# Patient Record
Sex: FEMALE | Race: WHITE | Employment: OTHER | URBAN - NONMETROPOLITAN AREA
[De-identification: names, ages, dates, MRNs, and addresses within clinical notes are randomized per-mention and may not be internally consistent; named-entity substitution may affect disease eponyms.]

---

## 2017-03-22 ENCOUNTER — OFFICE VISIT (OUTPATIENT)
Dept: FAMILY MEDICINE CLINIC | Age: 67
End: 2017-03-22

## 2017-03-22 VITALS
HEIGHT: 61 IN | OXYGEN SATURATION: 97 % | DIASTOLIC BLOOD PRESSURE: 84 MMHG | SYSTOLIC BLOOD PRESSURE: 120 MMHG | HEART RATE: 73 BPM | BODY MASS INDEX: 47.01 KG/M2 | WEIGHT: 249 LBS

## 2017-03-22 DIAGNOSIS — R29.898 RIGIDITY: ICD-10-CM

## 2017-03-22 DIAGNOSIS — Z11.59 NEED FOR HEPATITIS C SCREENING TEST: ICD-10-CM

## 2017-03-22 DIAGNOSIS — I10 ESSENTIAL HYPERTENSION: ICD-10-CM

## 2017-03-22 DIAGNOSIS — R25.1 TREMOR: Primary | ICD-10-CM

## 2017-03-22 DIAGNOSIS — M16.11 PRIMARY OSTEOARTHRITIS OF RIGHT HIP: ICD-10-CM

## 2017-03-22 DIAGNOSIS — Z12.11 SCREEN FOR COLON CANCER: ICD-10-CM

## 2017-03-22 DIAGNOSIS — M25.551 RIGHT HIP PAIN: ICD-10-CM

## 2017-03-22 PROCEDURE — 99203 OFFICE O/P NEW LOW 30 MIN: CPT | Performed by: FAMILY MEDICINE

## 2017-03-22 RX ORDER — DICLOFENAC SODIUM AND MISOPROSTOL 75; 200 MG/1; UG/1
TABLET, DELAYED RELEASE ORAL
COMMUNITY
Start: 2017-03-06 | End: 2017-08-17

## 2017-03-24 DIAGNOSIS — I10 ESSENTIAL HYPERTENSION: ICD-10-CM

## 2017-03-24 DIAGNOSIS — Z11.59 NEED FOR HEPATITIS C SCREENING TEST: ICD-10-CM

## 2017-03-24 DIAGNOSIS — R25.1 TREMOR: ICD-10-CM

## 2017-03-24 LAB
ALBUMIN SERPL-MCNC: 4.2 G/DL (ref 3.9–4.9)
ALP BLD-CCNC: 61 U/L (ref 40–130)
ALT SERPL-CCNC: 24 U/L (ref 0–33)
ANION GAP SERPL CALCULATED.3IONS-SCNC: 16 MEQ/L (ref 7–13)
AST SERPL-CCNC: 20 U/L (ref 0–35)
BILIRUB SERPL-MCNC: 0.4 MG/DL (ref 0–1.2)
BUN BLDV-MCNC: 20 MG/DL (ref 8–23)
CALCIUM SERPL-MCNC: 10 MG/DL (ref 8.6–10.2)
CHLORIDE BLD-SCNC: 100 MEQ/L (ref 98–107)
CHOLESTEROL, TOTAL: 128 MG/DL (ref 0–199)
CO2: 25 MEQ/L (ref 22–29)
CREAT SERPL-MCNC: 0.78 MG/DL (ref 0.5–0.9)
GFR AFRICAN AMERICAN: >60
GFR NON-AFRICAN AMERICAN: >60
GLOBULIN: 2.7 G/DL (ref 2.3–3.5)
GLUCOSE BLD-MCNC: 94 MG/DL (ref 74–109)
HCT VFR BLD CALC: 44 % (ref 37–47)
HDLC SERPL-MCNC: 44 MG/DL (ref 40–59)
HEMOGLOBIN: 14.5 G/DL (ref 12–16)
HEPATITIS C ANTIBODY INTERPRETATION: NORMAL
LDL CHOLESTEROL CALCULATED: 69 MG/DL (ref 0–129)
MCH RBC QN AUTO: 29.2 PG (ref 27–31.3)
MCHC RBC AUTO-ENTMCNC: 33 % (ref 33–37)
MCV RBC AUTO: 88.5 FL (ref 82–100)
PDW BLD-RTO: 13.8 % (ref 11.5–14.5)
PLATELET # BLD: 307 K/UL (ref 130–400)
POTASSIUM SERPL-SCNC: 4.5 MEQ/L (ref 3.5–5.1)
RBC # BLD: 4.97 M/UL (ref 4.2–5.4)
SODIUM BLD-SCNC: 141 MEQ/L (ref 132–144)
TOTAL PROTEIN: 6.9 G/DL (ref 6.4–8.1)
TRIGL SERPL-MCNC: 74 MG/DL (ref 0–200)
TSH SERPL DL<=0.05 MIU/L-ACNC: 4.86 UIU/ML (ref 0.27–4.2)
WBC # BLD: 8.2 K/UL (ref 4.8–10.8)

## 2017-04-20 ENCOUNTER — HOSPITAL ENCOUNTER (OUTPATIENT)
Dept: GENERAL RADIOLOGY | Age: 67
Discharge: HOME | End: 2017-04-20
Payer: COMMERCIAL

## 2017-04-20 DIAGNOSIS — M16.10 DEGENERATIVE JOINT DISEASE OF PELVIC REGION: ICD-10-CM

## 2017-04-20 PROCEDURE — 2500000003 HC RX 250 WO HCPCS: Performed by: RADIOLOGY

## 2017-04-20 PROCEDURE — 20610 DRAIN/INJ JOINT/BURSA W/O US: CPT

## 2017-04-20 PROCEDURE — 6360000002 HC RX W HCPCS: Performed by: RADIOLOGY

## 2017-04-20 RX ORDER — TRIAMCINOLONE ACETONIDE 40 MG/ML
80 INJECTION, SUSPENSION INTRA-ARTICULAR; INTRAMUSCULAR ONCE
Status: COMPLETED | OUTPATIENT
Start: 2017-04-20 | End: 2017-04-20

## 2017-04-20 RX ORDER — BUPIVACAINE HYDROCHLORIDE 5 MG/ML
30 INJECTION, SOLUTION EPIDURAL; INTRACAUDAL ONCE
Status: COMPLETED | OUTPATIENT
Start: 2017-04-20 | End: 2017-04-20

## 2017-04-20 RX ORDER — LIDOCAINE HYDROCHLORIDE 20 MG/ML
20 INJECTION, SOLUTION INFILTRATION; PERINEURAL ONCE
Status: COMPLETED | OUTPATIENT
Start: 2017-04-20 | End: 2017-04-20

## 2017-04-20 RX ADMIN — TRIAMCINOLONE ACETONIDE 80 MG: 40 INJECTION, SUSPENSION INTRA-ARTICULAR; INTRAMUSCULAR at 09:05

## 2017-04-20 RX ADMIN — BUPIVACAINE HYDROCHLORIDE 3 MG: 5 INJECTION, SOLUTION EPIDURAL; INTRACAUDAL; PERINEURAL at 09:00

## 2017-04-20 RX ADMIN — LIDOCAINE HYDROCHLORIDE 12 ML: 20 INJECTION, SOLUTION INFILTRATION; PERINEURAL at 09:10

## 2017-07-31 ENCOUNTER — OFFICE VISIT (OUTPATIENT)
Dept: FAMILY MEDICINE CLINIC | Age: 67
End: 2017-07-31

## 2017-07-31 VITALS
SYSTOLIC BLOOD PRESSURE: 128 MMHG | HEART RATE: 87 BPM | WEIGHT: 207 LBS | DIASTOLIC BLOOD PRESSURE: 82 MMHG | HEIGHT: 61 IN | BODY MASS INDEX: 39.08 KG/M2 | OXYGEN SATURATION: 97 %

## 2017-07-31 DIAGNOSIS — G20 PARKINSONISM, UNSPECIFIED PARKINSONISM TYPE (HCC): ICD-10-CM

## 2017-07-31 DIAGNOSIS — Z23 NEED FOR PNEUMOCOCCAL VACCINATION: ICD-10-CM

## 2017-07-31 DIAGNOSIS — I10 ESSENTIAL HYPERTENSION: ICD-10-CM

## 2017-07-31 DIAGNOSIS — Z12.11 COLON CANCER SCREENING: ICD-10-CM

## 2017-07-31 DIAGNOSIS — R19.5 POSITIVE FIT (FECAL IMMUNOCHEMICAL TEST): ICD-10-CM

## 2017-07-31 DIAGNOSIS — M16.11 PRIMARY OSTEOARTHRITIS OF RIGHT HIP: Primary | ICD-10-CM

## 2017-07-31 LAB
CONTROL: YES
HEMOCCULT STL QL: ABNORMAL

## 2017-07-31 PROCEDURE — 90471 IMMUNIZATION ADMIN: CPT | Performed by: FAMILY MEDICINE

## 2017-07-31 PROCEDURE — 99213 OFFICE O/P EST LOW 20 MIN: CPT | Performed by: FAMILY MEDICINE

## 2017-07-31 PROCEDURE — 82274 ASSAY TEST FOR BLOOD FECAL: CPT | Performed by: FAMILY MEDICINE

## 2017-07-31 PROCEDURE — 90670 PCV13 VACCINE IM: CPT | Performed by: FAMILY MEDICINE

## 2017-07-31 ASSESSMENT — PATIENT HEALTH QUESTIONNAIRE - PHQ9
2. FEELING DOWN, DEPRESSED OR HOPELESS: 0
1. LITTLE INTEREST OR PLEASURE IN DOING THINGS: 0
SUM OF ALL RESPONSES TO PHQ9 QUESTIONS 1 & 2: 0
SUM OF ALL RESPONSES TO PHQ QUESTIONS 1-9: 0

## 2017-08-10 ENCOUNTER — HOSPITAL ENCOUNTER (OUTPATIENT)
Dept: PREADMISSION TESTING | Age: 67
Discharge: HOME | End: 2017-08-10
Payer: COMMERCIAL

## 2017-08-10 VITALS
RESPIRATION RATE: 18 BRPM | HEIGHT: 60 IN | TEMPERATURE: 97 F | WEIGHT: 205 LBS | DIASTOLIC BLOOD PRESSURE: 77 MMHG | HEART RATE: 84 BPM | OXYGEN SATURATION: 96 % | BODY MASS INDEX: 40.25 KG/M2 | SYSTOLIC BLOOD PRESSURE: 152 MMHG

## 2017-08-10 PROBLEM — M16.11 OSTEOARTHRITIS OF RIGHT HIP: Status: ACTIVE | Noted: 2017-08-10

## 2017-08-10 LAB
ABO/RH: NORMAL
ANION GAP SERPL CALCULATED.3IONS-SCNC: 15 MEQ/L (ref 7–13)
ANTIBODY IDENTIFICATION: NORMAL
ANTIBODY SCREEN: NORMAL
BILIRUBIN URINE: NEGATIVE
BLOOD, URINE: NEGATIVE
BUN BLDV-MCNC: 22 MG/DL (ref 8–23)
CALCIUM SERPL-MCNC: 9.4 MG/DL (ref 8.6–10.2)
CHLORIDE BLD-SCNC: 102 MEQ/L (ref 98–107)
CLARITY: CLEAR
CO2: 23 MEQ/L (ref 22–29)
COLOR: YELLOW
CREAT SERPL-MCNC: 0.66 MG/DL (ref 0.5–0.9)
DAT POLYSPECIFIC: NORMAL
EKG ATRIAL RATE: 75 BPM
EKG P AXIS: -9 DEGREES
EKG P-R INTERVAL: 138 MS
EKG Q-T INTERVAL: 382 MS
EKG QRS DURATION: 98 MS
EKG QTC CALCULATION (BAZETT): 426 MS
EKG R AXIS: -20 DEGREES
EKG T AXIS: 2 DEGREES
EKG VENTRICULAR RATE: 75 BPM
GFR AFRICAN AMERICAN: >60
GFR NON-AFRICAN AMERICAN: >60
GLUCOSE BLD-MCNC: 99 MG/DL (ref 74–109)
GLUCOSE URINE: NEGATIVE MG/DL
HCT VFR BLD CALC: 42.9 % (ref 37–47)
HEMOGLOBIN: 14.2 G/DL (ref 12–16)
KETONES, URINE: NEGATIVE MG/DL
LEUKOCYTE ESTERASE, URINE: NEGATIVE
MCH RBC QN AUTO: 29.8 PG (ref 27–31.3)
MCHC RBC AUTO-ENTMCNC: 33.1 % (ref 33–37)
MCV RBC AUTO: 90 FL (ref 82–100)
NITRITE, URINE: NEGATIVE
PDW BLD-RTO: 13.6 % (ref 11.5–14.5)
PH UA: 7.5 (ref 5–9)
PLATELET # BLD: 335 K/UL (ref 130–400)
POTASSIUM SERPL-SCNC: 4.3 MEQ/L (ref 3.5–5.1)
PROTEIN UA: NEGATIVE MG/DL
RBC # BLD: 4.77 M/UL (ref 4.2–5.4)
SODIUM BLD-SCNC: 140 MEQ/L (ref 132–144)
SPECIFIC GRAVITY UA: 1.02 (ref 1–1.03)
URINE REFLEX TO CULTURE: NORMAL
UROBILINOGEN, URINE: 0.2 E.U./DL
WBC # BLD: 7.5 K/UL (ref 4.8–10.8)

## 2017-08-10 PROCEDURE — 86870 RBC ANTIBODY IDENTIFICATION: CPT

## 2017-08-10 PROCEDURE — 93005 ELECTROCARDIOGRAM TRACING: CPT

## 2017-08-10 PROCEDURE — 86901 BLOOD TYPING SEROLOGIC RH(D): CPT

## 2017-08-10 PROCEDURE — 86900 BLOOD TYPING SEROLOGIC ABO: CPT

## 2017-08-10 PROCEDURE — 86850 RBC ANTIBODY SCREEN: CPT

## 2017-08-10 PROCEDURE — 85027 COMPLETE CBC AUTOMATED: CPT

## 2017-08-10 PROCEDURE — 81003 URINALYSIS AUTO W/O SCOPE: CPT

## 2017-08-10 PROCEDURE — 80048 BASIC METABOLIC PNL TOTAL CA: CPT

## 2017-08-10 PROCEDURE — 93010 ELECTROCARDIOGRAM REPORT: CPT | Performed by: INTERNAL MEDICINE

## 2017-08-10 PROCEDURE — 86880 COOMBS TEST DIRECT: CPT

## 2017-08-10 RX ORDER — SODIUM CHLORIDE 0.9 % (FLUSH) 0.9 %
10 SYRINGE (ML) INJECTION EVERY 12 HOURS SCHEDULED
Status: CANCELLED | OUTPATIENT
Start: 2017-08-10

## 2017-08-10 RX ORDER — TRAMADOL HYDROCHLORIDE 50 MG/1
50 TABLET ORAL EVERY 6 HOURS PRN
Status: ON HOLD | COMMUNITY
End: 2017-08-17

## 2017-08-10 RX ORDER — SODIUM CHLORIDE, SODIUM LACTATE, POTASSIUM CHLORIDE, CALCIUM CHLORIDE 600; 310; 30; 20 MG/100ML; MG/100ML; MG/100ML; MG/100ML
INJECTION, SOLUTION INTRAVENOUS CONTINUOUS
Status: CANCELLED | OUTPATIENT
Start: 2017-08-10

## 2017-08-10 RX ORDER — LIDOCAINE HYDROCHLORIDE 10 MG/ML
1 INJECTION, SOLUTION EPIDURAL; INFILTRATION; INTRACAUDAL; PERINEURAL
Status: CANCELLED | OUTPATIENT
Start: 2017-08-10 | End: 2017-08-10

## 2017-08-10 RX ORDER — SODIUM CHLORIDE 0.9 % (FLUSH) 0.9 %
10 SYRINGE (ML) INJECTION PRN
Status: CANCELLED | OUTPATIENT
Start: 2017-08-10

## 2017-08-16 ENCOUNTER — ANESTHESIA EVENT (OUTPATIENT)
Dept: OPERATING ROOM | Age: 67
DRG: 470 | End: 2017-08-16
Payer: COMMERCIAL

## 2017-08-17 ENCOUNTER — PREP FOR PROCEDURE (OUTPATIENT)
Dept: ORTHOPEDIC SURGERY | Age: 67
End: 2017-08-17

## 2017-08-17 ENCOUNTER — ANESTHESIA (OUTPATIENT)
Dept: OPERATING ROOM | Age: 67
DRG: 470 | End: 2017-08-17
Payer: COMMERCIAL

## 2017-08-17 ENCOUNTER — APPOINTMENT (OUTPATIENT)
Dept: GENERAL RADIOLOGY | Age: 67
DRG: 470 | End: 2017-08-17
Attending: ORTHOPAEDIC SURGERY
Payer: COMMERCIAL

## 2017-08-17 ENCOUNTER — HOSPITAL ENCOUNTER (INPATIENT)
Age: 67
LOS: 2 days | Discharge: HOME HEALTH | DRG: 470 | End: 2017-08-19
Attending: ORTHOPAEDIC SURGERY | Admitting: ORTHOPAEDIC SURGERY
Payer: COMMERCIAL

## 2017-08-17 VITALS — SYSTOLIC BLOOD PRESSURE: 113 MMHG | TEMPERATURE: 97.7 F | DIASTOLIC BLOOD PRESSURE: 62 MMHG | OXYGEN SATURATION: 100 %

## 2017-08-17 PROCEDURE — 2720000010 HC SURG SUPPLY STERILE: Performed by: ORTHOPAEDIC SURGERY

## 2017-08-17 PROCEDURE — 6360000002 HC RX W HCPCS: Performed by: NURSE ANESTHETIST, CERTIFIED REGISTERED

## 2017-08-17 PROCEDURE — 6360000002 HC RX W HCPCS: Performed by: NURSE PRACTITIONER

## 2017-08-17 PROCEDURE — 87086 URINE CULTURE/COLONY COUNT: CPT

## 2017-08-17 PROCEDURE — G8987 SELF CARE CURRENT STATUS: HCPCS

## 2017-08-17 PROCEDURE — C1773 RET DEV, INSERTABLE: HCPCS | Performed by: ORTHOPAEDIC SURGERY

## 2017-08-17 PROCEDURE — G8988 SELF CARE GOAL STATUS: HCPCS

## 2017-08-17 PROCEDURE — 2500000003 HC RX 250 WO HCPCS: Performed by: STUDENT IN AN ORGANIZED HEALTH CARE EDUCATION/TRAINING PROGRAM

## 2017-08-17 PROCEDURE — 2580000003 HC RX 258: Performed by: STUDENT IN AN ORGANIZED HEALTH CARE EDUCATION/TRAINING PROGRAM

## 2017-08-17 PROCEDURE — 3600000014 HC SURGERY LEVEL 4 ADDTL 15MIN: Performed by: ORTHOPAEDIC SURGERY

## 2017-08-17 PROCEDURE — 1210000000 HC MED SURG R&B

## 2017-08-17 PROCEDURE — 3700000001 HC ADD 15 MINUTES (ANESTHESIA): Performed by: ORTHOPAEDIC SURGERY

## 2017-08-17 PROCEDURE — 2580000003 HC RX 258: Performed by: ORTHOPAEDIC SURGERY

## 2017-08-17 PROCEDURE — 97167 OT EVAL HIGH COMPLEX 60 MIN: CPT

## 2017-08-17 PROCEDURE — 97161 PT EVAL LOW COMPLEX 20 MIN: CPT

## 2017-08-17 PROCEDURE — 0SR904A REPLACEMENT OF RIGHT HIP JOINT WITH CERAMIC ON POLYETHYLENE SYNTHETIC SUBSTITUTE, UNCEMENTED, OPEN APPROACH: ICD-10-PCS | Performed by: ORTHOPAEDIC SURGERY

## 2017-08-17 PROCEDURE — 73501 X-RAY EXAM HIP UNI 1 VIEW: CPT

## 2017-08-17 PROCEDURE — 6360000002 HC RX W HCPCS: Performed by: ORTHOPAEDIC SURGERY

## 2017-08-17 PROCEDURE — 7100000001 HC PACU RECOVERY - ADDTL 15 MIN: Performed by: ORTHOPAEDIC SURGERY

## 2017-08-17 PROCEDURE — 3700000000 HC ANESTHESIA ATTENDED CARE: Performed by: ORTHOPAEDIC SURGERY

## 2017-08-17 PROCEDURE — G8978 MOBILITY CURRENT STATUS: HCPCS

## 2017-08-17 PROCEDURE — G8979 MOBILITY GOAL STATUS: HCPCS

## 2017-08-17 PROCEDURE — 3600000004 HC SURGERY LEVEL 4 BASE: Performed by: ORTHOPAEDIC SURGERY

## 2017-08-17 PROCEDURE — 6370000000 HC RX 637 (ALT 250 FOR IP): Performed by: PHYSICIAN ASSISTANT

## 2017-08-17 PROCEDURE — 6370000000 HC RX 637 (ALT 250 FOR IP): Performed by: ORTHOPAEDIC SURGERY

## 2017-08-17 PROCEDURE — C1776 JOINT DEVICE (IMPLANTABLE): HCPCS | Performed by: ORTHOPAEDIC SURGERY

## 2017-08-17 PROCEDURE — 7100000000 HC PACU RECOVERY - FIRST 15 MIN: Performed by: ORTHOPAEDIC SURGERY

## 2017-08-17 PROCEDURE — 2500000003 HC RX 250 WO HCPCS: Performed by: NURSE PRACTITIONER

## 2017-08-17 PROCEDURE — 2580000003 HC RX 258: Performed by: NURSE PRACTITIONER

## 2017-08-17 PROCEDURE — 2500000003 HC RX 250 WO HCPCS: Performed by: NURSE ANESTHETIST, CERTIFIED REGISTERED

## 2017-08-17 DEVICE — LINER ACET SZ E ID42MM HIP X3 CO CHROM CEMENTLESS MOD 2: Type: IMPLANTABLE DEVICE | Status: FUNCTIONAL

## 2017-08-17 DEVICE — HEAD FEM DIA28MM +0MM OFFSET HIP BIOLOX DELT CERAMIC TAPR: Type: IMPLANTABLE DEVICE | Status: FUNCTIONAL

## 2017-08-17 DEVICE — STEM FEM SZ 3 L102MM NK L30MM 38MM OFFSET 127DEG HIP TI: Type: IMPLANTABLE DEVICE | Status: FUNCTIONAL

## 2017-08-17 DEVICE — SHELL ACET SZ E DIA52MM HIP TRIDENT HA CLUS H HMSPHR MOD 2: Type: IMPLANTABLE DEVICE | Status: FUNCTIONAL

## 2017-08-17 RX ORDER — PROPOFOL 10 MG/ML
INJECTION, EMULSION INTRAVENOUS CONTINUOUS PRN
Status: DISCONTINUED | OUTPATIENT
Start: 2017-08-17 | End: 2017-08-17 | Stop reason: SDUPTHER

## 2017-08-17 RX ORDER — PROPOFOL 10 MG/ML
INJECTION, EMULSION INTRAVENOUS PRN
Status: DISCONTINUED | OUTPATIENT
Start: 2017-08-17 | End: 2017-08-17 | Stop reason: SDUPTHER

## 2017-08-17 RX ORDER — SENNA AND DOCUSATE SODIUM 50; 8.6 MG/1; MG/1
1 TABLET, FILM COATED ORAL DAILY
Status: DISCONTINUED | OUTPATIENT
Start: 2017-08-17 | End: 2017-08-19 | Stop reason: HOSPADM

## 2017-08-17 RX ORDER — METOCLOPRAMIDE HYDROCHLORIDE 5 MG/ML
10 INJECTION INTRAMUSCULAR; INTRAVENOUS
Status: DISCONTINUED | OUTPATIENT
Start: 2017-08-17 | End: 2017-08-17 | Stop reason: HOSPADM

## 2017-08-17 RX ORDER — FENTANYL CITRATE 50 UG/ML
50 INJECTION, SOLUTION INTRAMUSCULAR; INTRAVENOUS EVERY 10 MIN PRN
Status: DISCONTINUED | OUTPATIENT
Start: 2017-08-17 | End: 2017-08-17 | Stop reason: HOSPADM

## 2017-08-17 RX ORDER — OXYCODONE HYDROCHLORIDE AND ACETAMINOPHEN 5; 325 MG/1; MG/1
1 TABLET ORAL EVERY 4 HOURS PRN
Status: DISCONTINUED | OUTPATIENT
Start: 2017-08-17 | End: 2017-08-19 | Stop reason: HOSPADM

## 2017-08-17 RX ORDER — SENNA AND DOCUSATE SODIUM 50; 8.6 MG/1; MG/1
1 TABLET, FILM COATED ORAL DAILY
Status: CANCELLED | OUTPATIENT
Start: 2017-08-17

## 2017-08-17 RX ORDER — OXYCODONE HYDROCHLORIDE AND ACETAMINOPHEN 5; 325 MG/1; MG/1
1 TABLET ORAL EVERY 4 HOURS PRN
Status: CANCELLED | OUTPATIENT
Start: 2017-08-17

## 2017-08-17 RX ORDER — ACETAMINOPHEN 500 MG
500 TABLET ORAL EVERY 6 HOURS PRN
Status: DISCONTINUED | OUTPATIENT
Start: 2017-08-17 | End: 2017-08-19 | Stop reason: HOSPADM

## 2017-08-17 RX ORDER — ONDANSETRON 2 MG/ML
INJECTION INTRAMUSCULAR; INTRAVENOUS PRN
Status: DISCONTINUED | OUTPATIENT
Start: 2017-08-17 | End: 2017-08-17 | Stop reason: SDUPTHER

## 2017-08-17 RX ORDER — SODIUM CHLORIDE 0.9 % (FLUSH) 0.9 %
10 SYRINGE (ML) INJECTION EVERY 12 HOURS SCHEDULED
Status: DISCONTINUED | OUTPATIENT
Start: 2017-08-17 | End: 2017-08-17 | Stop reason: HOSPADM

## 2017-08-17 RX ORDER — TRANEXAMIC ACID 100 MG/ML
INJECTION, SOLUTION INTRAVENOUS PRN
Status: DISCONTINUED | OUTPATIENT
Start: 2017-08-17 | End: 2017-08-17 | Stop reason: SDUPTHER

## 2017-08-17 RX ORDER — OXYCODONE HYDROCHLORIDE AND ACETAMINOPHEN 5; 325 MG/1; MG/1
2 TABLET ORAL EVERY 4 HOURS PRN
Status: CANCELLED | OUTPATIENT
Start: 2017-08-17

## 2017-08-17 RX ORDER — SODIUM CHLORIDE 0.9 % (FLUSH) 0.9 %
10 SYRINGE (ML) INJECTION PRN
Status: DISCONTINUED | OUTPATIENT
Start: 2017-08-17 | End: 2017-08-19 | Stop reason: HOSPADM

## 2017-08-17 RX ORDER — EPHEDRINE SULFATE 50 MG/ML
INJECTION, SOLUTION INTRAVENOUS PRN
Status: DISCONTINUED | OUTPATIENT
Start: 2017-08-17 | End: 2017-08-17 | Stop reason: SDUPTHER

## 2017-08-17 RX ORDER — DOCUSATE SODIUM 100 MG/1
100 CAPSULE, LIQUID FILLED ORAL 2 TIMES DAILY
Status: DISCONTINUED | OUTPATIENT
Start: 2017-08-17 | End: 2017-08-19 | Stop reason: HOSPADM

## 2017-08-17 RX ORDER — MORPHINE SULFATE 2 MG/ML
2 INJECTION, SOLUTION INTRAMUSCULAR; INTRAVENOUS
Status: CANCELLED | OUTPATIENT
Start: 2017-08-17

## 2017-08-17 RX ORDER — OXYCODONE HYDROCHLORIDE AND ACETAMINOPHEN 5; 325 MG/1; MG/1
2 TABLET ORAL EVERY 4 HOURS PRN
Status: DISCONTINUED | OUTPATIENT
Start: 2017-08-17 | End: 2017-08-19 | Stop reason: HOSPADM

## 2017-08-17 RX ORDER — KETOROLAC TROMETHAMINE 30 MG/ML
30 INJECTION, SOLUTION INTRAMUSCULAR; INTRAVENOUS EVERY 6 HOURS
Status: CANCELLED | OUTPATIENT
Start: 2017-08-17 | End: 2017-08-17

## 2017-08-17 RX ORDER — MORPHINE SULFATE 4 MG/ML
4 INJECTION, SOLUTION INTRAMUSCULAR; INTRAVENOUS
Status: DISCONTINUED | OUTPATIENT
Start: 2017-08-17 | End: 2017-08-19 | Stop reason: HOSPADM

## 2017-08-17 RX ORDER — SPIRONOLACTONE AND HYDROCHLOROTHIAZIDE 25; 25 MG/1; MG/1
2 TABLET ORAL DAILY
Status: DISCONTINUED | OUTPATIENT
Start: 2017-08-17 | End: 2017-08-19 | Stop reason: HOSPADM

## 2017-08-17 RX ORDER — SODIUM CHLORIDE 9 MG/ML
INJECTION, SOLUTION INTRAVENOUS CONTINUOUS
Status: CANCELLED | OUTPATIENT
Start: 2017-08-17

## 2017-08-17 RX ORDER — HYDROCODONE BITARTRATE AND ACETAMINOPHEN 5; 325 MG/1; MG/1
2 TABLET ORAL PRN
Status: DISCONTINUED | OUTPATIENT
Start: 2017-08-17 | End: 2017-08-17 | Stop reason: HOSPADM

## 2017-08-17 RX ORDER — MEPERIDINE HYDROCHLORIDE 25 MG/ML
12.5 INJECTION INTRAMUSCULAR; INTRAVENOUS; SUBCUTANEOUS EVERY 5 MIN PRN
Status: DISCONTINUED | OUTPATIENT
Start: 2017-08-17 | End: 2017-08-17 | Stop reason: HOSPADM

## 2017-08-17 RX ORDER — SODIUM CHLORIDE 9 MG/ML
INJECTION, SOLUTION INTRAVENOUS CONTINUOUS
Status: DISCONTINUED | OUTPATIENT
Start: 2017-08-17 | End: 2017-08-19 | Stop reason: HOSPADM

## 2017-08-17 RX ORDER — SODIUM CHLORIDE 0.9 % (FLUSH) 0.9 %
10 SYRINGE (ML) INJECTION PRN
Status: CANCELLED | OUTPATIENT
Start: 2017-08-17

## 2017-08-17 RX ORDER — ONDANSETRON 2 MG/ML
4 INJECTION INTRAMUSCULAR; INTRAVENOUS EVERY 6 HOURS PRN
Status: DISCONTINUED | OUTPATIENT
Start: 2017-08-17 | End: 2017-08-19 | Stop reason: HOSPADM

## 2017-08-17 RX ORDER — HYDROCODONE BITARTRATE AND ACETAMINOPHEN 5; 325 MG/1; MG/1
1 TABLET ORAL PRN
Status: DISCONTINUED | OUTPATIENT
Start: 2017-08-17 | End: 2017-08-17 | Stop reason: HOSPADM

## 2017-08-17 RX ORDER — LIDOCAINE HYDROCHLORIDE 10 MG/ML
1 INJECTION, SOLUTION EPIDURAL; INFILTRATION; INTRACAUDAL; PERINEURAL
Status: COMPLETED | OUTPATIENT
Start: 2017-08-17 | End: 2017-08-17

## 2017-08-17 RX ORDER — MORPHINE SULFATE 2 MG/ML
4 INJECTION, SOLUTION INTRAMUSCULAR; INTRAVENOUS
Status: CANCELLED | OUTPATIENT
Start: 2017-08-17

## 2017-08-17 RX ORDER — SODIUM CHLORIDE 0.9 % (FLUSH) 0.9 %
10 SYRINGE (ML) INJECTION EVERY 12 HOURS SCHEDULED
Status: CANCELLED | OUTPATIENT
Start: 2017-08-17

## 2017-08-17 RX ORDER — SODIUM CHLORIDE 0.9 % (FLUSH) 0.9 %
10 SYRINGE (ML) INJECTION PRN
Status: DISCONTINUED | OUTPATIENT
Start: 2017-08-17 | End: 2017-08-17 | Stop reason: HOSPADM

## 2017-08-17 RX ORDER — DEXAMETHASONE SODIUM PHOSPHATE 4 MG/ML
INJECTION, SOLUTION INTRA-ARTICULAR; INTRALESIONAL; INTRAMUSCULAR; INTRAVENOUS; SOFT TISSUE PRN
Status: DISCONTINUED | OUTPATIENT
Start: 2017-08-17 | End: 2017-08-17 | Stop reason: SDUPTHER

## 2017-08-17 RX ORDER — ACETAMINOPHEN 325 MG/1
500 TABLET ORAL EVERY 6 HOURS PRN
Status: CANCELLED | OUTPATIENT
Start: 2017-08-17

## 2017-08-17 RX ORDER — DIPHENHYDRAMINE HYDROCHLORIDE 50 MG/ML
12.5 INJECTION INTRAMUSCULAR; INTRAVENOUS
Status: DISCONTINUED | OUTPATIENT
Start: 2017-08-17 | End: 2017-08-17 | Stop reason: HOSPADM

## 2017-08-17 RX ORDER — MIDAZOLAM HYDROCHLORIDE 1 MG/ML
INJECTION INTRAMUSCULAR; INTRAVENOUS PRN
Status: DISCONTINUED | OUTPATIENT
Start: 2017-08-17 | End: 2017-08-17 | Stop reason: SDUPTHER

## 2017-08-17 RX ORDER — ONDANSETRON 2 MG/ML
4 INJECTION INTRAMUSCULAR; INTRAVENOUS EVERY 6 HOURS PRN
Status: CANCELLED | OUTPATIENT
Start: 2017-08-17

## 2017-08-17 RX ORDER — SODIUM CHLORIDE 0.9 % (FLUSH) 0.9 %
10 SYRINGE (ML) INJECTION EVERY 12 HOURS SCHEDULED
Status: DISCONTINUED | OUTPATIENT
Start: 2017-08-17 | End: 2017-08-19 | Stop reason: HOSPADM

## 2017-08-17 RX ORDER — MORPHINE SULFATE 1 MG/ML
INJECTION, SOLUTION EPIDURAL; INTRATHECAL; INTRAVENOUS PRN
Status: DISCONTINUED | OUTPATIENT
Start: 2017-08-17 | End: 2017-08-17 | Stop reason: SDUPTHER

## 2017-08-17 RX ORDER — ASPIRIN 81 MG/1
81 TABLET ORAL 2 TIMES DAILY
Status: DISCONTINUED | OUTPATIENT
Start: 2017-08-17 | End: 2017-08-19 | Stop reason: HOSPADM

## 2017-08-17 RX ORDER — KETOROLAC TROMETHAMINE 30 MG/ML
30 INJECTION, SOLUTION INTRAMUSCULAR; INTRAVENOUS EVERY 6 HOURS
Status: COMPLETED | OUTPATIENT
Start: 2017-08-17 | End: 2017-08-17

## 2017-08-17 RX ORDER — MORPHINE SULFATE 2 MG/ML
2 INJECTION, SOLUTION INTRAMUSCULAR; INTRAVENOUS
Status: DISCONTINUED | OUTPATIENT
Start: 2017-08-17 | End: 2017-08-19 | Stop reason: HOSPADM

## 2017-08-17 RX ORDER — SODIUM CHLORIDE, SODIUM LACTATE, POTASSIUM CHLORIDE, CALCIUM CHLORIDE 600; 310; 30; 20 MG/100ML; MG/100ML; MG/100ML; MG/100ML
INJECTION, SOLUTION INTRAVENOUS CONTINUOUS
Status: DISCONTINUED | OUTPATIENT
Start: 2017-08-17 | End: 2017-08-17

## 2017-08-17 RX ORDER — DOCUSATE SODIUM 100 MG/1
100 CAPSULE, LIQUID FILLED ORAL 2 TIMES DAILY
Status: CANCELLED | OUTPATIENT
Start: 2017-08-17

## 2017-08-17 RX ORDER — ONDANSETRON 2 MG/ML
4 INJECTION INTRAMUSCULAR; INTRAVENOUS
Status: DISCONTINUED | OUTPATIENT
Start: 2017-08-17 | End: 2017-08-17 | Stop reason: HOSPADM

## 2017-08-17 RX ADMIN — CEFAZOLIN SODIUM 2 G: 2 SOLUTION INTRAVENOUS at 16:32

## 2017-08-17 RX ADMIN — PHENYLEPHRINE HYDROCHLORIDE 100 MCG: 10 INJECTION INTRAVENOUS at 09:29

## 2017-08-17 RX ADMIN — ASPIRIN 81 MG: 81 TABLET, COATED ORAL at 20:29

## 2017-08-17 RX ADMIN — CEFAZOLIN SODIUM 2 G: 2 SOLUTION INTRAVENOUS at 08:40

## 2017-08-17 RX ADMIN — EPHEDRINE SULFATE 5 MG: 50 INJECTION, SOLUTION INTRAMUSCULAR; INTRAVENOUS; SUBCUTANEOUS at 09:57

## 2017-08-17 RX ADMIN — MORPHINE SULFATE 0.2 MG: 1 INJECTION EPIDURAL; INTRATHECAL; INTRAVENOUS at 08:50

## 2017-08-17 RX ADMIN — PROPOFOL 30 MG: 10 INJECTION, EMULSION INTRAVENOUS at 09:00

## 2017-08-17 RX ADMIN — PHENYLEPHRINE HYDROCHLORIDE 100 MCG: 10 INJECTION INTRAVENOUS at 09:22

## 2017-08-17 RX ADMIN — TRANEXAMIC ACID 1000 MG: 100 INJECTION, SOLUTION INTRAVENOUS at 11:08

## 2017-08-17 RX ADMIN — DOCUSATE SODIUM 100 MG: 100 CAPSULE, LIQUID FILLED ORAL at 20:30

## 2017-08-17 RX ADMIN — DEXAMETHASONE SODIUM PHOSPHATE 4 MG: 4 INJECTION, SOLUTION INTRAMUSCULAR; INTRAVENOUS at 09:11

## 2017-08-17 RX ADMIN — SENNOSIDES AND DOCUSATE SODIUM 1 TABLET: 8.6; 5 TABLET ORAL at 20:30

## 2017-08-17 RX ADMIN — EPHEDRINE SULFATE 5 MG: 50 INJECTION, SOLUTION INTRAMUSCULAR; INTRAVENOUS; SUBCUTANEOUS at 09:33

## 2017-08-17 RX ADMIN — PROPOFOL 120 MCG/KG/MIN: 10 INJECTION, EMULSION INTRAVENOUS at 09:00

## 2017-08-17 RX ADMIN — SODIUM CHLORIDE: 9 INJECTION, SOLUTION INTRAVENOUS at 12:17

## 2017-08-17 RX ADMIN — CEFAZOLIN SODIUM 2 G: 2 SOLUTION INTRAVENOUS at 23:40

## 2017-08-17 RX ADMIN — EPHEDRINE SULFATE 5 MG: 50 INJECTION, SOLUTION INTRAMUSCULAR; INTRAVENOUS; SUBCUTANEOUS at 09:29

## 2017-08-17 RX ADMIN — MIDAZOLAM HYDROCHLORIDE 2 MG: 1 INJECTION, SOLUTION INTRAMUSCULAR; INTRAVENOUS at 08:40

## 2017-08-17 RX ADMIN — EPHEDRINE SULFATE 5 MG: 50 INJECTION, SOLUTION INTRAMUSCULAR; INTRAVENOUS; SUBCUTANEOUS at 09:49

## 2017-08-17 RX ADMIN — LIDOCAINE HYDROCHLORIDE 0.1 ML: 10 INJECTION, SOLUTION EPIDURAL; INFILTRATION; INTRACAUDAL; PERINEURAL at 07:36

## 2017-08-17 RX ADMIN — TRANEXAMIC ACID 1000 MG: 100 INJECTION, SOLUTION INTRAVENOUS at 09:08

## 2017-08-17 RX ADMIN — EPHEDRINE SULFATE 5 MG: 50 INJECTION, SOLUTION INTRAMUSCULAR; INTRAVENOUS; SUBCUTANEOUS at 09:19

## 2017-08-17 RX ADMIN — SPIRONOLACTONE AND HYDROCHLOROTHIAZIDE 2 TABLET: 25; 25 TABLET, FILM COATED ORAL at 21:57

## 2017-08-17 RX ADMIN — KETOROLAC TROMETHAMINE 30 MG: 30 INJECTION, SOLUTION INTRAMUSCULAR at 18:04

## 2017-08-17 RX ADMIN — ONDANSETRON 4 MG: 2 INJECTION, SOLUTION INTRAMUSCULAR; INTRAVENOUS at 09:28

## 2017-08-17 RX ADMIN — SODIUM CHLORIDE, POTASSIUM CHLORIDE, SODIUM LACTATE AND CALCIUM CHLORIDE: 600; 310; 30; 20 INJECTION, SOLUTION INTRAVENOUS at 09:20

## 2017-08-17 RX ADMIN — KETOROLAC TROMETHAMINE 30 MG: 30 INJECTION, SOLUTION INTRAMUSCULAR at 12:17

## 2017-08-17 RX ADMIN — EPHEDRINE SULFATE 5 MG: 50 INJECTION, SOLUTION INTRAMUSCULAR; INTRAVENOUS; SUBCUTANEOUS at 09:43

## 2017-08-17 RX ADMIN — SODIUM CHLORIDE, POTASSIUM CHLORIDE, SODIUM LACTATE AND CALCIUM CHLORIDE: 600; 310; 30; 20 INJECTION, SOLUTION INTRAVENOUS at 08:40

## 2017-08-17 RX ADMIN — EPHEDRINE SULFATE 5 MG: 50 INJECTION, SOLUTION INTRAMUSCULAR; INTRAVENOUS; SUBCUTANEOUS at 09:13

## 2017-08-17 RX ADMIN — SODIUM CHLORIDE, POTASSIUM CHLORIDE, SODIUM LACTATE AND CALCIUM CHLORIDE: 600; 310; 30; 20 INJECTION, SOLUTION INTRAVENOUS at 07:37

## 2017-08-17 RX ADMIN — PHENYLEPHRINE HYDROCHLORIDE 100 MCG: 10 INJECTION INTRAVENOUS at 09:37

## 2017-08-17 ASSESSMENT — PAIN SCALES - GENERAL
PAINLEVEL_OUTOF10: 2
PAINLEVEL_OUTOF10: 0

## 2017-08-18 LAB
ANION GAP SERPL CALCULATED.3IONS-SCNC: 14 MEQ/L (ref 7–13)
BASOPHILS ABSOLUTE: 0 K/UL (ref 0–0.2)
BASOPHILS RELATIVE PERCENT: 0.2 %
BUN BLDV-MCNC: 18 MG/DL (ref 8–23)
CALCIUM SERPL-MCNC: 8.9 MG/DL (ref 8.6–10.2)
CHLORIDE BLD-SCNC: 96 MEQ/L (ref 98–107)
CO2: 26 MEQ/L (ref 22–29)
CREAT SERPL-MCNC: 0.58 MG/DL (ref 0.5–0.9)
EOSINOPHILS ABSOLUTE: 0.1 K/UL (ref 0–0.7)
EOSINOPHILS RELATIVE PERCENT: 0.9 %
GFR AFRICAN AMERICAN: >60
GFR NON-AFRICAN AMERICAN: >60
GLUCOSE BLD-MCNC: 101 MG/DL (ref 74–109)
HCT VFR BLD CALC: 34.4 % (ref 37–47)
HCT VFR BLD CALC: 36.3 % (ref 37–47)
HEMOGLOBIN: 11.3 G/DL (ref 12–16)
HEMOGLOBIN: 12 G/DL (ref 12–16)
LYMPHOCYTES ABSOLUTE: 1.6 K/UL (ref 1–4.8)
LYMPHOCYTES RELATIVE PERCENT: 10.5 %
MCH RBC QN AUTO: 29 PG (ref 27–31.3)
MCH RBC QN AUTO: 29.4 PG (ref 27–31.3)
MCHC RBC AUTO-ENTMCNC: 32.8 % (ref 33–37)
MCHC RBC AUTO-ENTMCNC: 33.1 % (ref 33–37)
MCV RBC AUTO: 88.4 FL (ref 82–100)
MCV RBC AUTO: 89 FL (ref 82–100)
MONOCYTES ABSOLUTE: 1 K/UL (ref 0.2–0.8)
MONOCYTES RELATIVE PERCENT: 6.5 %
NEUTROPHILS ABSOLUTE: 12.6 K/UL (ref 1.4–6.5)
NEUTROPHILS RELATIVE PERCENT: 81.9 %
PDW BLD-RTO: 13.4 % (ref 11.5–14.5)
PDW BLD-RTO: 13.4 % (ref 11.5–14.5)
PLATELET # BLD: 276 K/UL (ref 130–400)
PLATELET # BLD: 289 K/UL (ref 130–400)
POTASSIUM SERPL-SCNC: 3.5 MEQ/L (ref 3.5–5.1)
RBC # BLD: 3.9 M/UL (ref 4.2–5.4)
RBC # BLD: 4.08 M/UL (ref 4.2–5.4)
SODIUM BLD-SCNC: 136 MEQ/L (ref 132–144)
WBC # BLD: 15.3 K/UL (ref 4.8–10.8)
WBC # BLD: 15.4 K/UL (ref 4.8–10.8)

## 2017-08-18 PROCEDURE — 6370000000 HC RX 637 (ALT 250 FOR IP): Performed by: ORTHOPAEDIC SURGERY

## 2017-08-18 PROCEDURE — 36415 COLL VENOUS BLD VENIPUNCTURE: CPT

## 2017-08-18 PROCEDURE — 80048 BASIC METABOLIC PNL TOTAL CA: CPT

## 2017-08-18 PROCEDURE — 85027 COMPLETE CBC AUTOMATED: CPT

## 2017-08-18 PROCEDURE — 6360000002 HC RX W HCPCS: Performed by: ORTHOPAEDIC SURGERY

## 2017-08-18 PROCEDURE — 2580000003 HC RX 258: Performed by: NURSE PRACTITIONER

## 2017-08-18 PROCEDURE — 1210000000 HC MED SURG R&B

## 2017-08-18 PROCEDURE — 6370000000 HC RX 637 (ALT 250 FOR IP): Performed by: PHYSICIAN ASSISTANT

## 2017-08-18 PROCEDURE — 2580000003 HC RX 258: Performed by: ORTHOPAEDIC SURGERY

## 2017-08-18 PROCEDURE — 97116 GAIT TRAINING THERAPY: CPT

## 2017-08-18 PROCEDURE — 85025 COMPLETE CBC W/AUTO DIFF WBC: CPT

## 2017-08-18 PROCEDURE — 97110 THERAPEUTIC EXERCISES: CPT

## 2017-08-18 PROCEDURE — 6370000000 HC RX 637 (ALT 250 FOR IP): Performed by: NURSE PRACTITIONER

## 2017-08-18 RX ORDER — ASPIRIN 81 MG/1
81 TABLET ORAL 2 TIMES DAILY
Qty: 60 TABLET | Refills: 0 | Status: SHIPPED | OUTPATIENT
Start: 2017-08-18 | End: 2021-03-24

## 2017-08-18 RX ORDER — FAMOTIDINE 20 MG/1
20 TABLET, FILM COATED ORAL ONCE
Status: COMPLETED | OUTPATIENT
Start: 2017-08-18 | End: 2017-08-18

## 2017-08-18 RX ORDER — PSEUDOEPHEDRINE HCL 30 MG
100 TABLET ORAL 2 TIMES DAILY PRN
Qty: 60 CAPSULE | Refills: 0 | Status: SHIPPED | OUTPATIENT
Start: 2017-08-18 | End: 2018-09-17

## 2017-08-18 RX ORDER — OXYCODONE HYDROCHLORIDE AND ACETAMINOPHEN 5; 325 MG/1; MG/1
1 TABLET ORAL EVERY 4 HOURS PRN
Qty: 60 TABLET | Refills: 0 | Status: SHIPPED | OUTPATIENT
Start: 2017-08-18 | End: 2017-08-25

## 2017-08-18 RX ADMIN — ONDANSETRON 4 MG: 2 INJECTION INTRAMUSCULAR; INTRAVENOUS at 08:49

## 2017-08-18 RX ADMIN — OXYCODONE HYDROCHLORIDE AND ACETAMINOPHEN 2 TABLET: 5; 325 TABLET ORAL at 23:50

## 2017-08-18 RX ADMIN — SPIRONOLACTONE AND HYDROCHLOROTHIAZIDE 2 TABLET: 25; 25 TABLET, FILM COATED ORAL at 09:38

## 2017-08-18 RX ADMIN — FAMOTIDINE 20 MG: 20 TABLET, FILM COATED ORAL at 17:37

## 2017-08-18 RX ADMIN — OXYCODONE HYDROCHLORIDE AND ACETAMINOPHEN 1 TABLET: 5; 325 TABLET ORAL at 07:41

## 2017-08-18 RX ADMIN — DOCUSATE SODIUM 100 MG: 100 CAPSULE, LIQUID FILLED ORAL at 20:56

## 2017-08-18 RX ADMIN — OXYCODONE HYDROCHLORIDE AND ACETAMINOPHEN 2 TABLET: 5; 325 TABLET ORAL at 11:25

## 2017-08-18 RX ADMIN — DOCUSATE SODIUM 100 MG: 100 CAPSULE, LIQUID FILLED ORAL at 09:38

## 2017-08-18 RX ADMIN — SENNOSIDES AND DOCUSATE SODIUM 1 TABLET: 8.6; 5 TABLET ORAL at 20:56

## 2017-08-18 RX ADMIN — ASPIRIN 81 MG: 81 TABLET, COATED ORAL at 09:38

## 2017-08-18 RX ADMIN — OXYCODONE HYDROCHLORIDE AND ACETAMINOPHEN 1 TABLET: 5; 325 TABLET ORAL at 01:54

## 2017-08-18 RX ADMIN — SODIUM CHLORIDE 50 ML/HR: 9 INJECTION, SOLUTION INTRAVENOUS at 08:49

## 2017-08-18 RX ADMIN — SODIUM CHLORIDE 125 ML/HR: 9 INJECTION, SOLUTION INTRAVENOUS at 17:37

## 2017-08-18 RX ADMIN — CARBIDOPA AND LEVODOPA 1 TABLET: 25; 100 TABLET ORAL at 09:38

## 2017-08-18 RX ADMIN — OXYCODONE HYDROCHLORIDE AND ACETAMINOPHEN 2 TABLET: 5; 325 TABLET ORAL at 19:30

## 2017-08-18 RX ADMIN — OXYCODONE HYDROCHLORIDE AND ACETAMINOPHEN 2 TABLET: 5; 325 TABLET ORAL at 15:39

## 2017-08-18 RX ADMIN — ASPIRIN 81 MG: 81 TABLET, COATED ORAL at 20:56

## 2017-08-18 ASSESSMENT — PAIN DESCRIPTION - LOCATION
LOCATION: HIP

## 2017-08-18 ASSESSMENT — PAIN DESCRIPTION - ORIENTATION
ORIENTATION: RIGHT

## 2017-08-18 ASSESSMENT — PAIN SCALES - GENERAL
PAINLEVEL_OUTOF10: 2
PAINLEVEL_OUTOF10: 5
PAINLEVEL_OUTOF10: 2
PAINLEVEL_OUTOF10: 5
PAINLEVEL_OUTOF10: 5
PAINLEVEL_OUTOF10: 7
PAINLEVEL_OUTOF10: 4
PAINLEVEL_OUTOF10: 3
PAINLEVEL_OUTOF10: 3

## 2017-08-18 ASSESSMENT — PAIN DESCRIPTION - PAIN TYPE
TYPE: ACUTE PAIN
TYPE: ACUTE PAIN;SURGICAL PAIN
TYPE: SURGICAL PAIN

## 2017-08-18 ASSESSMENT — PAIN DESCRIPTION - DESCRIPTORS
DESCRIPTORS: ACHING
DESCRIPTORS: ACHING

## 2017-08-19 VITALS
SYSTOLIC BLOOD PRESSURE: 125 MMHG | TEMPERATURE: 99.1 F | WEIGHT: 205 LBS | RESPIRATION RATE: 18 BRPM | HEIGHT: 61 IN | HEART RATE: 88 BPM | OXYGEN SATURATION: 96 % | DIASTOLIC BLOOD PRESSURE: 63 MMHG | BODY MASS INDEX: 38.71 KG/M2

## 2017-08-19 LAB
ANION GAP SERPL CALCULATED.3IONS-SCNC: 15 MEQ/L (ref 7–13)
BUN BLDV-MCNC: 8 MG/DL (ref 8–23)
CALCIUM SERPL-MCNC: 8.7 MG/DL (ref 8.6–10.2)
CHLORIDE BLD-SCNC: 98 MEQ/L (ref 98–107)
CO2: 28 MEQ/L (ref 22–29)
CREAT SERPL-MCNC: 0.54 MG/DL (ref 0.5–0.9)
GFR AFRICAN AMERICAN: >60
GFR NON-AFRICAN AMERICAN: >60
GLUCOSE BLD-MCNC: 96 MG/DL (ref 74–109)
HCT VFR BLD CALC: 34.9 % (ref 37–47)
HEMOGLOBIN: 11.4 G/DL (ref 12–16)
MAGNESIUM: 1.9 MG/DL (ref 1.7–2.3)
MCH RBC QN AUTO: 29.4 PG (ref 27–31.3)
MCHC RBC AUTO-ENTMCNC: 32.7 % (ref 33–37)
MCV RBC AUTO: 89.9 FL (ref 82–100)
PDW BLD-RTO: 13.4 % (ref 11.5–14.5)
PLATELET # BLD: 296 K/UL (ref 130–400)
POTASSIUM SERPL-SCNC: 3.2 MEQ/L (ref 3.5–5.1)
RBC # BLD: 3.88 M/UL (ref 4.2–5.4)
SODIUM BLD-SCNC: 141 MEQ/L (ref 132–144)
URINE CULTURE, ROUTINE: NORMAL
WBC # BLD: 12.3 K/UL (ref 4.8–10.8)

## 2017-08-19 PROCEDURE — 6370000000 HC RX 637 (ALT 250 FOR IP): Performed by: ORTHOPAEDIC SURGERY

## 2017-08-19 PROCEDURE — 83735 ASSAY OF MAGNESIUM: CPT

## 2017-08-19 PROCEDURE — 97116 GAIT TRAINING THERAPY: CPT

## 2017-08-19 PROCEDURE — 85027 COMPLETE CBC AUTOMATED: CPT

## 2017-08-19 PROCEDURE — 97535 SELF CARE MNGMENT TRAINING: CPT

## 2017-08-19 PROCEDURE — 6370000000 HC RX 637 (ALT 250 FOR IP): Performed by: INTERNAL MEDICINE

## 2017-08-19 PROCEDURE — 6370000000 HC RX 637 (ALT 250 FOR IP): Performed by: PHYSICIAN ASSISTANT

## 2017-08-19 PROCEDURE — 36415 COLL VENOUS BLD VENIPUNCTURE: CPT

## 2017-08-19 PROCEDURE — 80048 BASIC METABOLIC PNL TOTAL CA: CPT

## 2017-08-19 RX ORDER — POTASSIUM CHLORIDE 20 MEQ/1
40 TABLET, EXTENDED RELEASE ORAL ONCE
Status: COMPLETED | OUTPATIENT
Start: 2017-08-19 | End: 2017-08-19

## 2017-08-19 RX ADMIN — DOCUSATE SODIUM 100 MG: 100 CAPSULE, LIQUID FILLED ORAL at 09:44

## 2017-08-19 RX ADMIN — POTASSIUM CHLORIDE 40 MEQ: 20 TABLET, EXTENDED RELEASE ORAL at 10:55

## 2017-08-19 RX ADMIN — OXYCODONE HYDROCHLORIDE AND ACETAMINOPHEN 2 TABLET: 5; 325 TABLET ORAL at 14:56

## 2017-08-19 RX ADMIN — SPIRONOLACTONE AND HYDROCHLOROTHIAZIDE 2 TABLET: 25; 25 TABLET, FILM COATED ORAL at 09:44

## 2017-08-19 RX ADMIN — SENNOSIDES AND DOCUSATE SODIUM 1 TABLET: 8.6; 5 TABLET ORAL at 09:44

## 2017-08-19 RX ADMIN — ASPIRIN 81 MG: 81 TABLET, COATED ORAL at 09:44

## 2017-08-19 RX ADMIN — OXYCODONE HYDROCHLORIDE AND ACETAMINOPHEN 2 TABLET: 5; 325 TABLET ORAL at 06:13

## 2017-08-19 RX ADMIN — CARBIDOPA AND LEVODOPA 1 TABLET: 25; 100 TABLET ORAL at 09:44

## 2017-08-19 RX ADMIN — OXYCODONE HYDROCHLORIDE AND ACETAMINOPHEN 2 TABLET: 5; 325 TABLET ORAL at 10:54

## 2017-08-19 ASSESSMENT — PAIN DESCRIPTION - ORIENTATION
ORIENTATION: RIGHT

## 2017-08-19 ASSESSMENT — PAIN SCALES - GENERAL
PAINLEVEL_OUTOF10: 3
PAINLEVEL_OUTOF10: 4
PAINLEVEL_OUTOF10: 2
PAINLEVEL_OUTOF10: 7

## 2017-08-19 ASSESSMENT — PAIN DESCRIPTION - LOCATION
LOCATION: BUTTOCKS;HIP
LOCATION: HIP
LOCATION: HIP

## 2017-08-19 ASSESSMENT — ENCOUNTER SYMPTOMS
VOMITING: 0
NAUSEA: 0
COUGH: 0
SHORTNESS OF BREATH: 0

## 2017-08-19 ASSESSMENT — PAIN DESCRIPTION - PAIN TYPE
TYPE: SURGICAL PAIN;ACUTE PAIN
TYPE: ACUTE PAIN;SURGICAL PAIN
TYPE: SURGICAL PAIN

## 2017-08-19 ASSESSMENT — PAIN DESCRIPTION - DESCRIPTORS
DESCRIPTORS: ACHING
DESCRIPTORS: ACHING

## 2017-08-20 LAB
BLOOD BANK DISPENSE STATUS: NORMAL
BLOOD BANK DISPENSE STATUS: NORMAL
BLOOD BANK PRODUCT CODE: NORMAL
BLOOD BANK PRODUCT CODE: NORMAL
BPU ID: NORMAL
BPU ID: NORMAL
DESCRIPTION BLOOD BANK: NORMAL
DESCRIPTION BLOOD BANK: NORMAL

## 2017-08-29 ENCOUNTER — HOSPITAL ENCOUNTER (OUTPATIENT)
Dept: ORTHOPEDIC SURGERY | Age: 67
Discharge: HOME | End: 2017-08-29
Payer: COMMERCIAL

## 2017-08-29 DIAGNOSIS — M16.10 DEGENERATIVE JOINT DISEASE OF PELVIC REGION: ICD-10-CM

## 2017-08-29 PROCEDURE — 73502 X-RAY EXAM HIP UNI 2-3 VIEWS: CPT

## 2017-09-28 ENCOUNTER — HOSPITAL ENCOUNTER (OUTPATIENT)
Dept: PHYSICAL THERAPY | Age: 67
Setting detail: THERAPIES SERIES
Discharge: HOME | End: 2017-09-28
Payer: COMMERCIAL

## 2017-09-28 PROCEDURE — 97110 THERAPEUTIC EXERCISES: CPT

## 2017-09-28 PROCEDURE — 97161 PT EVAL LOW COMPLEX 20 MIN: CPT

## 2017-09-28 ASSESSMENT — PAIN DESCRIPTION - PAIN TYPE: TYPE: SURGICAL PAIN

## 2017-09-28 ASSESSMENT — PAIN DESCRIPTION - LOCATION: LOCATION: BACK;HIP

## 2017-09-28 ASSESSMENT — PAIN DESCRIPTION - DESCRIPTORS: DESCRIPTORS: SORE

## 2017-09-28 ASSESSMENT — PAIN DESCRIPTION - ORIENTATION: ORIENTATION: RIGHT

## 2017-10-04 ENCOUNTER — HOSPITAL ENCOUNTER (OUTPATIENT)
Dept: PHYSICAL THERAPY | Age: 67
Setting detail: THERAPIES SERIES
Discharge: HOME | End: 2017-10-04
Payer: COMMERCIAL

## 2017-10-04 PROCEDURE — 97110 THERAPEUTIC EXERCISES: CPT

## 2017-10-04 ASSESSMENT — PAIN DESCRIPTION - ORIENTATION: ORIENTATION: RIGHT

## 2017-10-04 ASSESSMENT — PAIN SCALES - GENERAL: PAINLEVEL_OUTOF10: 2

## 2017-10-04 ASSESSMENT — PAIN DESCRIPTION - LOCATION: LOCATION: BUTTOCKS

## 2017-10-04 ASSESSMENT — PAIN DESCRIPTION - DESCRIPTORS: DESCRIPTORS: SORE

## 2017-10-04 NOTE — PROGRESS NOTES
[id number] [address]  Outpatient Physical Therapy    Treatment Note        Date: 10/4/2017  Patient: Molly Bahena  : 1950  ACCT #: [de-identified]  Referring Practitioner: RHYS Pepe; Alley Shultz   Diagnosis: Hip OA; Hip pain R hip     Visit Information:  PT Visit Information  Onset Date: 17  PT Insurance Information: BCBS  Total # of Visits to Date: 2  No Show: 0  Canceled Appointment: 0  Progress Note Counter: -10    Subjective: Pt states thinks she overdid it with exercises yesterday. Reporting soreness in R buttock. Comments: RTD 10/31/17  HEP Compliance:  [x] Good [] Fair [] Poor [] Reports not doing due to:    Vital Signs  Patient Currently in Pain: Yes   Pain Screening  Patient Currently in Pain: Yes  Pain Assessment  Pain Assessment: 0-10  Pain Level: 2 (with WB)  Pain Location: Buttocks  Pain Orientation: Right  Pain Descriptors: Sore    OBJECTIVE:   Exercises  Exercise 1: bridges 5\"x15  Exercise 2: mod florina stretch 30\"x3   Exercise 3: S/L hip abd x15  Exercise 4: Scifit L1.0 x 5 min  Exercise 5: step ups F/L x15 ea  Exercise 6: rockerboard 3 way x15   Exercise 7: hip hikes 2\" x15 b/l  Exercise 8: clams x15  Exercise 10: HS stretch at step 3x30\"  Exercise 11: Sink ex x15    Modalities:  Modalities  Cryotherapy (Minutes\Location): to R hip post tx for pain and inflammation control x10 min     *Indicates exercise, modality, or manual techniques to be initiated when appropriate    Assessment: Body structures, Functions, Activity limitations: Decreased functional mobility , Decreased ADL status, Decreased ROM, Decreased strength  Assessment: VC for eccentric control with sink ex and for retro wt shift with mini squats. Reports mild inc discomfort in R buttock with therex, relieved with cold pack.   Treatment Diagnosis: decreased R hip arom, strength, functional mobility and pain  Prognosis: Good, Excellent     Goals:  Long term goals  Time Frame for Long term goals : 4-5 weeks

## 2017-10-06 ENCOUNTER — HOSPITAL ENCOUNTER (OUTPATIENT)
Dept: PHYSICAL THERAPY | Age: 67
Setting detail: THERAPIES SERIES
Discharge: HOME | End: 2017-10-06
Payer: COMMERCIAL

## 2017-10-06 PROCEDURE — 97110 THERAPEUTIC EXERCISES: CPT

## 2017-10-06 ASSESSMENT — PAIN DESCRIPTION - DESCRIPTORS: DESCRIPTORS: SORE

## 2017-10-06 ASSESSMENT — PAIN DESCRIPTION - ORIENTATION: ORIENTATION: RIGHT

## 2017-10-06 ASSESSMENT — PAIN DESCRIPTION - PAIN TYPE: TYPE: SURGICAL PAIN

## 2017-10-06 ASSESSMENT — PAIN DESCRIPTION - LOCATION: LOCATION: BUTTOCKS;HIP

## 2017-10-06 ASSESSMENT — PAIN SCALES - GENERAL: PAINLEVEL_OUTOF10: 2

## 2017-10-06 NOTE — PROGRESS NOTES
[id number] [address]  Outpatient Physical Therapy    Treatment Note        Date: 10/6/2017  Patient: Mitchell Licona  : 1950  ACCT #: [de-identified]  Referring Practitioner: RHYS Kent; Abdoul Aguero   Diagnosis: Hip OA; Hip pain R hip     Visit Information:  PT Visit Information  Onset Date: 17  PT Insurance Information: BCBS  Total # of Visits to Date: 3  No Show: 0  Canceled Appointment: 0  Progress Note Counter: 3/8-10    Subjective: Pt reports 1-2/10 pain currently in R hip. Pt reprots increased soreness yesterday and \"skipped out on the ex's\" however admits to \"up and moving all day. \"  Comments: RTD 10/31/17  HEP Compliance:  [x] Good [] Fair [] Poor [] Reports not doing due to:    Vital Signs  Patient Currently in Pain: Yes   Pain Screening  Patient Currently in Pain: Yes  Pain Assessment  Pain Assessment: 0-10  Pain Level: 2  Pain Type: Surgical pain  Pain Location: Buttocks; Hip  Pain Orientation: Right  Pain Descriptors: Sore    OBJECTIVE:   Exercises  Exercise 1: bridges 5\"x15  Exercise 2: mod florina stretch 30\"x3   Exercise 3: S/L hip abd x15, circles x10 F/R b/l  Exercise 4: Scifit L1.2 x 5 min  Exercise 5: step ups on 6'' F/L x15 ea  Exercise 6: small rockerboard 3 way x15   Exercise 7: hip hikes 2\" x15 b/l  Exercise 8: clams x15 b/l  Exercise 10: HS stretch at step 3x30\"  Exercise 11: 4 way hip w/ YTB x10 ea RLE only- *progress to LLE for focus SLS when appropriate  Exercise 12: H/L hip adduction w/ ball 5''x15    Strength: [x] NT  [] MMT completed:  ROM: [x] NT  [] ROM measurements:    Modalities:  Modalities  Cryotherapy (Minutes\Location): to R hip post tx for pain and inflammation control x10 min     *Indicates exercise, modality, or manual techniques to be initiated when appropriate    Assessment:    Body structures, Functions, Activity limitations: Decreased functional mobility , Decreased ADL status, Decreased ROM, Decreased strength  Assessment: VC's for decreased use of UE exercise changes, additions or modifications this date.     PT Individual Minutes  Time In: 0800  Time Out: 6355  Minutes: 50  Timed Code Treatment Minutes: 40 Minutes  Procedure Minutes:10    Signature:  Electronically signed by Devante Mustafa PTA on 10/6/17 at 8:41 AM

## 2017-10-11 ENCOUNTER — HOSPITAL ENCOUNTER (OUTPATIENT)
Dept: PHYSICAL THERAPY | Age: 67
Setting detail: THERAPIES SERIES
Discharge: HOME | End: 2017-10-11
Payer: COMMERCIAL

## 2017-10-11 PROCEDURE — 97110 THERAPEUTIC EXERCISES: CPT

## 2017-10-11 NOTE — PROGRESS NOTES
strength, functional mobility and pain  Prognosis: Good, Excellent      Goals:       Long term goals  Time Frame for Long term goals : 4-5 weeks   Long term goal 1: Pt will be Indep/compliant with HEP in order to self manage symptoms upon D/C  Long term goal 2: The pt will have an increase in LEFS score >/=10 points demonstrating an improvement in functional activity tolerace  Long term goal 3: The pt will demo improved R LE strength >/= 4+/5 in order to safely ambulate with decreased pain/limitations   Long term goal 4: The pt will demo improved R hip ext, IR, and ER arom >/=5-10* ea in order to perform dressing with decreased pain/limitations   Progress toward goals:ongoing    POST-PAIN       Pain Rating (0-10 pain scale):  0 /10   Location and pain description same as pre-treatment unless indicated. Action: [] NA   [] Perform HEP  [] Meds as prescribed  [] Modalities as prescribed   [] Call Physician     Frequency/Duration:  Plan  Times per week: 2  Plan weeks: 4-5  Current Treatment Recommendations: Strengthening, ROM, Balance Training, Functional Mobility Training, Gait Training, Stair training, Neuromuscular Re-education, Manual Therapy - Soft Tissue Mobilization, Manual Therapy - Joint Manipulation, Equipment Evaluation, Education, & procurement, Modalities, Home Exercise Program, Safety Education & Training, Patient/Caregiver Education & Training     Pt to continue current HEP. See objective section for any therapeutic exercise changes, additions or modifications this date.          PT Individual Minutes  Time In: 0800  Time Out: 0848  Minutes: 48  Timed Code Treatment Minutes: 38 Minutes  Procedure Minutes:10    Signature:  Electronically signed by Chandan Georges PTA on 10/11/17 at 8:34 AM

## 2017-10-13 ENCOUNTER — HOSPITAL ENCOUNTER (OUTPATIENT)
Dept: PHYSICAL THERAPY | Age: 67
Setting detail: THERAPIES SERIES
Discharge: HOME | End: 2017-10-13
Payer: COMMERCIAL

## 2017-10-13 PROCEDURE — 97110 THERAPEUTIC EXERCISES: CPT

## 2017-10-13 NOTE — PROGRESS NOTES
[id number] [address]  Outpatient Physical Therapy    Treatment Note        Date: 10/13/2017  Patient: Licha Joy  : 1950  ACCT #: [de-identified]  Referring Practitioner: RHYS Henao; Skinny De   Diagnosis: Hip OA; Hip pain R hip     Visit Information:  PT Visit Information  Onset Date: 17  PT Insurance Information: BCBS  Total # of Visits to Date: 5  No Show: 0  Canceled Appointment: 0  Progress Note Counter: -10    Subjective: Pt reports only having minimal discomfort with walking after sitting for a prolonged period of time, no problems with prolonged driving  Comments: RTD 10/31/17  HEP Compliance:  [x] Good [] Fair [] Poor [] Reports not doing due to:    Vital Signs  Patient Currently in Pain: Denies   Pain Screening  Patient Currently in Pain: Denies    OBJECTIVE:   Exercises  Exercise 1: bridges 5\"x20   Exercise 2: mod florina stretch 30\"x3   Exercise 3: hip series x 10 right   Exercise 4: Scifit L-2.7, 5 min x 5 min  Exercise 5: step ups on 6'' F/L x20 ea  Exercise 6: Lg rocker bd x 20, 3-way  Exercise 7: hip hikes 4\" x20 b/l  Exercise 8: clams x15 with YTB, right  Exercise 9: SLS 3 x 20 sec b/l with one finger hold  Exercise 10: HS stretch 3 x 30 sec in longsit  Exercise 11: 4 way hip w/ YTB x15 B   Exercise 12: pball HS curls 5\"x15   Exercise 13: TG L8 x15   Exercise 14: B Hip IR seated with towel between knees 5\"x10  Exercise 15: prone hip ER YTB x15  Exercise 16: prone hip ext x10   Exercise 20: HEP: hip series, clams,     Strength: [x] NT  [] MMT completed:    ROM: [] NT  [x] ROM measurements:     AROM RLE (degrees)  RLE General AROM: Hip ext 5, ER 50, IR 20 (pepe in prone)     Modalities:  Modalities  Cryotherapy (Minutes\Location): to R hip post tx for pain and inflammation control x10 min     *Indicates exercise, modality, or manual techniques to be initiated when appropriate    Assessment:    Body structures, Functions, Activity limitations: Decreased functional mobility , Decreased ADL status, Decreased ROM, Decreased strength  Assessment: Pt with good sofia to multiple progressions this date to improve R Le strength and stability for increased ease with functional adl's and ambulation. No c/o increased pain throughout, min VC's required for pelvic alignment with S/L hip series. Treatment Diagnosis: decreased R hip arom, strength, functional mobility and pain  Prognosis: Good, Excellent  Patient Education: provided written updates for hep     Goals:  Long term goals  Time Frame for Long term goals : 4-5 weeks   Long term goal 1: Pt will be Indep/compliant with HEP in order to self manage symptoms upon D/C  Long term goal 2: The pt will have an increase in LEFS score >/=10 points demonstrating an improvement in functional activity tolerace  Long term goal 3: The pt will demo improved R LE strength >/= 4+/5 in order to safely ambulate with decreased pain/limitations   Long term goal 4: The pt will demo improved R hip ext, IR, and ER arom >/=5-10* ea in order to perform dressing with decreased pain/limitations   Progress toward goals: good towards rom     POST-PAIN       Pain Rating (0-10 pain scale):  0 /10   Location and pain description same as pre-treatment unless indicated. Action: [] NA   [] Perform HEP  [] Meds as prescribed  [] Modalities as prescribed   [] Call Physician     Frequency/Duration:  Plan  Times per week: 2  Plan weeks: 4-5  Current Treatment Recommendations: Strengthening, ROM, Balance Training, Functional Mobility Training, Gait Training, Stair training, Neuromuscular Re-education, Manual Therapy - Soft Tissue Mobilization, Manual Therapy - Joint Manipulation, Equipment Evaluation, Education, & procurement, Modalities, Home Exercise Program, Safety Education & Training, Patient/Caregiver Education & Training     Pt to continue current HEP. See objective section for any therapeutic exercise changes, additions or modifications this date.     PT Individual Minutes  Time In: 0801  Time Out: 1199  Minutes: 49  Timed Code Treatment Minutes: 38 Minutes  Procedure Minutes: 10 minutes    Signature:  Electronically signed by Devaughn Ortiz PT on 10/13/17 at 8:42 AM

## 2017-10-17 ENCOUNTER — HOSPITAL ENCOUNTER (OUTPATIENT)
Dept: ORTHOPEDIC SURGERY | Age: 67
Discharge: HOME | End: 2017-10-17
Payer: COMMERCIAL

## 2017-10-17 DIAGNOSIS — M25.551 PAIN IN JOINT INVOLVING RIGHT PELVIC REGION AND THIGH: ICD-10-CM

## 2017-10-17 PROCEDURE — 73502 X-RAY EXAM HIP UNI 2-3 VIEWS: CPT

## 2017-10-18 ENCOUNTER — HOSPITAL ENCOUNTER (OUTPATIENT)
Dept: PHYSICAL THERAPY | Age: 67
Setting detail: THERAPIES SERIES
Discharge: HOME | End: 2017-10-18
Payer: COMMERCIAL

## 2017-10-18 PROCEDURE — 97110 THERAPEUTIC EXERCISES: CPT

## 2017-10-20 ENCOUNTER — HOSPITAL ENCOUNTER (OUTPATIENT)
Dept: PHYSICAL THERAPY | Age: 67
Setting detail: THERAPIES SERIES
Discharge: HOME | End: 2017-10-20
Payer: COMMERCIAL

## 2017-10-20 PROCEDURE — 97110 THERAPEUTIC EXERCISES: CPT

## 2017-10-20 NOTE — PROGRESS NOTES
[id number] [address]  Outpatient Physical Therapy    Treatment Note        Date: 10/20/2017  Patient: Cheryl Santamaria  : 1950  ACCT #: [de-identified]  Referring Practitioner: RHYS Muñiz; Dhiraj Serrano   Diagnosis: Hip OA; Hip pain R hip     Visit Information:  PT Visit Information  Onset Date: 17  PT Insurance Information: BCBS  Total # of Visits to Date: 7  No Show: 0  Canceled Appointment: 0  Progress Note Counter: 7/8-10    Subjective: Pt states RTW at [address] [place] [address] in [place] in 2 weeks. Pt reports having no difficulties dressing, though reports still having difficulty with stairs and prolonged amb. Comments: RTD 10/31/17  HEP Compliance:  [x] Good [] Fair [] Poor [] Reports not doing due to:    Vital Signs  Patient Currently in Pain: No   Pain Screening  Patient Currently in Pain: No    OBJECTIVE:   Exercises  Exercise 1: bridges on Pball 5\"x20  Exercise 2: mod florina stretch 30\"x3   Exercise 3: hip series x 15 right   Exercise 4: Scifit L3.5 x 5 min for LE strengthening  Exercise 6: Lg rocker bd x 25, 3-way  Exercise 8: clams 2x15 with RTB R; reverse clams 2x15 R  Exercise 10: HS stretch 3 x 30 sec at step  Exercise 11: 4 way hip w/ YTB x15 B   Exercise 12: pball HS curls 5\"x20  Exercise 13: TG squats/HR L8 x15 ea  Exercise 17: *Gait drills NV  Exercise 20: HEP: 4 way hip    Stairs  # Steps : 4 (2x)  Stairs Height: 6\"  Rails: Bilateral  Device: No Device  Assistance: Independent  Comment: non-reciprocal first trial, reciprocal with report of mild inc in pain in R groin and lateral hip    Modalities:  Modalities  Cryotherapy (Minutes\Location): to R hip post tx for pain and inflammation control x10 min     *Indicates exercise, modality, or manual techniques to be initiated when appropriate    Assessment:    Body structures, Functions, Activity limitations: Decreased functional mobility , Decreased ADL status, Decreased ROM, Decreased strength  Assessment: Good tolerance to tx this date without report of discomfort with therex. Initiated 4 Way hip to progress HEP. Treatment Diagnosis: decreased R hip arom, strength, functional mobility and pain  Prognosis: Good, Excellent     Goals:  Long term goals  Time Frame for Long term goals : 4-5 weeks   Long term goal 1: Pt will be Indep/compliant with HEP in order to self manage symptoms upon D/C  Long term goal 2: The pt will have an increase in LEFS score >/=10 points demonstrating an improvement in functional activity tolerace  Long term goal 3: The pt will demo improved R LE strength >/= 4+/5 in order to safely ambulate with decreased pain/limitations   Long term goal 4: The pt will demo improved R hip ext, IR, and ER arom >/=5-10* ea in order to perform dressing with decreased pain/limitations   Progress toward goals: LE strengthening for improved tolerance to functional activities    POST-PAIN       Pain Rating (0-10 pain scale):  0 /10   Location and pain description same as pre-treatment unless indicated. Action: [] NA   [x] Perform HEP  [] Meds as prescribed  [] Modalities as prescribed   [] Call Physician     Frequency/Duration:  Plan  Times per week: 2  Plan weeks: 4-5  Current Treatment Recommendations: Strengthening, ROM, Balance Training, Functional Mobility Training, Gait Training, Stair training, Neuromuscular Re-education, Manual Therapy - Soft Tissue Mobilization, Manual Therapy - Joint Manipulation, Equipment Evaluation, Education, & procurement, Modalities, Home Exercise Program, Safety Education & Training, Patient/Caregiver Education & Training     Pt to continue current HEP. See objective section for any therapeutic exercise changes, additions or modifications this date.          PT Individual Minutes  Time In: 0720  Time Out: 0809  Minutes: 49  Timed Code Treatment Minutes: 39 Minutes  Procedure Minutes: 10    Activity Minutes Units   Ther Ex 39 3   Manual      Neuro Ed     Cold pack  10          Signature:  Electronically signed

## 2017-10-23 ENCOUNTER — HOSPITAL ENCOUNTER (OUTPATIENT)
Dept: PHYSICAL THERAPY | Age: 67
Setting detail: THERAPIES SERIES
Discharge: HOME | End: 2017-10-23
Payer: COMMERCIAL

## 2017-10-23 PROCEDURE — 97110 THERAPEUTIC EXERCISES: CPT

## 2017-10-23 NOTE — PROGRESS NOTES
Physical Therapy  [facility]  Outpatient Physical Therapy    Treatment Note        Date: 10/23/2017  Patient: Molly Bahena  : 1950  ACCT #: [de-identified]  Referring Practitioner: RHYS Pepe; Alley Shultz   Diagnosis: Hip OA; Hip pain R hip     Visit Information:  PT Visit Information  Onset Date: 17  PT Insurance Information: BCBS  Total # of Visits to Date: 8  No Show: 0  Canceled Appointment: 0  Progress Note Counter: -10    Subjective: Pt stated not RTW till10/31/17. Reports no pain, just stiff getting oout of car. Comments: RTD 10/31/17  HEP Compliance:  [x] Good [] Fair [] Poor [] Reports not doing due to:    Vital Signs  Patient Currently in Pain: No   Pain Screening  Patient Currently in Pain: No    OBJECTIVE:   Exercises  Exercise 1: bridges on Pball 5\"x20  Exercise 2: mod florina stretch 30\"x3   Exercise 3: hip series x 18 right   Exercise 4: Scifit L3.7 x 5 min for LE strengthening  Exercise 6: Lg rocker bd x 30, 3-way  Exercise 7: hip hikes 4\" x20 b/l  Exercise 8: clams 2x18 with RTB R; reverse clams 2x18 R  Exercise 10: HS stretch 3 x 30 sec at step  Exercise 11: 4 way hip w/ YTB x 20 B   Exercise 12: pball HS curls 5\"x20  Exercise 13: TG squats/HR L8 x 20 ea  Exercise 15: prone hip ER YTB x18  Exercise 16: prone hip ext x18       Strength: [x] NT  [] MMT completed:        ROM: [x] NT  [] ROM measurements:            Modalities:  Modalities  Cryotherapy (Minutes\Location): to R hip post tx for pain and inflammation control x10 min     *Indicates exercise, modality, or manual techniques to be initiated when appropriate    Assessment: Body structures, Functions, Activity limitations: Decreased functional mobility , Decreased ADL status, Decreased ROM, Decreased strength  Assessment: Increased multiple exercises, no complaint of pain with ex. Good tolerance to progressions and treatemnt.   Treatment Diagnosis: decreased R hip arom, strength, functional mobility

## 2017-10-26 ENCOUNTER — HOSPITAL ENCOUNTER (OUTPATIENT)
Dept: PHYSICAL THERAPY | Age: 67
Setting detail: THERAPIES SERIES
Discharge: HOME | End: 2017-10-26
Payer: COMMERCIAL

## 2017-10-26 PROCEDURE — 97110 THERAPEUTIC EXERCISES: CPT

## 2017-10-26 ASSESSMENT — PAIN SCALES - GENERAL: PAINLEVEL_OUTOF10: 2

## 2017-10-26 ASSESSMENT — PAIN DESCRIPTION - ORIENTATION: ORIENTATION: RIGHT

## 2017-10-26 ASSESSMENT — PAIN DESCRIPTION - LOCATION: LOCATION: KNEE

## 2017-10-26 NOTE — PROGRESS NOTES
[address], [address]     Ph: [phone]  Fax: [phone]    [] Certification  [] Recertification []  Plan of Care  [] Progress Note [x] Discharge      To:  Referring Practitioner: Andrew Pettit Gearing      From:  Devaughn Ortiz, PT,DPT  Patient: Nata Lopez     : 1950  Diagnosis: Hip OA; Hip pain R hip     Date: 10/26/2017  Treatment Diagnosis: decreased R hip arom, strength, functional mobility and pain     Progress Report Period from:  2017  to 10/26/2017    Total # of Visits to Date: 9   No Show: 0    Canceled Appointment: 0     OBJECTIVE:   Long Term Goals - Time Frame for Long term goals : 4-5 weeks   Goals Current/ Discharge status Met   Long term goal 1: Pt will be Indep/compliant with HEP in order to self manage symptoms upon D/C Indep/compliant [x] yes  [] no   Long term goal 2: The pt will have an increase in LEFS score >/=10 points demonstrating an improvement in functional activity tolerace Previous   Current   Reports feeling 80% functional  [x] yes  [x] no   Long term goal 3: The pt will demo improved R LE strength >/= 4+/5 in order to safely ambulate with decreased pain/limitations  5/5 knee, 4+/5 Hip IR/ER, 4/5 hip ext/abd, 4-/5 SLR  [x] yes  [x] no   Long term goal 4: The pt will demo improved R hip ext, IR, and ER arom >/=5-10* ea in order to perform dressing with decreased pain/limitations  IR 26, ER 24, flex 106, ext 10, abd 51  [x] yes  [x] no     Assessment: Pt demos improved R LE strength and functional abilities since initiation of PT. She conts to have minimal deficits in strength and R hip arom however is WFL's and is able to self manage symptoms independently on her own with HEP at this time. PLAN: D/C              ? Patient Status:[] Continue/ Initiate plan of Care    [x] Discharge PT. Recommend pt continue with HEP.      [] Additional visits requested, Please re-certify for additional visits:        Signature: Electronically signed by Isai Nieves PT on 10/26/17 at 11:08 AM    If you have any questions or concerns, please don't hesitate to call. Thank you for your referral.    I have reviewed this plan of care and certify a need for medically necessary rehabilitation services.     Physician Signature:__________________________________________________________  Date:  Please sign and return

## 2018-03-08 ENCOUNTER — OFFICE VISIT (OUTPATIENT)
Dept: FAMILY MEDICINE CLINIC | Age: 68
End: 2018-03-08
Payer: COMMERCIAL

## 2018-03-08 VITALS
TEMPERATURE: 98.4 F | WEIGHT: 201.6 LBS | HEIGHT: 61 IN | HEART RATE: 85 BPM | OXYGEN SATURATION: 97 % | DIASTOLIC BLOOD PRESSURE: 94 MMHG | BODY MASS INDEX: 38.06 KG/M2 | SYSTOLIC BLOOD PRESSURE: 160 MMHG

## 2018-03-08 DIAGNOSIS — J20.9 ACUTE BRONCHITIS, UNSPECIFIED ORGANISM: Primary | ICD-10-CM

## 2018-03-08 PROCEDURE — 99213 OFFICE O/P EST LOW 20 MIN: CPT | Performed by: FAMILY MEDICINE

## 2018-03-08 RX ORDER — AZITHROMYCIN 250 MG/1
TABLET, FILM COATED ORAL
Qty: 1 PACKET | Refills: 0 | Status: SHIPPED | OUTPATIENT
Start: 2018-03-08 | End: 2018-03-18

## 2018-03-08 RX ORDER — PROMETHAZINE HYDROCHLORIDE AND CODEINE PHOSPHATE 6.25; 1 MG/5ML; MG/5ML
5-10 SYRUP ORAL 4 TIMES DAILY PRN
Qty: 200 ML | Refills: 0 | Status: SHIPPED | OUTPATIENT
Start: 2018-03-08 | End: 2018-03-15

## 2018-03-08 NOTE — PROGRESS NOTES
Chief Complaint   Patient presents with    Congestion     cough, x Monday, sinus congestion,clear phelgm, ear pressure         HPI:   Olivier Day is a 69 y. o.female that complains of symptoms as detailed above. Symptoms began earlier this week  Productive cough  Sinus congestion  Cough worse at night     Ear pressure     Has taken some robitussin  Not much help    Grandson/son with flu   with cold    Some wheezing    Denies SOB  Has some hot flashes     reports that she has never smoked. She has never used smokeless tobacco.    Patient's medications, allergies, past medical, surgical, social and family histories were reviewed and updated as appropriate. Physical Exam:  BP (!) 160/94 (Site: Right Arm, Position: Sitting, Cuff Size: Medium Adult)   Pulse 85   Temp 98.4 °F (36.9 °C)   Ht 5' 1\" (1.549 m)   Wt 201 lb 9.6 oz (91.4 kg)   LMP 08/10/2007   SpO2 97%   Breastfeeding? No   BMI 38.09 kg/m²   General appearance - alert, well appearing, and not in acute respiratory distress  Mental Status - alert, oriented to person, place, and time  Eyes - pupils equal and reactive, extraocular eye movements intact   Ears - bilateral TM's and external ear canals normal   Nose - clear  Sinuses - no sinus tenderness  Throat - mucous membranes moist, pharynx normal without lesions   Neck - mild to moderate anterior lymphadenopathy    Chest: rhonchi noted diffusely with mild end exp wheeze. Heart - normal rate, regular rhythm, normal S1, S2, no murmurs, rubs, clicks or gallops   Skin - normal coloration and turgor, no rashes    A/P  1. Acute bronchitis, unspecified organism  azithromycin (ZITHROMAX) 250 MG tablet    promethazine-codeine (PHENERGAN WITH CODEINE) 6.25-10 MG/5ML syrup       Meds as ordered. OTC anti-tussives, steam inhalation, rest/fluids    Take antibiotics as directed.    If any problems occur, an appointment should be made or ER visit if severe    Call or return to clinic prn if these

## 2018-05-08 ENCOUNTER — HOSPITAL ENCOUNTER (OUTPATIENT)
Dept: ORTHOPEDIC SURGERY | Age: 68
Discharge: HOME | End: 2018-05-10
Payer: COMMERCIAL

## 2018-05-08 DIAGNOSIS — M25.551 PAIN IN JOINT INVOLVING RIGHT PELVIC REGION AND THIGH: ICD-10-CM

## 2018-05-08 PROCEDURE — 73502 X-RAY EXAM HIP UNI 2-3 VIEWS: CPT

## 2018-05-25 ENCOUNTER — HOSPITAL ENCOUNTER (OUTPATIENT)
Dept: PHYSICAL THERAPY | Age: 68
Setting detail: THERAPIES SERIES
Discharge: HOME | End: 2018-05-25
Payer: COMMERCIAL

## 2018-05-25 PROCEDURE — 97161 PT EVAL LOW COMPLEX 20 MIN: CPT

## 2018-05-25 ASSESSMENT — PAIN SCALES - GENERAL: PAINLEVEL_OUTOF10: 0

## 2018-05-29 ENCOUNTER — HOSPITAL ENCOUNTER (OUTPATIENT)
Dept: PHYSICAL THERAPY | Age: 68
Setting detail: THERAPIES SERIES
Discharge: HOME | End: 2018-05-29
Payer: COMMERCIAL

## 2018-06-01 ENCOUNTER — HOSPITAL ENCOUNTER (OUTPATIENT)
Dept: PHYSICAL THERAPY | Age: 68
Setting detail: THERAPIES SERIES
Discharge: HOME | End: 2018-06-01
Payer: COMMERCIAL

## 2018-06-06 ENCOUNTER — HOSPITAL ENCOUNTER (OUTPATIENT)
Dept: PHYSICAL THERAPY | Age: 68
Setting detail: THERAPIES SERIES
Discharge: HOME | End: 2018-06-06
Payer: COMMERCIAL

## 2018-06-06 PROCEDURE — 97110 THERAPEUTIC EXERCISES: CPT

## 2018-06-06 ASSESSMENT — PAIN SCALES - GENERAL: PAINLEVEL_OUTOF10: 0

## 2018-06-08 ENCOUNTER — HOSPITAL ENCOUNTER (OUTPATIENT)
Dept: PHYSICAL THERAPY | Age: 68
Setting detail: THERAPIES SERIES
Discharge: HOME | End: 2018-06-08
Payer: COMMERCIAL

## 2018-06-13 ENCOUNTER — HOSPITAL ENCOUNTER (OUTPATIENT)
Dept: PHYSICAL THERAPY | Age: 68
Setting detail: THERAPIES SERIES
Discharge: HOME | End: 2018-06-13
Payer: COMMERCIAL

## 2018-06-13 PROCEDURE — 97110 THERAPEUTIC EXERCISES: CPT

## 2018-06-15 ENCOUNTER — HOSPITAL ENCOUNTER (OUTPATIENT)
Dept: PHYSICAL THERAPY | Age: 68
Setting detail: THERAPIES SERIES
Discharge: HOME | End: 2018-06-15
Payer: COMMERCIAL

## 2018-06-15 PROCEDURE — 97110 THERAPEUTIC EXERCISES: CPT

## 2018-06-15 ASSESSMENT — PAIN SCALES - GENERAL: PAINLEVEL_OUTOF10: 0

## 2018-06-20 ENCOUNTER — HOSPITAL ENCOUNTER (OUTPATIENT)
Dept: PHYSICAL THERAPY | Age: 68
Setting detail: THERAPIES SERIES
Discharge: HOME | End: 2018-06-20
Payer: COMMERCIAL

## 2018-06-20 PROCEDURE — 97110 THERAPEUTIC EXERCISES: CPT

## 2018-06-21 RX ORDER — SPIRONOLACTONE AND HYDROCHLOROTHIAZIDE 25; 25 MG/1; MG/1
2 TABLET ORAL DAILY
Qty: 60 TABLET | Refills: 5 | Status: SHIPPED | OUTPATIENT
Start: 2018-06-21 | End: 2018-12-15 | Stop reason: SDUPTHER

## 2018-06-22 ENCOUNTER — HOSPITAL ENCOUNTER (OUTPATIENT)
Dept: PHYSICAL THERAPY | Age: 68
Setting detail: THERAPIES SERIES
Discharge: HOME | End: 2018-06-22
Payer: COMMERCIAL

## 2018-06-22 PROCEDURE — 97110 THERAPEUTIC EXERCISES: CPT

## 2018-09-17 ENCOUNTER — OFFICE VISIT (OUTPATIENT)
Dept: FAMILY MEDICINE CLINIC | Age: 68
End: 2018-09-17
Payer: COMMERCIAL

## 2018-09-17 VITALS
WEIGHT: 208 LBS | OXYGEN SATURATION: 97 % | TEMPERATURE: 97.1 F | BODY MASS INDEX: 39.27 KG/M2 | HEIGHT: 61 IN | DIASTOLIC BLOOD PRESSURE: 76 MMHG | HEART RATE: 73 BPM | SYSTOLIC BLOOD PRESSURE: 136 MMHG

## 2018-09-17 DIAGNOSIS — Z12.11 COLON CANCER SCREENING: ICD-10-CM

## 2018-09-17 DIAGNOSIS — H61.22 IMPACTED CERUMEN OF LEFT EAR: Primary | ICD-10-CM

## 2018-09-17 DIAGNOSIS — Z78.0 POST-MENOPAUSAL: ICD-10-CM

## 2018-09-17 DIAGNOSIS — Z23 NEED FOR INFLUENZA VACCINATION: ICD-10-CM

## 2018-09-17 DIAGNOSIS — Z12.31 SCREENING MAMMOGRAM, ENCOUNTER FOR: ICD-10-CM

## 2018-09-17 DIAGNOSIS — Z23 NEED FOR PNEUMOCOCCAL VACCINATION: ICD-10-CM

## 2018-09-17 DIAGNOSIS — I10 ESSENTIAL HYPERTENSION: ICD-10-CM

## 2018-09-17 PROCEDURE — 99214 OFFICE O/P EST MOD 30 MIN: CPT | Performed by: NURSE PRACTITIONER

## 2018-09-17 PROCEDURE — 90471 IMMUNIZATION ADMIN: CPT | Performed by: NURSE PRACTITIONER

## 2018-09-17 PROCEDURE — 90662 IIV NO PRSV INCREASED AG IM: CPT | Performed by: NURSE PRACTITIONER

## 2018-09-17 PROCEDURE — 69209 REMOVE IMPACTED EAR WAX UNI: CPT | Performed by: NURSE PRACTITIONER

## 2018-09-17 PROCEDURE — 90732 PPSV23 VACC 2 YRS+ SUBQ/IM: CPT | Performed by: NURSE PRACTITIONER

## 2018-09-17 PROCEDURE — 90472 IMMUNIZATION ADMIN EACH ADD: CPT | Performed by: NURSE PRACTITIONER

## 2018-09-17 ASSESSMENT — PATIENT HEALTH QUESTIONNAIRE - PHQ9
2. FEELING DOWN, DEPRESSED OR HOPELESS: 0
SUM OF ALL RESPONSES TO PHQ9 QUESTIONS 1 & 2: 0
1. LITTLE INTEREST OR PLEASURE IN DOING THINGS: 0
SUM OF ALL RESPONSES TO PHQ QUESTIONS 1-9: 0
SUM OF ALL RESPONSES TO PHQ QUESTIONS 1-9: 0

## 2018-09-17 ASSESSMENT — ENCOUNTER SYMPTOMS
CHEST TIGHTNESS: 0
SINUS PRESSURE: 0
ABDOMINAL DISTENTION: 0
SINUS PAIN: 0
SHORTNESS OF BREATH: 0
ABDOMINAL PAIN: 0

## 2018-09-17 NOTE — PROGRESS NOTES
Subjective  Chief Complaint   Patient presents with    Ear Fullness     LT worse, pt states that she was swimming last week and forgot swimming cap? RT should be looked at as well?  Health Maintenance       Ear Fullness    There is pain in both (left way worse) ears. This is a new problem. The current episode started in the past 7 days. The problem occurs constantly. The problem has been unchanged. There has been no fever. The patient is experiencing no pain. Associated symptoms include hearing loss (left ear). Pertinent negatives include no abdominal pain, ear discharge, headaches or neck pain. She has tried nothing for the symptoms. Pt reports for CC described above and denies having had a similar problem in the past. Cannot hear her phone in the left ear, reports muffled voice. Occasionally feels pressure in the left pre-auricular area.     Post-ear canal lavage pt reports fullness in right ear but hearing in the left ear    Patient Active Problem List    Diagnosis Date Noted    Osteoarthritis of right hip 08/10/2017    Positive FIT (fecal immunochemical test) 2017    Osteoarthritis     Hypertension     Parkinsonism ([address]      Past Medical History:   Diagnosis Date    Hypertension     meds > 10 yrs / denies TIA or stroke    Osteoarthritis     DJD right hip    Parkinsonism ([address]      Past Surgical History:   Procedure Laterality Date     SECTION  1988    DILATION AND CURETTAGE OF UTERUS      AUB    KNEE SURGERY Left     arthrotomy    TOTAL HIP ARTHROPLASTY Right 2017    RIGHT HIP TOTAL HIP ARTHROPLASTY WILLIAM [facility]INAL  performed by Alfredo Gaona MD at [address] History   Problem Relation Age of Onset    High Blood Pressure Mother     Emphysema Father     Other Sister         fibromyalgia    Stroke Brother     Heart Disease Brother     Stroke Sister         brain aneurysm at age 43    Other Brother         drowning accident at age 39

## 2018-12-17 RX ORDER — SPIRONOLACTONE AND HYDROCHLOROTHIAZIDE 25; 25 MG/1; MG/1
2 TABLET ORAL DAILY
Qty: 60 TABLET | Refills: 5 | Status: SHIPPED | OUTPATIENT
Start: 2018-12-17 | End: 2019-04-09 | Stop reason: SDUPTHER

## 2018-12-19 DIAGNOSIS — I10 ESSENTIAL HYPERTENSION: ICD-10-CM

## 2018-12-19 LAB
ALBUMIN SERPL-MCNC: 4.2 G/DL (ref 3.9–4.9)
ALP BLD-CCNC: 69 U/L (ref 40–130)
ALT SERPL-CCNC: 10 U/L (ref 0–33)
ANION GAP SERPL CALCULATED.3IONS-SCNC: 13 MEQ/L (ref 7–13)
AST SERPL-CCNC: 15 U/L (ref 0–35)
BILIRUB SERPL-MCNC: 0.4 MG/DL (ref 0–1.2)
BUN BLDV-MCNC: 21 MG/DL (ref 8–23)
CALCIUM SERPL-MCNC: 9.8 MG/DL (ref 8.6–10.2)
CHLORIDE BLD-SCNC: 101 MEQ/L (ref 98–107)
CO2: 27 MEQ/L (ref 22–29)
CREAT SERPL-MCNC: 0.71 MG/DL (ref 0.5–0.9)
GFR AFRICAN AMERICAN: >60
GFR NON-AFRICAN AMERICAN: >60
GLOBULIN: 3.3 G/DL (ref 2.3–3.5)
GLUCOSE BLD-MCNC: 83 MG/DL (ref 74–109)
POTASSIUM SERPL-SCNC: 3.5 MEQ/L (ref 3.5–5.1)
SODIUM BLD-SCNC: 141 MEQ/L (ref 132–144)
TOTAL PROTEIN: 7.5 G/DL (ref 6.4–8.1)

## 2019-04-09 RX ORDER — SPIRONOLACTONE AND HYDROCHLOROTHIAZIDE 25; 25 MG/1; MG/1
2 TABLET ORAL DAILY
Qty: 60 TABLET | Refills: 4 | Status: SHIPPED | OUTPATIENT
Start: 2019-04-09 | End: 2019-08-20 | Stop reason: SDUPTHER

## 2019-08-20 RX ORDER — SPIRONOLACTONE AND HYDROCHLOROTHIAZIDE 25; 25 MG/1; MG/1
2 TABLET ORAL DAILY
Qty: 180 TABLET | Refills: 1 | Status: SHIPPED | OUTPATIENT
Start: 2019-08-20 | End: 2020-03-04

## 2019-10-18 ENCOUNTER — HOSPITAL ENCOUNTER (OUTPATIENT)
Dept: ORTHOPEDIC SURGERY | Age: 69
Discharge: HOME | End: 2019-10-20
Payer: MEDICARE

## 2019-10-18 DIAGNOSIS — M25.559 ARTHRALGIA OF HIP, UNSPECIFIED LATERALITY: ICD-10-CM

## 2019-10-18 PROCEDURE — 73502 X-RAY EXAM HIP UNI 2-3 VIEWS: CPT

## 2019-11-24 ENCOUNTER — OFFICE VISIT (OUTPATIENT)
Dept: FAMILY MEDICINE CLINIC | Age: 69
End: 2019-11-24
Payer: MEDICARE

## 2019-11-24 VITALS
DIASTOLIC BLOOD PRESSURE: 80 MMHG | TEMPERATURE: 97.8 F | HEART RATE: 81 BPM | BODY MASS INDEX: 39.65 KG/M2 | HEIGHT: 61 IN | SYSTOLIC BLOOD PRESSURE: 134 MMHG | OXYGEN SATURATION: 98 % | WEIGHT: 210 LBS

## 2019-11-24 DIAGNOSIS — R10.9 FLANK PAIN: ICD-10-CM

## 2019-11-24 DIAGNOSIS — R11.0 NAUSEA: ICD-10-CM

## 2019-11-24 DIAGNOSIS — R10.9 ACUTE LEFT FLANK PAIN: Primary | ICD-10-CM

## 2019-11-24 LAB
BILIRUBIN, POC: NORMAL
BLOOD URINE, POC: NORMAL
CLARITY, POC: CLEAR
COLOR, POC: YELLOW
GLUCOSE URINE, POC: NORMAL
KETONES, POC: NORMAL
LEUKOCYTE EST, POC: NORMAL
NITRITE, POC: NORMAL
PH, POC: 8.5
PROTEIN, POC: NORMAL
SPECIFIC GRAVITY, POC: 1.01
UROBILINOGEN, POC: 0.2

## 2019-11-24 PROCEDURE — 1123F ACP DISCUSS/DSCN MKR DOCD: CPT | Performed by: NURSE PRACTITIONER

## 2019-11-24 PROCEDURE — 81002 URINALYSIS NONAUTO W/O SCOPE: CPT | Performed by: NURSE PRACTITIONER

## 2019-11-24 PROCEDURE — G8427 DOCREV CUR MEDS BY ELIG CLIN: HCPCS | Performed by: NURSE PRACTITIONER

## 2019-11-24 PROCEDURE — G8417 CALC BMI ABV UP PARAM F/U: HCPCS | Performed by: NURSE PRACTITIONER

## 2019-11-24 PROCEDURE — G8484 FLU IMMUNIZE NO ADMIN: HCPCS | Performed by: NURSE PRACTITIONER

## 2019-11-24 PROCEDURE — 1090F PRES/ABSN URINE INCON ASSESS: CPT | Performed by: NURSE PRACTITIONER

## 2019-11-24 PROCEDURE — 4040F PNEUMOC VAC/ADMIN/RCVD: CPT | Performed by: NURSE PRACTITIONER

## 2019-11-24 PROCEDURE — 3017F COLORECTAL CA SCREEN DOC REV: CPT | Performed by: NURSE PRACTITIONER

## 2019-11-24 PROCEDURE — G8400 PT W/DXA NO RESULTS DOC: HCPCS | Performed by: NURSE PRACTITIONER

## 2019-11-24 PROCEDURE — 1036F TOBACCO NON-USER: CPT | Performed by: NURSE PRACTITIONER

## 2019-11-24 PROCEDURE — 99213 OFFICE O/P EST LOW 20 MIN: CPT | Performed by: NURSE PRACTITIONER

## 2019-11-24 RX ORDER — OXYCODONE HYDROCHLORIDE AND ACETAMINOPHEN 5; 325 MG/1; MG/1
1 TABLET ORAL EVERY 6 HOURS PRN
Qty: 20 TABLET | Refills: 0 | Status: SHIPPED | OUTPATIENT
Start: 2019-11-24 | End: 2019-11-29

## 2019-11-24 RX ORDER — ONDANSETRON 4 MG/1
4 TABLET, FILM COATED ORAL EVERY 8 HOURS PRN
Qty: 30 TABLET | Refills: 0 | Status: SHIPPED | OUTPATIENT
Start: 2019-11-24 | End: 2019-11-27

## 2019-11-24 ASSESSMENT — PATIENT HEALTH QUESTIONNAIRE - PHQ9
2. FEELING DOWN, DEPRESSED OR HOPELESS: 0
SUM OF ALL RESPONSES TO PHQ QUESTIONS 1-9: 0
SUM OF ALL RESPONSES TO PHQ QUESTIONS 1-9: 0
1. LITTLE INTEREST OR PLEASURE IN DOING THINGS: 0
SUM OF ALL RESPONSES TO PHQ9 QUESTIONS 1 & 2: 0

## 2019-11-24 ASSESSMENT — ENCOUNTER SYMPTOMS
CONSTIPATION: 1
ABDOMINAL PAIN: 1
VOMITING: 0
NAUSEA: 1
DIARRHEA: 0

## 2019-11-25 DIAGNOSIS — R10.9 ACUTE LEFT FLANK PAIN: ICD-10-CM

## 2019-11-25 LAB
ALBUMIN SERPL-MCNC: 4.6 G/DL (ref 3.5–4.6)
ALP BLD-CCNC: 73 U/L (ref 40–130)
ALT SERPL-CCNC: 8 U/L (ref 0–33)
ANION GAP SERPL CALCULATED.3IONS-SCNC: 17 MEQ/L (ref 9–15)
AST SERPL-CCNC: 15 U/L (ref 0–35)
BILIRUB SERPL-MCNC: 0.3 MG/DL (ref 0.2–0.7)
BUN BLDV-MCNC: 18 MG/DL (ref 8–23)
CALCIUM SERPL-MCNC: 10 MG/DL (ref 8.5–9.9)
CHLORIDE BLD-SCNC: 102 MEQ/L (ref 95–107)
CO2: 24 MEQ/L (ref 20–31)
CREAT SERPL-MCNC: 0.85 MG/DL (ref 0.5–0.9)
GFR AFRICAN AMERICAN: >60
GFR NON-AFRICAN AMERICAN: >60
GLOBULIN: 3.4 G/DL (ref 2.3–3.5)
GLUCOSE BLD-MCNC: 94 MG/DL (ref 70–99)
POTASSIUM SERPL-SCNC: 3.7 MEQ/L (ref 3.4–4.9)
SODIUM BLD-SCNC: 143 MEQ/L (ref 135–144)
TOTAL PROTEIN: 8 G/DL (ref 6.3–8)

## 2019-12-27 ENCOUNTER — OFFICE VISIT (OUTPATIENT)
Dept: NEUROLOGY | Age: 69
End: 2019-12-27
Payer: MEDICARE

## 2019-12-27 VITALS — SYSTOLIC BLOOD PRESSURE: 126 MMHG | WEIGHT: 209.2 LBS | BODY MASS INDEX: 39.53 KG/M2 | DIASTOLIC BLOOD PRESSURE: 58 MMHG

## 2019-12-27 DIAGNOSIS — G20 PARKINSON DISEASE (HCC): Primary | ICD-10-CM

## 2019-12-27 PROCEDURE — 99214 OFFICE O/P EST MOD 30 MIN: CPT | Performed by: PSYCHIATRY & NEUROLOGY

## 2019-12-27 ASSESSMENT — ENCOUNTER SYMPTOMS
TROUBLE SWALLOWING: 0
CHOKING: 0
SHORTNESS OF BREATH: 0
PHOTOPHOBIA: 0
NAUSEA: 0
BACK PAIN: 0
VOMITING: 0

## 2020-01-05 ENCOUNTER — OFFICE VISIT (OUTPATIENT)
Dept: FAMILY MEDICINE CLINIC | Age: 70
End: 2020-01-05
Payer: MEDICARE

## 2020-01-05 VITALS
OXYGEN SATURATION: 97 % | SYSTOLIC BLOOD PRESSURE: 136 MMHG | HEART RATE: 75 BPM | HEIGHT: 61 IN | TEMPERATURE: 97.3 F | DIASTOLIC BLOOD PRESSURE: 70 MMHG | BODY MASS INDEX: 39.65 KG/M2 | WEIGHT: 210 LBS

## 2020-01-05 LAB
BILIRUBIN, POC: NORMAL
BLOOD URINE, POC: NORMAL
CLARITY, POC: NORMAL
COLOR, POC: NORMAL
GLUCOSE URINE, POC: NORMAL
KETONES, POC: NORMAL
LEUKOCYTE EST, POC: NORMAL
NITRITE, POC: NORMAL
PH, POC: 6
PROTEIN, POC: NORMAL
SPECIFIC GRAVITY, POC: 1.02
UROBILINOGEN, POC: 0.2

## 2020-01-05 PROCEDURE — G8417 CALC BMI ABV UP PARAM F/U: HCPCS | Performed by: NURSE PRACTITIONER

## 2020-01-05 PROCEDURE — G8427 DOCREV CUR MEDS BY ELIG CLIN: HCPCS | Performed by: NURSE PRACTITIONER

## 2020-01-05 PROCEDURE — 81002 URINALYSIS NONAUTO W/O SCOPE: CPT | Performed by: NURSE PRACTITIONER

## 2020-01-05 PROCEDURE — 4040F PNEUMOC VAC/ADMIN/RCVD: CPT | Performed by: NURSE PRACTITIONER

## 2020-01-05 PROCEDURE — 1036F TOBACCO NON-USER: CPT | Performed by: NURSE PRACTITIONER

## 2020-01-05 PROCEDURE — 1090F PRES/ABSN URINE INCON ASSESS: CPT | Performed by: NURSE PRACTITIONER

## 2020-01-05 PROCEDURE — G8400 PT W/DXA NO RESULTS DOC: HCPCS | Performed by: NURSE PRACTITIONER

## 2020-01-05 PROCEDURE — 3017F COLORECTAL CA SCREEN DOC REV: CPT | Performed by: NURSE PRACTITIONER

## 2020-01-05 PROCEDURE — 99213 OFFICE O/P EST LOW 20 MIN: CPT | Performed by: NURSE PRACTITIONER

## 2020-01-05 PROCEDURE — G8484 FLU IMMUNIZE NO ADMIN: HCPCS | Performed by: NURSE PRACTITIONER

## 2020-01-05 PROCEDURE — 1123F ACP DISCUSS/DSCN MKR DOCD: CPT | Performed by: NURSE PRACTITIONER

## 2020-01-05 RX ORDER — CIPROFLOXACIN 500 MG/1
500 TABLET, FILM COATED ORAL 2 TIMES DAILY
Qty: 20 TABLET | Refills: 0 | Status: SHIPPED | OUTPATIENT
Start: 2020-01-05 | End: 2020-01-15

## 2020-01-05 SDOH — ECONOMIC STABILITY: TRANSPORTATION INSECURITY
IN THE PAST 12 MONTHS, HAS LACK OF TRANSPORTATION KEPT YOU FROM MEETINGS, WORK, OR FROM GETTING THINGS NEEDED FOR DAILY LIVING?: NO

## 2020-01-05 SDOH — ECONOMIC STABILITY: TRANSPORTATION INSECURITY
IN THE PAST 12 MONTHS, HAS THE LACK OF TRANSPORTATION KEPT YOU FROM MEDICAL APPOINTMENTS OR FROM GETTING MEDICATIONS?: NO

## 2020-01-05 SDOH — ECONOMIC STABILITY: INCOME INSECURITY: HOW HARD IS IT FOR YOU TO PAY FOR THE VERY BASICS LIKE FOOD, HOUSING, MEDICAL CARE, AND HEATING?: NOT HARD AT ALL

## 2020-01-05 SDOH — ECONOMIC STABILITY: FOOD INSECURITY: WITHIN THE PAST 12 MONTHS, THE FOOD YOU BOUGHT JUST DIDN'T LAST AND YOU DIDN'T HAVE MONEY TO GET MORE.: NEVER TRUE

## 2020-01-05 SDOH — ECONOMIC STABILITY: FOOD INSECURITY: WITHIN THE PAST 12 MONTHS, YOU WORRIED THAT YOUR FOOD WOULD RUN OUT BEFORE YOU GOT MONEY TO BUY MORE.: NEVER TRUE

## 2020-01-05 ASSESSMENT — PATIENT HEALTH QUESTIONNAIRE - PHQ9
1. LITTLE INTEREST OR PLEASURE IN DOING THINGS: 1
SUM OF ALL RESPONSES TO PHQ QUESTIONS 1-9: 2
2. FEELING DOWN, DEPRESSED OR HOPELESS: 1
SUM OF ALL RESPONSES TO PHQ QUESTIONS 1-9: 2
SUM OF ALL RESPONSES TO PHQ9 QUESTIONS 1 & 2: 2

## 2020-01-05 ASSESSMENT — ENCOUNTER SYMPTOMS
NAUSEA: 0
ABDOMINAL PAIN: 1
VOMITING: 0

## 2020-01-05 NOTE — PROGRESS NOTES
Subjective  [place] Dottie, 68 y.o. female presents today with:  Chief Complaint   Patient presents with    Urinary Tract Infection     RT side back pain X 1 wk. pt states that she was in about a month ago for Kidney pain. pt states still struggling, burning urination        Urinary Tract Infection    This is a new problem. Episode onset: one week. The problem occurs every urination. The quality of the pain is described as burning. There has been no fever. There is no history of pyelonephritis. Associated symptoms include flank pain, frequency and urgency. Pertinent negatives include no chills, hematuria, nausea, sweats or vomiting. Her past medical history is significant for kidney stones. There is no history of urinary stasis or a urological procedure. Review of Systems   Constitutional: Negative for chills, diaphoresis and fever. Gastrointestinal: Positive for abdominal pain. Negative for nausea and vomiting. Genitourinary: Positive for dysuria, flank pain, frequency and urgency. Negative for hematuria, vaginal bleeding, vaginal discharge and vaginal pain. Objective    Vitals:    01/05/20 1115   BP: 136/70   Pulse: 75   Temp: 97.3 °F (36.3 °C)   SpO2: 97%   Weight: 210 lb (95.3 kg)   Height: 5' 1\" (1.549 m)       Physical Exam  Vitals signs reviewed. Constitutional:       General: She is not in acute distress. Appearance: She is well-developed. She is not diaphoretic. HENT:      Head: Normocephalic and atraumatic. Right Ear: External ear normal.      Left Ear: External ear normal.      Nose: Nose normal.   Eyes:      General:         Right eye: No discharge. Conjunctiva/sclera: Conjunctivae normal.   Cardiovascular:      Rate and Rhythm: Normal rate and regular rhythm. Pulmonary:      Effort: Pulmonary effort is normal.      Breath sounds: Normal breath sounds. Abdominal:      General: Bowel sounds are normal. There is no distension. Palpations: Abdomen is soft.  There is no mass. Tenderness: There is tenderness in the suprapubic area. There is right CVA tenderness. There is no left CVA tenderness. Lymphadenopathy:      Cervical: No cervical adenopathy. Skin:     General: Skin is warm and dry. Neurological:      Mental Status: She is alert. POC Testing Today:  Results for POC orders placed in visit on 01/05/20   POCT Urinalysis no Micro   Result Value Ref Range    Color, UA dark     Clarity, UA hazy     Glucose, UA POC n     Bilirubin, UA 1+     Ketones, UA +     Spec Grav, UA 1.025     Blood, UA POC n     pH, UA 6.0     Protein, UA POC +     Urobilinogen, UA 0.2     Leukocytes, UA n     Nitrite, UA n        Assessment & Plan    Diagnosis Orders   1. Urinary tract infection without hematuria, site unspecified  ciprofloxacin (CIPRO) 500 MG tablet   2. Burning with urination  POCT Urinalysis no Micro    Urine Culture       Orders Placed This Encounter   Procedures    Urine Culture     Standing Status:   Future     Standing Expiration Date:   1/5/2021     Order Specific Question:   Specify (ex-cath, midstream, cysto, etc)? Answer:   Midstream    POCT Urinalysis no Micro     UTI Instructions: Complete the full course of antibiotics as ordered. Take each dose with a small snack or meal to lessen potential GI upset. To prevent antibiotic resistance, please take medication as ordered and for the full duration even if you start to feel better. Consider intake of yogurt or probiotic during antibiotic use and for a few days after to help reduce the risk of developing a secondary infection. Separate the yogurt and antibiotic by at least 2 hours. Avoid alcohol while taking antibiotics. Drink plenty of water to keep your urinary system flushed. Urine specimen sent for culture and sensitivity. I will f/u with you when the results are available from the lab and may adjust your antibiotic if needed.   If you do not feel that your symptoms have resolved after

## 2020-01-06 DIAGNOSIS — R30.0 BURNING WITH URINATION: ICD-10-CM

## 2020-01-08 LAB — URINE CULTURE, ROUTINE: NORMAL

## 2020-02-10 ENCOUNTER — OFFICE VISIT (OUTPATIENT)
Dept: FAMILY MEDICINE CLINIC | Age: 70
End: 2020-02-10
Payer: MEDICARE

## 2020-02-10 VITALS
SYSTOLIC BLOOD PRESSURE: 138 MMHG | RESPIRATION RATE: 20 BRPM | HEART RATE: 88 BPM | WEIGHT: 210 LBS | TEMPERATURE: 98.1 F | OXYGEN SATURATION: 95 % | HEIGHT: 61 IN | DIASTOLIC BLOOD PRESSURE: 70 MMHG | BODY MASS INDEX: 39.65 KG/M2

## 2020-02-10 PROBLEM — E66.01 MORBIDLY OBESE (HCC): Status: ACTIVE | Noted: 2020-02-10

## 2020-02-10 PROCEDURE — G8484 FLU IMMUNIZE NO ADMIN: HCPCS | Performed by: FAMILY MEDICINE

## 2020-02-10 PROCEDURE — 1036F TOBACCO NON-USER: CPT | Performed by: FAMILY MEDICINE

## 2020-02-10 PROCEDURE — G8417 CALC BMI ABV UP PARAM F/U: HCPCS | Performed by: FAMILY MEDICINE

## 2020-02-10 PROCEDURE — 1123F ACP DISCUSS/DSCN MKR DOCD: CPT | Performed by: FAMILY MEDICINE

## 2020-02-10 PROCEDURE — G8400 PT W/DXA NO RESULTS DOC: HCPCS | Performed by: FAMILY MEDICINE

## 2020-02-10 PROCEDURE — G8427 DOCREV CUR MEDS BY ELIG CLIN: HCPCS | Performed by: FAMILY MEDICINE

## 2020-02-10 PROCEDURE — 1090F PRES/ABSN URINE INCON ASSESS: CPT | Performed by: FAMILY MEDICINE

## 2020-02-10 PROCEDURE — 3017F COLORECTAL CA SCREEN DOC REV: CPT | Performed by: FAMILY MEDICINE

## 2020-02-10 PROCEDURE — 4040F PNEUMOC VAC/ADMIN/RCVD: CPT | Performed by: FAMILY MEDICINE

## 2020-02-10 PROCEDURE — 99213 OFFICE O/P EST LOW 20 MIN: CPT | Performed by: FAMILY MEDICINE

## 2020-02-10 RX ORDER — HYDROCODONE BITARTRATE AND ACETAMINOPHEN 5; 325 MG/1; MG/1
1 TABLET ORAL EVERY 6 HOURS PRN
Qty: 12 TABLET | Refills: 0 | Status: SHIPPED | OUTPATIENT
Start: 2020-02-10 | End: 2020-02-13

## 2020-02-10 RX ORDER — METHYLPREDNISOLONE 4 MG/1
TABLET ORAL
Qty: 1 KIT | Refills: 0 | Status: SHIPPED | OUTPATIENT
Start: 2020-02-10 | End: 2020-10-19 | Stop reason: ALTCHOICE

## 2020-02-10 NOTE — PROGRESS NOTES
Chief Complaint   Patient presents with    Arm Pain     right x 2 week        HPI:  Licha Guy is a 68 y.o. female     Right arm pain for 2 weeks  Points to lateral deltoid  Radiates down to forearm  Denies numbness/tingling  Ibuprofen is some help  Hurts when abducting the arm  Hurts to lift things    Parkinsonism  Does see dr. Max Selby    htn  Taking medication without issue  Overdue for screens/routine labs, etc    She has not had colonoscopy    Past Medical History:   Diagnosis Date    Hypertension     meds > 10 yrs / denies TIA or stroke    Osteoarthritis     DJD right hip    Parkinsonism ([address]      Past Surgical History:   Procedure Laterality Date     SECTION  1988    DILATION AND CURETTAGE OF UTERUS      AUB    KNEE SURGERY Left     arthrotomy    TOTAL HIP ARTHROPLASTY Right 2017    RIGHT HIP TOTAL HIP ARTHROPLASTY WILLIAM [facility]DM SPINAL  performed by Prem Roberts MD at [address] History   Problem Relation Age of Onset    High Blood Pressure Mother     Emphysema Father     Other Sister         fibromyalgia    Stroke Brother     Heart Disease Brother     Stroke Sister         brain aneurysm at age 43    Other Brother         drowning accident at age 39     Social History     Socioeconomic History    Marital status:      Spouse name: Not on file    Number of children: Not on file    Years of education: Not on file    Highest education level: Not on file   Occupational History    Not on file   Social Needs    Financial resource strain: Not hard at all   [place] insecurity:     Worry: Never true     Inability: Never true   [place] needs:     Medical: No     Non-medical: No   Tobacco Use    Smoking status: Never Smoker    Smokeless tobacco: Never Used   Substance and Sexual Activity    Alcohol use:  Yes     Alcohol/week: 1.0 standard drinks     Types: 1 Glasses of wine per week    Drug use: No    Sexual activity: Not on file   Lifestyle    Physical activity:     Days per week: Not on file     Minutes per session: Not on file    Stress: Not on file   Relationships    Social connections:     Talks on phone: Not on file     Gets together: Not on file     Attends Yazdanism service: Not on file     Active member of club or organization: Not on file     Attends meetings of clubs or organizations: Not on file     Relationship status: Not on file    Intimate partner violence:     Fear of current or ex partner: Not on file     Emotionally abused: Not on file     Physically abused: Not on file     Forced sexual activity: Not on file   Other Topics Concern    Not on file   Social History Narrative    Not on file     Current Outpatient Medications   Medication Sig Dispense Refill    methylPREDNISolone (MEDROL DOSEPACK) 4 MG tablet Take by mouth. 1 kit 0    HYDROcodone-acetaminophen (NORCO) 5-325 MG per tablet Take 1 tablet by mouth every 6 hours as needed for Pain for up to 3 days. Intended supply: 3 days. Take lowest dose possible to manage pain 12 tablet 0    spironolactone-hydrochlorothiazide (ALDACTAZIDE) 25-25 MG per tablet TAKE 2 TABLETS BY MOUTH DAILY 180 tablet 1    aspirin 81 MG EC tablet Take 1 tablet by mouth 2 times daily 60 tablet 0    carbidopa-levodopa (SINEMET)  MG per tablet Take 1 tablet by mouth 2 times daily 180 tablet 3     No current facility-administered medications for this visit.       Allergies   Allergen Reactions    Tramadol Nausea And Vomiting    Sulfa Antibiotics Hives     hives & chills       Review of Systems:   General ROS: negative for - chills, fatigue, fever, malaise, weight gain or weight loss  Respiratory ROS: no cough, shortness of breath, or wheezing  Cardiovascular ROS: no chest pain or dyspnea on exertion  Gastrointestinal ROS: no abdominal pain, change in bowel habits, or black or bloody stools  Genito-Urinary ROS: no dysuria, trouble voiding  Musculoskeletal ROS: right knee pain, right hip

## 2020-02-10 NOTE — PATIENT INSTRUCTIONS
with:  ? Sweating. ? Shortness of breath. ? Nausea or vomiting. ? Pain that spreads from the chest to the neck, jaw, or one or both shoulders or arms. ? Dizziness or lightheadedness. ? A fast or uneven pulse. After calling  911, chew 1 adult-strength aspirin. Wait for an ambulance. Do not try to drive yourself.     · Your arm or hand is cool or pale or changes color.    Call your doctor now or seek immediate medical care if:    · You have signs of infection, such as:  ? Increased pain, swelling, warmth, or redness in your shoulder. ? Red streaks leading from a place on your shoulder. ? Pus draining from an area of your shoulder. ? Swollen lymph nodes in your neck, armpits, or groin. ? A fever.    Watch closely for changes in your health, and be sure to contact your doctor if:    · You cannot use your shoulder.     · Your shoulder does not get better as expected. Where can you learn more? Go to https://[email or web address]. org and sign in to your [employer] account. Enter L931 in the [employer]heart box to learn more about \"Shoulder Pain: Care Instructions. \"     If you do not have an account, please click on the \"Sign Up Now\" link. Current as of: June 26, 2019  Content Version: 12.3  © 5314-5862 [employer]. Care instructions adapted under license by [facility] ([facility]). If you have questions about a medical condition or this instruction, always ask your healthcare professional. [place] [address] any warranty or liability for your use of this information. Patient Education        Shoulder Stretches: Exercises  Introduction  Here are some examples of exercises for you to try. The exercises may be suggested for a condition or for rehabilitation. Start each exercise slowly. Ease off the exercises if you start to have pain. You will be told when to start these exercises and which ones will work best for you.   How to do the exercises  Shoulder stretch   1. Stand in a doorway and place one arm against the door frame. Your elbow should be a little higher than your shoulder. 2. Relax your shoulders as you lean forward, allowing your chest and shoulder muscles to stretch. You can also turn your body slightly away from your arm to stretch the muscles even more. 3. Hold for 15 to 30 seconds. 4. Repeat 2 to 4 times with each arm. Shoulder and chest stretch   1. Shoulder and chest stretch  2. While sitting, relax your upper body so you slump slightly in your chair. 3. As you breathe in, straighten your back and open your arms out to the sides. 4. Gently pull your shoulder blades back and downward. 5. Hold for 15 to 30 seconds as your breathe normally. 6. Repeat 2 to 4 times. Overhead stretch   1. Reach up over your head with both arms. 2. Hold for 15 to 30 seconds. 3. Repeat 2 to 4 times. Follow-up care is a key part of your treatment and safety. Be sure to make and go to all appointments, and call your doctor if you are having problems. It's also a good idea to know your test results and keep a list of the medicines you take. Where can you learn more? Go to [email or web address] org and sign in to your [employer] account. Enter S254 in the [employer] box to learn more about \"Shoulder Stretches: Exercises. \"     If you do not have an account, please click on the \"Sign Up Now\" link. Current as of: June 26, 2019  Content Version: 12.3  © 8746-7094 [employer]. Care instructions adapted under license by [facility] ([facility]). If you have questions about a medical condition or this instruction, always ask your healthcare professional. [address] any warranty or liability for your use of this information.

## 2020-03-04 RX ORDER — SPIRONOLACTONE AND HYDROCHLOROTHIAZIDE 25; 25 MG/1; MG/1
2 TABLET ORAL DAILY
Qty: 180 TABLET | Refills: 1 | Status: SHIPPED | OUTPATIENT
Start: 2020-03-04 | End: 2020-03-05 | Stop reason: SDUPTHER

## 2020-03-05 ENCOUNTER — OFFICE VISIT (OUTPATIENT)
Dept: FAMILY MEDICINE CLINIC | Age: 70
End: 2020-03-05
Payer: MEDICARE

## 2020-03-05 VITALS
BODY MASS INDEX: 39.2 KG/M2 | HEIGHT: 61 IN | WEIGHT: 207.6 LBS | HEART RATE: 79 BPM | OXYGEN SATURATION: 97 % | SYSTOLIC BLOOD PRESSURE: 128 MMHG | DIASTOLIC BLOOD PRESSURE: 80 MMHG

## 2020-03-05 PROCEDURE — G8417 CALC BMI ABV UP PARAM F/U: HCPCS | Performed by: FAMILY MEDICINE

## 2020-03-05 PROCEDURE — 1123F ACP DISCUSS/DSCN MKR DOCD: CPT | Performed by: FAMILY MEDICINE

## 2020-03-05 PROCEDURE — 20610 DRAIN/INJ JOINT/BURSA W/O US: CPT | Performed by: FAMILY MEDICINE

## 2020-03-05 PROCEDURE — 4040F PNEUMOC VAC/ADMIN/RCVD: CPT | Performed by: FAMILY MEDICINE

## 2020-03-05 PROCEDURE — 1090F PRES/ABSN URINE INCON ASSESS: CPT | Performed by: FAMILY MEDICINE

## 2020-03-05 PROCEDURE — 3017F COLORECTAL CA SCREEN DOC REV: CPT | Performed by: FAMILY MEDICINE

## 2020-03-05 PROCEDURE — G8400 PT W/DXA NO RESULTS DOC: HCPCS | Performed by: FAMILY MEDICINE

## 2020-03-05 PROCEDURE — G8484 FLU IMMUNIZE NO ADMIN: HCPCS | Performed by: FAMILY MEDICINE

## 2020-03-05 PROCEDURE — 1036F TOBACCO NON-USER: CPT | Performed by: FAMILY MEDICINE

## 2020-03-05 PROCEDURE — 99212 OFFICE O/P EST SF 10 MIN: CPT | Performed by: FAMILY MEDICINE

## 2020-03-05 PROCEDURE — G8427 DOCREV CUR MEDS BY ELIG CLIN: HCPCS | Performed by: FAMILY MEDICINE

## 2020-03-05 RX ORDER — TRIAMCINOLONE ACETONIDE 40 MG/ML
40 INJECTION, SUSPENSION INTRA-ARTICULAR; INTRAMUSCULAR ONCE
Status: COMPLETED | OUTPATIENT
Start: 2020-03-05 | End: 2020-03-05

## 2020-03-05 RX ORDER — SPIRONOLACTONE AND HYDROCHLOROTHIAZIDE 25; 25 MG/1; MG/1
2 TABLET ORAL DAILY
Qty: 180 TABLET | Refills: 1 | Status: SHIPPED | OUTPATIENT
Start: 2020-03-05 | End: 2020-11-05

## 2020-03-05 RX ORDER — LIDOCAINE HYDROCHLORIDE 10 MG/ML
2 INJECTION, SOLUTION INFILTRATION; PERINEURAL ONCE
Status: COMPLETED | OUTPATIENT
Start: 2020-03-05 | End: 2020-03-05

## 2020-03-05 RX ADMIN — LIDOCAINE HYDROCHLORIDE 2 ML: 10 INJECTION, SOLUTION INFILTRATION; PERINEURAL at 14:23

## 2020-03-05 RX ADMIN — TRIAMCINOLONE ACETONIDE 40 MG: 40 INJECTION, SUSPENSION INTRA-ARTICULAR; INTRAMUSCULAR at 14:24

## 2020-03-05 NOTE — PROGRESS NOTES
Chief Complaint   Patient presents with    Shoulder Pain     was on prednisone 3 weeks ago, still bothering her        HPI:  Alexis Johnson is a 68 y.o. female     Right arm pain for now over a month  Prednisone helped for a few days  Points to lateral deltoid    Denies numbness/tingling  Ibuprofen is some help  Hurts when abducting the arm  Hurts to lift things    Parkinsonism  Does see dr. Rowdy De Jesus    htn  Taking medication without issue  Overdue for screens/routine labs, etc    She has not had colonoscopy    Past Medical History:   Diagnosis Date    Hypertension     meds > 10 yrs / denies TIA or stroke    Osteoarthritis     DJD right hip    Parkinsonism ([facility] [address]      Past Surgical History:   Procedure Laterality Date     SECTION  1988    DILATION AND CURETTAGE OF UTERUS      AUB    KNEE SURGERY Left     arthrotomy    TOTAL HIP ARTHROPLASTY Right 2017    RIGHT HIP TOTAL HIP ARTHROPLASTY WILLIAM MDM SPINAL  performed by Babak Pardo MD at [address] History   Problem Relation Age of Onset    High Blood Pressure Mother     Emphysema Father     Other Sister         fibromyalgia    Stroke Brother     Heart Disease Brother     Stroke Sister         brain aneurysm at age 39    Other Brother         drowning accident at age 39     Social History     Socioeconomic History    Marital status:      Spouse name: None    Number of children: None    Years of education: None    Highest education level: None   Occupational History    None   Social Needs    Financial resource strain: Not hard at all   [place] insecurity:     Worry: Never true     Inability: Never true    Transportation needs:     Medical: No     Non-medical: No   Tobacco Use    Smoking status: Never Smoker    Smokeless tobacco: Never Used   Substance and Sexual Activity    Alcohol use:  Yes     Alcohol/week: 1.0 standard drinks     Types: 1 Glasses of wine per week    Drug use: No    Sexual activity: None Physical Exam:  /80 (Site: Right Upper Arm)   Pulse 79   Ht 5' 1\" (1.549 m)   Wt 207 lb 9.6 oz (94.2 kg)   LMP 08/10/2007   SpO2 97%   Breastfeeding No   BMI 39.23 kg/m²     Gen: Well, NAD, Alert, Oriented x 3   HEENT: EOMI, eyes clear, MMM  Skin: without rash or jaundice  Right lateral arm with tenderness  Neuro: left hand with pill rolling tremor, left arm with some rigidity        A&P   Diagnosis Orders   1. Acute pain of right shoulder  20610 - [place] DRAIN/INJECT LARGE JOINT/BURSA    lidocaine 1 % injection 2 mL    triamcinolone acetonide (KENALOG-40) injection 40 mg    Ambulatory referral to Physical Therapy   2. Essential hypertension  spironolactone-hydrochlorothiazide (ALDACTAZIDE) 25-25 MG per tablet   3. Bursitis of right shoulder  Ambulatory referral to Physical Therapy        Shoulder  Pre-operative Diagnosis: right shoulder pain/impingement  Post-operative Diagnosis: same    Indications: shoulder pain with impingement    Anesthesia: Ethyl chloride spray    Procedure Details     After a discussion of the risks and benefits with the patient (including the possibility that any manipulation of the joint space could introduce infection, worsening the current situation significantly), verbal consent was obtained for the procedure. The joint was prepped with alcohol,  Betadine x2, then again with alcohol. Ethyl chloride spray used for topical anesthesia. Using a lateral approach, the shoulder was injected 40mg kenalog and 2cc 2%lidocaine without epi. The injection site was cleansed with topical isopropyl alcohol and a dressing was applied. Complications:  None; patient tolerated the procedure well.       Allyson Mcginnis MD

## 2020-05-26 ENCOUNTER — TELEPHONE (OUTPATIENT)
Dept: FAMILY MEDICINE CLINIC | Age: 70
End: 2020-05-26

## 2020-07-09 ENCOUNTER — OFFICE VISIT (OUTPATIENT)
Dept: NEUROLOGY | Age: 70
End: 2020-07-09
Payer: MEDICARE

## 2020-07-09 VITALS
WEIGHT: 204 LBS | HEART RATE: 79 BPM | DIASTOLIC BLOOD PRESSURE: 73 MMHG | SYSTOLIC BLOOD PRESSURE: 125 MMHG | BODY MASS INDEX: 38.55 KG/M2

## 2020-07-09 PROBLEM — R25.1 TREMORS OF NERVOUS SYSTEM: Status: ACTIVE | Noted: 2020-07-09

## 2020-07-09 PROCEDURE — 4040F PNEUMOC VAC/ADMIN/RCVD: CPT | Performed by: PSYCHIATRY & NEUROLOGY

## 2020-07-09 PROCEDURE — 1123F ACP DISCUSS/DSCN MKR DOCD: CPT | Performed by: PSYCHIATRY & NEUROLOGY

## 2020-07-09 PROCEDURE — G8417 CALC BMI ABV UP PARAM F/U: HCPCS | Performed by: PSYCHIATRY & NEUROLOGY

## 2020-07-09 PROCEDURE — G8427 DOCREV CUR MEDS BY ELIG CLIN: HCPCS | Performed by: PSYCHIATRY & NEUROLOGY

## 2020-07-09 PROCEDURE — 1036F TOBACCO NON-USER: CPT | Performed by: PSYCHIATRY & NEUROLOGY

## 2020-07-09 PROCEDURE — G8400 PT W/DXA NO RESULTS DOC: HCPCS | Performed by: PSYCHIATRY & NEUROLOGY

## 2020-07-09 PROCEDURE — 1090F PRES/ABSN URINE INCON ASSESS: CPT | Performed by: PSYCHIATRY & NEUROLOGY

## 2020-07-09 PROCEDURE — 3017F COLORECTAL CA SCREEN DOC REV: CPT | Performed by: PSYCHIATRY & NEUROLOGY

## 2020-07-09 PROCEDURE — 99214 OFFICE O/P EST MOD 30 MIN: CPT | Performed by: PSYCHIATRY & NEUROLOGY

## 2020-07-09 ASSESSMENT — ENCOUNTER SYMPTOMS
CHOKING: 0
SHORTNESS OF BREATH: 0
TROUBLE SWALLOWING: 0
BACK PAIN: 0
VOMITING: 0
COLOR CHANGE: 0
NAUSEA: 0
PHOTOPHOBIA: 0

## 2020-07-09 NOTE — PROGRESS NOTES
Lifestyle    Physical activity     Days per week: Not on file     Minutes per session: Not on file    Stress: Not on file   Relationships    Social connections     Talks on phone: Not on file     Gets together: Not on file     Attends Sabianist service: Not on file     Active member of club or organization: Not on file     Attends meetings of clubs or organizations: Not on file     Relationship status: Not on file    Intimate partner violence     Fear of current or ex partner: Not on file     Emotionally abused: Not on file     Physically abused: Not on file     Forced sexual activity: Not on file   Other Topics Concern    Not on file   Social History Narrative    Not on file     Family History   Problem Relation Age of Onset    High Blood Pressure Mother     Emphysema Father     Other Sister         fibromyalgia    Stroke Brother     Heart Disease Brother     Stroke Sister         brain aneurysm at age 39    Other Brother         drowning accident at age 39     Allergies   Allergen Reactions    Tramadol Nausea And Vomiting    Sulfa Antibiotics Hives     hives & chills       Current Outpatient Medications   Medication Sig Dispense Refill    spironolactone-hydrochlorothiazide (ALDACTAZIDE) 25-25 MG per tablet Take 2 tablets by mouth daily 180 tablet 1    carbidopa-levodopa (SINEMET)  MG per tablet Take 1 tablet by mouth 2 times daily 180 tablet 3    aspirin 81 MG EC tablet Take 1 tablet by mouth 2 times daily 60 tablet 0    methylPREDNISolone (MEDROL DOSEPACK) 4 MG tablet Take by mouth. (Patient not taking: Reported on 3/5/2020) 1 kit 0     No current facility-administered medications for this visit. Review of Systems   Constitutional: Negative for fever. HENT: Negative for ear pain, tinnitus and trouble swallowing. Eyes: Negative for photophobia and visual disturbance. Respiratory: Negative for choking and shortness of breath.     Cardiovascular: Negative for chest pain and palpitations. Gastrointestinal: Negative for nausea and vomiting. Musculoskeletal: Negative for back pain, gait problem, joint swelling, myalgias, neck pain and neck stiffness. Skin: Negative for color change. Allergic/Immunologic: Negative for food allergies. Neurological: Positive for tremors. Negative for dizziness, seizures, syncope, facial asymmetry, speech difficulty, weakness, light-headedness, numbness and headaches. Psychiatric/Behavioral: Negative for behavioral problems, confusion, hallucinations and sleep disturbance. Objective:   /73 (Site: Right Upper Arm, Position: Sitting, Cuff Size: Large Adult)   Pulse 79   Wt 204 lb (92.5 kg)   LMP 08/10/2007   BMI 38.55 kg/m²     Physical Exam  Vitals signs reviewed. Eyes:      Pupils: Pupils are equal, round, and reactive to light. Neck:      Musculoskeletal: Normal range of motion. Cardiovascular:      Rate and Rhythm: Normal rate and regular rhythm. Heart sounds: No murmur. Pulmonary:      Effort: Pulmonary effort is normal.      Breath sounds: Normal breath sounds. Abdominal:      General: Bowel sounds are normal.   Musculoskeletal: Normal range of motion. Skin:     General: Skin is warm. Neurological:      Mental Status: She is alert and oriented to person, place, and time. Cranial Nerves: No cranial nerve deficit. Sensory: No sensory deficit. Motor: No abnormal muscle tone. Coordination: Coordination normal.      Deep Tendon Reflexes: Reflexes are normal and symmetric. Babinski sign absent on the right side. Babinski sign absent on the left side. Psychiatric:         Mood and Affect: Mood normal.     Minimal tremor which is an isolated parkinsonian tremor noted on the left. She has normal arm swings normal saccades normal pursuits and a normal walk    Xr Hip Right (2-3 Views)    Result Date: 10/18/2019  Radiology exam is complete. No Radiologist dictation.  Please follow up with ordering months (around 11/9/2020).       Chasity Srivastava MD

## 2020-10-10 ENCOUNTER — TELEPHONE (OUTPATIENT)
Dept: FAMILY MEDICINE CLINIC | Age: 70
End: 2020-10-10

## 2020-10-15 ENCOUNTER — NURSE TRIAGE (OUTPATIENT)
Dept: OTHER | Facility: CLINIC | Age: 70
End: 2020-10-15

## 2020-10-18 NOTE — PROGRESS NOTES
10/19/2020    Nika Guido (:  1950) is a 68 y.o. female, here for evaluation of the following medical concerns:  Chief Complaint   Patient presents with    Rectal Bleeding     x2 months States this has happened before, has had stomach scan and never found anything.  Diarrhea    Health Maintenance     Yes for Flu vaccine. Declined Dexa, Mammogram orderd previously needs to schedule, and will discuss colonscopy. HPI  Rectal bleeding  Symptoms started 2 months ago  Blood when she wipes, she does not notice any mixed in with the stool  Denied constipation  Pt does take 2 aspirin daily  Pt has had similar episodes before, states stress tends to cause her symptoms  Denied history of hemorrhoids  Endorses mucous in the stool  Pt does have urgency and loose BMs  She is having 1 BM daily, sometimes another BM at night  Denied any pain with BMs  Rectal bleeding occurs everyday, bleeding is about the size of a quarter on the toilet paper      Review of Systems   Constitutional: Negative for chills and fever. HENT: Negative for congestion, sinus pressure and sore throat. Respiratory: Negative for cough and shortness of breath. Cardiovascular: Negative for chest pain and palpitations. Gastrointestinal: Positive for anal bleeding. Negative for abdominal pain and vomiting. Musculoskeletal: Negative for arthralgias and myalgias. Skin: Negative for rash and wound. Neurological: Negative for speech difficulty and light-headedness. Psychiatric/Behavioral: Negative for suicidal ideas. The patient is not nervous/anxious. Prior to Visit Medications    Medication Sig Taking?  Authorizing Provider   carbidopa-levodopa (SINEMET)  MG per tablet Take 1 tablet by mouth 2 times daily Yes Javon Lau MD   spironolactone-hydrochlorothiazide (ALDACTAZIDE) 25-25 MG per tablet Take 2 tablets by mouth daily Yes Mitchell Willoughby MD   aspirin 81 MG EC tablet Take 1 tablet by mouth 2 times daily Yes Cordella Fair, APRN - CNP        Allergies   Allergen Reactions    Tramadol Nausea And Vomiting    Sulfa Antibiotics Hives     hives & chills       Past Medical History:   Diagnosis Date    Hypertension     meds > 10 yrs / denies TIA or stroke    Osteoarthritis     DJD right hip    Parkinsonism ([address]        Past Surgical History:   Procedure Laterality Date     SECTION  1988    DILATION AND CURETTAGE OF UTERUS      AUB    KNEE SURGERY Left     arthrotomy    TOTAL HIP ARTHROPLASTY Right 2017    RIGHT HIP TOTAL HIP ARTHROPLASTY WILLIAM MDM SPINAL  performed by Paula Redman MD at [facility] History     Socioeconomic History    Marital status:      Spouse name: Not on file    Number of children: Not on file    Years of education: Not on file    Highest education level: Not on file   Occupational History    Not on file   Social Needs    Financial resource strain: Not hard at all   [place] insecurity     Worry: Never true     Inability: Never true   [employer] needs     Medical: No     Non-medical: No   Tobacco Use    Smoking status: Never Smoker    Smokeless tobacco: Never Used   Substance and Sexual Activity    Alcohol use:  Yes     Alcohol/week: 1.0 standard drinks     Types: 1 Glasses of wine per week    Drug use: No    Sexual activity: Not on file   Lifestyle    Physical activity     Days per week: Not on file     Minutes per session: Not on file    Stress: Not on file   Relationships    Social connections     Talks on phone: Not on file     Gets together: Not on file     Attends Mandaen service: Not on file     Active member of club or organization: Not on file     Attends meetings of clubs or organizations: Not on file     Relationship status: Not on file    Intimate partner violence     Fear of current or ex partner: Not on file     Emotionally abused: Not on file     Physically abused: Not on file     Forced sexual activity: Not on file   Other Topics Concern    Not on file   Social History Narrative    Not on file        Family History   Problem Relation Age of Onset    High Blood Pressure Mother     Emphysema Father     Other Sister         fibromyalgia    Stroke Brother     Heart Disease Brother     Stroke Sister         brain aneurysm at age 43    Other Brother         drowning accident at age 39       Vitals:    10/19/20 1330   BP: 126/80   Pulse: 88   Temp: 96.1 °F (35.6 °C)   SpO2: 99%   Weight: 198 lb (89.8 kg)   Height: 5' 1\" (1.549 m)       Estimated body mass index is 37.41 kg/m² as calculated from the following:    Height as of this encounter: 5' 1\" (1.549 m). Weight as of this encounter: 198 lb (89.8 kg). No results for input(s): WBC, RBC, HGB, HCT, MCV, MCH, MCHC, RDW, PLT, MPV in the last 72 hours. No results for input(s): NA, K, CL, CO2, BUN, CREATININE, GLUCOSE, CALCIUM, PROT, LABALBU, BILITOT, ALKPHOS, AST, ALT in the last 72 hours. No results found for: LABA1C    No results found. Physical Exam  Constitutional:       Appearance: Normal appearance. She is normal weight. HENT:      Head: Normocephalic and atraumatic. Eyes:      Extraocular Movements: Extraocular movements intact. Conjunctiva/sclera: Conjunctivae normal.   Cardiovascular:      Rate and Rhythm: Normal rate and regular rhythm. Pulses: Normal pulses. Heart sounds: Normal heart sounds. Pulmonary:      Effort: Pulmonary effort is normal.      Breath sounds: Normal breath sounds. No wheezing or rales. Abdominal:      General: Abdomen is flat. Bowel sounds are normal. There is no distension. Palpations: Abdomen is soft. Tenderness: There is no abdominal tenderness. There is no guarding or rebound. Skin:     General: Skin is warm. Capillary Refill: Capillary refill takes less than 2 seconds. Findings: No rash. Neurological:      Mental Status: She is alert.    Psychiatric:         Mood and Affect: Mood normal.         Thought Content: Thought content normal.         Judgment: Judgment normal.         ASSESSMENT/PLAN:  1. Rectal bleeding  - Pt can trial benefiber/metamucil to help with loose stools, discussed importance of drinking plenty of fluids while taking fiber supplement  - Pt has never had a colonoscopy, would recommend follow up with GI and colonoscopy at this time  [facility] Kandy Contreras    2. Need for influenza vaccination  - INFLUENZA, QUADV, ADJUVANTED, 65 YRS =, IM, PF, PREFILL SYR, 0.5ML (FLUAD)      ---------------------------------------------------------------------  Side effects, adverse effects of the medication prescribed today, as well as treatment plan/ rationale and result expectations have been discussed with the patient who expresses understanding and desires to proceed. Close follow up to evaluate treatment results and for coordination of care. I have reviewed the patient's medical history in detail and updated the computerized patient record. As always, patient is advised that if symptoms worsen in any way they will proceed to the nearest emergency room. --------------------------------------------------------------------    Return in about 2 months (around 12/19/2020) for Follow up chronic conditions w/PCP. An  electronic signature was used to authenticate this note.     --Shadi Amor,  on 10/19/2020 at 1:49 PM

## 2020-10-19 ENCOUNTER — OFFICE VISIT (OUTPATIENT)
Dept: FAMILY MEDICINE CLINIC | Age: 70
End: 2020-10-19
Payer: MEDICARE

## 2020-10-19 VITALS
OXYGEN SATURATION: 99 % | HEART RATE: 88 BPM | HEIGHT: 61 IN | SYSTOLIC BLOOD PRESSURE: 126 MMHG | TEMPERATURE: 96.1 F | DIASTOLIC BLOOD PRESSURE: 80 MMHG | BODY MASS INDEX: 37.38 KG/M2 | WEIGHT: 198 LBS

## 2020-10-19 PROCEDURE — G8484 FLU IMMUNIZE NO ADMIN: HCPCS | Performed by: STUDENT IN AN ORGANIZED HEALTH CARE EDUCATION/TRAINING PROGRAM

## 2020-10-19 PROCEDURE — 3017F COLORECTAL CA SCREEN DOC REV: CPT | Performed by: STUDENT IN AN ORGANIZED HEALTH CARE EDUCATION/TRAINING PROGRAM

## 2020-10-19 PROCEDURE — 1036F TOBACCO NON-USER: CPT | Performed by: STUDENT IN AN ORGANIZED HEALTH CARE EDUCATION/TRAINING PROGRAM

## 2020-10-19 PROCEDURE — 1123F ACP DISCUSS/DSCN MKR DOCD: CPT | Performed by: STUDENT IN AN ORGANIZED HEALTH CARE EDUCATION/TRAINING PROGRAM

## 2020-10-19 PROCEDURE — G0008 ADMIN INFLUENZA VIRUS VAC: HCPCS | Performed by: STUDENT IN AN ORGANIZED HEALTH CARE EDUCATION/TRAINING PROGRAM

## 2020-10-19 PROCEDURE — G8400 PT W/DXA NO RESULTS DOC: HCPCS | Performed by: STUDENT IN AN ORGANIZED HEALTH CARE EDUCATION/TRAINING PROGRAM

## 2020-10-19 PROCEDURE — 4040F PNEUMOC VAC/ADMIN/RCVD: CPT | Performed by: STUDENT IN AN ORGANIZED HEALTH CARE EDUCATION/TRAINING PROGRAM

## 2020-10-19 PROCEDURE — 90694 VACC AIIV4 NO PRSRV 0.5ML IM: CPT | Performed by: STUDENT IN AN ORGANIZED HEALTH CARE EDUCATION/TRAINING PROGRAM

## 2020-10-19 PROCEDURE — 99213 OFFICE O/P EST LOW 20 MIN: CPT | Performed by: STUDENT IN AN ORGANIZED HEALTH CARE EDUCATION/TRAINING PROGRAM

## 2020-10-19 PROCEDURE — G8427 DOCREV CUR MEDS BY ELIG CLIN: HCPCS | Performed by: STUDENT IN AN ORGANIZED HEALTH CARE EDUCATION/TRAINING PROGRAM

## 2020-10-19 PROCEDURE — G8417 CALC BMI ABV UP PARAM F/U: HCPCS | Performed by: STUDENT IN AN ORGANIZED HEALTH CARE EDUCATION/TRAINING PROGRAM

## 2020-10-19 PROCEDURE — 1090F PRES/ABSN URINE INCON ASSESS: CPT | Performed by: STUDENT IN AN ORGANIZED HEALTH CARE EDUCATION/TRAINING PROGRAM

## 2020-10-19 ASSESSMENT — ENCOUNTER SYMPTOMS
SORE THROAT: 0
ANAL BLEEDING: 1
SHORTNESS OF BREATH: 0
VOMITING: 0
ABDOMINAL PAIN: 0
COUGH: 0
SINUS PRESSURE: 0

## 2020-10-19 NOTE — PROGRESS NOTES
After obtaining consent, and per orders of Dr. Rajan Jameson, injection of Influenza given in Right deltoid by Perla Gottlieb. Patient instructed to remain in clinic for 20 minutes afterwards, and to report any adverse reaction to me immediately. Vaccine Information Sheet, \"Influenza - Inactivated\"  given to Girish Pineda, or parent/legal guardian of  Girish Pineda and verbalized understanding. Patient responses:    Have you ever had a reaction to a flu vaccine? No  Are you able to eat eggs without adverse effects? Yes  Do you have any current illness? No  Have you ever had Guillian [place] Syndrome? No    Flu vaccine given per order. Please see immunization tab.

## 2020-10-26 ENCOUNTER — OFFICE VISIT (OUTPATIENT)
Dept: FAMILY MEDICINE CLINIC | Age: 70
End: 2020-10-26
Payer: MEDICARE

## 2020-10-26 VITALS
DIASTOLIC BLOOD PRESSURE: 82 MMHG | OXYGEN SATURATION: 97 % | TEMPERATURE: 97.5 F | HEIGHT: 61 IN | BODY MASS INDEX: 36.82 KG/M2 | HEART RATE: 85 BPM | WEIGHT: 195 LBS | SYSTOLIC BLOOD PRESSURE: 134 MMHG

## 2020-10-26 PROCEDURE — 1036F TOBACCO NON-USER: CPT | Performed by: NURSE PRACTITIONER

## 2020-10-26 PROCEDURE — G8400 PT W/DXA NO RESULTS DOC: HCPCS | Performed by: NURSE PRACTITIONER

## 2020-10-26 PROCEDURE — 1123F ACP DISCUSS/DSCN MKR DOCD: CPT | Performed by: NURSE PRACTITIONER

## 2020-10-26 PROCEDURE — 69209 REMOVE IMPACTED EAR WAX UNI: CPT | Performed by: NURSE PRACTITIONER

## 2020-10-26 PROCEDURE — 4040F PNEUMOC VAC/ADMIN/RCVD: CPT | Performed by: NURSE PRACTITIONER

## 2020-10-26 PROCEDURE — G8427 DOCREV CUR MEDS BY ELIG CLIN: HCPCS | Performed by: NURSE PRACTITIONER

## 2020-10-26 PROCEDURE — G8417 CALC BMI ABV UP PARAM F/U: HCPCS | Performed by: NURSE PRACTITIONER

## 2020-10-26 PROCEDURE — 1090F PRES/ABSN URINE INCON ASSESS: CPT | Performed by: NURSE PRACTITIONER

## 2020-10-26 PROCEDURE — 99213 OFFICE O/P EST LOW 20 MIN: CPT | Performed by: NURSE PRACTITIONER

## 2020-10-26 PROCEDURE — 3017F COLORECTAL CA SCREEN DOC REV: CPT | Performed by: NURSE PRACTITIONER

## 2020-10-26 PROCEDURE — G8484 FLU IMMUNIZE NO ADMIN: HCPCS | Performed by: NURSE PRACTITIONER

## 2020-10-26 ASSESSMENT — ENCOUNTER SYMPTOMS
SORE THROAT: 0
RHINORRHEA: 0
ABDOMINAL PAIN: 0
COUGH: 0
DIARRHEA: 0
NAUSEA: 0

## 2020-10-26 NOTE — PROGRESS NOTES
Subjective  Jaime Duenas, 68 y.o. female presents today with:  Chief Complaint   Patient presents with    Ear Fullness     pt states she was taking a shower the other day and noticed her right ear become plugged. pt states left eye sounds crackely. pt states she tried using ear drops to clean ears but did not work. HPI   Presents to [facility] for right ear congestion   3-4 days of ear feeling \"plugged\"  Noticed it after showering   Afebrile  Eating, drinking and sleeping well  Ear not causing discomfort   Wasn't recently sick with URI or cough   Denies drainage from ear  States left ear has a \"crackling\" sound intermittently         Past Medical History:   Diagnosis Date    Hypertension     meds > 10 yrs / denies TIA or stroke    Osteoarthritis     DJD right hip    Parkinsonism ([address]       Past Surgical History:   Procedure Laterality Date     SECTION  1988    DILATION AND CURETTAGE OF UTERUS      AUB    KNEE SURGERY Left     arthrotomy    TOTAL HIP ARTHROPLASTY Right 2017    RIGHT HIP TOTAL HIP ARTHROPLASTY WILLIAM MDM SPINAL  performed by Tess Elder MD at [address] History   Problem Relation Age of Onset    High Blood Pressure Mother     Emphysema Father     Other Sister         fibromyalgia    Stroke Brother     Heart Disease Brother     Stroke Sister         brain aneurysm at age 39    Other Brother         drowning accident at age 39       Review of Systems   Constitutional: Negative for activity change, appetite change, chills, diaphoresis, fatigue and fever. HENT: Negative for congestion, ear discharge, ear pain (\"clogged\" feeling ), rhinorrhea, sneezing and sore throat. Respiratory: Negative for cough. Gastrointestinal: Negative for abdominal pain, diarrhea and nausea. Neurological: Negative for dizziness, light-headedness and headaches. PMH, Surgical Hx, Family Hx, and Social Hx reviewed and updated.       Objective  Vitals: 10/26/20 1701   BP: 134/82   Pulse: 85   Temp: 97.5 °F (36.4 °C)   SpO2: 97%   Weight: 195 lb (88.5 kg)   Height: 5' 1\" (1.549 m)     BP Readings from Last 3 Encounters:   10/26/20 134/82   10/19/20 126/80   07/09/20 125/73     Wt Readings from Last 3 Encounters:   10/26/20 195 lb (88.5 kg)   10/19/20 198 lb (89.8 kg)   07/09/20 204 lb (92.5 kg)       Physical Exam  Vitals signs reviewed. Constitutional:       Appearance: Normal appearance. She is not ill-appearing. HENT:      Right Ear: No drainage, swelling or tenderness. There is impacted cerumen. No foreign body. No mastoid tenderness. Left Ear: No drainage, swelling or tenderness. There is impacted cerumen. A foreign body (black hair ) is present. No mastoid tenderness. Nose: Nose normal.   Eyes:      Conjunctiva/sclera: Conjunctivae normal.   Neck:      Musculoskeletal: Normal range of motion. Cardiovascular:      Rate and Rhythm: Normal rate. Heart sounds: Normal heart sounds. Pulmonary:      Effort: Pulmonary effort is normal.   Skin:     General: Skin is warm and dry. Neurological:      Mental Status: She is alert and oriented to person, place, and time. Psychiatric:         Mood and Affect: Mood normal.         Assessment & Plan    Diagnosis Orders   1. Bilateral impacted cerumen  MI REMOVAL IMPACTED CERUMEN IRRIGATION/LVG UNILAT   2. Foreign body in ear lobe, left, initial encounter       Orders Placed This Encounter   Procedures    MI REMOVAL IMPACTED CERUMEN IRRIGATION/LVG UNILAT     No orders of the defined types were placed in this encounter. Return if symptoms reoccur     Reviewed with the patient: current clinical status & the process of irrigating ears for cerumen impaction/foreign object removal. Pt verbalized understanding. PROCEDURE:  The patient had their both ear(s) irrigated to remove impacted cerumen.   A 50:50 mixture of warm water and hydrogen peroxide was used to flush each ear canal.  Large amounts of

## 2020-10-26 NOTE — PATIENT INSTRUCTIONS
loss of hearing. Watch closely for changes in your health, and be sure to contact your doctor if:    · You have pain or reduced hearing after 1 week of home treatment.     · You have any new symptoms, such as nausea or balance problems. Where can you learn more? Go to [email or web address] and sign in to your [employer] account. Enter B339 in the [employer] box to learn more about \"Earwax Blockage: Care Instructions. \"     If you do not have an account, please click on the \"Sign Up Now\" link. Current as of: June 26, 2019               Content Version: 12.6  © 7486-7471 [employer]. Care instructions adapted under license by [facility]. If you have questions about a medical condition or this instruction, always ask your healthcare professional. [address] any warranty or liability for your use of this information.

## 2020-10-29 ENCOUNTER — OFFICE VISIT (OUTPATIENT)
Dept: GASTROENTEROLOGY | Age: 70
End: 2020-10-29
Payer: MEDICARE

## 2020-10-29 VITALS
HEIGHT: 61 IN | OXYGEN SATURATION: 98 % | DIASTOLIC BLOOD PRESSURE: 78 MMHG | HEART RATE: 91 BPM | WEIGHT: 196 LBS | RESPIRATION RATE: 14 BRPM | BODY MASS INDEX: 37 KG/M2 | SYSTOLIC BLOOD PRESSURE: 118 MMHG

## 2020-10-29 PROCEDURE — G8417 CALC BMI ABV UP PARAM F/U: HCPCS | Performed by: SPECIALIST

## 2020-10-29 PROCEDURE — G8400 PT W/DXA NO RESULTS DOC: HCPCS | Performed by: SPECIALIST

## 2020-10-29 PROCEDURE — 99203 OFFICE O/P NEW LOW 30 MIN: CPT | Performed by: SPECIALIST

## 2020-10-29 PROCEDURE — G8484 FLU IMMUNIZE NO ADMIN: HCPCS | Performed by: SPECIALIST

## 2020-10-29 PROCEDURE — 1123F ACP DISCUSS/DSCN MKR DOCD: CPT | Performed by: SPECIALIST

## 2020-10-29 PROCEDURE — 1090F PRES/ABSN URINE INCON ASSESS: CPT | Performed by: SPECIALIST

## 2020-10-29 PROCEDURE — 1036F TOBACCO NON-USER: CPT | Performed by: SPECIALIST

## 2020-10-29 PROCEDURE — 4040F PNEUMOC VAC/ADMIN/RCVD: CPT | Performed by: SPECIALIST

## 2020-10-29 PROCEDURE — G8427 DOCREV CUR MEDS BY ELIG CLIN: HCPCS | Performed by: SPECIALIST

## 2020-10-29 PROCEDURE — 3017F COLORECTAL CA SCREEN DOC REV: CPT | Performed by: SPECIALIST

## 2020-10-29 RX ORDER — SODIUM, POTASSIUM,MAG SULFATES 17.5-3.13G
SOLUTION, RECONSTITUTED, ORAL ORAL
Qty: 1 BOTTLE | Refills: 0 | Status: SHIPPED | OUTPATIENT
Start: 2020-10-29 | End: 2021-03-24

## 2020-10-29 ASSESSMENT — ENCOUNTER SYMPTOMS
VOMITING: 0
BACK PAIN: 1
ANAL BLEEDING: 0
ABDOMINAL DISTENTION: 0
BLOOD IN STOOL: 1
RECTAL PAIN: 0
EYES NEGATIVE: 1
RESPIRATORY NEGATIVE: 1
ABDOMINAL PAIN: 0
CONSTIPATION: 0
DIARRHEA: 0
NAUSEA: 0

## 2020-11-02 ENCOUNTER — OFFICE VISIT (OUTPATIENT)
Dept: FAMILY MEDICINE CLINIC | Age: 70
End: 2020-11-02
Payer: MEDICARE

## 2020-11-02 VITALS
HEIGHT: 61 IN | SYSTOLIC BLOOD PRESSURE: 132 MMHG | DIASTOLIC BLOOD PRESSURE: 72 MMHG | BODY MASS INDEX: 37.57 KG/M2 | OXYGEN SATURATION: 98 % | TEMPERATURE: 96.9 F | WEIGHT: 199 LBS | HEART RATE: 81 BPM

## 2020-11-02 PROCEDURE — 1036F TOBACCO NON-USER: CPT | Performed by: FAMILY MEDICINE

## 2020-11-02 PROCEDURE — 20610 DRAIN/INJ JOINT/BURSA W/O US: CPT | Performed by: FAMILY MEDICINE

## 2020-11-02 PROCEDURE — G8427 DOCREV CUR MEDS BY ELIG CLIN: HCPCS | Performed by: FAMILY MEDICINE

## 2020-11-02 PROCEDURE — G8417 CALC BMI ABV UP PARAM F/U: HCPCS | Performed by: FAMILY MEDICINE

## 2020-11-02 PROCEDURE — 4040F PNEUMOC VAC/ADMIN/RCVD: CPT | Performed by: FAMILY MEDICINE

## 2020-11-02 PROCEDURE — G8400 PT W/DXA NO RESULTS DOC: HCPCS | Performed by: FAMILY MEDICINE

## 2020-11-02 PROCEDURE — 99213 OFFICE O/P EST LOW 20 MIN: CPT | Performed by: FAMILY MEDICINE

## 2020-11-02 PROCEDURE — G8484 FLU IMMUNIZE NO ADMIN: HCPCS | Performed by: FAMILY MEDICINE

## 2020-11-02 PROCEDURE — 1090F PRES/ABSN URINE INCON ASSESS: CPT | Performed by: FAMILY MEDICINE

## 2020-11-02 PROCEDURE — 1123F ACP DISCUSS/DSCN MKR DOCD: CPT | Performed by: FAMILY MEDICINE

## 2020-11-02 PROCEDURE — 3017F COLORECTAL CA SCREEN DOC REV: CPT | Performed by: FAMILY MEDICINE

## 2020-11-02 RX ORDER — LIDOCAINE HYDROCHLORIDE 10 MG/ML
2 INJECTION, SOLUTION INFILTRATION; PERINEURAL ONCE
Status: COMPLETED | OUTPATIENT
Start: 2020-11-02 | End: 2020-11-02

## 2020-11-02 RX ORDER — TRIAMCINOLONE ACETONIDE 40 MG/ML
40 INJECTION, SUSPENSION INTRA-ARTICULAR; INTRAMUSCULAR ONCE
Status: COMPLETED | OUTPATIENT
Start: 2020-11-02 | End: 2020-11-02

## 2020-11-02 RX ADMIN — LIDOCAINE HYDROCHLORIDE 2 ML: 10 INJECTION, SOLUTION INFILTRATION; PERINEURAL at 10:22

## 2020-11-02 RX ADMIN — TRIAMCINOLONE ACETONIDE 40 MG: 40 INJECTION, SUSPENSION INTRA-ARTICULAR; INTRAMUSCULAR at 10:11

## 2020-11-02 NOTE — PROGRESS NOTES
Chief Complaint   Patient presents with    Shoulder Pain     Pt stated she has a sore left shoulder x2-3 months, intense at night, keeps her up, right one feels pretty good     Health Maintenance     pt needs AWV, Colonoscopy on 20, Dr. Cortez Flores got her set up 10/19/20        HPI:  Unruly Paulino is a 65 y.o. female     Left  arm pain for 2-3 month    Points to lateral deltoid    Denies numbness/tingling  Ibuprofen is some help  Hurts when abducting the arm  Hurts to lift things    We had injected right shoulder last year  That was helpful    There was no injury  Worse with stress/repetitive activities    Parkinsonism  Does see dr. Preet Bermudez    htn  Taking medication without issue  Overdue for screens/routine labs, etc    She has not had colonoscopy - but has ordered    Due for mamm/dexa    Past Medical History:   Diagnosis Date    Hypertension     meds > 10 yrs / denies TIA or stroke    Osteoarthritis     DJD right hip    Parkinsonism [facility])      Past Surgical History:   Procedure Laterality Date     SECTION  1988    DILATION AND CURETTAGE OF UTERUS      AUB    KNEE SURGERY Left     arthrotomy    TOTAL HIP ARTHROPLASTY Right 2017    RIGHT HIP TOTAL HIP ARTHROPLASTY WILLIAM MDM SPINAL  performed by Arpita Ware MD at [address] History   Problem Relation Age of Onset    High Blood Pressure Mother     Emphysema Father     Other Sister         fibromyalgia    Stroke Brother     Heart Disease Brother     Stroke Sister         brain aneurysm at age 39    Other Brother         drowning accident at age 39     Social History     Socioeconomic History    Marital status:      Spouse name: None    Number of children: None    Years of education: None    Highest education level: None   Occupational History    None   Social Needs    Financial resource strain: Not hard at all   [place] insecurity     Worry: Never true     Inability: Never true   [email or web address] needs Medical: No     Non-medical: No   Tobacco Use    Smoking status: Never Smoker    Smokeless tobacco: Never Used   Substance and Sexual Activity    Alcohol use:  Yes     Alcohol/week: 1.0 standard drinks     Types: 1 Glasses of wine per week    Drug use: No    Sexual activity: None   Lifestyle    Physical activity     Days per week: None     Minutes per session: None    Stress: None   Relationships    Social connections     Talks on phone: None     Gets together: None     Attends Pentecostal service: None     Active member of club or organization: None     Attends meetings of clubs or organizations: None     Relationship status: None    Intimate partner violence     Fear of current or ex partner: None     Emotionally abused: None     Physically abused: None     Forced sexual activity: None   Other Topics Concern    None   Social History Narrative    None     Current Outpatient Medications   Medication Sig Dispense Refill    Psyllium (METAMUCIL FIBER PO) Take by mouth      Famotidine (PEPCID PO) Take by mouth      Na Sulfate-K Sulfate-Mg Sulf 17.5-3.13-1.6 GM/177ML SOLN As directed 1 Bottle 0    carbidopa-levodopa (SINEMET)  MG per tablet Take 1 tablet by mouth 2 times daily 180 tablet 3    spironolactone-hydrochlorothiazide (ALDACTAZIDE) 25-25 MG per tablet Take 2 tablets by mouth daily 180 tablet 1    aspirin 81 MG EC tablet Take 1 tablet by mouth 2 times daily 60 tablet 0     Current Facility-Administered Medications   Medication Dose Route Frequency Provider Last Rate Last Dose    lidocaine 1 % injection 2 mL  2 mL Other Once Adelina Goetz MD        triamcinolone acetonide (KENALOG-40) injection 40 mg  40 mg Intra-articular Once Adelina Goetz MD         Allergies   Allergen Reactions    Tramadol Nausea And Vomiting    Sulfa Antibiotics Hives     hives & chills       Review of Systems:   General ROS: negative for - chills, fatigue, fever, malaise, weight gain or weight loss  Respiratory ROS: no cough, shortness of breath, or wheezing  Cardiovascular ROS: no chest pain or dyspnea on exertion  Gastrointestinal ROS: no abdominal pain, change in bowel habits, or black or bloody stools  Genito-Urinary ROS: no dysuria, trouble voiding  Musculoskeletal ROS: right shoulder pain  In general patient otherwise reports feeling well. Physical Exam:  /72 (Site: Left Upper Arm, Position: Sitting, Cuff Size: Large Adult)   Pulse 81   Temp 96.9 °F (36.1 °C) (Infrared)   Ht 5' 1\" (1.549 m)   Wt 199 lb (90.3 kg)   LMP 08/10/2007   SpO2 98%   Breastfeeding No   BMI 37.60 kg/m²     Gen: Well, NAD, Alert, Oriented x 3   HEENT: EOMI, eyes clear, MMM  Skin: without rash or jaundice  left lateral arm with tenderness pain with ROM  Lungs CTAB  Heart s1s2 RRR  Neuro: left hand with pill rolling tremor, left arm with some rigidity        A&P   Diagnosis Orders   1. Acute pain of left shoulder  20610 - CO DRAIN/INJECT LARGE JOINT/BURSA    lidocaine 1 % injection 2 mL    triamcinolone acetonide (KENALOG-40) injection 40 mg   2. Encounter for screening mammogram for malignant neoplasm of breast  BRANDON DIGITAL SCREEN W OR WO CAD BILATERAL   3. Post-menopausal  DEXA BONE DENSITY AXIAL SKELETON   4. Essential hypertension     5. Parkinson disease ([address]     6. Morbidly obese (HCC)        Chronic conditions are stable  Continue current regimen  Follow up with appropriate specialists and here routinely for ongoing monitoring of chronic conditions      Shoulder  Pre-operative Diagnosis: left shoulder pain/impingement  Post-operative Diagnosis: same    Indications: shoulder pain with impingement    Anesthesia: Ethyl chloride spray    Procedure Details     After a discussion of the risks and benefits with the patient (including the possibility that any manipulation of the joint space could introduce infection, worsening the current situation significantly), verbal consent was obtained for the procedure.  The joint was prepped with alcohol,  Betadine x2, then again with alcohol. Ethyl chloride spray used for topical anesthesia. Using a lateral approach, the shoulder was injected 40mg kenalog and 2cc 2%lidocaine without epi. The injection site was cleansed with topical isopropyl alcohol and a dressing was applied. Complications:  None; patient tolerated the procedure well.       Janusz Hsu MD

## 2020-11-05 RX ORDER — SPIRONOLACTONE AND HYDROCHLOROTHIAZIDE 25; 25 MG/1; MG/1
TABLET ORAL
Qty: 180 TABLET | Refills: 1 | Status: SHIPPED | OUTPATIENT
Start: 2020-11-05 | End: 2021-05-18

## 2020-11-06 ENCOUNTER — NURSE ONLY (OUTPATIENT)
Dept: PRIMARY CARE CLINIC | Age: 70
End: 2020-11-06

## 2020-11-06 ENCOUNTER — HOSPITAL ENCOUNTER (OUTPATIENT)
Age: 70
Setting detail: SPECIMEN
Discharge: HOME | End: 2020-11-06
Payer: MEDICARE

## 2020-11-06 PROCEDURE — U0003 INFECTIOUS AGENT DETECTION BY NUCLEIC ACID (DNA OR RNA); SEVERE ACUTE RESPIRATORY SYNDROME CORONAVIRUS 2 (SARS-COV-2) (CORONAVIRUS DISEASE [COVID-19]), AMPLIFIED PROBE TECHNIQUE, MAKING USE OF HIGH THROUGHPUT TECHNOLOGIES AS DESCRIBED BY CMS-2020-01-R: HCPCS

## 2020-11-07 LAB
SARS-COV-2: NOT DETECTED
SOURCE: NORMAL

## 2020-11-11 ENCOUNTER — OFFICE VISIT (OUTPATIENT)
Dept: NEUROLOGY | Age: 70
End: 2020-11-11
Payer: MEDICARE

## 2020-11-11 VITALS
HEART RATE: 80 BPM | SYSTOLIC BLOOD PRESSURE: 139 MMHG | DIASTOLIC BLOOD PRESSURE: 72 MMHG | BODY MASS INDEX: 36.47 KG/M2 | WEIGHT: 193 LBS

## 2020-11-11 PROCEDURE — 1036F TOBACCO NON-USER: CPT | Performed by: PSYCHIATRY & NEUROLOGY

## 2020-11-11 PROCEDURE — 3017F COLORECTAL CA SCREEN DOC REV: CPT | Performed by: PSYCHIATRY & NEUROLOGY

## 2020-11-11 PROCEDURE — G8400 PT W/DXA NO RESULTS DOC: HCPCS | Performed by: PSYCHIATRY & NEUROLOGY

## 2020-11-11 PROCEDURE — G8484 FLU IMMUNIZE NO ADMIN: HCPCS | Performed by: PSYCHIATRY & NEUROLOGY

## 2020-11-11 PROCEDURE — G8417 CALC BMI ABV UP PARAM F/U: HCPCS | Performed by: PSYCHIATRY & NEUROLOGY

## 2020-11-11 PROCEDURE — 1090F PRES/ABSN URINE INCON ASSESS: CPT | Performed by: PSYCHIATRY & NEUROLOGY

## 2020-11-11 PROCEDURE — G8427 DOCREV CUR MEDS BY ELIG CLIN: HCPCS | Performed by: PSYCHIATRY & NEUROLOGY

## 2020-11-11 PROCEDURE — 1123F ACP DISCUSS/DSCN MKR DOCD: CPT | Performed by: PSYCHIATRY & NEUROLOGY

## 2020-11-11 PROCEDURE — 4040F PNEUMOC VAC/ADMIN/RCVD: CPT | Performed by: PSYCHIATRY & NEUROLOGY

## 2020-11-11 PROCEDURE — 99214 OFFICE O/P EST MOD 30 MIN: CPT | Performed by: PSYCHIATRY & NEUROLOGY

## 2020-11-11 ASSESSMENT — ENCOUNTER SYMPTOMS
TROUBLE SWALLOWING: 0
NAUSEA: 0
CHOKING: 0
VOMITING: 0
SHORTNESS OF BREATH: 0
PHOTOPHOBIA: 0
BACK PAIN: 0
COLOR CHANGE: 0

## 2020-11-11 NOTE — PROGRESS NOTES
Subjective:      Patient ID: Citlali Gordon is a 65 y.o. female who presents today for:  Chief Complaint   Patient presents with    Follow-up     Pt states that she is doing pretty well on 2 tabs of cab-levo and will take 3 on some days. She states that her hands seem to shake more when she is stressed out.  Back Pain     She states that she will have a stiff back toward the end of the day and will start to have shuffled gait at the end of the night when she is tired it seems to happen more. HPI 68-year-old right-handed female with a history of Parkinson's disease. Patient continues on carbidopa levodopa to tablets daily occasionally she takes 3 and does feel better. Patient is developing some minor dyskinesia which I can see she is not quite aware of this she is not bothered about this. She is not any hallucinations falls injuries trauma bleeding bruising she occasionally loses her balance. She sleeps well.     Past Medical History:   Diagnosis Date    Hypertension     meds > 10 yrs / denies TIA or stroke    Osteoarthritis     DJD right hip    Parkinsonism ([address]      Past Surgical History:   Procedure Laterality Date     SECTION  1988    DILATION AND CURETTAGE OF UTERUS      AUB    KNEE SURGERY Left     arthrotomy    TOTAL HIP ARTHROPLASTY Right 2017    RIGHT HIP TOTAL HIP ARTHROPLASTY WILLIAM [facility]DM SPINAL  performed by Sonali Castellano MD at [address] [place] [address] History     Socioeconomic History    Marital status:      Spouse name: Not on file    Number of children: Not on file    Years of education: Not on file    Highest education level: Not on file   Occupational History    Not on file   Social Needs    Financial resource strain: Not hard at all   [place] insecurity     Worry: Never true     Inability: Never true   [employer] needs     Medical: No     Non-medical: No   Tobacco Use    Smoking status: Never Smoker    Smokeless tobacco: Never Used lidocaine 1 % injection 2 mL  2 mL Other Once Niya Du MD        triamcinolone acetonide [facility]) injection 40 mg  40 mg Intra-articular Once Niya Du MD             Review of Systems   Constitutional: Negative for fever. HENT: Negative for ear pain, tinnitus and trouble swallowing. Eyes: Negative for photophobia and visual disturbance. Respiratory: Negative for choking and shortness of breath. Cardiovascular: Negative for chest pain and palpitations. Gastrointestinal: Negative for nausea and vomiting. Musculoskeletal: Negative for back pain, gait problem, joint swelling, myalgias, neck pain and neck stiffness. Skin: Negative for color change. Allergic/Immunologic: Negative for food allergies. Neurological: Negative for dizziness, tremors, seizures, syncope, facial asymmetry, speech difficulty, weakness, light-headedness, numbness and headaches. Psychiatric/Behavioral: Negative for behavioral problems, confusion, hallucinations and sleep disturbance. Objective:   /72 (Site: Right Upper Arm, Position: Sitting, Cuff Size: Large Adult)   Pulse 80   Wt 193 lb (87.5 kg)   LMP 08/10/2007   BMI 36.47 kg/m²     Physical Exam  Vitals signs reviewed. Eyes:      Pupils: Pupils are equal, round, and reactive to light. Neck:      Musculoskeletal: Normal range of motion. Cardiovascular:      Rate and Rhythm: Normal rate and regular rhythm. Heart sounds: No murmur. Pulmonary:      Effort: Pulmonary effort is normal.      Breath sounds: Normal breath sounds. Abdominal:      General: Bowel sounds are normal.   Musculoskeletal: Normal range of motion. Skin:     General: Skin is warm. Neurological:      Mental Status: She is alert and oriented to person, place, and time. Cranial Nerves: No cranial nerve deficit. Sensory: No sensory deficit. Motor: No abnormal muscle tone.       Coordination: Coordination normal.      Deep Tendon Reflexes: Reflexes are normal and symmetric. Babinski sign absent on the right side. Babinski sign absent on the left side. Psychiatric:         Mood and Affect: Mood normal.     Minor dyskinesias are notable    Xr Hip Right (2-3 Views)    Result Date: 10/18/2019  Radiology exam is complete. No Radiologist dictation. Please follow up with ordering provider. Lab Results   Component Value Date    WBC 12.3 08/19/2017    RBC 3.88 08/19/2017    HGB 11.4 08/19/2017    HCT 34.9 08/19/2017    MCV 89.9 08/19/2017    MCH 29.4 08/19/2017    MCHC 32.7 08/19/2017    RDW 13.4 08/19/2017     08/19/2017     Lab Results   Component Value Date     11/25/2019    K 3.7 11/25/2019     11/25/2019    CO2 24 11/25/2019    BUN 18 11/25/2019    CREATININE 0.85 11/25/2019    GFRAA >60.0 11/25/2019    LABGLOM >60.0 11/25/2019    GLUCOSE 94 11/25/2019    PROT 8.0 11/25/2019    LABALBU 4.6 11/25/2019    CALCIUM 10.0 11/25/2019    BILITOT 0.3 11/25/2019    ALKPHOS 73 11/25/2019    AST 15 11/25/2019    ALT 8 11/25/2019     No results found for: PROTIME, INR  Lab Results   Component Value Date    TSH 4.860 03/24/2017     Lab Results   Component Value Date    TRIG 74 03/24/2017    HDL 44 03/24/2017    LDLCALC 69 03/24/2017     No results found for: Ramya Sides, LABBENZ, CANNAB, COCAINESCRN, LABMETH, OPIATESCREENURINE, PHENCYCLIDINESCREENURINE, PPXUR, ETOH  No results found for: LITHIUM, DILFRTOT, VALPROATE    Assessment:       Diagnosis Orders   1. Parkinsonism, unspecified Parkinsonism type ([address]     2. Dyskinesia     3. Balance problem     Parkinson's disease which is optimally treated she is on carbidopa levodopa twice a day. Occasionally she takes 1/3 pill. Today I am seeing some minor dyskinesia and she is getting somewhat supersensitive dopamine agonist we keep an eye on this I did explain to her to let me know if this occurs. Patient does have some loss of balance from the same.   Next well she is not any side effects of hallucinations dizziness falls injuries trauma. We will keep an eye on this and see how she does. Plan:      No orders of the defined types were placed in this encounter. No orders of the defined types were placed in this encounter. No follow-ups on file.       Ari Grimaldo MD

## 2020-11-12 ENCOUNTER — HOSPITAL ENCOUNTER (OUTPATIENT)
Age: 70
Setting detail: OUTPATIENT SURGERY
Discharge: HOME | End: 2020-11-12
Attending: SPECIALIST | Admitting: SPECIALIST
Payer: MEDICARE

## 2020-11-12 ENCOUNTER — ANCILLARY PROCEDURE (OUTPATIENT)
Dept: ENDOSCOPY | Age: 70
End: 2020-11-12
Attending: SPECIALIST
Payer: MEDICARE

## 2020-11-12 ENCOUNTER — ANESTHESIA (OUTPATIENT)
Dept: ENDOSCOPY | Age: 70
End: 2020-11-12
Payer: MEDICARE

## 2020-11-12 ENCOUNTER — ANESTHESIA EVENT (OUTPATIENT)
Dept: ENDOSCOPY | Age: 70
End: 2020-11-12
Payer: MEDICARE

## 2020-11-12 VITALS
DIASTOLIC BLOOD PRESSURE: 77 MMHG | HEART RATE: 85 BPM | WEIGHT: 190 LBS | HEIGHT: 61 IN | OXYGEN SATURATION: 95 % | RESPIRATION RATE: 18 BRPM | TEMPERATURE: 97.8 F | BODY MASS INDEX: 35.87 KG/M2 | SYSTOLIC BLOOD PRESSURE: 137 MMHG

## 2020-11-12 VITALS
DIASTOLIC BLOOD PRESSURE: 62 MMHG | SYSTOLIC BLOOD PRESSURE: 114 MMHG | RESPIRATION RATE: 16 BRPM | OXYGEN SATURATION: 97 %

## 2020-11-12 PROCEDURE — 45385 COLONOSCOPY W/LESION REMOVAL: CPT | Performed by: SPECIALIST

## 2020-11-12 PROCEDURE — 3609027000 HC COLONOSCOPY: Performed by: SPECIALIST

## 2020-11-12 PROCEDURE — 7100000011 HC PHASE II RECOVERY - ADDTL 15 MIN: Performed by: SPECIALIST

## 2020-11-12 PROCEDURE — 3700000001 HC ADD 15 MINUTES (ANESTHESIA): Performed by: SPECIALIST

## 2020-11-12 PROCEDURE — 2500000003 HC RX 250 WO HCPCS: Performed by: NURSE ANESTHETIST, CERTIFIED REGISTERED

## 2020-11-12 PROCEDURE — 6360000002 HC RX W HCPCS: Performed by: NURSE ANESTHETIST, CERTIFIED REGISTERED

## 2020-11-12 PROCEDURE — 7100000010 HC PHASE II RECOVERY - FIRST 15 MIN: Performed by: SPECIALIST

## 2020-11-12 PROCEDURE — 88305 TISSUE EXAM BY PATHOLOGIST: CPT

## 2020-11-12 PROCEDURE — 45380 COLONOSCOPY AND BIOPSY: CPT | Performed by: SPECIALIST

## 2020-11-12 PROCEDURE — 2709999900 HC NON-CHARGEABLE SUPPLY: Performed by: SPECIALIST

## 2020-11-12 PROCEDURE — 3700000000 HC ANESTHESIA ATTENDED CARE: Performed by: SPECIALIST

## 2020-11-12 PROCEDURE — 2580000003 HC RX 258: Performed by: SPECIALIST

## 2020-11-12 PROCEDURE — 2580000003 HC RX 258: Performed by: NURSE ANESTHETIST, CERTIFIED REGISTERED

## 2020-11-12 PROCEDURE — 2580000003 HC RX 258

## 2020-11-12 PROCEDURE — 6370000000 HC RX 637 (ALT 250 FOR IP): Performed by: SPECIALIST

## 2020-11-12 RX ORDER — LIDOCAINE HYDROCHLORIDE 20 MG/ML
INJECTION, SOLUTION INFILTRATION; PERINEURAL PRN
Status: DISCONTINUED | OUTPATIENT
Start: 2020-11-12 | End: 2020-11-12 | Stop reason: SDUPTHER

## 2020-11-12 RX ORDER — ONDANSETRON 2 MG/ML
4 INJECTION INTRAMUSCULAR; INTRAVENOUS
Status: DISCONTINUED | OUTPATIENT
Start: 2020-11-12 | End: 2020-11-12 | Stop reason: HOSPADM

## 2020-11-12 RX ORDER — PROPOFOL 10 MG/ML
INJECTION, EMULSION INTRAVENOUS CONTINUOUS PRN
Status: DISCONTINUED | OUTPATIENT
Start: 2020-11-12 | End: 2020-11-12 | Stop reason: SDUPTHER

## 2020-11-12 RX ORDER — SODIUM CHLORIDE 9 MG/ML
INJECTION, SOLUTION INTRAVENOUS CONTINUOUS PRN
Status: DISCONTINUED | OUTPATIENT
Start: 2020-11-12 | End: 2020-11-12 | Stop reason: SDUPTHER

## 2020-11-12 RX ORDER — MAGNESIUM HYDROXIDE 1200 MG/15ML
LIQUID ORAL PRN
Status: DISCONTINUED | OUTPATIENT
Start: 2020-11-12 | End: 2020-11-12 | Stop reason: ALTCHOICE

## 2020-11-12 RX ORDER — MESALAMINE 4 G/60ML
4 SUSPENSION RECTAL NIGHTLY
Qty: 30 KIT | Refills: 2 | Status: SHIPPED | OUTPATIENT
Start: 2020-11-12 | End: 2021-03-24

## 2020-11-12 RX ORDER — SODIUM CHLORIDE 9 MG/ML
INJECTION, SOLUTION INTRAVENOUS CONTINUOUS
Status: DISCONTINUED | OUTPATIENT
Start: 2020-11-12 | End: 2020-11-12 | Stop reason: HOSPADM

## 2020-11-12 RX ORDER — SODIUM CHLORIDE 9 MG/ML
INJECTION, SOLUTION INTRAVENOUS
Status: COMPLETED
Start: 2020-11-12 | End: 2020-11-12

## 2020-11-12 RX ORDER — SIMETHICONE 20 MG/.3ML
EMULSION ORAL PRN
Status: DISCONTINUED | OUTPATIENT
Start: 2020-11-12 | End: 2020-11-12 | Stop reason: ALTCHOICE

## 2020-11-12 RX ADMIN — PROPOFOL 150 MCG/KG/MIN: 10 INJECTION, EMULSION INTRAVENOUS at 09:42

## 2020-11-12 RX ADMIN — SODIUM CHLORIDE 500 ML: 9 INJECTION, SOLUTION INTRAVENOUS at 09:29

## 2020-11-12 RX ADMIN — SODIUM CHLORIDE: 9 INJECTION, SOLUTION INTRAVENOUS at 09:34

## 2020-11-12 RX ADMIN — LIDOCAINE HYDROCHLORIDE 40 MG: 20 INJECTION, SOLUTION INFILTRATION; PERINEURAL at 09:42

## 2020-11-12 NOTE — ANESTHESIA POSTPROCEDURE EVALUATION
Department of Anesthesiology  Postprocedure Note    Patient: Santiago Burgos  MRN: [id number]  YOB: 1950  Date of evaluation: 11/12/2020  Time:  10:06 AM     Procedure Summary     Date:  11/12/20 Room / Location:  [address] [place] [address] Mariah Smith    Anesthesia Start:  0940 Anesthesia Stop:      Procedure:  COLONOSCOPY WITH POLYPECTOMY (N/A ) Diagnosis:  (rectal bleeding  K62.5)    Surgeon:  Adam Thao MD Responsible Provider:  RUDY Manley CRNA    Anesthesia Type:  MAC ASA Status:  2          Anesthesia Type: MAC    Flavio Phase I: Flavio Score: 10    Flavio Phase II:      Last vitals: Reviewed and per EMR flowsheets.        Anesthesia Post Evaluation    Patient location during evaluation: PACU  Patient participation: complete - patient participated  Level of consciousness: awake and alert  Pain score: 0  Airway patency: patent  Nausea & Vomiting: no nausea and no vomiting  Complications: no  Cardiovascular status: blood pressure returned to baseline and hemodynamically stable  Respiratory status: acceptable  Hydration status: euvolemic

## 2020-11-12 NOTE — H&P
chills        History of allergic reaction to anesthesia:  No    Past Medical History:  Past Medical History:   Diagnosis Date    Hypertension     meds > 10 yrs / denies TIA or stroke    Osteoarthritis     DJD right hip    Parkinsonism ([address]        Past Surgical History:  Past Surgical History:   Procedure Laterality Date     SECTION  1988    DILATION AND CURETTAGE OF UTERUS      AUB    KNEE SURGERY Left     arthrotomy    TOTAL HIP ARTHROPLASTY Right 2017    RIGHT HIP TOTAL HIP ARTHROPLASTY WILLIAM MDM SPINAL  performed by Alicia Ferreira MD at [facility] History:  Social History     Tobacco Use    Smoking status: Never Smoker    Smokeless tobacco: Never Used   Substance Use Topics    Alcohol use: Yes     Alcohol/week: 1.0 standard drinks     Types: 1 Glasses of wine per week    Drug use: No       Vital Signs: There were no vitals filed for this visit. Physical Exam:  Cardiac:  [x]WNL  []Comments:  Pulmonary:  [x]WNL   []Comments:   Neuro/Mental Status:  [x]WNL  []Comments:  Abdominal:  [x]WNL    []Comments:  Other:   []WNL  []Comments:    Informed Consent:  The risks and benefits of the procedure have been discussed with either the patient or if they cannot consent, their representative. Assessment:  Patient examined and appropriate for planned sedation and procedure. Plan:  Proceed with planned sedation and procedure as above.     Jl Rowe MD  7:54 AM

## 2020-11-12 NOTE — ANESTHESIA PRE PROCEDURE
Department of Anesthesiology  Preprocedure Note       Name:  Santiago Burgos   Age:  65 y.o.  :  1950                                          MRN:  [id number]         Date:  2020      Surgeon: Jojo Kendrick):  Adam Thao MD    Procedure: Procedure(s):  COLONOSCOPY DIAGNOSTIC    Medications prior to admission:   Prior to Admission medications    Medication Sig Start Date End Date Taking? Authorizing Provider   carbidopa-levodopa (SINEMET)  MG per tablet Take 1 tablet by mouth 3 times daily 20  Erwin Sky MD   spironolactone-hydroCHLOROthiazide (ALDACTAZIDE) 25-25 MG per tablet TAKE 2 TABLETS BY MOUTH EVERY DAY 20   Parth Davidson MD   Psyllium (METAMUCIL FIBER PO) Take by mouth    Historical Provider, MD   Famotidine (PEPCID PO) Take by mouth    Historical Provider, MD   Na Sulfate-K Sulfate-Mg Sulf 17.5-3.13-1.6 GM/177ML SOLN As directed 10/29/20   Adam Thao MD   aspirin 81 MG EC tablet Take 1 tablet by mouth 2 times daily 17   RUDY Dunlap - CNP       Current medications:    No current facility-administered medications for this encounter. Allergies:     Allergies   Allergen Reactions    Tramadol Nausea And Vomiting    Sulfa Antibiotics Hives     hives & chills       Problem List:    Patient Active Problem List   Diagnosis Code    Osteoarthritis M19.90    HTN (hypertension) I10    Parkinsonism ([address] G20    Positive FIT (fecal immunochemical test) R19.5    Osteoarthritis of right hip M16.11    Morbidly obese ([address] E66.01    Primary osteoarthritis of right knee M17.11    Transient synovitis, right knee M67.361    Tremors of nervous system R25.1       Past Medical History:        Diagnosis Date    Hypertension     meds > 10 yrs / denies TIA or stroke    Osteoarthritis     DJD right hip    Parkinsonism ([address]        Past Surgical History:        Procedure Laterality Date     SECTION  1988    DILATION AND CURETTAGE OF UTERUS AUB    KNEE SURGERY Left     arthrotomy    TOTAL HIP ARTHROPLASTY Right 8/17/2017    RIGHT HIP TOTAL HIP ARTHROPLASTY WILLIAM MDM SPINAL  performed by Alicia Ferreira MD at [facility] History:    Social History     Tobacco Use    Smoking status: Never Smoker    Smokeless tobacco: Never Used   Substance Use Topics    Alcohol use: Yes     Alcohol/week: 1.0 standard drinks     Types: 1 Glasses of wine per week                                Counseling given: Not Answered      Vital Signs (Current): There were no vitals filed for this visit. BP Readings from Last 3 Encounters:   11/11/20 139/72   11/02/20 132/72   10/29/20 118/78       NPO Status:                                                                                 BMI:   Wt Readings from Last 3 Encounters:   11/11/20 193 lb (87.5 kg)   11/02/20 199 lb (90.3 kg)   10/29/20 196 lb (88.9 kg)     There is no height or weight on file to calculate BMI.    CBC:   Lab Results   Component Value Date    WBC 12.3 08/19/2017    RBC 3.88 08/19/2017    HGB 11.4 08/19/2017    HCT 34.9 08/19/2017    MCV 89.9 08/19/2017    RDW 13.4 08/19/2017     08/19/2017       CMP:   Lab Results   Component Value Date     11/25/2019    K 3.7 11/25/2019     11/25/2019    CO2 24 11/25/2019    BUN 18 11/25/2019    CREATININE 0.85 11/25/2019    GFRAA >60.0 11/25/2019    LABGLOM >60.0 11/25/2019    GLUCOSE 94 11/25/2019    PROT 8.0 11/25/2019    CALCIUM 10.0 11/25/2019    BILITOT 0.3 11/25/2019    ALKPHOS 73 11/25/2019    AST 15 11/25/2019    ALT 8 11/25/2019       POC Tests: No results for input(s): POCGLU, POCNA, POCK, POCCL, POCBUN, POCHEMO, POCHCT in the last 72 hours.     Coags: No results found for: PROTIME, INR, APTT    HCG (If Applicable): No results found for: PREGTESTUR, PREGSERUM, HCG, HCGQUANT     ABGs: No results found for: PHART, PO2ART, MSD6XYZ, UUD4IBA, BEART, P2RBHONB     Type & Screen (If Applicable):  No results found for: LABABO, LABRH    Drug/Infectious Status (If Applicable):  No results found for: HIV, HEPCAB    COVID-19 Screening (If Applicable):   Lab Results   Component Value Date    COVID19 Not Detected 11/06/2020         Anesthesia Evaluation  Patient summary reviewed and Nursing notes reviewed  Airway: Mallampati: II  TM distance: >3 FB   Neck ROM: full  Mouth opening: > = 3 FB Dental: normal exam         Pulmonary:Negative Pulmonary ROS and normal exam                               Cardiovascular:    (+) hypertension:,                   Neuro/Psych:   Negative Neuro/Psych ROS              GI/Hepatic/Renal: Neg GI/Hepatic/Renal ROS            Endo/Other: Negative Endo/Other ROS                    Abdominal:           Vascular:                                        Anesthesia Plan      MAC     ASA 2             Anesthetic plan and risks discussed with patient. Plan discussed with CRNA.                   RUDY Muñiz - CRNA   11/12/2020

## 2020-11-12 NOTE — PROGRESS NOTES
Dr. Kendra Garcia to OR. Pt awaiting DC inst. and talk w Dr. Kendra Garcia. Pt resting on cart for few min.  called in parking lot to notify of above.

## 2021-01-28 ENCOUNTER — OFFICE VISIT (OUTPATIENT)
Dept: FAMILY MEDICINE CLINIC | Age: 71
End: 2021-01-28
Payer: MEDICARE

## 2021-01-28 VITALS
DIASTOLIC BLOOD PRESSURE: 82 MMHG | HEART RATE: 84 BPM | OXYGEN SATURATION: 98 % | HEIGHT: 61 IN | WEIGHT: 191 LBS | BODY MASS INDEX: 36.06 KG/M2 | SYSTOLIC BLOOD PRESSURE: 138 MMHG | TEMPERATURE: 98.2 F

## 2021-01-28 DIAGNOSIS — M25.511 ACUTE PAIN OF RIGHT SHOULDER: Primary | ICD-10-CM

## 2021-01-28 PROCEDURE — 3017F COLORECTAL CA SCREEN DOC REV: CPT | Performed by: STUDENT IN AN ORGANIZED HEALTH CARE EDUCATION/TRAINING PROGRAM

## 2021-01-28 PROCEDURE — G8417 CALC BMI ABV UP PARAM F/U: HCPCS | Performed by: STUDENT IN AN ORGANIZED HEALTH CARE EDUCATION/TRAINING PROGRAM

## 2021-01-28 PROCEDURE — 99213 OFFICE O/P EST LOW 20 MIN: CPT | Performed by: STUDENT IN AN ORGANIZED HEALTH CARE EDUCATION/TRAINING PROGRAM

## 2021-01-28 PROCEDURE — 1123F ACP DISCUSS/DSCN MKR DOCD: CPT | Performed by: STUDENT IN AN ORGANIZED HEALTH CARE EDUCATION/TRAINING PROGRAM

## 2021-01-28 PROCEDURE — 1036F TOBACCO NON-USER: CPT | Performed by: STUDENT IN AN ORGANIZED HEALTH CARE EDUCATION/TRAINING PROGRAM

## 2021-01-28 PROCEDURE — 20610 DRAIN/INJ JOINT/BURSA W/O US: CPT | Performed by: STUDENT IN AN ORGANIZED HEALTH CARE EDUCATION/TRAINING PROGRAM

## 2021-01-28 PROCEDURE — 4040F PNEUMOC VAC/ADMIN/RCVD: CPT | Performed by: STUDENT IN AN ORGANIZED HEALTH CARE EDUCATION/TRAINING PROGRAM

## 2021-01-28 PROCEDURE — G8427 DOCREV CUR MEDS BY ELIG CLIN: HCPCS | Performed by: STUDENT IN AN ORGANIZED HEALTH CARE EDUCATION/TRAINING PROGRAM

## 2021-01-28 PROCEDURE — 1090F PRES/ABSN URINE INCON ASSESS: CPT | Performed by: STUDENT IN AN ORGANIZED HEALTH CARE EDUCATION/TRAINING PROGRAM

## 2021-01-28 PROCEDURE — G8484 FLU IMMUNIZE NO ADMIN: HCPCS | Performed by: STUDENT IN AN ORGANIZED HEALTH CARE EDUCATION/TRAINING PROGRAM

## 2021-01-28 PROCEDURE — G8400 PT W/DXA NO RESULTS DOC: HCPCS | Performed by: STUDENT IN AN ORGANIZED HEALTH CARE EDUCATION/TRAINING PROGRAM

## 2021-01-28 RX ORDER — TRIAMCINOLONE ACETONIDE 40 MG/ML
40 INJECTION, SUSPENSION INTRA-ARTICULAR; INTRAMUSCULAR ONCE
Status: COMPLETED | OUTPATIENT
Start: 2021-01-28 | End: 2021-01-28

## 2021-01-28 RX ADMIN — TRIAMCINOLONE ACETONIDE 40 MG: 40 INJECTION, SUSPENSION INTRA-ARTICULAR; INTRAMUSCULAR at 13:51

## 2021-01-28 ASSESSMENT — ENCOUNTER SYMPTOMS
ABDOMINAL PAIN: 0
SINUS PRESSURE: 0
VOMITING: 0
SORE THROAT: 0
COUGH: 0
SHORTNESS OF BREATH: 0

## 2021-01-28 ASSESSMENT — PATIENT HEALTH QUESTIONNAIRE - PHQ9
SUM OF ALL RESPONSES TO PHQ QUESTIONS 1-9: 0
SUM OF ALL RESPONSES TO PHQ QUESTIONS 1-9: 0
1. LITTLE INTEREST OR PLEASURE IN DOING THINGS: 0
SUM OF ALL RESPONSES TO PHQ9 QUESTIONS 1 & 2: 0

## 2021-01-28 NOTE — PATIENT INSTRUCTIONS
Joint Injections: Care Instructions    Your Care Instructions:  Joint injections are shots into a joint, such as the knee or shoulders. They may be used to put in medicines, such as pain relievers. A corticosteroid, or steroid, shot is used to reduce inflammation in tendons or joints. It is often used to treat problems such as arthritis, tendinitis, and bursitis. Steroids can be injected directly into a painful, inflamed joint. They can also help reduce inflammation of a bursa. A bursa is a sac of fluid. It cushions and lubricates areas where tendons, ligaments, skin, muscles, or bones rub against each other. A steroid shot can sometimes help with short-term pain relief when other treatments haven't worked. The steroid will start working in about 24-48 hours. If steroid shots help, pain may improve for weeks or months. How can you care for yourself at home?  - It is common to experience mild soreness at the injection site(s) for 24-48 hours. Ice is the best remedy.   - Put ice or a cold pack on the area for 10 to 20 minutes at a  time. Put a thin cloth between the ice and your skin. - Ask your doctor if you can take an over-the-counter pain medicine, such as acetaminophen (Tylenol), ibuprofen (Advil, Motrin), or naproxen (Aleve). Be safe with medicines. Read and follow all instructions on the label. - Avoid strenuous activities for several days. In particular, avoid ones that put stress on the area where you got the shot. - If you have dressings over the area, keep them clean and dry. You may remove them when your doctor tells you to. Call your doctor or nurse call line now or seek immediate medical care if:  You have signs of infection, such as:  - Increased pain, swelling, warmth, or redness.  - Red streaks leading from the site. - Pus draining from the site. - A fever.

## 2021-01-28 NOTE — PROGRESS NOTES
2021    Sunday Alcaraz (:  1950) is a 65 y.o. female, here for evaluation of the following medical concerns:  Chief Complaint   Patient presents with    Shoulder Pain     Rt shoulder Muscle pain- hurts to lift anything, even brushing her teeth        HPI  Right shoulder pain  Started a few months ago  Pain at the lateral aspect of the arm  More sore first thing in the am  Hard to lift her arm   Last steroid injection 3/2020      Review of Systems   Constitutional: Negative for chills and fever. HENT: Negative for congestion, sinus pressure and sore throat. Respiratory: Negative for cough and shortness of breath. Cardiovascular: Negative for chest pain and palpitations. Gastrointestinal: Negative for abdominal pain and vomiting. Musculoskeletal: Negative for arthralgias and myalgias. R shoulder pain   Skin: Negative for rash and wound. Neurological: Negative for speech difficulty and light-headedness. Psychiatric/Behavioral: Negative for suicidal ideas. The patient is not nervous/anxious. Prior to Visit Medications    Medication Sig Taking?  Authorizing Provider   mesalamine (ROWASA) 4 g KIT Place 4 g rectally nightly Yes Dom Goode MD   carbidopa-levodopa (SINEMET)  MG per tablet Take 1 tablet by mouth 3 times daily Yes Joesph Murphy MD   spironolactone-hydroCHLOROthiazide (ALDACTAZIDE) 25-25 MG per tablet TAKE 2 [address] Yes Jarret Jimenez MD   Psyllium (METAMUCIL FIBER PO) Take by mouth Yes Historical Provider, MD   Famotidine (PEPCID PO) Take by mouth Yes Historical Provider, MD   Na Sulfate-K Sulfate-Mg Sulf 17.5-3.13-1.6 GM/177ML SOLN As directed Yes Dom oGode MD   aspirin 81 MG EC tablet Take 1 tablet by mouth 2 times daily Yes RUDY Mcbride - CNP        Allergies   Allergen Reactions    Tramadol Nausea And Vomiting    Sulfa Antibiotics Hives     hives & chills       Past Medical History:   Diagnosis Date    Narrative    Not on file        Family History   Problem Relation Age of Onset    High Blood Pressure Mother     Emphysema Father     Other Sister         fibromyalgia    Stroke Brother     Heart Disease Brother     Stroke Sister         brain aneurysm at age 43    Other Brother         drowning accident at age 39       Vitals:    01/28/21 1330   BP: 138/82   Pulse: 84   Temp: 98.2 °F (36.8 °C)   SpO2: 98%   Weight: 191 lb (86.6 kg)   Height: 5' 1\" (1.549 m)       Estimated body mass index is 36.09 kg/m² as calculated from the following:    Height as of this encounter: 5' 1\" (1.549 m). Weight as of this encounter: 191 lb (86.6 kg). No results for input(s): WBC, RBC, HGB, HCT, MCV, MCH, MCHC, RDW, PLT, MPV in the last 72 hours. No results for input(s): NA, K, CL, CO2, BUN, CREATININE, GLUCOSE, CALCIUM, PROT, LABALBU, BILITOT, ALKPHOS, AST, ALT in the last 72 hours. No results found for: LABA1C    No results found. Physical Exam  Constitutional:       Appearance: Normal appearance. She is normal weight. HENT:      Head: Normocephalic and atraumatic. Eyes:      Extraocular Movements: Extraocular movements intact. Conjunctiva/sclera: Conjunctivae normal.   Musculoskeletal: Normal range of motion. General: Tenderness (deltoid muscle spasm, slight tenderness to the biceps tendon at the shoulder joint) present. No swelling, deformity or signs of injury. Skin:     General: Skin is warm. Capillary Refill: Capillary refill takes less than 2 seconds. Findings: No rash. Neurological:      Mental Status: She is alert. Psychiatric:         Mood and Affect: Mood normal.         Thought Content: Thought content normal.         Judgment: Judgment normal.         ASSESSMENT/PLAN:  1.  Acute pain of right shoulder  - triamcinolone acetonide (KENALOG-40) injection 40 mg  - Ice shoulder a few times tonight and move arm should joint to move medication around the joint  - If pt has continued pain, discussed x-ray and PT  - Pain does appear to be more muscular    . Procedure  PRE-OP DIAGNOSIS: right shoulder pain  POST-OP DIAGNOSIS: Same   PROCEDURE: joint injection, posterior approach   Performing Physician: Dr. Gina Goldstein     Dose: 2 cc of 2% Lidocaine without epinephrine with 1cc 40mg/mL Triamcinolone acetonide     Procedure: The area was prepped in the usual sterile manner. The needle was inserted into the affected area, the syringe was aspirated and the steroid was injected. There were no complications during this procedure. Followup: The patient tolerated the procedure well without complications. Standard post-procedure care is explained and return precautions are given.      ---------------------------------------------------------------------  Side effects, adverse effects of the medication prescribed today, as well as treatment plan/ rationale and result expectations have been discussed with the patient who expresses understanding and desires to proceed. Close follow up to evaluate treatment results and for coordination of care. I have reviewed the patient's medical history in detail and updated the computerized patient record. As always, patient is advised that if symptoms worsen in any way they will proceed to the nearest emergency room. --------------------------------------------------------------------    Return if symptoms worsen or fail to improve. An  electronic signature was used to authenticate this note.     --Gina Goldstein, DO on 1/28/2021 at 1:38 PM

## 2021-03-12 PROBLEM — R26.89 BALANCE PROBLEM: Status: ACTIVE | Noted: 2021-03-12

## 2021-03-12 PROBLEM — G24.9 DYSKINESIA: Status: ACTIVE | Noted: 2021-03-12

## 2021-03-15 ENCOUNTER — TELEPHONE (OUTPATIENT)
Dept: FAMILY MEDICINE CLINIC | Age: 71
End: 2021-03-15

## 2021-03-15 DIAGNOSIS — Z12.31 BREAST CANCER SCREENING BY MAMMOGRAM: Primary | ICD-10-CM

## 2021-03-24 ENCOUNTER — OFFICE VISIT (OUTPATIENT)
Dept: FAMILY MEDICINE CLINIC | Age: 71
End: 2021-03-24
Payer: MEDICARE

## 2021-03-24 VITALS
SYSTOLIC BLOOD PRESSURE: 120 MMHG | TEMPERATURE: 98.3 F | DIASTOLIC BLOOD PRESSURE: 78 MMHG | OXYGEN SATURATION: 97 % | WEIGHT: 180.2 LBS | HEIGHT: 61 IN | BODY MASS INDEX: 34.02 KG/M2 | HEART RATE: 91 BPM

## 2021-03-24 DIAGNOSIS — G89.29 CHRONIC LEFT SHOULDER PAIN: ICD-10-CM

## 2021-03-24 DIAGNOSIS — M75.121 COMPLETE TEAR OF RIGHT ROTATOR CUFF, UNSPECIFIED WHETHER TRAUMATIC: ICD-10-CM

## 2021-03-24 DIAGNOSIS — E66.01 MORBIDLY OBESE (HCC): ICD-10-CM

## 2021-03-24 DIAGNOSIS — M19.012 OSTEOARTHRITIS OF LEFT SHOULDER, UNSPECIFIED OSTEOARTHRITIS TYPE: ICD-10-CM

## 2021-03-24 DIAGNOSIS — G20 PARKINSONISM, UNSPECIFIED PARKINSONISM TYPE (HCC): ICD-10-CM

## 2021-03-24 DIAGNOSIS — Z01.818 PREOPERATIVE CLEARANCE: ICD-10-CM

## 2021-03-24 DIAGNOSIS — M25.512 CHRONIC LEFT SHOULDER PAIN: ICD-10-CM

## 2021-03-24 DIAGNOSIS — M75.122 COMPLETE TEAR OF LEFT ROTATOR CUFF, UNSPECIFIED WHETHER TRAUMATIC: Primary | ICD-10-CM

## 2021-03-24 PROCEDURE — 4040F PNEUMOC VAC/ADMIN/RCVD: CPT | Performed by: FAMILY MEDICINE

## 2021-03-24 PROCEDURE — 1036F TOBACCO NON-USER: CPT | Performed by: FAMILY MEDICINE

## 2021-03-24 PROCEDURE — G8427 DOCREV CUR MEDS BY ELIG CLIN: HCPCS | Performed by: FAMILY MEDICINE

## 2021-03-24 PROCEDURE — 3017F COLORECTAL CA SCREEN DOC REV: CPT | Performed by: FAMILY MEDICINE

## 2021-03-24 PROCEDURE — 1123F ACP DISCUSS/DSCN MKR DOCD: CPT | Performed by: FAMILY MEDICINE

## 2021-03-24 PROCEDURE — 99213 OFFICE O/P EST LOW 20 MIN: CPT | Performed by: FAMILY MEDICINE

## 2021-03-24 PROCEDURE — G8484 FLU IMMUNIZE NO ADMIN: HCPCS | Performed by: FAMILY MEDICINE

## 2021-03-24 PROCEDURE — G8400 PT W/DXA NO RESULTS DOC: HCPCS | Performed by: FAMILY MEDICINE

## 2021-03-24 PROCEDURE — G8417 CALC BMI ABV UP PARAM F/U: HCPCS | Performed by: FAMILY MEDICINE

## 2021-03-24 PROCEDURE — 1090F PRES/ABSN URINE INCON ASSESS: CPT | Performed by: FAMILY MEDICINE

## 2021-03-24 NOTE — PROGRESS NOTES
Chief Complaint   Patient presents with    Pre-op Exam     surgery clearnce, should surgery on        HPI:  Harman Dockery is a 65 y.o. female     Pre-op exam/clearance  Left shoulder arthroscopy 21 at [facility]  Dr. Christiano Hastings  PAT 3/31/21 at [facility]    Parkinsonism  Does see dr. Niyah Narvaez    HTN  Taking medication without issue  Overdue for screens/routine labs, etc  Has order for mamm        Past Medical History:   Diagnosis Date    Hypertension     meds > 10 yrs / denies TIA or stroke    Osteoarthritis     DJD right hip    Parkinsonism [facility])      Past Surgical History:   Procedure Laterality Date     SECTION  1988    COLONOSCOPY N/A 2020    COLONOSCOPY WITH POLYPECTOMY performed by Titus Thomas MD at [address] Left     arthrotomy    TOTAL HIP ARTHROPLASTY Right 2017    RIGHT HIP TOTAL HIP ARTHROPLASTY WILLIAM MDM SPINAL  performed by Bright Soares MD at [address] History   Problem Relation Age of Onset    High Blood Pressure Mother     Emphysema Father     Other Sister         fibromyalgia    Stroke Brother     Heart Disease Brother     Stroke Sister         brain aneurysm at age 43    Other Brother         drowning accident at age 39     Social History     Socioeconomic History    Marital status:      Spouse name: None    Number of children: None    Years of education: None    Highest education level: None   Occupational History    None   Social Needs    Financial resource strain: Not hard at all   [place] insecurity     Worry: Never true     Inability: Never true    Transportation needs     Medical: No     Non-medical: No   Tobacco Use    Smoking status: Never Smoker    Smokeless tobacco: Never Used   Substance and Sexual Activity    Alcohol use:  Yes     Alcohol/week: 1.0 standard drinks     Types: 1 Glasses of wine per week     Comment: occasional    Drug use: No   

## 2021-03-31 ENCOUNTER — HOSPITAL ENCOUNTER (OUTPATIENT)
Dept: PREADMISSION TESTING | Age: 71
Discharge: HOME | End: 2021-04-04
Payer: MEDICARE

## 2021-03-31 ENCOUNTER — NURSE ONLY (OUTPATIENT)
Dept: PRIMARY CARE CLINIC | Age: 71
End: 2021-03-31

## 2021-03-31 VITALS
OXYGEN SATURATION: 97 % | DIASTOLIC BLOOD PRESSURE: 70 MMHG | BODY MASS INDEX: 35.93 KG/M2 | RESPIRATION RATE: 16 BRPM | TEMPERATURE: 97 F | HEART RATE: 77 BPM | SYSTOLIC BLOOD PRESSURE: 143 MMHG | HEIGHT: 60 IN | WEIGHT: 183 LBS

## 2021-03-31 DIAGNOSIS — Z41.9 ELECTIVE SURGERY: Primary | ICD-10-CM

## 2021-03-31 LAB
ANION GAP SERPL CALCULATED.3IONS-SCNC: 8 MEQ/L (ref 9–15)
BUN BLDV-MCNC: 22 MG/DL (ref 8–23)
CALCIUM SERPL-MCNC: 10 MG/DL (ref 8.5–9.9)
CHLORIDE BLD-SCNC: 103 MEQ/L (ref 95–107)
CO2: 30 MEQ/L (ref 20–31)
CREAT SERPL-MCNC: 0.63 MG/DL (ref 0.5–0.9)
EKG ATRIAL RATE: 74 BPM
EKG P AXIS: 29 DEGREES
EKG P-R INTERVAL: 150 MS
EKG Q-T INTERVAL: 382 MS
EKG QRS DURATION: 106 MS
EKG QTC CALCULATION (BAZETT): 424 MS
EKG R AXIS: -24 DEGREES
EKG T AXIS: 25 DEGREES
EKG VENTRICULAR RATE: 74 BPM
GFR AFRICAN AMERICAN: >60
GFR NON-AFRICAN AMERICAN: >60
GLUCOSE BLD-MCNC: 83 MG/DL (ref 70–99)
HCT VFR BLD CALC: 44.9 % (ref 37–47)
HEMOGLOBIN: 15 G/DL (ref 12–16)
MCH RBC QN AUTO: 30.6 PG (ref 27–31.3)
MCHC RBC AUTO-ENTMCNC: 33.5 % (ref 33–37)
MCV RBC AUTO: 91.3 FL (ref 82–100)
PDW BLD-RTO: 14.4 % (ref 11.5–14.5)
PLATELET # BLD: 301 K/UL (ref 130–400)
POTASSIUM SERPL-SCNC: 3.6 MEQ/L (ref 3.4–4.9)
RBC # BLD: 4.91 M/UL (ref 4.2–5.4)
SODIUM BLD-SCNC: 141 MEQ/L (ref 135–144)
WBC # BLD: 8.8 K/UL (ref 4.8–10.8)

## 2021-03-31 PROCEDURE — 80048 BASIC METABOLIC PNL TOTAL CA: CPT

## 2021-03-31 PROCEDURE — 85027 COMPLETE CBC AUTOMATED: CPT

## 2021-03-31 PROCEDURE — 93005 ELECTROCARDIOGRAM TRACING: CPT

## 2021-03-31 RX ORDER — ASCORBIC ACID 250 MG
1 TABLET,CHEWABLE ORAL DAILY
COMMUNITY
End: 2022-01-03

## 2021-03-31 RX ORDER — CALCIUM CARBONATE 300MG(750)
1 TABLET,CHEWABLE ORAL DAILY
COMMUNITY
End: 2021-04-21

## 2021-03-31 RX ORDER — OXYCODONE HYDROCHLORIDE AND ACETAMINOPHEN 5; 325 MG/1; MG/1
1 TABLET ORAL PRN
COMMUNITY
End: 2021-09-17

## 2021-03-31 RX ORDER — CEFAZOLIN SODIUM 2 G/50ML
2000 SOLUTION INTRAVENOUS
Status: CANCELLED | OUTPATIENT
Start: 2021-04-07 | End: 2021-04-07

## 2021-03-31 RX ORDER — DIAZEPAM 5 MG/1
5 TABLET ORAL PRN
COMMUNITY
End: 2021-04-21

## 2021-03-31 RX ORDER — VIT C/B6/B5/MAGNESIUM/HERB 173 50-5-6-5MG
1 CAPSULE ORAL DAILY
COMMUNITY
End: 2022-04-12

## 2021-04-01 LAB
SARS-COV-2: NOT DETECTED
SOURCE: NORMAL

## 2021-04-01 RX ORDER — LIDOCAINE HYDROCHLORIDE 10 MG/ML
1 INJECTION, SOLUTION EPIDURAL; INFILTRATION; INTRACAUDAL; PERINEURAL
Status: CANCELLED | OUTPATIENT
Start: 2021-04-07 | End: 2021-04-07

## 2021-04-01 RX ORDER — SODIUM CHLORIDE, SODIUM LACTATE, POTASSIUM CHLORIDE, CALCIUM CHLORIDE 600; 310; 30; 20 MG/100ML; MG/100ML; MG/100ML; MG/100ML
INJECTION, SOLUTION INTRAVENOUS CONTINUOUS
Status: CANCELLED | OUTPATIENT
Start: 2021-04-07

## 2021-04-01 RX ORDER — SODIUM CHLORIDE 0.9 % (FLUSH) 0.9 %
10 SYRINGE (ML) INJECTION PRN
Status: CANCELLED | OUTPATIENT
Start: 2021-04-07

## 2021-04-01 RX ORDER — SODIUM CHLORIDE 0.9 % (FLUSH) 0.9 %
10 SYRINGE (ML) INJECTION EVERY 12 HOURS SCHEDULED
Status: CANCELLED | OUTPATIENT
Start: 2021-04-07

## 2021-04-06 ENCOUNTER — ANESTHESIA EVENT (OUTPATIENT)
Dept: OPERATING ROOM | Age: 71
End: 2021-04-06
Payer: MEDICARE

## 2021-04-07 ENCOUNTER — ANESTHESIA (OUTPATIENT)
Dept: OPERATING ROOM | Age: 71
End: 2021-04-07
Payer: MEDICARE

## 2021-04-07 ENCOUNTER — HOSPITAL ENCOUNTER (OUTPATIENT)
Age: 71
Setting detail: OUTPATIENT SURGERY
Discharge: HOME | End: 2021-04-07
Attending: ORTHOPAEDIC SURGERY | Admitting: ORTHOPAEDIC SURGERY
Payer: MEDICARE

## 2021-04-07 VITALS
WEIGHT: 183 LBS | BODY MASS INDEX: 35.93 KG/M2 | HEART RATE: 92 BPM | OXYGEN SATURATION: 96 % | DIASTOLIC BLOOD PRESSURE: 67 MMHG | HEIGHT: 60 IN | SYSTOLIC BLOOD PRESSURE: 143 MMHG | RESPIRATION RATE: 18 BRPM | TEMPERATURE: 97.8 F

## 2021-04-07 VITALS — TEMPERATURE: 97 F | DIASTOLIC BLOOD PRESSURE: 79 MMHG | OXYGEN SATURATION: 99 % | SYSTOLIC BLOOD PRESSURE: 159 MMHG

## 2021-04-07 PROCEDURE — 6360000002 HC RX W HCPCS: Performed by: ORTHOPAEDIC SURGERY

## 2021-04-07 PROCEDURE — 7100000001 HC PACU RECOVERY - ADDTL 15 MIN: Performed by: ORTHOPAEDIC SURGERY

## 2021-04-07 PROCEDURE — 2580000003 HC RX 258: Performed by: NURSE PRACTITIONER

## 2021-04-07 PROCEDURE — 2580000003 HC RX 258: Performed by: NURSE ANESTHETIST, CERTIFIED REGISTERED

## 2021-04-07 PROCEDURE — 2580000003 HC RX 258: Performed by: ORTHOPAEDIC SURGERY

## 2021-04-07 PROCEDURE — 7100000010 HC PHASE II RECOVERY - FIRST 15 MIN: Performed by: ORTHOPAEDIC SURGERY

## 2021-04-07 PROCEDURE — 7100000011 HC PHASE II RECOVERY - ADDTL 15 MIN: Performed by: ORTHOPAEDIC SURGERY

## 2021-04-07 PROCEDURE — 6360000002 HC RX W HCPCS: Performed by: NURSE ANESTHETIST, CERTIFIED REGISTERED

## 2021-04-07 PROCEDURE — 2500000003 HC RX 250 WO HCPCS: Performed by: NURSE ANESTHETIST, CERTIFIED REGISTERED

## 2021-04-07 PROCEDURE — 6360000002 HC RX W HCPCS: Performed by: ANESTHESIOLOGY

## 2021-04-07 PROCEDURE — 2720000010 HC SURG SUPPLY STERILE: Performed by: ORTHOPAEDIC SURGERY

## 2021-04-07 PROCEDURE — 3600000004 HC SURGERY LEVEL 4 BASE: Performed by: ORTHOPAEDIC SURGERY

## 2021-04-07 PROCEDURE — 64415 NJX AA&/STRD BRCH PLXS IMG: CPT | Performed by: ANESTHESIOLOGY

## 2021-04-07 PROCEDURE — C1713 ANCHOR/SCREW BN/BN,TIS/BN: HCPCS | Performed by: ORTHOPAEDIC SURGERY

## 2021-04-07 PROCEDURE — 3700000000 HC ANESTHESIA ATTENDED CARE: Performed by: ORTHOPAEDIC SURGERY

## 2021-04-07 PROCEDURE — 7100000000 HC PACU RECOVERY - FIRST 15 MIN: Performed by: ORTHOPAEDIC SURGERY

## 2021-04-07 PROCEDURE — 2709999900 HC NON-CHARGEABLE SUPPLY: Performed by: ORTHOPAEDIC SURGERY

## 2021-04-07 PROCEDURE — 3600000014 HC SURGERY LEVEL 4 ADDTL 15MIN: Performed by: ORTHOPAEDIC SURGERY

## 2021-04-07 PROCEDURE — 3700000001 HC ADD 15 MINUTES (ANESTHESIA): Performed by: ORTHOPAEDIC SURGERY

## 2021-04-07 DEVICE — ANCHOR SUT L14.7MM DIA5.5MM BIOCOMPOSITE FULL THRD W/ 1.3MM: Type: IMPLANTABLE DEVICE | Site: SHOULDER | Status: FUNCTIONAL

## 2021-04-07 DEVICE — ANCHOR SUTURE BIOCOMP 4.75X19.1 MM SWIVELOCK C: Type: IMPLANTABLE DEVICE | Site: SHOULDER | Status: FUNCTIONAL

## 2021-04-07 RX ORDER — ROCURONIUM BROMIDE 10 MG/ML
INJECTION, SOLUTION INTRAVENOUS PRN
Status: DISCONTINUED | OUTPATIENT
Start: 2021-04-07 | End: 2021-04-07 | Stop reason: SDUPTHER

## 2021-04-07 RX ORDER — SODIUM CHLORIDE 0.9 % (FLUSH) 0.9 %
10 SYRINGE (ML) INJECTION EVERY 12 HOURS SCHEDULED
Status: DISCONTINUED | OUTPATIENT
Start: 2021-04-07 | End: 2021-04-07 | Stop reason: HOSPADM

## 2021-04-07 RX ORDER — ROPIVACAINE HYDROCHLORIDE 5 MG/ML
INJECTION, SOLUTION EPIDURAL; INFILTRATION; PERINEURAL PRN
Status: DISCONTINUED | OUTPATIENT
Start: 2021-04-07 | End: 2021-04-07 | Stop reason: SDUPTHER

## 2021-04-07 RX ORDER — MAGNESIUM HYDROXIDE 1200 MG/15ML
LIQUID ORAL CONTINUOUS PRN
Status: COMPLETED | OUTPATIENT
Start: 2021-04-07 | End: 2021-04-07

## 2021-04-07 RX ORDER — CEFAZOLIN SODIUM 2 G/50ML
2000 SOLUTION INTRAVENOUS
Status: COMPLETED | OUTPATIENT
Start: 2021-04-07 | End: 2021-04-07

## 2021-04-07 RX ORDER — MORPHINE SULFATE 2 MG/ML
2 INJECTION, SOLUTION INTRAMUSCULAR; INTRAVENOUS
Status: DISCONTINUED | OUTPATIENT
Start: 2021-04-07 | End: 2021-04-07 | Stop reason: HOSPADM

## 2021-04-07 RX ORDER — SODIUM CHLORIDE 9 MG/ML
25 INJECTION, SOLUTION INTRAVENOUS PRN
Status: DISCONTINUED | OUTPATIENT
Start: 2021-04-07 | End: 2021-04-07 | Stop reason: HOSPADM

## 2021-04-07 RX ORDER — FENTANYL CITRATE 50 UG/ML
50 INJECTION, SOLUTION INTRAMUSCULAR; INTRAVENOUS EVERY 10 MIN PRN
Status: DISCONTINUED | OUTPATIENT
Start: 2021-04-07 | End: 2021-04-07 | Stop reason: HOSPADM

## 2021-04-07 RX ORDER — PROPOFOL 10 MG/ML
INJECTION, EMULSION INTRAVENOUS PRN
Status: DISCONTINUED | OUTPATIENT
Start: 2021-04-07 | End: 2021-04-07 | Stop reason: SDUPTHER

## 2021-04-07 RX ORDER — MORPHINE SULFATE 4 MG/ML
4 INJECTION, SOLUTION INTRAMUSCULAR; INTRAVENOUS
Status: DISCONTINUED | OUTPATIENT
Start: 2021-04-07 | End: 2021-04-07 | Stop reason: HOSPADM

## 2021-04-07 RX ORDER — SODIUM CHLORIDE 0.9 % (FLUSH) 0.9 %
10 SYRINGE (ML) INJECTION PRN
Status: DISCONTINUED | OUTPATIENT
Start: 2021-04-07 | End: 2021-04-07 | Stop reason: HOSPADM

## 2021-04-07 RX ORDER — LIDOCAINE HYDROCHLORIDE 10 MG/ML
1 INJECTION, SOLUTION EPIDURAL; INFILTRATION; INTRACAUDAL; PERINEURAL
Status: DISCONTINUED | OUTPATIENT
Start: 2021-04-07 | End: 2021-04-07 | Stop reason: HOSPADM

## 2021-04-07 RX ORDER — ONDANSETRON 2 MG/ML
INJECTION INTRAMUSCULAR; INTRAVENOUS PRN
Status: DISCONTINUED | OUTPATIENT
Start: 2021-04-07 | End: 2021-04-07 | Stop reason: SDUPTHER

## 2021-04-07 RX ORDER — FENTANYL CITRATE 50 UG/ML
INJECTION, SOLUTION INTRAMUSCULAR; INTRAVENOUS PRN
Status: DISCONTINUED | OUTPATIENT
Start: 2021-04-07 | End: 2021-04-07 | Stop reason: SDUPTHER

## 2021-04-07 RX ORDER — DIPHENHYDRAMINE HYDROCHLORIDE 50 MG/ML
12.5 INJECTION INTRAMUSCULAR; INTRAVENOUS
Status: DISCONTINUED | OUTPATIENT
Start: 2021-04-07 | End: 2021-04-07 | Stop reason: HOSPADM

## 2021-04-07 RX ORDER — OXYCODONE HYDROCHLORIDE 5 MG/1
5 TABLET ORAL EVERY 4 HOURS PRN
Status: DISCONTINUED | OUTPATIENT
Start: 2021-04-07 | End: 2021-04-07 | Stop reason: HOSPADM

## 2021-04-07 RX ORDER — MEPERIDINE HYDROCHLORIDE 25 MG/ML
12.5 INJECTION INTRAMUSCULAR; INTRAVENOUS; SUBCUTANEOUS EVERY 5 MIN PRN
Status: DISCONTINUED | OUTPATIENT
Start: 2021-04-07 | End: 2021-04-07 | Stop reason: HOSPADM

## 2021-04-07 RX ORDER — SODIUM CHLORIDE, SODIUM LACTATE, POTASSIUM CHLORIDE, CALCIUM CHLORIDE 600; 310; 30; 20 MG/100ML; MG/100ML; MG/100ML; MG/100ML
INJECTION, SOLUTION INTRAVENOUS CONTINUOUS
Status: DISCONTINUED | OUTPATIENT
Start: 2021-04-07 | End: 2021-04-07 | Stop reason: HOSPADM

## 2021-04-07 RX ORDER — MIDAZOLAM HYDROCHLORIDE 1 MG/ML
INJECTION INTRAMUSCULAR; INTRAVENOUS PRN
Status: DISCONTINUED | OUTPATIENT
Start: 2021-04-07 | End: 2021-04-07 | Stop reason: SDUPTHER

## 2021-04-07 RX ORDER — ONDANSETRON 2 MG/ML
4 INJECTION INTRAMUSCULAR; INTRAVENOUS
Status: DISCONTINUED | OUTPATIENT
Start: 2021-04-07 | End: 2021-04-07 | Stop reason: HOSPADM

## 2021-04-07 RX ORDER — METOCLOPRAMIDE HYDROCHLORIDE 5 MG/ML
10 INJECTION INTRAMUSCULAR; INTRAVENOUS
Status: DISCONTINUED | OUTPATIENT
Start: 2021-04-07 | End: 2021-04-07 | Stop reason: HOSPADM

## 2021-04-07 RX ORDER — DEXAMETHASONE SODIUM PHOSPHATE 10 MG/ML
INJECTION INTRAMUSCULAR; INTRAVENOUS PRN
Status: DISCONTINUED | OUTPATIENT
Start: 2021-04-07 | End: 2021-04-07 | Stop reason: SDUPTHER

## 2021-04-07 RX ORDER — SODIUM CHLORIDE, SODIUM LACTATE, POTASSIUM CHLORIDE, CALCIUM CHLORIDE 600; 310; 30; 20 MG/100ML; MG/100ML; MG/100ML; MG/100ML
INJECTION, SOLUTION INTRAVENOUS CONTINUOUS PRN
Status: DISCONTINUED | OUTPATIENT
Start: 2021-04-07 | End: 2021-04-07 | Stop reason: SDUPTHER

## 2021-04-07 RX ADMIN — CEFAZOLIN 1000 MG: 1 INJECTION, POWDER, FOR SOLUTION INTRAMUSCULAR; INTRAVENOUS at 10:40

## 2021-04-07 RX ADMIN — FENTANYL CITRATE 50 MCG: 50 INJECTION, SOLUTION INTRAMUSCULAR; INTRAVENOUS at 10:18

## 2021-04-07 RX ADMIN — PHENYLEPHRINE HYDROCHLORIDE 100 MCG: 10 INJECTION INTRAVENOUS at 08:02

## 2021-04-07 RX ADMIN — PHENYLEPHRINE HYDROCHLORIDE 20 MCG/MIN: 10 INJECTION INTRAVENOUS at 08:22

## 2021-04-07 RX ADMIN — SUGAMMADEX 200 MG: 100 INJECTION, SOLUTION INTRAVENOUS at 09:48

## 2021-04-07 RX ADMIN — ROPIVACAINE HYDROCHLORIDE 40 ML: 5 INJECTION, SOLUTION EPIDURAL; INFILTRATION; PERINEURAL at 07:13

## 2021-04-07 RX ADMIN — ROCURONIUM BROMIDE 50 MG: 10 INJECTION INTRAVENOUS at 07:38

## 2021-04-07 RX ADMIN — CEFAZOLIN SODIUM 2000 MG: 2 SOLUTION INTRAVENOUS at 07:43

## 2021-04-07 RX ADMIN — SODIUM CHLORIDE, POTASSIUM CHLORIDE, SODIUM LACTATE AND CALCIUM CHLORIDE: 600; 310; 30; 20 INJECTION, SOLUTION INTRAVENOUS at 08:02

## 2021-04-07 RX ADMIN — PROPOFOL 150 MG: 10 INJECTION, EMULSION INTRAVENOUS at 07:38

## 2021-04-07 RX ADMIN — DEXAMETHASONE SODIUM PHOSPHATE 10 MG: 10 INJECTION INTRAMUSCULAR; INTRAVENOUS at 07:54

## 2021-04-07 RX ADMIN — ONDANSETRON 4 MG: 2 INJECTION INTRAMUSCULAR; INTRAVENOUS at 09:27

## 2021-04-07 RX ADMIN — MIDAZOLAM HYDROCHLORIDE 2 MG: 2 INJECTION, SOLUTION INTRAMUSCULAR; INTRAVENOUS at 07:09

## 2021-04-07 RX ADMIN — PHENYLEPHRINE HYDROCHLORIDE 100 MCG: 10 INJECTION INTRAVENOUS at 08:10

## 2021-04-07 RX ADMIN — SODIUM CHLORIDE, POTASSIUM CHLORIDE, SODIUM LACTATE AND CALCIUM CHLORIDE: 600; 310; 30; 20 INJECTION, SOLUTION INTRAVENOUS at 07:30

## 2021-04-07 RX ADMIN — SODIUM CHLORIDE, POTASSIUM CHLORIDE, SODIUM LACTATE AND CALCIUM CHLORIDE: 600; 310; 30; 20 INJECTION, SOLUTION INTRAVENOUS at 06:23

## 2021-04-07 RX ADMIN — FENTANYL CITRATE 50 MCG: 50 INJECTION, SOLUTION INTRAMUSCULAR; INTRAVENOUS at 10:28

## 2021-04-07 RX ADMIN — FENTANYL CITRATE 50 MCG: 50 INJECTION, SOLUTION INTRAMUSCULAR; INTRAVENOUS at 08:46

## 2021-04-07 RX ADMIN — FENTANYL CITRATE 50 MCG: 50 INJECTION, SOLUTION INTRAMUSCULAR; INTRAVENOUS at 07:38

## 2021-04-07 ASSESSMENT — PULMONARY FUNCTION TESTS
PIF_VALUE: 15
PIF_VALUE: 12
PIF_VALUE: 14
PIF_VALUE: 12
PIF_VALUE: 14
PIF_VALUE: 12
PIF_VALUE: 15
PIF_VALUE: 14
PIF_VALUE: 13
PIF_VALUE: 18
PIF_VALUE: 13
PIF_VALUE: 12
PIF_VALUE: 15
PIF_VALUE: 14
PIF_VALUE: 0
PIF_VALUE: 14
PIF_VALUE: 3
PIF_VALUE: 0
PIF_VALUE: 1
PIF_VALUE: 19
PIF_VALUE: 14
PIF_VALUE: 15
PIF_VALUE: 15
PIF_VALUE: 0
PIF_VALUE: 14
PIF_VALUE: 2
PIF_VALUE: 14
PIF_VALUE: 14
PIF_VALUE: 15
PIF_VALUE: 14
PIF_VALUE: 14
PIF_VALUE: 13
PIF_VALUE: 12
PIF_VALUE: 12
PIF_VALUE: 14
PIF_VALUE: 14
PIF_VALUE: 15
PIF_VALUE: 13
PIF_VALUE: 12
PIF_VALUE: 12
PIF_VALUE: 14
PIF_VALUE: 12
PIF_VALUE: 15
PIF_VALUE: 14
PIF_VALUE: 19
PIF_VALUE: 15
PIF_VALUE: 14
PIF_VALUE: 14
PIF_VALUE: 17
PIF_VALUE: 2
PIF_VALUE: 15
PIF_VALUE: 14
PIF_VALUE: 14
PIF_VALUE: 13
PIF_VALUE: 14
PIF_VALUE: 13
PIF_VALUE: 14
PIF_VALUE: 12
PIF_VALUE: 15
PIF_VALUE: 14
PIF_VALUE: 13
PIF_VALUE: 12
PIF_VALUE: 14
PIF_VALUE: 14
PIF_VALUE: 15
PIF_VALUE: 14
PIF_VALUE: 10
PIF_VALUE: 13
PIF_VALUE: 15
PIF_VALUE: 12
PIF_VALUE: 14
PIF_VALUE: 15

## 2021-04-07 ASSESSMENT — PAIN DESCRIPTION - LOCATION: LOCATION: SHOULDER

## 2021-04-07 ASSESSMENT — PAIN SCALES - GENERAL: PAINLEVEL_OUTOF10: 5

## 2021-04-07 ASSESSMENT — PAIN DESCRIPTION - ORIENTATION: ORIENTATION: LEFT

## 2021-04-07 NOTE — ANESTHESIA PRE PROCEDURE
Department of Anesthesiology  Preprocedure Note       Name:  Sera Phillips   Age:  65 y.o.  :  1950                                          MRN:  [id number]         Date:  2021      Surgeon: Constatnin Mir):  Mia Cid MD    Procedure: Procedure(s):  LEFT SHOULDER ARTHROSCOPY ROTATOR CUFF REPAIR, SUBACROMIAL DECOMPRESSION POSSIBLE ALLOGRAFT AND POSSIBLE BICEPS TENODESIS, ARTHREX EQUIPMENT, ARTHREX RTC REPAIR TRAY, DERMAL ALLOGRAFT, BEACH CHAIR ARTHREX BICEP TENODESIS TRAY, SCALENE NERVE BLOCK -SOCO    Medications prior to admission:   Prior to Admission medications    Medication Sig Start Date End Date Taking? Authorizing Provider   oxyCODONE-acetaminophen (PERCOCET) 5-325 MG per tablet Take 1 tablet by mouth as needed for Pain. Takes 1/2 tab at [facility]   Yes Historical Provider, MD   diazePAM (VALIUM) 5 MG tablet Take 5 mg by mouth as needed for Anxiety.  Takes 1 at [facility]   Yes Historical Provider, MD   carbidopa-levodopa (SINEMET)  MG per tablet Take 1 tablet by mouth 2 times daily 3/24/21  Yes Arely Benitez MD   spironolactone-hydroCHLOROthiazide (ALDACTAZIDE) 25-25 MG per tablet TAKE 2 TABLETS BY MOUTH EVERY DAY 20  Yes Arely Benitez MD   Multiple Vitamin (MULTI VITAMIN DAILY PO) Take 1 tablet by mouth daily    Historical Provider, MD   Ascorbic Acid (VITAMIN C) 250 MG CHEW Take 1 tablet by mouth daily    Historical Provider, MD   Turmeric 500 MG CAPS Take 1 tablet by mouth daily    Historical Provider, MD   Cholecalciferol (VITAMIN D3) 25 MCG (1000 UT) CHEW Take 1 tablet by mouth daily    Historical Provider, MD       Current medications:    Current Facility-Administered Medications   Medication Dose Route Frequency Provider Last Rate Last Admin    lactated ringers infusion   Intravenous Continuous RUDY Gan -  mL/hr at 21 0623 New Bag at 21 0623    lidocaine PF 1 % injection 1 mL  1 mL Intradermal Once PRN RUDY Gan CNP        sodium COLONOSCOPY WITH POLYPECTOMY performed by Andrew Michaud MD at [address] Left     arthrotomy    TOTAL HIP ARTHROPLASTY Right 8/17/2017    RIGHT HIP TOTAL HIP ARTHROPLASTY WILLIAM MDM SPINAL  performed by Clinton Jack MD at [facility] History:    Social History     Tobacco Use    Smoking status: Never Smoker    Smokeless tobacco: Never Used   Substance Use Topics    Alcohol use: Yes     Alcohol/week: 1.0 standard drinks     Types: 1 Glasses of wine per week     Comment: occasional                                Counseling given: Not Answered      Vital Signs (Current):   Vitals:    04/07/21 0545   BP: (!) 165/75   Pulse: 89   Resp: 16   Temp: 96.5 °F (35.8 °C)   TempSrc: Temporal   SpO2: 97%   Weight: 183 lb (83 kg)   Height: 5' 0.05\" (1.525 m)                                              BP Readings from Last 3 Encounters:   04/07/21 (!) 165/75   03/31/21 (!) 143/70   03/24/21 120/78       NPO Status: Time of last liquid consumption: 2130                        Time of last solid consumption: 1900                        Date of last liquid consumption: 04/06/21                        Date of last solid food consumption: 04/06/21    BMI:   Wt Readings from Last 3 Encounters:   04/07/21 183 lb (83 kg)   03/31/21 183 lb (83 kg)   03/24/21 180 lb 3.2 oz (81.7 kg)     Body mass index is 35.68 kg/m².     CBC:   Lab Results   Component Value Date    WBC 8.8 03/31/2021    RBC 4.91 03/31/2021    HGB 15.0 03/31/2021    HCT 44.9 03/31/2021    MCV 91.3 03/31/2021    RDW 14.4 03/31/2021     03/31/2021       CMP:   Lab Results   Component Value Date     03/31/2021    K 3.6 03/31/2021     03/31/2021    CO2 30 03/31/2021    BUN 22 03/31/2021    CREATININE 0.63 03/31/2021    GFRAA >60.0 03/31/2021    LABGLOM >60.0 03/31/2021    GLUCOSE 83 03/31/2021    PROT 8.0 11/25/2019    CALCIUM 10.0 03/31/2021    BILITOT 0.3 11/25/2019    ALKPHOS 73 11/25/2019    AST 15 11/25/2019    ALT 8 11/25/2019       POC Tests: No results for input(s): POCGLU, POCNA, POCK, POCCL, POCBUN, POCHEMO, POCHCT in the last 72 hours. Coags: No results found for: PROTIME, INR, APTT    HCG (If Applicable): No results found for: PREGTESTUR, PREGSERUM, HCG, HCGQUANT     ABGs: No results found for: PHART, PO2ART, LUP6HEW, MYS4AIN, BEART, A3VZWUNJ     Type & Screen (If Applicable):  No results found for: LABABO, LABRH    Drug/Infectious Status (If Applicable):  No results found for: HIV, HEPCAB    COVID-19 Screening (If Applicable):   Lab Results   Component Value Date    COVID19 Not Detected 03/31/2021           Anesthesia Evaluation  Patient summary reviewed and Nursing notes reviewed no history of anesthetic complications:   Airway: Mallampati: II  TM distance: >3 FB   Neck ROM: full  Mouth opening: > = 3 FB Dental: normal exam         Pulmonary:Negative Pulmonary ROS and normal exam                               Cardiovascular:    (+) hypertension: no interval change,       ECG reviewed                        Neuro/Psych:   Negative Neuro/Psych ROS  (+) neuromuscular disease: Parkinson's disease,             GI/Hepatic/Renal:   (+) morbid obesity          Endo/Other: Negative Endo/Other ROS                    Abdominal:   (+) obese,         Vascular: negative vascular ROS. Anesthesia Plan      general     ASA 3       Induction: intravenous. MIPS: Prophylactic antiemetics administered. Anesthetic plan and risks discussed with patient. Plan discussed with CRNA.     Attending anesthesiologist reviewed and agrees with Pre Eval content              Guero Grady MD   4/7/2021

## 2021-04-07 NOTE — ANESTHESIA POSTPROCEDURE EVALUATION
Department of Anesthesiology  Postprocedure Note    Patient: Sera Phillips  MRN: [id number]  YOB: 1950  Date of evaluation: 4/7/2021  Time:  9:59 AM     Procedure Summary     Date: 04/07/21 Room / Location: [address]    Anesthesia Start: 0730 Anesthesia Stop: 8282    Procedure: LEFT SHOULDER ARTHROSCOPY ROTATOR CUFF REPAIR, SUBACROMIAL DECOMPRESSION (Left Shoulder) Diagnosis: (LEFT SHOULDER ROTATOR CUFF TEAR)    Surgeons: Mia Cid MD Responsible Provider: Anisa Juarez MD    Anesthesia Type: general ASA Status: 3          Anesthesia Type: general    Flavio Phase I: Flavio Score: 8    Flavio Phase II:      Last vitals: Reviewed and per EMR flowsheets.        Anesthesia Post Evaluation    Patient location during evaluation: PACU  Patient participation: complete - patient participated  Level of consciousness: awake  Pain score: 0  Airway patency: patent  Nausea & Vomiting: no nausea and no vomiting  Complications: no  Cardiovascular status: hemodynamically stable  Respiratory status: acceptable  Hydration status: euvolemic

## 2021-04-07 NOTE — ANESTHESIA PROCEDURE NOTES
Peripheral Block    Patient location during procedure: pre-op  Staffing  Performed: anesthesiologist   Anesthesiologist: Jem Carey MD  Preanesthetic Checklist  Completed: patient identified, IV checked, site marked, risks and benefits discussed, surgical consent, monitors and equipment checked, pre-op evaluation, timeout performed, anesthesia consent given, oxygen available and patient being monitored  Peripheral Block  Patient position: supine  Prep: ChloraPrep  Patient monitoring: cardiac monitor, continuous pulse ox, frequent blood pressure checks and IV access  Block type: Brachial plexus  Laterality: left  Injection technique: single-shot  Guidance: ultrasound guided  Local infiltration: ropivacaine  Infiltration strength: 0.5 %  Dose: 40 mL  Supraclavicular  Provider prep: mask and sterile gloves  Local infiltration: ropivacaine  Needle  Needle type: combined needle/nerve stimulator   Needle gauge: 21 G  Needle length: 10 cm  Needle localization: ultrasound guidance  Test dose: negative  Assessment  Injection assessment: negative aspiration for heme, no paresthesia on injection and local visualized surrounding nerve on ultrasound  Paresthesia pain: none  Slow fractionated injection: yes  Hemodynamics: stable  Reason for block: post-op pain management

## 2021-04-07 NOTE — BRIEF OP NOTE
Brief Postoperative Note      Patient: Karo Byrd  YOB: 1950  MRN: [id number]    Date of Procedure: 4/7/2021    Pre-Op Diagnosis: LEFT SHOULDER ROTATOR CUFF TEAR    Post-Op Diagnosis: Same       Procedure(s):  LEFT SHOULDER ARTHROSCOPY ROTATOR CUFF REPAIR, SUBACROMIAL DECOMPRESSION   2. Left shoulder arthroscopic biceps tenotomy     Surgeon(s):  Paola De Los Santos MD    Assistant:  Physician Assistant: Dc Roth PA-C    Anesthesia: General    Estimated Blood Loss (mL): less than 580     Complications: None    Specimens:   * No specimens in log *    Implants:  Implant Name Type Inv. Item Serial No.  Lot No. LRB No. Used Action   ANCHOR SUT L14.7MM DIA5. 5MM BIOCOMPOSITE FULL THRD W/ 1.3MM - [id number]  ANCHOR SUT L14.7MM DIA5. 5MM BIOCOMPOSITE FULL THRD W/ 1.3MM [id number] ARTHREX [employer]-WD [id number] Left 2 Implanted   ANCHOR SUTURE BIOCOMP 4.75X19.1 MM SWIVELOCK C - [id number]  ANCHOR SUTURE BIOCOMP 4.75X19.1 MM Roxianne Mckee [employer]-WD [id number] Left 2 Implanted         Drains: * No LDAs found *    Findings: Complete rotator cuff tear supra/infraspinatus    Electronically signed by Paola De Los Santos MD on 4/7/2021 at 10:17 AM

## 2021-04-08 ENCOUNTER — TELEPHONE (OUTPATIENT)
Dept: PRIMARY CARE CLINIC | Age: 71
End: 2021-04-08

## 2021-04-10 NOTE — OP NOTE
Operative Note      Patient: Kedar Lieberman  YOB: 1950  MRN: [id number]    Date of Procedure: 4/7/2021    Pre-Op Diagnosis: LEFT SHOULDER ROTATOR CUFF TEAR    Post-Op Diagnosis: Same       Procedure(s):  LEFT SHOULDER ARTHROSCOPY ROTATOR CUFF REPAIR, SUBACROMIAL DECOMPRESSION   2. Left shoulder arthroscopic biceps tenotomy    Surgeon(s):  Helga Schwab, MD    Assistant:   Physician Assistant: Shania Bailey PA-C    Anesthesia: General    Estimated Blood Loss (mL): less than 973     Complications: None    Specimens:   * No specimens in log *    Implants:  Implant Name Type Inv. Item Serial No.  Lot No. LRB No. Used Action   ANCHOR SUT L14.7MM DIA5. 5MM BIOCOMPOSITE FULL THRD W/ 1.3MM - [id number]  ANCHOR SUT L14.7MM DIA5. 5MM BIOCOMPOSITE FULL THRD W/ 1.3MM [id number] [employer]WD [id number] Left 2 Implanted   ANCHOR SUTURE BIOCOMP 4.75X19.1 MM SWIVELOCK C - [id number]  ANCHOR SUTURE BIOCOMP 4.75X19.1 MM Laveda Matar [id number] AR[employer]-WD [id number] Left 2 Implanted         Drains: * No LDAs found *    Findings: Large tear of supra and infraspinatus. Easily mobilized with partial capsular release. Primary repair to the tuberosity. Intrasubstance tear in the long head of biceps. Cut for a tenotomy. Adequate subacromial decompression performed 5 mm. Minimal arthritic change    Detailed Description of Procedure: Procedure:  1. Left shoulder arthroscopic rotator cuff repair with subacromial decompression (large supraspinatus/infraspinatus complete tear, minimally retracted  2.   Left shoulder arthroscopic biceps tenotomy    Indications: Is a 78-year-old female with a severe left shoulder pain but despite conservative treatment elected proceed with surgery for left shoulder arthroscopy with a rotator cuff repair and indicated procedures    Risks benefits and alternatives to surgery were discussed including but not limited to Infection, bleeding, neurovascular injury, pain and dysfunction, hardware related complications including cutout failure breakage, loss of function, motion, and permanent disability as well as the cardiovascular and pulmonary complications from anesthesia including death and DVT. Patient and family accept these risks. We discussed specifically incomplete pain relief, posttraumatic arthritis, hardware related complications going cut out, failure, breakage, incomplete rotator cuff repair as well as a retear and possible need for intraoperative decision regarding allograft and potentially future revision surgeries including arthroplasty    Patient identified in the preop area. Left upper extremity marked and confirmed with patient as the operative site. Brought back to the operating room and anesthetized under general anesthesia. Left upper extremity was then prepped and draped in standard sterile fashion. Patient, site and procedure were confirmed with timeout. Everyone in the room agreed. Preop antibiotics were given. Patient was given a preoperative scalene nerve block. Placed into a beachchair position with bony prominences well-padded    We then made standard glenohumeral working portals beginning posteriorly. Then created anterior mid glenoid portal.  Cannula was placed. Glenohumeral findings: Patient evidence of a long head of biceps tear. She had notable intra-articular split in the biceps and was a partially detached at the anchor superiorly at the labrum. They performed a biceps tenotomy we cut it off its most superior attachment. We did a limited glenohumeral debridement of the anterior superior and posterior labrum respectively. She had fairly healthy cartilage noted in the glenohumeral joint without any significant advanced disease. We then reintroduced the scope and into the subacromial space. Subacromial findings: Patient had very large rotator cuff tear. It was complete supra and infraspinatus.   We were able to adequately mobilize the

## 2021-04-16 ENCOUNTER — HOSPITAL ENCOUNTER (OUTPATIENT)
Dept: ULTRASOUND IMAGING | Age: 71
Discharge: HOME | End: 2021-04-18
Payer: MEDICARE

## 2021-04-16 DIAGNOSIS — I82.4Y2 DEEP VEIN THROMBOSIS (DVT) OF PROXIMAL VEIN OF LEFT LOWER EXTREMITY, UNSPECIFIED CHRONICITY (HCC): ICD-10-CM

## 2021-04-16 PROCEDURE — 93971 EXTREMITY STUDY: CPT

## 2021-04-20 NOTE — PROGRESS NOTES
2021    Tomás Becker (:  1950) is a 65 y.o. female, here for evaluation of the following medical concerns:  Chief Complaint   Patient presents with    Rash     Rash on feet. States she had it for a couple months then it cleared up and has came back in the last few weeks. Pt has tried athletes foot medicine and neosporin.  Health Maintenance     Pt aware to call and schedule mammogram and dexa. Pt aware she is due for AWV with Dr. Mali Del Rio. HPI  Rash  Located on the bilateral feet  Started a few months ago, cleared on its own and then reappeared  She has tried athlete's foot cream and Neosporin without improvement  The rash is not painful or itchy  She has no history of diabetes    Review of Systems   Constitutional: Negative for chills and fever. HENT: Negative for congestion, sinus pressure and sore throat. Respiratory: Negative for cough and shortness of breath. Cardiovascular: Negative for chest pain and palpitations. Gastrointestinal: Negative for abdominal pain and vomiting. Musculoskeletal: Negative for arthralgias and myalgias. Skin: Positive for rash. Negative for wound. Neurological: Negative for speech difficulty and light-headedness. Psychiatric/Behavioral: Negative for suicidal ideas. The patient is not nervous/anxious. Prior to Visit Medications    Medication Sig Taking?  Authorizing Provider   HYDROcodone-acetaminophen (NORCO) 5-325 MG per tablet TAKE 1 TABLET BY MOUTH EVERY 12 HOURS AS NEEDED Yes Historical Provider, MD   ibuprofen (ADVIL;MOTRIN) 800 MG tablet TAKE 1 TABLET BY MOUTH THREE TIMES A DAY WITH FOOD AS NEEDED Yes Historical Provider, MD   aspirin 81 MG EC tablet Take 81 mg by mouth daily Yes Historical Provider, MD   terbinafine (LAMISIL) 1 % cream Apply topically 2 times daily for 2-3 weeks, apply 7-10 days after lesions disappear Yes Chiquita Patel,    oxyCODONE-acetaminophen (PERCOCET) 5-325 MG per tablet Take 1 tablet by mouth as Smoking status: Never Smoker    Smokeless tobacco: Never Used   Substance and Sexual Activity    Alcohol use: Yes     Alcohol/week: 1.0 standard drinks     Types: 1 Glasses of wine per week     Comment: occasional    Drug use: No    Sexual activity: Not on file   Lifestyle    Physical activity     Days per week: Not on file     Minutes per session: Not on file    Stress: Not on file   Relationships    Social connections     Talks on phone: Not on file     Gets together: Not on file     Attends Faith service: Not on file     Active member of club or organization: Not on file     Attends meetings of clubs or organizations: Not on file     Relationship status: Not on file    Intimate partner violence     Fear of current or ex partner: Not on file     Emotionally abused: Not on file     Physically abused: Not on file     Forced sexual activity: Not on file   Other Topics Concern    Not on file   Social History Narrative    Not on file        Family History   Problem Relation Age of Onset    High Blood Pressure Mother     Emphysema Father     Other Sister         fibromyalgia    Stroke Brother     Heart Disease Brother     Stroke Sister         brain aneurysm at age 39    Other Brother         drowning accident at age 39       Vitals:    04/21/21 0832   BP: 128/72   Pulse: 81   Temp: 96.9 °F (36.1 °C)   SpO2: 98%   Weight: 191 lb (86.6 kg)   Height: 5' 1\" (1.549 m)       Estimated body mass index is 36.09 kg/m² as calculated from the following:    Height as of this encounter: 5' 1\" (1.549 m). Weight as of this encounter: 191 lb (86.6 kg). No results for input(s): WBC, RBC, HGB, HCT, MCV, MCH, MCHC, RDW, PLT, MPV in the last 72 hours. No results for input(s): NA, K, CL, CO2, BUN, CREATININE, GLUCOSE, CALCIUM, PROT, LABALBU, BILITOT, ALKPHOS, AST, ALT in the last 72 hours.     No results found for: LABA1C    Us Dup Lower Extremity Left Lawson    Result Date: 4/16/2021  No sonographic evidence of expectations have been discussed with the patient who expresses understanding and desires to proceed. Close follow up to evaluate treatment results and for coordination of care. I have reviewed the patient's medical history in detail and updated the computerized patient record. As always, patient is advised that if symptoms worsen in any way they will proceed to the nearest emergency room. --------------------------------------------------------------------    Return in about 3 weeks (around 5/12/2021) for Skin check w/Dr. Maricruz Ty. An  electronic signature was used to authenticate this note.     --Lola Traore DO on 4/21/2021 at 8:54 AM

## 2021-04-21 ENCOUNTER — OFFICE VISIT (OUTPATIENT)
Dept: FAMILY MEDICINE CLINIC | Age: 71
End: 2021-04-21
Payer: MEDICARE

## 2021-04-21 VITALS
HEART RATE: 81 BPM | BODY MASS INDEX: 36.06 KG/M2 | HEIGHT: 61 IN | OXYGEN SATURATION: 98 % | WEIGHT: 191 LBS | SYSTOLIC BLOOD PRESSURE: 128 MMHG | DIASTOLIC BLOOD PRESSURE: 72 MMHG | TEMPERATURE: 96.9 F

## 2021-04-21 DIAGNOSIS — B35.1 ONYCHOMYCOSIS: ICD-10-CM

## 2021-04-21 DIAGNOSIS — B35.3 TINEA PEDIS OF BOTH FEET: Primary | ICD-10-CM

## 2021-04-21 PROCEDURE — G8427 DOCREV CUR MEDS BY ELIG CLIN: HCPCS | Performed by: STUDENT IN AN ORGANIZED HEALTH CARE EDUCATION/TRAINING PROGRAM

## 2021-04-21 PROCEDURE — 1090F PRES/ABSN URINE INCON ASSESS: CPT | Performed by: STUDENT IN AN ORGANIZED HEALTH CARE EDUCATION/TRAINING PROGRAM

## 2021-04-21 PROCEDURE — 3017F COLORECTAL CA SCREEN DOC REV: CPT | Performed by: STUDENT IN AN ORGANIZED HEALTH CARE EDUCATION/TRAINING PROGRAM

## 2021-04-21 PROCEDURE — 1123F ACP DISCUSS/DSCN MKR DOCD: CPT | Performed by: STUDENT IN AN ORGANIZED HEALTH CARE EDUCATION/TRAINING PROGRAM

## 2021-04-21 PROCEDURE — G8400 PT W/DXA NO RESULTS DOC: HCPCS | Performed by: STUDENT IN AN ORGANIZED HEALTH CARE EDUCATION/TRAINING PROGRAM

## 2021-04-21 PROCEDURE — 4040F PNEUMOC VAC/ADMIN/RCVD: CPT | Performed by: STUDENT IN AN ORGANIZED HEALTH CARE EDUCATION/TRAINING PROGRAM

## 2021-04-21 PROCEDURE — G8417 CALC BMI ABV UP PARAM F/U: HCPCS | Performed by: STUDENT IN AN ORGANIZED HEALTH CARE EDUCATION/TRAINING PROGRAM

## 2021-04-21 PROCEDURE — 99213 OFFICE O/P EST LOW 20 MIN: CPT | Performed by: STUDENT IN AN ORGANIZED HEALTH CARE EDUCATION/TRAINING PROGRAM

## 2021-04-21 PROCEDURE — 1036F TOBACCO NON-USER: CPT | Performed by: STUDENT IN AN ORGANIZED HEALTH CARE EDUCATION/TRAINING PROGRAM

## 2021-04-21 RX ORDER — ASPIRIN 81 MG/1
81 TABLET ORAL DAILY
COMMUNITY

## 2021-04-21 RX ORDER — PRENATAL VIT 91/IRON/FOLIC/DHA 28-975-200
COMBINATION PACKAGE (EA) ORAL
Qty: 1 TUBE | Refills: 1 | Status: SHIPPED | OUTPATIENT
Start: 2021-04-21 | End: 2022-01-03

## 2021-04-21 RX ORDER — HYDROCODONE BITARTRATE AND ACETAMINOPHEN 5; 325 MG/1; MG/1
TABLET ORAL
COMMUNITY
Start: 2021-03-26 | End: 2021-09-17

## 2021-04-21 RX ORDER — IBUPROFEN 800 MG/1
TABLET ORAL
COMMUNITY
Start: 2021-04-09 | End: 2021-09-17

## 2021-04-21 ASSESSMENT — ENCOUNTER SYMPTOMS
VOMITING: 0
SORE THROAT: 0
COUGH: 0
ABDOMINAL PAIN: 0
SHORTNESS OF BREATH: 0
SINUS PRESSURE: 0

## 2021-04-21 NOTE — PATIENT INSTRUCTIONS
Patient Education        Athlete's Foot: Care Instructions  Your Care Instructions     Athlete's foot is an itchy rash on the foot caused by an infection with a fungus. You can get it by going barefoot in wet public areas, such as swimming pools or locker rooms. Many times there is no clear reason why you get athlete's foot. You can easily treat athlete's foot by putting medicine on your feet for 1 to 6 weeks. In some cases, a doctor may prescribe pills to kill the fungus. Follow-up care is a key part of your treatment and safety. Be sure to make and go to all appointments, and call your doctor if you are having problems. It's also a good idea to know your test results and keep a list of the medicines you take. How can you care for yourself at home? · Your doctor may suggest an over-the counter lotion or spray or may prescribe a medicine. Take your medicines exactly as prescribed. Call your doctor if you think you are having a problem with your medicine. · Keep your feet clean and dry. · When you get dressed, put your socks on before your underwear. This can prevent the fungus from spreading from your feet to your groin. To prevent athlete's foot  · Wear flip-flops or other shower sandals in public locker rooms and showers and by the pool. · Dry between your toes after swimming or bathing. · Wear leather shoes or sandals, which let air get to your feet. · Change your socks as needed so your feet stay as dry as possible. · Use antifungal powder on your feet. When should you call for help? Watch closely for changes in your health, and be sure to contact your doctor if:    · You do not get better as expected. Where can you learn more? Go to [email or web address] org and sign in to your [employer] account. Enter M498 in the Carmine box to learn more about \"Athlete's Foot: Care Instructions. \"     If you do not have an account, please click on the \"Sign Up Now\" link.   Current as of: July 2, 2020               Content Version: 12.8  © 9313-6802 [employer]. Care instructions adapted under license by [facility] ([facility]). If you have questions about a medical condition or this instruction, always ask your healthcare professional. [address] any warranty or liability for your use of this information.

## 2021-05-12 ENCOUNTER — TELEPHONE (OUTPATIENT)
Dept: FAMILY MEDICINE CLINIC | Age: 71
End: 2021-05-12

## 2021-05-18 DIAGNOSIS — I10 ESSENTIAL HYPERTENSION: ICD-10-CM

## 2021-05-18 RX ORDER — SPIRONOLACTONE AND HYDROCHLOROTHIAZIDE 25; 25 MG/1; MG/1
TABLET ORAL
Qty: 180 TABLET | Refills: 1 | Status: SHIPPED | OUTPATIENT
Start: 2021-05-18 | End: 2021-11-30

## 2021-07-08 ENCOUNTER — TELEPHONE (OUTPATIENT)
Dept: FAMILY MEDICINE CLINIC | Age: 71
End: 2021-07-08

## 2021-07-20 PROBLEM — M75.80 ROTATOR CUFF TENDINITIS: Status: ACTIVE | Noted: 2021-07-20

## 2021-07-20 PROBLEM — M25.519 SHOULDER PAIN: Status: ACTIVE | Noted: 2021-07-20

## 2021-07-20 PROBLEM — Z87.828 HISTORY OF INJURY: Status: ACTIVE | Noted: 2021-07-20

## 2021-08-20 ENCOUNTER — OFFICE VISIT (OUTPATIENT)
Dept: NEUROLOGY | Age: 71
End: 2021-08-20
Payer: MEDICARE

## 2021-08-20 VITALS
SYSTOLIC BLOOD PRESSURE: 130 MMHG | DIASTOLIC BLOOD PRESSURE: 78 MMHG | WEIGHT: 199 LBS | HEART RATE: 84 BPM | BODY MASS INDEX: 37.6 KG/M2

## 2021-08-20 DIAGNOSIS — G20 PARKINSONISM, UNSPECIFIED PARKINSONISM TYPE (HCC): Primary | ICD-10-CM

## 2021-08-20 DIAGNOSIS — R26.89 BALANCE PROBLEM: ICD-10-CM

## 2021-08-20 DIAGNOSIS — G24.9 DYSKINESIA: ICD-10-CM

## 2021-08-20 DIAGNOSIS — R26.89 BALANCE DISORDER: ICD-10-CM

## 2021-08-20 PROCEDURE — 1036F TOBACCO NON-USER: CPT | Performed by: PSYCHIATRY & NEUROLOGY

## 2021-08-20 PROCEDURE — 3017F COLORECTAL CA SCREEN DOC REV: CPT | Performed by: PSYCHIATRY & NEUROLOGY

## 2021-08-20 PROCEDURE — 4040F PNEUMOC VAC/ADMIN/RCVD: CPT | Performed by: PSYCHIATRY & NEUROLOGY

## 2021-08-20 PROCEDURE — 1090F PRES/ABSN URINE INCON ASSESS: CPT | Performed by: PSYCHIATRY & NEUROLOGY

## 2021-08-20 PROCEDURE — 1123F ACP DISCUSS/DSCN MKR DOCD: CPT | Performed by: PSYCHIATRY & NEUROLOGY

## 2021-08-20 PROCEDURE — G8427 DOCREV CUR MEDS BY ELIG CLIN: HCPCS | Performed by: PSYCHIATRY & NEUROLOGY

## 2021-08-20 PROCEDURE — 99214 OFFICE O/P EST MOD 30 MIN: CPT | Performed by: PSYCHIATRY & NEUROLOGY

## 2021-08-20 PROCEDURE — G8417 CALC BMI ABV UP PARAM F/U: HCPCS | Performed by: PSYCHIATRY & NEUROLOGY

## 2021-08-20 PROCEDURE — G8400 PT W/DXA NO RESULTS DOC: HCPCS | Performed by: PSYCHIATRY & NEUROLOGY

## 2021-08-20 RX ORDER — AMMONIUM LACTATE 12 G/100G
CREAM TOPICAL
COMMUNITY
Start: 2021-08-15

## 2021-08-20 ASSESSMENT — ENCOUNTER SYMPTOMS
SHORTNESS OF BREATH: 0
BACK PAIN: 0
VOMITING: 0
PHOTOPHOBIA: 0
TROUBLE SWALLOWING: 0
CHOKING: 0
NAUSEA: 0
COLOR CHANGE: 0

## 2021-08-20 NOTE — PROGRESS NOTES
Subjective:      Patient ID: Darius José is a 65 y.o. female who presents today for:  Chief Complaint   Patient presents with    Follow-up     PT says that she noticed that her mouth contourts every once in a while, and that sometimes her fingers get stiff. She says those are new things, everything else is still the same with her normal tremors and things like that. HPI 57-year-old right-handed female with a history of Parkinson's disease. Patient is carbidopa levodopa twice a day. Occasionally she takes 3 pills. Patient has some minor degree of dyskinesias and she is somewhat supersensitive to dopamine agonist.  She is not any hallucinations dyskinesias or falls. She denies difficulty swallowing or choking she still has some balance issues. Her memory has not changed  Patient had shoulder surgery and therefore balance is not right.   She is still very independent    Past Medical History:   Diagnosis Date    Hypertension     meds > 10 yrs / denies TIA or stroke    Osteoarthritis     DJD right hip    Parkinson disease ([address]     Parkinsonism ([facility] [address]      Past Surgical History:   Procedure Laterality Date     SECTION  1988    COLONOSCOPY N/A 2020    COLONOSCOPY WITH POLYPECTOMY performed by Keri Cali MD at [address] Left     arthrotomy    SHOULDER ARTHROSCOPY Left 2021    LEFT SHOULDER ARTHROSCOPY ROTATOR CUFF REPAIR, SUBACROMIAL DECOMPRESSION performed by Jacqueline Perla MD at [facility] Right 2017    RIGHT HIP TOTAL HIP ARTHROPLASTY WILLIAM MDM SPINAL  performed by Luke Mendenhall MD at [facility] History     Socioeconomic History    Marital status:      Spouse name: Not on file    Number of children: Not on file    Years of education: Not on file    Highest education level: Not on file   Occupational History    Not on file   Tobacco Use    Smoking status: Never Smoker    Smokeless tobacco: Never Used   Vaping Use    Vaping Use: Never used   Substance and Sexual Activity    Alcohol use: Yes     Alcohol/week: 1.0 standard drinks     Types: 1 Glasses of wine per week     Comment: occasional    Drug use: No    Sexual activity: Not on file   Other Topics Concern    Not on file   Social History Narrative    Not on file     Social Determinants of Health     Financial Resource Strain:     Difficulty of Paying Living Expenses:    Food Insecurity:     Worried About Running Out of Food in the Last Year:     [address] in the Last Year:    Transportation Needs:     Lack of Transportation (Medical):      Lack of Transportation (Non-Medical):    Physical Activity:     Days of Exercise per Week:     Minutes of Exercise per Session:    Stress:     Feeling of Stress :    Social Connections:     Frequency of Communication with Friends and Family:     Frequency of Social Gatherings with Friends and Family:     Attends Taoism Services:     Active Member of Clubs or Organizations:     Attends Club or Organization Meetings:     Marital Status:    Intimate Partner Violence:     Fear of Current or Ex-Partner:     Emotionally Abused:     Physically Abused:     Sexually Abused:      Family History   Problem Relation Age of Onset    High Blood Pressure Mother     Emphysema Father     Other Sister         fibromyalgia    Stroke Brother     Heart Disease Brother     Stroke Sister         brain aneurysm at age 39    Other Brother         drowning accident at age 39     Allergies   Allergen Reactions    Tramadol Nausea And Vomiting    Sulfa Antibiotics Hives     hives & chills       Current Outpatient Medications   Medication Sig Dispense Refill    spironolactone-hydroCHLOROthiazide (ALDACTAZIDE) 25-25 MG per tablet TAKE 2 TABLETS BY MOUTH EVERY  tablet 1    HYDROcodone-acetaminophen (NORCO) 5-325 MG per tablet TAKE 1 TABLET BY MOUTH EVERY 12 HOURS AS NEEDED      ibuprofen (ADVIL;MOTRIN) 800 MG tablet TAKE 1 TABLET BY MOUTH THREE TIMES A DAY WITH FOOD AS NEEDED      aspirin 81 MG EC tablet Take 81 mg by mouth daily      terbinafine (LAMISIL) 1 % cream Apply topically 2 times daily for 2-3 weeks, apply 7-10 days after lesions disappear 1 Tube 1    oxyCODONE-acetaminophen (PERCOCET) 5-325 MG per tablet Take 1 tablet by mouth as needed for Pain. Takes 1/2 tab at HS      Multiple Vitamin (MULTI VITAMIN DAILY PO) Take 1 tablet by mouth daily      Ascorbic Acid (VITAMIN C) 250 MG CHEW Take 1 tablet by mouth daily      Turmeric 500 MG CAPS Take 1 tablet by mouth daily      carbidopa-levodopa (SINEMET)  MG per tablet Take 1 tablet by mouth 2 times daily      ammonium lactate (AMLACTIN) 12 % cream        No current facility-administered medications for this visit. Review of Systems   Constitutional: Negative for fever. HENT: Negative for ear pain, tinnitus and trouble swallowing. Eyes: Negative for photophobia and visual disturbance. Respiratory: Negative for choking and shortness of breath. Cardiovascular: Negative for chest pain and palpitations. Gastrointestinal: Negative for nausea and vomiting. Musculoskeletal: Negative for back pain, gait problem, joint swelling, myalgias, neck pain and neck stiffness. Skin: Negative for color change. Allergic/Immunologic: Negative for food allergies. Neurological: Negative for dizziness, tremors, seizures, syncope, facial asymmetry, speech difficulty, weakness, light-headedness, numbness and headaches. Psychiatric/Behavioral: Negative for behavioral problems, confusion, hallucinations and sleep disturbance. Objective:   /78 (Site: Left Upper Arm, Position: Sitting, Cuff Size: Medium Adult)   Pulse 84   Wt 199 lb (90.3 kg)   LMP 08/10/2007   BMI 37.60 kg/m²     Physical Exam  Vitals reviewed.    Eyes:      Pupils: Pupils are equal, round, and reactive to light. Cardiovascular:      Rate and Rhythm: Normal rate and regular rhythm. Heart sounds: No murmur heard. Pulmonary:      Effort: Pulmonary effort is normal.      Breath sounds: Normal breath sounds. Abdominal:      General: Bowel sounds are normal.   Musculoskeletal:         General: Normal range of motion. Cervical back: Normal range of motion. Skin:     General: Skin is warm. Neurological:      Mental Status: She is alert and oriented to person, place, and time. Cranial Nerves: No cranial nerve deficit. Sensory: No sensory deficit. Motor: No abnormal muscle tone. Coordination: Coordination normal.      Deep Tendon Reflexes: Reflexes are normal and symmetric. Babinski sign absent on the right side. Babinski sign absent on the left side. Psychiatric:         Mood and Affect: Mood normal.     The limitation of shoulder abduction is notable on the left where she had surgery and she requires surgery on the right as well. Patient is having some minor dyskinesias today. Rest of the examination is normal    US DUP LOWER EXTREMITY LEFT RYNE    Result Date: 4/16/2021  Venous duplex ultrasound of the left lower extremity CLINICAL HISTORY:  DVT of the left lower extremity. Left lower extremity pain and swelling. COMPARISON:  None available TECHNIQUE: Lavella Reining scale, duplex Doppler, with compression and augmentation sonography of the deep venous system of the left  lower extremity was performed by a registered sonographer and the images were submitted for interpretation. FINDINGS:  The common femoral, femoral, and popliteal veins demonstrate normal color flow, augmentation, and compressibility. No venous duplex ultrasound evidence of DVT in the left lower extremity. No sonographic evidence of deep venous thrombosis within the  left  lower extremity.        Lab Results   Component Value Date    WBC 8.8 03/31/2021    RBC 4.91 03/31/2021    HGB 15.0 03/31/2021    HCT 44.9 03/31/2021    MCV 91.3 03/31/2021    MCH 30.6 03/31/2021    MCHC 33.5 03/31/2021    RDW 14.4 03/31/2021     03/31/2021     Lab Results   Component Value Date     03/31/2021    K 3.6 03/31/2021     03/31/2021    CO2 30 03/31/2021    BUN 22 03/31/2021    CREATININE 0.63 03/31/2021    GFRAA >60.0 03/31/2021    LABGLOM >60.0 03/31/2021    GLUCOSE 83 03/31/2021    PROT 8.0 11/25/2019    LABALBU 4.6 11/25/2019    CALCIUM 10.0 03/31/2021    BILITOT 0.3 11/25/2019    ALKPHOS 73 11/25/2019    AST 15 11/25/2019    ALT 8 11/25/2019     No results found for: PROTIME, INR  Lab Results   Component Value Date    TSH 4.860 03/24/2017     Lab Results   Component Value Date    TRIG 74 03/24/2017    HDL 44 03/24/2017    LDLCALC 69 03/24/2017     No results found for: Landen Subha, LABBENZ, CANNAB, COCAINESCRN, LABMETH, OPIATESCREENURINE, PHENCYCLIDINESCREENURINE, PPXUR, ETOH  No results found for: LITHIUM, DILFRTOT, VALPROATE    Assessment:       Diagnosis Orders   1. Parkinsonism, unspecified Parkinsonism type ([facility] [address]     2. Dyskinesia     3. Balance disorder     4. Balance problem     Parkinson's disease with response to dopamine agonist though patient is very super sensitive to dopamine agonist and has dyskinesias she is only taking 2 tablets and occasionally 3. She is aware of the dyskinesias. not bothersome. I recommended that she may want to do half a tablet 4 times a day as a trial to see if she is having peaks of dopamine agonist.  She has not developed any hallucinations falls injuries trauma. Her balance issues also are complex secondary to extrapyramidal findings as well as she has some shoulder issues bilaterally and cannot balance herself. She denies any falls injuries or trauma. She denies difficulty swallowing. No cognitive deficits are reported.   We will follow in a few months and earlier if there are any concerns      Plan:      No orders of the defined types were placed in this encounter. No orders of the defined types were placed in this encounter. No follow-ups on file.       Sharen Kawasaki, MD

## 2021-09-17 ENCOUNTER — OFFICE VISIT (OUTPATIENT)
Dept: FAMILY MEDICINE CLINIC | Age: 71
End: 2021-09-17
Payer: MEDICARE

## 2021-09-17 VITALS
BODY MASS INDEX: 37 KG/M2 | HEIGHT: 61 IN | SYSTOLIC BLOOD PRESSURE: 134 MMHG | OXYGEN SATURATION: 97 % | HEART RATE: 83 BPM | TEMPERATURE: 97.5 F | DIASTOLIC BLOOD PRESSURE: 70 MMHG | WEIGHT: 196 LBS

## 2021-09-17 DIAGNOSIS — R42 LIGHTHEADEDNESS: ICD-10-CM

## 2021-09-17 DIAGNOSIS — R53.83 FATIGUE, UNSPECIFIED TYPE: ICD-10-CM

## 2021-09-17 DIAGNOSIS — I10 ESSENTIAL HYPERTENSION: Primary | ICD-10-CM

## 2021-09-17 DIAGNOSIS — G20 PARKINSONISM, UNSPECIFIED PARKINSONISM TYPE (HCC): ICD-10-CM

## 2021-09-17 PROCEDURE — 1090F PRES/ABSN URINE INCON ASSESS: CPT | Performed by: FAMILY MEDICINE

## 2021-09-17 PROCEDURE — 1036F TOBACCO NON-USER: CPT | Performed by: FAMILY MEDICINE

## 2021-09-17 PROCEDURE — 3017F COLORECTAL CA SCREEN DOC REV: CPT | Performed by: FAMILY MEDICINE

## 2021-09-17 PROCEDURE — 4040F PNEUMOC VAC/ADMIN/RCVD: CPT | Performed by: FAMILY MEDICINE

## 2021-09-17 PROCEDURE — G8400 PT W/DXA NO RESULTS DOC: HCPCS | Performed by: FAMILY MEDICINE

## 2021-09-17 PROCEDURE — G8417 CALC BMI ABV UP PARAM F/U: HCPCS | Performed by: FAMILY MEDICINE

## 2021-09-17 PROCEDURE — G8427 DOCREV CUR MEDS BY ELIG CLIN: HCPCS | Performed by: FAMILY MEDICINE

## 2021-09-17 PROCEDURE — 1123F ACP DISCUSS/DSCN MKR DOCD: CPT | Performed by: FAMILY MEDICINE

## 2021-09-17 PROCEDURE — 99213 OFFICE O/P EST LOW 20 MIN: CPT | Performed by: FAMILY MEDICINE

## 2021-09-17 SDOH — ECONOMIC STABILITY: FOOD INSECURITY: WITHIN THE PAST 12 MONTHS, YOU WORRIED THAT YOUR FOOD WOULD RUN OUT BEFORE YOU GOT MONEY TO BUY MORE.: NEVER TRUE

## 2021-09-17 SDOH — ECONOMIC STABILITY: FOOD INSECURITY: WITHIN THE PAST 12 MONTHS, THE FOOD YOU BOUGHT JUST DIDN'T LAST AND YOU DIDN'T HAVE MONEY TO GET MORE.: NEVER TRUE

## 2021-09-17 ASSESSMENT — SOCIAL DETERMINANTS OF HEALTH (SDOH): HOW HARD IS IT FOR YOU TO PAY FOR THE VERY BASICS LIKE FOOD, HOUSING, MEDICAL CARE, AND HEATING?: NOT HARD AT ALL

## 2021-09-17 NOTE — PROGRESS NOTES
Chief Complaint   Patient presents with    Dizziness     low bp at PT, 80/66    Fatigue     no energy     Back Pain     lower back pain        HPI:  Yesica Solorzano is a 65 y.o. female       Left shoulder [facility]roscopy [facility] at [facility]  Dr. Praful Scott  Will have right shoulder surgery as well    At PT she had an episode of lightheadedness and low bp  Got home ate banana, rechecked pressure and was normal     Denies SOB/CP      Parkinsonism  Does see dr. Luli Cobb      HTN  Taking medication without issue  Overdue for screens/routine labs, etc  Has order for mamm        Past Medical History:   Diagnosis Date    Hypertension     meds > 10 yrs / denies TIA or stroke    Osteoarthritis     DJD right hip    Parkinson disease [facility] [address]     Parkinsonism ([facility] [address]      Past Surgical History:   Procedure Laterality Date     SECTION  1988    COLONOSCOPY N/A 2020    COLONOSCOPY WITH POLYPECTOMY performed by Corin Ivey MD at [address] Left     arthrotomy    SHOULDER ARTHROSCOPY Left 2021    LEFT SHOULDER ARTHROSCOPY ROTATOR CUFF REPAIR, SUBACROMIAL DECOMPRESSION performed by Karol Delgadillo MD at [facility] Right 2017    RIGHT HIP TOTAL HIP ARTHROPLASTY WILLIAM MDM SPINAL  performed by Taj Emmanuel MD at [address] History   Problem Relation Age of Onset    High Blood Pressure Mother     Emphysema Father     Other Sister         fibromyalgia    Stroke Brother     Heart Disease Brother     Stroke Sister         brain aneurysm at age 39    Other Brother         drowning accident at age 39     Social History     Socioeconomic History    Marital status:      Spouse name: None    Number of children: None    Years of education: None    Highest education level: None   Occupational History    None   Tobacco Use    Smoking status: Never Smoker    Smokeless tobacco: Never Used   Vaping Use    Vaping Use: Never used   Substance and Sexual Activity    Alcohol use: Yes     Alcohol/week: 1.0 standard drinks     Types: 1 Glasses of wine per week     Comment: occasional    Drug use: No    Sexual activity: None   Other Topics Concern    None   Social History Narrative    None     Social Determinants of Health     Financial Resource Strain: Low Risk     Difficulty of Paying Living Expenses: Not hard at all   Food Insecurity: No Food Insecurity    Worried About Running Out of Food in the Last Year: Never true    Taylor of Food in the Last Year: Never true   Transportation Needs: No Transportation Needs    Lack of Transportation (Medical): No    Lack of Transportation (Non-Medical):  No   Physical Activity:     Days of Exercise per Week:     Minutes of Exercise per Session:    Stress:     Feeling of Stress :    Social Connections:     Frequency of Communication with Friends and Family:     Frequency of Social Gatherings with Friends and Family:     Attends [employer] Services:     Active Member of Clubs or Organizations:     Attends Club or Organization Meetings:     Marital Status:    Intimate Partner Violence:     Fear of Current or Ex-Partner:     Emotionally Abused:     Physically Abused:     Sexually Abused:      Current Outpatient Medications   Medication Sig Dispense Refill    carbidopa-levodopa (SINEMET)  MG per tablet TAKE 1 TABLET BY MOUTH TWICE A  tablet 3    ammonium lactate (AMLACTIN) 12 % cream       spironolactone-hydroCHLOROthiazide (ALDACTAZIDE) 25-25 MG per tablet TAKE 2 TABLETS BY MOUTH EVERY  tablet 1    aspirin 81 MG EC tablet Take 81 mg by mouth daily      terbinafine (LAMISIL) 1 % cream Apply topically 2 times daily for 2-3 weeks, apply 7-10 days after lesions disappear 1 Tube 1    Multiple Vitamin (MULTI VITAMIN DAILY PO) Take 1 tablet by mouth daily      Ascorbic Acid (VITAMIN C) 250 MG CHEW Take 1 tablet by mouth daily      Turmeric 500 MG CAPS Take 1 tablet by mouth daily       No current facility-administered medications for this visit. Allergies   Allergen Reactions    Tramadol Nausea And Vomiting    Sulfa Antibiotics Hives     hives & chills       Review of Systems:   General ROS: fatigue, chills on occasion  Respiratory ROS: no cough, shortness of breath, or wheezing  Cardiovascular ROS: no chest pain or dyspnea on exertion  Gastrointestinal ROS: no abdominal pain, change in bowel habits, or black or bloody stools  Genito-Urinary ROS: no dysuria, trouble voiding  Musculoskeletal ROS: right shoulder pain  In general patient otherwise reports feeling well. Physical Exam:  /70 (Site: Right Upper Arm)   Pulse 83   Temp 97.5 °F (36.4 °C)   Ht 5' 1\" (1.549 m)   Wt 196 lb (88.9 kg)   LMP 08/10/2007   SpO2 97%   Breastfeeding No   BMI 37.03 kg/m²     Gen: Well, NAD, Alert, Oriented x 3   HEENT: EOMI, eyes clear, MMM  Skin: without rash or jaundice  Lungs CTAB  Heart s1s2 RRR  Neuro: left hand with pill rolling tremor, left arm with some rigidity  Ext: no CCE       A&P   Diagnosis Orders   1. Essential hypertension  CBC    Comprehensive Metabolic Panel   2. Lightheadedness     3. Fatigue, unspecified type  TSH without Reflex   4.  Parkinsonism, unspecified Parkinsonism type ([address]  carbidopa-levodopa (SINEMET)  MG per tablet        Check labs as ordered    She can continue her PT for shoulder     Needs to reschedule mamm/DEXA    Avoid nsaids before surgery    Chronic conditions are stable  Continue current regimen  Follow up with appropriate specialists and here routinely for ongoing monitoring of chronic conditions            Brian Cash MD

## 2021-11-04 ENCOUNTER — OFFICE VISIT (OUTPATIENT)
Dept: GASTROENTEROLOGY | Age: 71
End: 2021-11-04
Payer: MEDICARE

## 2021-11-04 VITALS
HEART RATE: 78 BPM | DIASTOLIC BLOOD PRESSURE: 60 MMHG | BODY MASS INDEX: 39.42 KG/M2 | HEIGHT: 60 IN | WEIGHT: 200.8 LBS | OXYGEN SATURATION: 97 % | SYSTOLIC BLOOD PRESSURE: 116 MMHG

## 2021-11-04 DIAGNOSIS — K52.9 COLITIS: Primary | ICD-10-CM

## 2021-11-04 PROCEDURE — 4040F PNEUMOC VAC/ADMIN/RCVD: CPT | Performed by: SPECIALIST

## 2021-11-04 PROCEDURE — 99214 OFFICE O/P EST MOD 30 MIN: CPT | Performed by: SPECIALIST

## 2021-11-04 PROCEDURE — G8417 CALC BMI ABV UP PARAM F/U: HCPCS | Performed by: SPECIALIST

## 2021-11-04 PROCEDURE — 1090F PRES/ABSN URINE INCON ASSESS: CPT | Performed by: SPECIALIST

## 2021-11-04 PROCEDURE — 1123F ACP DISCUSS/DSCN MKR DOCD: CPT | Performed by: SPECIALIST

## 2021-11-04 PROCEDURE — G8400 PT W/DXA NO RESULTS DOC: HCPCS | Performed by: SPECIALIST

## 2021-11-04 PROCEDURE — G8484 FLU IMMUNIZE NO ADMIN: HCPCS | Performed by: SPECIALIST

## 2021-11-04 PROCEDURE — G8427 DOCREV CUR MEDS BY ELIG CLIN: HCPCS | Performed by: SPECIALIST

## 2021-11-04 PROCEDURE — 1036F TOBACCO NON-USER: CPT | Performed by: SPECIALIST

## 2021-11-04 PROCEDURE — 3017F COLORECTAL CA SCREEN DOC REV: CPT | Performed by: SPECIALIST

## 2021-11-04 RX ORDER — MESALAMINE 4 G/60ML
4 ENEMA RECTAL NIGHTLY
Qty: 1 ENEMA | Refills: 3 | Status: SHIPPED | OUTPATIENT
Start: 2021-11-04 | End: 2022-01-03

## 2021-11-04 RX ORDER — MESALAMINE 400 MG/1
800 CAPSULE, DELAYED RELEASE ORAL 3 TIMES DAILY
Qty: 180 CAPSULE | Refills: 2 | Status: SHIPPED | OUTPATIENT
Start: 2021-11-04 | End: 2021-11-15 | Stop reason: SDUPTHER

## 2021-11-04 ASSESSMENT — ENCOUNTER SYMPTOMS
ABDOMINAL DISTENTION: 0
CONSTIPATION: 0
ANAL BLEEDING: 0
BACK PAIN: 1
NAUSEA: 0
BLOOD IN STOOL: 1
RECTAL PAIN: 0
DIARRHEA: 1
EYES NEGATIVE: 1
RESPIRATORY NEGATIVE: 1
VOMITING: 0
ABDOMINAL PAIN: 0

## 2021-11-04 NOTE — PROGRESS NOTES
Gastroenterology Clinic Visit    Freedom Pelaez  [id number]  Chief Complaint   Patient presents with    Diarrhea     with alot of mucus with some blood; was taking an OTC anti-diarreal which did seem to help some. No complaints of abdominal pain. HPI: 66 y.o. female presents to the clinic with history of diarrhea associated with mucousy discharge and occasional blood in the stool. Has frequent nocturnal bowel movement. Colonoscopy was done in November 2020 which showed moderate proctosigmoiditis and a benign polyp in ascending colon, colonic biopsies were consistent with ulcerative colitis, patient was prescribed Rowasa enema however she was not able to fill the prescription because of the cost.  Also did not have a follow-up visit since then. Patient did experience some improvement in her symptoms while she was on prednisone for shoulder pain. No abdominal pain nausea or vomiting, patient is allergic to sulfa and tramadol. No history of fever night sweats, no history of recent weight loss. Has occasional back pain and arthralgia, takes ibuprofen occasionally for shoulder pain, does not smoke, CBC from March 31 of 2021 showed a white count of 8.8 and hemoglobin of 15 and hematocrit of 44.9, BMP was also unremarkable, no history of recent antibiotics use. Review of Systems   Constitutional: Negative. HENT: Negative. Eyes: Negative. Respiratory: Negative. Cardiovascular: Negative. No cardiac issues   Gastrointestinal: Positive for blood in stool and diarrhea. Negative for abdominal distention, abdominal pain, anal bleeding, constipation, nausea, rectal pain and vomiting. Endocrine: Negative. Genitourinary: Negative. Musculoskeletal: Positive for arthralgias and back pain. Skin: Negative. Allergic/Immunologic: Negative for food allergies. Neurological: Negative. Hematological: Negative. Psychiatric/Behavioral: Negative.          Past Medical History:   Diagnosis Date    Hypertension     meds > 10 yrs / denies TIA or stroke    Osteoarthritis     DJD right hip    Parkinson [facility] [address]     Parkinsonism ([facility] [address]       Past Surgical History:   Procedure Laterality Date     SECTION  1988    COLONOSCOPY N/A 2020    COLONOSCOPY WITH POLYPECTOMY performed by Espinoza Obrien MD at [address] Left     arthrotomy    SHOULDER ARTHROSCOPY Left 2021    LEFT SHOULDER ARTHROSCOPY ROTATOR CUFF REPAIR, SUBACROMIAL DECOMPRESSION performed by Sugar Rodríguez MD at [place] [address] Right 2017    RIGHT HIP TOTAL HIP ARTHROPLASTY WILLIAM MDM SPINAL  performed by Vidal Holguin MD at [facility]     Current Outpatient Medications on File Prior to Visit   Medication Sig Dispense Refill    carbidopa-levodopa (SINEMET)  MG per tablet TAKE 1 TABLET BY MOUTH TWICE A  tablet 3    ammonium lactate (AMLACTIN) 12 % cream       spironolactone-hydroCHLOROthiazide (ALDACTAZIDE) 25-25 MG per tablet TAKE 2 TABLETS BY MOUTH EVERY  tablet 1    aspirin 81 MG EC tablet Take 81 mg by mouth daily      terbinafine (LAMISIL) 1 % cream Apply topically 2 times daily for 2-3 weeks, apply 7-10 days after lesions disappear 1 Tube 1    Multiple Vitamin (MULTI VITAMIN DAILY PO) Take 1 tablet by mouth daily      Ascorbic Acid (VITAMIN C) 250 MG CHEW Take 1 tablet by mouth daily      Turmeric 500 MG CAPS Take 1 tablet by mouth daily       No current facility-administered medications on file prior to visit.      Family History   Problem Relation Age of Onset    High Blood Pressure Mother     Emphysema Father     Other Sister         fibromyalgia    Stroke Brother     Heart Disease Brother     Stroke Sister         brain aneurysm at age 43    Other Brother         drowning accident at age 39      Social History     Socioeconomic History    Marital status:      Spouse name: None    Number of children: None    Years of education: None    Highest education level: None   Occupational History    None   Tobacco Use    Smoking status: Never Smoker    Smokeless tobacco: Never Used   Vaping Use    Vaping Use: Never used   Substance and Sexual Activity    Alcohol use: Yes     Alcohol/week: 1.0 standard drinks     Types: 1 Glasses of wine per week     Comment: occasional    Drug use: No    Sexual activity: None   Other Topics Concern    None   Social History Narrative    None     Social Determinants of Health     Financial Resource Strain: Low Risk     Difficulty of Paying Living Expenses: Not hard at all   Food Insecurity: No Food Insecurity    Worried About Running Out of Food in the Last Year: Never true    Taylor of Food in the Last Year: Never true   Transportation Needs: No Transportation Needs    Lack of Transportation (Medical): No    Lack of Transportation (Non-Medical): No   Physical Activity:     Days of Exercise per Week:     Minutes of Exercise per Session:    Stress:     Feeling of Stress :    Social Connections:     Frequency of Communication with Friends and Family:     Frequency of Social Gatherings with Friends and Family:     Attends [employer] Services:     Active Member of Clubs or Organizations:     Attends Club or Organization Meetings:     Marital Status:    Intimate Partner Violence:     Fear of Current or Ex-Partner:     Emotionally Abused:     Physically Abused:     Sexually Abused:        Blood pressure 116/60, pulse 78, height 5' (1.524 m), weight 200 lb 12.8 oz (91.1 kg), last menstrual period 08/10/2007, SpO2 97 %, not currently breastfeeding.     Physical Exam    Laboratory, Pathology, Radiology reviewed indetail with relevant important investigations summarized below:  Lab Results   Component Value Date    WBC 7.6 09/17/2021    HGB 14.7 09/17/2021    HCT 43.5 09/17/2021    MCV 88.9 09/17/2021     09/17/2021     Lab

## 2021-11-05 ENCOUNTER — TELEPHONE (OUTPATIENT)
Dept: FAMILY MEDICINE CLINIC | Age: 71
End: 2021-11-05

## 2021-11-09 ENCOUNTER — TELEPHONE (OUTPATIENT)
Dept: GASTROENTEROLOGY | Age: 71
End: 2021-11-09

## 2021-11-09 NOTE — TELEPHONE ENCOUNTER
Fax received for [facility]. PA needed for Mesalamine. PA completed through [facility] 11/09/21 and was approved.

## 2021-11-12 NOTE — TELEPHONE ENCOUNTER
Patient requesting medication refill.  Please approve or deny this request.    Rx requested:  Requested Prescriptions     Pending Prescriptions Disp Refills    mesalamine (DELZICOL) 400 MG CPDR delayed release capsule 180 capsule 2     Sig: Take 2 capsules by mouth 3 times daily         Last Office Visit:   11/4/2021      Next Visit Date:  Future Appointments   Date Time Provider Rubens Richter   12/20/2021  3:30 PM Killian Hastings MD [facility]   1/14/2022  8:30 AM Sejal Tavares MD [facility]

## 2021-11-15 RX ORDER — MESALAMINE 400 MG/1
800 CAPSULE, DELAYED RELEASE ORAL 3 TIMES DAILY
Qty: 180 CAPSULE | Refills: 2 | Status: SHIPPED | OUTPATIENT
Start: 2021-11-15 | End: 2022-01-03

## 2021-11-28 DIAGNOSIS — I10 ESSENTIAL HYPERTENSION: ICD-10-CM

## 2021-11-30 RX ORDER — SPIRONOLACTONE AND HYDROCHLOROTHIAZIDE 25; 25 MG/1; MG/1
TABLET ORAL
Qty: 180 TABLET | Refills: 1 | Status: SHIPPED | OUTPATIENT
Start: 2021-11-30 | End: 2022-05-26

## 2021-12-16 PROBLEM — M75.100 TEAR OF ROTATOR CUFF: Status: ACTIVE | Noted: 2021-12-16

## 2021-12-16 PROBLEM — I82.409 ACUTE DEEP VEIN THROMBOSIS (DVT) OF LOWER EXTREMITY (HCC): Status: ACTIVE | Noted: 2021-12-16

## 2021-12-20 ENCOUNTER — OFFICE VISIT (OUTPATIENT)
Dept: NEUROLOGY | Age: 71
End: 2021-12-20
Payer: MEDICARE

## 2021-12-20 VITALS
HEART RATE: 84 BPM | BODY MASS INDEX: 39.45 KG/M2 | WEIGHT: 202 LBS | DIASTOLIC BLOOD PRESSURE: 74 MMHG | SYSTOLIC BLOOD PRESSURE: 126 MMHG

## 2021-12-20 DIAGNOSIS — R26.89 BALANCE PROBLEM: ICD-10-CM

## 2021-12-20 DIAGNOSIS — G24.9 DYSKINESIA: ICD-10-CM

## 2021-12-20 DIAGNOSIS — R25.1 TREMORS OF NERVOUS SYSTEM: ICD-10-CM

## 2021-12-20 DIAGNOSIS — G20 PARKINSONISM, UNSPECIFIED PARKINSONISM TYPE (HCC): Primary | ICD-10-CM

## 2021-12-20 PROCEDURE — 99214 OFFICE O/P EST MOD 30 MIN: CPT | Performed by: PSYCHIATRY & NEUROLOGY

## 2021-12-20 PROCEDURE — 1090F PRES/ABSN URINE INCON ASSESS: CPT | Performed by: PSYCHIATRY & NEUROLOGY

## 2021-12-20 PROCEDURE — G8484 FLU IMMUNIZE NO ADMIN: HCPCS | Performed by: PSYCHIATRY & NEUROLOGY

## 2021-12-20 PROCEDURE — 3017F COLORECTAL CA SCREEN DOC REV: CPT | Performed by: PSYCHIATRY & NEUROLOGY

## 2021-12-20 PROCEDURE — 1123F ACP DISCUSS/DSCN MKR DOCD: CPT | Performed by: PSYCHIATRY & NEUROLOGY

## 2021-12-20 PROCEDURE — G8427 DOCREV CUR MEDS BY ELIG CLIN: HCPCS | Performed by: PSYCHIATRY & NEUROLOGY

## 2021-12-20 PROCEDURE — G8417 CALC BMI ABV UP PARAM F/U: HCPCS | Performed by: PSYCHIATRY & NEUROLOGY

## 2021-12-20 PROCEDURE — 1036F TOBACCO NON-USER: CPT | Performed by: PSYCHIATRY & NEUROLOGY

## 2021-12-20 PROCEDURE — 4040F PNEUMOC VAC/ADMIN/RCVD: CPT | Performed by: PSYCHIATRY & NEUROLOGY

## 2021-12-20 PROCEDURE — G8400 PT W/DXA NO RESULTS DOC: HCPCS | Performed by: PSYCHIATRY & NEUROLOGY

## 2021-12-20 ASSESSMENT — ENCOUNTER SYMPTOMS
TROUBLE SWALLOWING: 0
CHOKING: 0
PHOTOPHOBIA: 0
VOMITING: 0
BACK PAIN: 0
NAUSEA: 0
COLOR CHANGE: 0
SHORTNESS OF BREATH: 0

## 2021-12-20 NOTE — PROGRESS NOTES
Subjective:      Patient ID: David Coronel is a 66 y.o. female who presents today for:  Chief Complaint   Patient presents with    Follow-up     Pt states that things have been good. She says she has a lot of stiffness in the neck and back. She says she was laid up for a while from surgery so she does not know if it is from that or if it is due to the parkinsons. She says that she shuffles the left leg at night especially when she is really tired. HPI no dysuria right-handed female with history of Parkinson's disease. With super sensitive to dopamine agonist.  Patient has dyskinesias even only on 2 tablets occasionally 3. She is aware of the dyskinesias but not bothersome. We recommend that she may want to do half a tablet 4 times a day and she was trying this she has not had any hallucinations falls loss of balance difficulty swallowing she is having a lot of stiffness in the neck and back. Surgery and she had become more stiff. Patient is otherwise doing well except she may have gained some weight after her shoulder surgery and becoming more stiffer. From the parking standpoint she has not worsened her tremors had not been prominent.     Past Medical History:   Diagnosis Date    Hypertension     meds > 10 yrs / denies TIA or stroke    Osteoarthritis     DJD right hip    Parkinson disease ([facility]     Parkinsonism ([facility]      Past Surgical History:   Procedure Laterality Date     SECTION  1988    COLONOSCOPY N/A 2020    COLONOSCOPY WITH POLYPECTOMY performed by Billy Mckeon MD at [address] Left     arthrotomy    SHOULDER ARTHROSCOPY Left 2021    LEFT SHOULDER ARTHROSCOPY ROTATOR CUFF REPAIR, SUBACROMIAL DECOMPRESSION performed by Melly Montemayor MD at [address] [place] [address] Right 2017    RIGHT HIP TOTAL HIP ARTHROPLASTY WILLIAM [facility]DM SPINAL  performed by De Michele MD at [facility] OR     Social History     Socioeconomic History    Marital status:      Spouse name: Not on file    Number of children: Not on file    Years of education: Not on file    Highest education level: Not on file   Occupational History    Not on file   Tobacco Use    Smoking status: Never Smoker    Smokeless tobacco: Never Used   Vaping Use    Vaping Use: Never used   Substance and Sexual Activity    Alcohol use: Yes     Alcohol/week: 1.0 standard drink     Types: 1 Glasses of wine per week     Comment: occasional    Drug use: No    Sexual activity: Not on file   Other Topics Concern    Not on file   Social History Narrative    Not on file     Social Determinants of Health     Financial Resource Strain: Low Risk     Difficulty of Paying Living Expenses: Not hard at all   Food Insecurity: No Food Insecurity    Worried About Running Out of Food in the Last Year: Never true    Taylor of Food in the Last Year: Never true   Transportation Needs: No Transportation Needs    Lack of Transportation (Medical): No    Lack of Transportation (Non-Medical):  No   Physical Activity:     Days of Exercise per Week: Not on file    Minutes of Exercise per Session: Not on file   Stress:     Feeling of Stress : Not on file   Social Connections:     Frequency of Communication with Friends and Family: Not on file    Frequency of Social Gatherings with Friends and Family: Not on file    Attends Worship Services: Not on file    Active Member of Clubs or Organizations: Not on file    Attends Club or Organization Meetings: Not on file    Marital Status: Not on file   Intimate Partner Violence:     Fear of Current or Ex-Partner: Not on file    Emotionally Abused: Not on file    Physically Abused: Not on file    Sexually Abused: Not on file   Housing Stability:     Unable to Pay for Housing in the Last Year: Not on file    Number of [place] in the Last Year: Not on file    Unstable Housing in the Last Allergic/Immunologic: Negative for food allergies. Neurological: Negative for dizziness, tremors, seizures, syncope, facial asymmetry, speech difficulty, weakness, light-headedness, numbness and headaches. Psychiatric/Behavioral: Negative for behavioral problems, confusion, hallucinations and sleep disturbance. In addition to this patient has prominent dyskinesias. O  Objective:   /74 (Site: Left Upper Arm, Position: Sitting, Cuff Size: Medium Adult)   Pulse 84   Wt 202 lb (91.6 kg)   LMP 08/10/2007   BMI 39.45 kg/m²     Physical Exam  Vitals reviewed. Eyes:      Pupils: Pupils are equal, round, and reactive to light. Cardiovascular:      Rate and Rhythm: Normal rate and regular rhythm. Heart sounds: No murmur heard. Pulmonary:      Effort: Pulmonary effort is normal.      Breath sounds: Normal breath sounds. Abdominal:      General: Bowel sounds are normal.   Musculoskeletal:         General: Normal range of motion. Cervical back: Normal range of motion. Skin:     General: Skin is warm. Neurological:      Mental Status: She is alert and oriented to person, place, and time. Cranial Nerves: No cranial nerve deficit. Sensory: No sensory deficit. Motor: No abnormal muscle tone. Coordination: Coordination normal.      Deep Tendon Reflexes: Reflexes are normal and symmetric. Babinski sign absent on the right side. Babinski sign absent on the left side. Psychiatric:         Mood and Affect: Mood normal.       Mild prominent dyskinesias notable which is mostly proximal and some face  US DUP LOWER EXTREMITY LEFT RYNE    Result Date: 4/16/2021  Venous duplex ultrasound of the left lower extremity CLINICAL HISTORY:  DVT of the left lower extremity. Left lower extremity pain and swelling.  COMPARISON:  None available TECHNIQUE: True [place]field scale, duplex Doppler, with compression and augmentation sonography of the deep venous system of the left  lower extremity was She still has some balance issues. Since last seen she reports that she is somewhat more stiff and has neck issues which are mostly related to being in bed after her shoulder surgery the shoulder surgery may be causing some neck issues as well. She has gained some weight which may be contributory. Recommended that she walk as much as she can and in the future may consider a stationary bike. For now we will continue the same dose and if her dyskinesias are very prominent we can counteract this with amantadine or Gocovri. And that she is very ambulatory have not made the decision as yet      Plan:      No orders of the defined types were placed in this encounter. No orders of the defined types were placed in this encounter. No follow-ups on file.       Bharati Gonzalez MD

## 2022-01-03 ENCOUNTER — HOSPITAL ENCOUNTER (OUTPATIENT)
Dept: PREADMISSION TESTING | Age: 72
Discharge: HOME | End: 2022-01-07
Payer: MEDICARE

## 2022-01-03 VITALS
BODY MASS INDEX: 39.89 KG/M2 | SYSTOLIC BLOOD PRESSURE: 121 MMHG | HEIGHT: 60 IN | HEART RATE: 83 BPM | DIASTOLIC BLOOD PRESSURE: 62 MMHG | WEIGHT: 203.2 LBS | OXYGEN SATURATION: 98 % | TEMPERATURE: 97.1 F | RESPIRATION RATE: 16 BRPM

## 2022-01-03 LAB
ALBUMIN SERPL-MCNC: 4.3 G/DL (ref 3.5–4.6)
ALP BLD-CCNC: 79 U/L (ref 40–130)
ALT SERPL-CCNC: 7 U/L (ref 0–33)
ANION GAP SERPL CALCULATED.3IONS-SCNC: 13 MEQ/L (ref 9–15)
APTT: 32.9 SEC (ref 24.4–36.8)
AST SERPL-CCNC: 21 U/L (ref 0–35)
BASOPHILS ABSOLUTE: 0 K/UL (ref 0–0.2)
BASOPHILS RELATIVE PERCENT: 0.5 %
BILIRUB SERPL-MCNC: <0.2 MG/DL (ref 0.2–0.7)
BILIRUBIN URINE: NEGATIVE
BLOOD, URINE: NEGATIVE
BUN BLDV-MCNC: 23 MG/DL (ref 8–23)
CALCIUM SERPL-MCNC: 9.8 MG/DL (ref 8.5–9.9)
CHLORIDE BLD-SCNC: 101 MEQ/L (ref 95–107)
CLARITY: CLEAR
CO2: 26 MEQ/L (ref 20–31)
COLOR: YELLOW
CREAT SERPL-MCNC: 0.72 MG/DL (ref 0.5–0.9)
EOSINOPHILS ABSOLUTE: 0.2 K/UL (ref 0–0.7)
EOSINOPHILS RELATIVE PERCENT: 2.8 %
GFR AFRICAN AMERICAN: >60
GFR NON-AFRICAN AMERICAN: >60
GLOBULIN: 3.2 G/DL (ref 2.3–3.5)
GLUCOSE BLD-MCNC: 92 MG/DL (ref 70–99)
GLUCOSE URINE: NEGATIVE MG/DL
HCT VFR BLD CALC: 41.3 % (ref 37–47)
HEMOGLOBIN: 13.6 G/DL (ref 12–16)
INR BLD: 1.1
KETONES, URINE: NEGATIVE MG/DL
LEUKOCYTE ESTERASE, URINE: NEGATIVE
LYMPHOCYTES ABSOLUTE: 1.5 K/UL (ref 1–4.8)
LYMPHOCYTES RELATIVE PERCENT: 20.3 %
MCH RBC QN AUTO: 29.6 PG (ref 27–31.3)
MCHC RBC AUTO-ENTMCNC: 32.9 % (ref 33–37)
MCV RBC AUTO: 90 FL (ref 82–100)
MONOCYTES ABSOLUTE: 0.6 K/UL (ref 0.2–0.8)
MONOCYTES RELATIVE PERCENT: 8 %
NEUTROPHILS ABSOLUTE: 5 K/UL (ref 1.4–6.5)
NEUTROPHILS RELATIVE PERCENT: 68.4 %
NITRITE, URINE: NEGATIVE
PDW BLD-RTO: 13.7 % (ref 11.5–14.5)
PH UA: 5.5 (ref 5–9)
PLATELET # BLD: 305 K/UL (ref 130–400)
POTASSIUM SERPL-SCNC: 3.9 MEQ/L (ref 3.4–4.9)
PROTEIN UA: NEGATIVE MG/DL
PROTHROMBIN TIME: 14.1 SEC (ref 12.3–14.9)
RBC # BLD: 4.58 M/UL (ref 4.2–5.4)
SODIUM BLD-SCNC: 140 MEQ/L (ref 135–144)
SPECIFIC GRAVITY UA: 1.02 (ref 1–1.03)
TOTAL PROTEIN: 7.5 G/DL (ref 6.3–8)
URINE REFLEX TO CULTURE: NORMAL
UROBILINOGEN, URINE: 0.2 E.U./DL
WBC # BLD: 7.3 K/UL (ref 4.8–10.8)

## 2022-01-03 PROCEDURE — 81003 URINALYSIS AUTO W/O SCOPE: CPT

## 2022-01-03 PROCEDURE — 85025 COMPLETE CBC W/AUTO DIFF WBC: CPT

## 2022-01-03 PROCEDURE — 85610 PROTHROMBIN TIME: CPT

## 2022-01-03 PROCEDURE — 80053 COMPREHEN METABOLIC PANEL: CPT

## 2022-01-03 PROCEDURE — 93005 ELECTROCARDIOGRAM TRACING: CPT | Performed by: ORTHOPAEDIC SURGERY

## 2022-01-03 PROCEDURE — 85730 THROMBOPLASTIN TIME PARTIAL: CPT

## 2022-01-03 NOTE — PROGRESS NOTES
Yellow PAT instruction sheet reviewed with Patient, who verbalized understanding. Patient is requesting to take her carbidopa the am of, Dr. Mia Marcelino ok'drake. COVID test is not done at this appointment, D/T  OR is more than 7 days out.

## 2022-01-04 LAB
EKG ATRIAL RATE: 76 BPM
EKG P AXIS: 28 DEGREES
EKG P-R INTERVAL: 152 MS
EKG Q-T INTERVAL: 392 MS
EKG QRS DURATION: 102 MS
EKG QTC CALCULATION (BAZETT): 441 MS
EKG R AXIS: -17 DEGREES
EKG T AXIS: 14 DEGREES
EKG VENTRICULAR RATE: 76 BPM

## 2022-01-04 PROCEDURE — 93010 ELECTROCARDIOGRAM REPORT: CPT | Performed by: INTERNAL MEDICINE

## 2022-01-11 ENCOUNTER — HOSPITAL ENCOUNTER (INPATIENT)
Age: 72
LOS: 1 days | Discharge: NURSING, REHAB OR LONG-TERM CARE | DRG: 502 | End: 2022-01-12
Attending: ORTHOPAEDIC SURGERY | Admitting: ORTHOPAEDIC SURGERY
Payer: MEDICARE

## 2022-01-11 ENCOUNTER — ANESTHESIA (OUTPATIENT)
Dept: OPERATING ROOM | Age: 72
DRG: 502 | End: 2022-01-11
Payer: MEDICARE

## 2022-01-11 ENCOUNTER — APPOINTMENT (OUTPATIENT)
Dept: GENERAL RADIOLOGY | Age: 72
DRG: 502 | End: 2022-01-11
Attending: ORTHOPAEDIC SURGERY
Payer: MEDICARE

## 2022-01-11 ENCOUNTER — ANESTHESIA EVENT (OUTPATIENT)
Dept: OPERATING ROOM | Age: 72
DRG: 502 | End: 2022-01-11
Payer: MEDICARE

## 2022-01-11 VITALS — SYSTOLIC BLOOD PRESSURE: 148 MMHG | OXYGEN SATURATION: 97 % | DIASTOLIC BLOOD PRESSURE: 77 MMHG | TEMPERATURE: 94.1 F

## 2022-01-11 PROBLEM — Z98.890 S/P ARTHROSCOPY OF RIGHT SHOULDER: Status: ACTIVE | Noted: 2022-01-11

## 2022-01-11 LAB — SARS-COV-2, NAAT: NOT DETECTED

## 2022-01-11 PROCEDURE — 2500000003 HC RX 250 WO HCPCS: Performed by: NURSE ANESTHETIST, CERTIFIED REGISTERED

## 2022-01-11 PROCEDURE — 6370000000 HC RX 637 (ALT 250 FOR IP): Performed by: ORTHOPAEDIC SURGERY

## 2022-01-11 PROCEDURE — 6360000002 HC RX W HCPCS: Performed by: ORTHOPAEDIC SURGERY

## 2022-01-11 PROCEDURE — 87635 SARS-COV-2 COVID-19 AMP PRB: CPT

## 2022-01-11 PROCEDURE — 2580000003 HC RX 258: Performed by: ORTHOPAEDIC SURGERY

## 2022-01-11 PROCEDURE — 0LQ14ZZ REPAIR RIGHT SHOULDER TENDON, PERCUTANEOUS ENDOSCOPIC APPROACH: ICD-10-PCS | Performed by: ORTHOPAEDIC SURGERY

## 2022-01-11 PROCEDURE — L3660 SO 8 AB RSTR CAN/WEB PRE OTS: HCPCS | Performed by: ORTHOPAEDIC SURGERY

## 2022-01-11 PROCEDURE — 6370000000 HC RX 637 (ALT 250 FOR IP): Performed by: STUDENT IN AN ORGANIZED HEALTH CARE EDUCATION/TRAINING PROGRAM

## 2022-01-11 PROCEDURE — 2720000010 HC SURG SUPPLY STERILE: Performed by: ORTHOPAEDIC SURGERY

## 2022-01-11 PROCEDURE — 7100000001 HC PACU RECOVERY - ADDTL 15 MIN: Performed by: ORTHOPAEDIC SURGERY

## 2022-01-11 PROCEDURE — 6360000002 HC RX W HCPCS: Performed by: STUDENT IN AN ORGANIZED HEALTH CARE EDUCATION/TRAINING PROGRAM

## 2022-01-11 PROCEDURE — 73020 X-RAY EXAM OF SHOULDER: CPT

## 2022-01-11 PROCEDURE — 0LN10ZZ RELEASE RIGHT SHOULDER TENDON, OPEN APPROACH: ICD-10-PCS | Performed by: ORTHOPAEDIC SURGERY

## 2022-01-11 PROCEDURE — 2709999900 HC NON-CHARGEABLE SUPPLY: Performed by: ORTHOPAEDIC SURGERY

## 2022-01-11 PROCEDURE — 3600000004 HC SURGERY LEVEL 4 BASE: Performed by: ORTHOPAEDIC SURGERY

## 2022-01-11 PROCEDURE — 76942 ECHO GUIDE FOR BIOPSY: CPT | Performed by: STUDENT IN AN ORGANIZED HEALTH CARE EDUCATION/TRAINING PROGRAM

## 2022-01-11 PROCEDURE — 7100000000 HC PACU RECOVERY - FIRST 15 MIN: Performed by: ORTHOPAEDIC SURGERY

## 2022-01-11 PROCEDURE — 2580000003 HC RX 258: Performed by: ANESTHESIOLOGY

## 2022-01-11 PROCEDURE — 6360000002 HC RX W HCPCS: Performed by: ANESTHESIOLOGY

## 2022-01-11 PROCEDURE — 6360000002 HC RX W HCPCS

## 2022-01-11 PROCEDURE — 3700000000 HC ANESTHESIA ATTENDED CARE: Performed by: ORTHOPAEDIC SURGERY

## 2022-01-11 PROCEDURE — C1713 ANCHOR/SCREW BN/BN,TIS/BN: HCPCS | Performed by: ORTHOPAEDIC SURGERY

## 2022-01-11 PROCEDURE — 3600000014 HC SURGERY LEVEL 4 ADDTL 15MIN: Performed by: ORTHOPAEDIC SURGERY

## 2022-01-11 PROCEDURE — 94150 VITAL CAPACITY TEST: CPT

## 2022-01-11 PROCEDURE — 6360000002 HC RX W HCPCS: Performed by: NURSE ANESTHETIST, CERTIFIED REGISTERED

## 2022-01-11 PROCEDURE — 3700000001 HC ADD 15 MINUTES (ANESTHESIA): Performed by: ORTHOPAEDIC SURGERY

## 2022-01-11 DEVICE — ANCHOR SUTURE BIOCOMP 4.75X22 MM DBL LD WHT SWIVELOCK C: Type: IMPLANTABLE DEVICE | Site: SHOULDER | Status: FUNCTIONAL

## 2022-01-11 DEVICE — ANCHOR SUT L14.7MM DIA5.5MM BIOCOMPOSITE FULL THRD W/ 1.3MM: Type: IMPLANTABLE DEVICE | Site: SHOULDER | Status: FUNCTIONAL

## 2022-01-11 RX ORDER — ONDANSETRON 2 MG/ML
4 INJECTION INTRAMUSCULAR; INTRAVENOUS
Status: COMPLETED | OUTPATIENT
Start: 2022-01-11 | End: 2022-01-11

## 2022-01-11 RX ORDER — SODIUM CHLORIDE 9 MG/ML
50 INJECTION, SOLUTION INTRAVENOUS CONTINUOUS
Status: DISCONTINUED | OUTPATIENT
Start: 2022-01-11 | End: 2022-01-12 | Stop reason: HOSPADM

## 2022-01-11 RX ORDER — PROPOFOL 10 MG/ML
INJECTION, EMULSION INTRAVENOUS PRN
Status: DISCONTINUED | OUTPATIENT
Start: 2022-01-11 | End: 2022-01-11 | Stop reason: SDUPTHER

## 2022-01-11 RX ORDER — ACETAMINOPHEN 325 MG/1
650 TABLET ORAL EVERY 6 HOURS
Status: DISCONTINUED | OUTPATIENT
Start: 2022-01-11 | End: 2022-01-12 | Stop reason: HOSPADM

## 2022-01-11 RX ORDER — SODIUM CHLORIDE 0.9 % (FLUSH) 0.9 %
10 SYRINGE (ML) INJECTION EVERY 12 HOURS SCHEDULED
Status: DISCONTINUED | OUTPATIENT
Start: 2022-01-11 | End: 2022-01-12 | Stop reason: HOSPADM

## 2022-01-11 RX ORDER — SODIUM CHLORIDE 0.9 % (FLUSH) 0.9 %
10 SYRINGE (ML) INJECTION PRN
Status: DISCONTINUED | OUTPATIENT
Start: 2022-01-11 | End: 2022-01-12 | Stop reason: HOSPADM

## 2022-01-11 RX ORDER — ONDANSETRON 2 MG/ML
4 INJECTION INTRAMUSCULAR; INTRAVENOUS EVERY 6 HOURS PRN
Status: DISCONTINUED | OUTPATIENT
Start: 2022-01-11 | End: 2022-01-12 | Stop reason: HOSPADM

## 2022-01-11 RX ORDER — MORPHINE SULFATE 2 MG/ML
2 INJECTION, SOLUTION INTRAMUSCULAR; INTRAVENOUS
Status: DISCONTINUED | OUTPATIENT
Start: 2022-01-11 | End: 2022-01-12 | Stop reason: HOSPADM

## 2022-01-11 RX ORDER — ACETAMINOPHEN/DIPHENHYDRAMINE 500MG-25MG
1 TABLET ORAL NIGHTLY PRN
COMMUNITY

## 2022-01-11 RX ORDER — OXYCODONE HYDROCHLORIDE 5 MG/1
5 TABLET ORAL EVERY 4 HOURS PRN
Status: DISCONTINUED | OUTPATIENT
Start: 2022-01-11 | End: 2022-01-12 | Stop reason: HOSPADM

## 2022-01-11 RX ORDER — SODIUM CHLORIDE, SODIUM LACTATE, POTASSIUM CHLORIDE, CALCIUM CHLORIDE 600; 310; 30; 20 MG/100ML; MG/100ML; MG/100ML; MG/100ML
INJECTION, SOLUTION INTRAVENOUS CONTINUOUS
Status: DISCONTINUED | OUTPATIENT
Start: 2022-01-11 | End: 2022-01-12

## 2022-01-11 RX ORDER — SENNA AND DOCUSATE SODIUM 50; 8.6 MG/1; MG/1
1 TABLET, FILM COATED ORAL 2 TIMES DAILY
Status: DISCONTINUED | OUTPATIENT
Start: 2022-01-11 | End: 2022-01-12 | Stop reason: HOSPADM

## 2022-01-11 RX ORDER — MAGNESIUM HYDROXIDE/ALUMINUM HYDROXICE/SIMETHICONE 120; 1200; 1200 MG/30ML; MG/30ML; MG/30ML
15 SUSPENSION ORAL EVERY 6 HOURS PRN
Status: DISCONTINUED | OUTPATIENT
Start: 2022-01-11 | End: 2022-01-12 | Stop reason: HOSPADM

## 2022-01-11 RX ORDER — HYDROCODONE BITARTRATE AND ACETAMINOPHEN 5; 325 MG/1; MG/1
2 TABLET ORAL PRN
Status: COMPLETED | OUTPATIENT
Start: 2022-01-11 | End: 2022-01-11

## 2022-01-11 RX ORDER — MIDAZOLAM HYDROCHLORIDE 1 MG/ML
INJECTION INTRAMUSCULAR; INTRAVENOUS PRN
Status: DISCONTINUED | OUTPATIENT
Start: 2022-01-11 | End: 2022-01-11 | Stop reason: SDUPTHER

## 2022-01-11 RX ORDER — ROCURONIUM BROMIDE 10 MG/ML
INJECTION, SOLUTION INTRAVENOUS PRN
Status: DISCONTINUED | OUTPATIENT
Start: 2022-01-11 | End: 2022-01-11 | Stop reason: SDUPTHER

## 2022-01-11 RX ORDER — ONDANSETRON 2 MG/ML
INJECTION INTRAMUSCULAR; INTRAVENOUS PRN
Status: DISCONTINUED | OUTPATIENT
Start: 2022-01-11 | End: 2022-01-11 | Stop reason: SDUPTHER

## 2022-01-11 RX ORDER — MORPHINE SULFATE 4 MG/ML
4 INJECTION, SOLUTION INTRAMUSCULAR; INTRAVENOUS
Status: DISCONTINUED | OUTPATIENT
Start: 2022-01-11 | End: 2022-01-12 | Stop reason: HOSPADM

## 2022-01-11 RX ORDER — MAGNESIUM HYDROXIDE 1200 MG/15ML
LIQUID ORAL CONTINUOUS PRN
Status: COMPLETED | OUTPATIENT
Start: 2022-01-11 | End: 2022-01-11

## 2022-01-11 RX ORDER — MEPERIDINE HYDROCHLORIDE 25 MG/ML
12.5 INJECTION INTRAMUSCULAR; INTRAVENOUS; SUBCUTANEOUS EVERY 5 MIN PRN
Status: DISCONTINUED | OUTPATIENT
Start: 2022-01-11 | End: 2022-01-11 | Stop reason: HOSPADM

## 2022-01-11 RX ORDER — SODIUM CHLORIDE 9 MG/ML
25 INJECTION, SOLUTION INTRAVENOUS PRN
Status: DISCONTINUED | OUTPATIENT
Start: 2022-01-11 | End: 2022-01-12 | Stop reason: HOSPADM

## 2022-01-11 RX ORDER — ROPIVACAINE HYDROCHLORIDE 5 MG/ML
INJECTION, SOLUTION EPIDURAL; INFILTRATION; PERINEURAL
Status: COMPLETED | OUTPATIENT
Start: 2022-01-11 | End: 2022-01-11

## 2022-01-11 RX ORDER — FENTANYL CITRATE 50 UG/ML
50 INJECTION, SOLUTION INTRAMUSCULAR; INTRAVENOUS EVERY 10 MIN PRN
Status: DISCONTINUED | OUTPATIENT
Start: 2022-01-11 | End: 2022-01-11 | Stop reason: HOSPADM

## 2022-01-11 RX ORDER — DEXAMETHASONE SODIUM PHOSPHATE 10 MG/ML
INJECTION INTRAMUSCULAR; INTRAVENOUS PRN
Status: DISCONTINUED | OUTPATIENT
Start: 2022-01-11 | End: 2022-01-11 | Stop reason: SDUPTHER

## 2022-01-11 RX ORDER — DIPHENHYDRAMINE HYDROCHLORIDE 50 MG/ML
12.5 INJECTION INTRAMUSCULAR; INTRAVENOUS
Status: DISCONTINUED | OUTPATIENT
Start: 2022-01-11 | End: 2022-01-11 | Stop reason: HOSPADM

## 2022-01-11 RX ORDER — OXYCODONE HYDROCHLORIDE 5 MG/1
5 TABLET ORAL EVERY 4 HOURS PRN
Status: DISCONTINUED | OUTPATIENT
Start: 2022-01-11 | End: 2022-01-12

## 2022-01-11 RX ORDER — METOCLOPRAMIDE HYDROCHLORIDE 5 MG/ML
10 INJECTION INTRAMUSCULAR; INTRAVENOUS
Status: DISCONTINUED | OUTPATIENT
Start: 2022-01-11 | End: 2022-01-11 | Stop reason: HOSPADM

## 2022-01-11 RX ORDER — HYDROCODONE BITARTRATE AND ACETAMINOPHEN 5; 325 MG/1; MG/1
1 TABLET ORAL PRN
Status: COMPLETED | OUTPATIENT
Start: 2022-01-11 | End: 2022-01-11

## 2022-01-11 RX ADMIN — OXYCODONE 5 MG: 5 TABLET ORAL at 23:37

## 2022-01-11 RX ADMIN — ROCURONIUM BROMIDE 30 MG: 10 INJECTION INTRAVENOUS at 14:55

## 2022-01-11 RX ADMIN — MEPERIDINE HYDROCHLORIDE 12.5 MG: 25 INJECTION, SOLUTION INTRAMUSCULAR; INTRAVENOUS; SUBCUTANEOUS at 18:15

## 2022-01-11 RX ADMIN — HYDROCODONE BITARTRATE AND ACETAMINOPHEN 1 TABLET: 5; 325 TABLET ORAL at 19:18

## 2022-01-11 RX ADMIN — CEFAZOLIN 2000 MG: 10 INJECTION, POWDER, FOR SOLUTION INTRAVENOUS at 20:28

## 2022-01-11 RX ADMIN — PROPOFOL 200 MG: 10 INJECTION, EMULSION INTRAVENOUS at 13:20

## 2022-01-11 RX ADMIN — MEPERIDINE HYDROCHLORIDE 12.5 MG: 25 INJECTION, SOLUTION INTRAMUSCULAR; INTRAVENOUS; SUBCUTANEOUS at 17:58

## 2022-01-11 RX ADMIN — FENTANYL CITRATE 50 MCG: 50 INJECTION INTRAMUSCULAR; INTRAVENOUS at 18:01

## 2022-01-11 RX ADMIN — SUGAMMADEX 50 MG: 100 INJECTION, SOLUTION INTRAVENOUS at 17:07

## 2022-01-11 RX ADMIN — SODIUM CHLORIDE, PRESERVATIVE FREE 10 ML: 5 INJECTION INTRAVENOUS at 23:37

## 2022-01-11 RX ADMIN — ROCURONIUM BROMIDE 50 MG: 10 INJECTION INTRAVENOUS at 13:20

## 2022-01-11 RX ADMIN — FENTANYL CITRATE 50 MCG: 50 INJECTION INTRAMUSCULAR; INTRAVENOUS at 17:49

## 2022-01-11 RX ADMIN — CEFAZOLIN 2 G: 10 INJECTION, POWDER, FOR SOLUTION INTRAVENOUS at 13:30

## 2022-01-11 RX ADMIN — SODIUM CHLORIDE, POTASSIUM CHLORIDE, SODIUM LACTATE AND CALCIUM CHLORIDE: 600; 310; 30; 20 INJECTION, SOLUTION INTRAVENOUS at 17:15

## 2022-01-11 RX ADMIN — ROCURONIUM BROMIDE 20 MG: 10 INJECTION INTRAVENOUS at 16:01

## 2022-01-11 RX ADMIN — Medication 650 MG: at 23:37

## 2022-01-11 RX ADMIN — DEXAMETHASONE SODIUM PHOSPHATE 10 MG: 10 INJECTION INTRAMUSCULAR; INTRAVENOUS at 13:20

## 2022-01-11 RX ADMIN — SODIUM CHLORIDE 50 ML/HR: 9 INJECTION, SOLUTION INTRAVENOUS at 23:31

## 2022-01-11 RX ADMIN — SODIUM CHLORIDE, POTASSIUM CHLORIDE, SODIUM LACTATE AND CALCIUM CHLORIDE: 600; 310; 30; 20 INJECTION, SOLUTION INTRAVENOUS at 11:25

## 2022-01-11 RX ADMIN — SENNOSIDES AND DOCUSATE SODIUM 1 TABLET: 50; 8.6 TABLET ORAL at 23:36

## 2022-01-11 RX ADMIN — SUGAMMADEX 200 MG: 100 INJECTION, SOLUTION INTRAVENOUS at 16:59

## 2022-01-11 RX ADMIN — ONDANSETRON 4 MG: 2 INJECTION INTRAMUSCULAR; INTRAVENOUS at 18:21

## 2022-01-11 RX ADMIN — ROPIVACAINE HYDROCHLORIDE 30 ML: 5 INJECTION, SOLUTION EPIDURAL; INFILTRATION; PERINEURAL at 11:46

## 2022-01-11 RX ADMIN — ONDANSETRON 4 MG: 2 INJECTION INTRAMUSCULAR; INTRAVENOUS at 13:20

## 2022-01-11 RX ADMIN — MIDAZOLAM HYDROCHLORIDE 2 MG: 1 INJECTION, SOLUTION INTRAMUSCULAR; INTRAVENOUS at 11:33

## 2022-01-11 ASSESSMENT — PULMONARY FUNCTION TESTS
PIF_VALUE: 21
PIF_VALUE: 21
PIF_VALUE: 23
PIF_VALUE: 22
PIF_VALUE: 20
PIF_VALUE: 7
PIF_VALUE: 22
PIF_VALUE: 23
PIF_VALUE: 22
PIF_VALUE: 19
PIF_VALUE: 22
PIF_VALUE: 20
PIF_VALUE: 22
PIF_VALUE: 14
PIF_VALUE: 20
PIF_VALUE: 21
PIF_VALUE: 0
PIF_VALUE: 16
PIF_VALUE: 22
PIF_VALUE: 24
PIF_VALUE: 2
PIF_VALUE: 19
PIF_VALUE: 21
PIF_VALUE: 22
PIF_VALUE: 2
PIF_VALUE: 21
PIF_VALUE: 21
PIF_VALUE: 20
PIF_VALUE: 24
PIF_VALUE: 19
PIF_VALUE: 22
PIF_VALUE: 22
PIF_VALUE: 21
PIF_VALUE: 2
PIF_VALUE: 21
PIF_VALUE: 22
PIF_VALUE: 21
PIF_VALUE: 21
PIF_VALUE: 22
PIF_VALUE: 24
PIF_VALUE: 19
PIF_VALUE: 19
PIF_VALUE: 21
PIF_VALUE: 25
PIF_VALUE: 22
PIF_VALUE: 1
PIF_VALUE: 21
PIF_VALUE: 21
PIF_VALUE: 22
PIF_VALUE: 21
PIF_VALUE: 20
PIF_VALUE: 22
PIF_VALUE: 21
PIF_VALUE: 20
PIF_VALUE: 23
PIF_VALUE: 22
PIF_VALUE: 22
PIF_VALUE: 19
PIF_VALUE: 19
PIF_VALUE: 22
PIF_VALUE: 22
PIF_VALUE: 21
PIF_VALUE: 21
PIF_VALUE: 22
PIF_VALUE: 21
PIF_VALUE: 20
PIF_VALUE: 21
PIF_VALUE: 22
PIF_VALUE: 20
PIF_VALUE: 23
PIF_VALUE: 22
PIF_VALUE: 24
PIF_VALUE: 22
PIF_VALUE: 22
PIF_VALUE: 23
PIF_VALUE: 23
PIF_VALUE: 22
PIF_VALUE: 21
PIF_VALUE: 22
PIF_VALUE: 16
PIF_VALUE: 20
PIF_VALUE: 21
PIF_VALUE: 23
PIF_VALUE: 22
PIF_VALUE: 19
PIF_VALUE: 21
PIF_VALUE: 20
PIF_VALUE: 22
PIF_VALUE: 20
PIF_VALUE: 22
PIF_VALUE: 21
PIF_VALUE: 16
PIF_VALUE: 22
PIF_VALUE: 21
PIF_VALUE: 21
PIF_VALUE: 23
PIF_VALUE: 22
PIF_VALUE: 22
PIF_VALUE: 0
PIF_VALUE: 22
PIF_VALUE: 1
PIF_VALUE: 22
PIF_VALUE: 23
PIF_VALUE: 22
PIF_VALUE: 19
PIF_VALUE: 22
PIF_VALUE: 23
PIF_VALUE: 21
PIF_VALUE: 21
PIF_VALUE: 23
PIF_VALUE: 22
PIF_VALUE: 24
PIF_VALUE: 21
PIF_VALUE: 19
PIF_VALUE: 22
PIF_VALUE: 22
PIF_VALUE: 21
PIF_VALUE: 23
PIF_VALUE: 21
PIF_VALUE: 23
PIF_VALUE: 2
PIF_VALUE: 22
PIF_VALUE: 5
PIF_VALUE: 20
PIF_VALUE: 23
PIF_VALUE: 22
PIF_VALUE: 22
PIF_VALUE: 20
PIF_VALUE: 23
PIF_VALUE: 21
PIF_VALUE: 21
PIF_VALUE: 20
PIF_VALUE: 23
PIF_VALUE: 20
PIF_VALUE: 23
PIF_VALUE: 22
PIF_VALUE: 0
PIF_VALUE: 22
PIF_VALUE: 2
PIF_VALUE: 22
PIF_VALUE: 21
PIF_VALUE: 22
PIF_VALUE: 22
PIF_VALUE: 20
PIF_VALUE: 23
PIF_VALUE: 22
PIF_VALUE: 13
PIF_VALUE: 22
PIF_VALUE: 22
PIF_VALUE: 21
PIF_VALUE: 3
PIF_VALUE: 18
PIF_VALUE: 24
PIF_VALUE: 21
PIF_VALUE: 22
PIF_VALUE: 22
PIF_VALUE: 20
PIF_VALUE: 21
PIF_VALUE: 22
PIF_VALUE: 22
PIF_VALUE: 23
PIF_VALUE: 22
PIF_VALUE: 22
PIF_VALUE: 23
PIF_VALUE: 23
PIF_VALUE: 21
PIF_VALUE: 22
PIF_VALUE: 21
PIF_VALUE: 22
PIF_VALUE: 20
PIF_VALUE: 21
PIF_VALUE: 22
PIF_VALUE: 23
PIF_VALUE: 21
PIF_VALUE: 22
PIF_VALUE: 22
PIF_VALUE: 21
PIF_VALUE: 22
PIF_VALUE: 0
PIF_VALUE: 22
PIF_VALUE: 22
PIF_VALUE: 19
PIF_VALUE: 21
PIF_VALUE: 21
PIF_VALUE: 23
PIF_VALUE: 20
PIF_VALUE: 23
PIF_VALUE: 20
PIF_VALUE: 22
PIF_VALUE: 21
PIF_VALUE: 22
PIF_VALUE: 21
PIF_VALUE: 22
PIF_VALUE: 21
PIF_VALUE: 22
PIF_VALUE: 21
PIF_VALUE: 22
PIF_VALUE: 21
PIF_VALUE: 23
PIF_VALUE: 22
PIF_VALUE: 20
PIF_VALUE: 23
PIF_VALUE: 23
PIF_VALUE: 5
PIF_VALUE: 21
PIF_VALUE: 21
PIF_VALUE: 22
PIF_VALUE: 2
PIF_VALUE: 20
PIF_VALUE: 21
PIF_VALUE: 20
PIF_VALUE: 23
PIF_VALUE: 21
PIF_VALUE: 21
PIF_VALUE: 22
PIF_VALUE: 22
PIF_VALUE: 21
PIF_VALUE: 23
PIF_VALUE: 22

## 2022-01-11 ASSESSMENT — PAIN SCALES - GENERAL
PAINLEVEL_OUTOF10: 7
PAINLEVEL_OUTOF10: 5
PAINLEVEL_OUTOF10: 8
PAINLEVEL_OUTOF10: 5
PAINLEVEL_OUTOF10: 0
PAINLEVEL_OUTOF10: 8
PAINLEVEL_OUTOF10: 4

## 2022-01-11 NOTE — FLOWSHEET NOTE
1722-Pt arrived to PACU via cart from OR, Pt remains on cart, vital signs stable, bulky foam dressing present to right shoulder clean, dry, and intact, ultrasling in place, ice pack applied-KGW  1740- Assessment completed pt A&Ox3, denies chest pain/SOB, ultra sling present, surgical dressing clean, dry, and intact, radial pulse palpable, pt unable to wiggle fingers at this time, pt complaining of lower back pain and numbness in right foot, pt able to do push pulls and wiggle toes on right foot, decreased sensation present-KGW  1815- Dr. Abbott November aware of pt right foot being numb, no new orders received-KGW  1830- Pt restless, stating back and legs hurt, PRN medication given-KGW  1845- Pt states \" I feel like crap\", pt requesting to sit up on side of bed, this nurse and another RN sit patient on side of bed, pt states right foot is numb and she can't move it, pt unable to take steps and only can shuffle feet at this time-KGW  1900- Pt states \" I can't breath\", oxygen saturation okay, explained to patient with Supraclavicular block it can feel like its hard to breath, pt verbalizes understanding-KGW  1925- Pt  in with patient, pt and  verbalize feeling like it is unsafe for the patient to go home Josse Rivera- Dr. Janneth Rea made aware of pt condition and pt inability to ambulate, per Dr. Janneth Rea pt to be admitted with no preference of unit-KGW  2000- Pt and  updated with plan of care-KGW  Electronically signed by Jimmie Longo RN on 1/11/2022 at 8:21 PM

## 2022-01-11 NOTE — OP NOTE
Operative Note      Patient: Jamaal Enciso  YOB: 1950  MRN: [id number]    Date of Procedure: 1/11/2022    Pre-Op Diagnosis: RIGHT SHOULDER ROTATOR CUFF TEAR    Post-Op Diagnosis: Same       Procedure(s):  RIGHT SHOULDER ARTHROSCOPY ROTATOR CUFF REPAIR WITH DERMAL ALLOGRAFT, OPEN REPAIR    Surgeon(s):  Mavis Garcia MD    Assistant:   Physician Assistant: Edward Devries PA-C    Anesthesia: General    Estimated Blood Loss (mL): less than 861     Complications: None    Specimens:   * No specimens in log *    Implants:  Implant Name Type Inv. Item Serial No.  Lot No. LRB No. Used Action   ANCHOR SUTURE BIOCOMP 4.75X22 MM DBL LD WHT [employer] C  ANCHOR SUTURE BIOCOMP 7.05L01 MM DBL LD WHT [employer] [id number] Right 1 Implanted   ANCHOR SUTURE BIOCOMP 4.75X22 MM DBL LD WHT [employer] C  ANCHOR SUTURE BIOCOMP 4.75X22 MM DBL LD WHT "[employer] Pipe [id number] Right 1 Implanted   ANCHOR SUT L14.7MM DIA5. 5MM BIOCOMPOSITE FULL THRD W/ 1.3MM  ANCHOR SUT L14.7MM DIA5. 5MM BIOCOMPOSITE FULL THRD W/ 1.3MM  [employer]WD [id number] Right 2 Implanted         Drains: * No LDAs found *    Findings: Chronic retracted supraspinatus and infraspinatus tear. Biceps tenotomy performed. Dermal allograft reconstruction for rotator cuff. Detailed Description of Procedure:   Procedure:  1. Right shoulder arthroscopic rotator cuff repair with open dermal allograft reconstruction  2. Right shoulder arthroscopic biceps tenotomy  3. Right shoulder arthroscopic limited debridement glenohumeral joint (anterior superior posterior labrum with a capsulotomy)  4.   Right shoulder arthroscopic subacromial decompression    Indications: 69-year-old female injured her right shoulder resulting in a large full-thickness massive rotator cuff tear likely proceed with surgery for right shoulder rotator cuff repair with possible allograft reconstruction and indicated surgeries    Risks benefits and alternatives to surgery were discussed including but not limited to Infection, bleeding, neurovascular injury, pain and dysfunction, hardware related complications including cutout failure breakage, loss of function, motion, and permanent disability as well as the cardiovascular and pulmonary complications from anesthesia including death and DVT. Patient and family accept these risks. We discussed specifically hardware related complications including cut out, failure, breakage, incomplete repair, retear, allograft augmentation, intraoperative assistance for her biceps, loss in strength, function or motion and incomplete pain relief as well as need for additional future revision surgeries including arthroplasty    Patient identified in the preop area. Right upper extremity marked and confirmed with patient as the operative site. Brought back to the operating room and anesthetized under general anesthesia. Right upper extremity was then prepped and draped in standard sterile fashion. Patient, site and procedure were confirmed with timeout. Everyone in the room agreed. Preop antibiotics were given. Patient was given a preoperative scalene nerve block. Placed into a beachchair position with bony prominences well-padded    Initial posterior subacromial portal.  Created total for arthroscopic viewing and working portals with cannulas placed. Patient had evidence of a full-thickness rotator cuff tear involving the entire supraspinatus and the entire infraspinatus. Patient notable subdeltoid toyed/subacromial adhesions. We had cut the cuff off of the undersurface of the acromion. Identified the upper edge of the subscapularis as well as a small amount of remaining supraspinatus. The rotator cuff tear was retracted all the way to the glenoid. Through the rotator cuff a window, we identified the biceps. It was subluxed in the upper edge of subscap we cut it from a superior attachment the labrum.   It was then cut for a biceps tenotomy. .  We did a limited debridement glenohumeral joint, patient did have a moderate fraying in the labrum anterior superior and posterior. She had fairly healthy cartilage on the glenoid and some small amount of loss of superior head of the humerus. We then performed a full capsulotomy releasing under the surface adhesions to the rotator cuff as well as his inferior soft possible to center the humeral head. Then circumferentially released adhesions of the subacromial space elbow back to the scapular spine. We could plane the acromion did a subacromial decompression as she has 2 fairly large spurs approximately 6 mm which were resected. Resected as well as back to the clavicle. Due to partial CA ligament release. Overall the remaining cuff tissue was poor quality. We are able to immobilize it just to the medial aspect of the articular surface. Then proceeded with rotator cuff repair. We created a trough of bone medially and debrided a small amount of cartilage that was a loose. We then debrided the greater tuberosity to healthy bleeding surface. We placed 2 independent horizontal suture tapes as medial as possible within the cuff for intended allograft reconstruction. Placed a single fiber link stitch essentially to gain access and control of the remaining tendon. Placed the 2 separate 5.5 mm corkscrew anchors. This was done at the medial border both posterior and anterior at the repair site. We then sequentially placed all 8 limbs of suture with a scorpion needle from posterior to anterior. After all limbs were passed were then sequentially tied. We tied from anterior to posterior this reduced the cuff to the medial row and footprint. Still has small amount of gapping present and incomplete coverage of the tuberosities. Then proceeded with open allograft reconstruction of her massive cuff tear.     We extended our anterolateral portal made a skin incision through the skin subtenons fat and deltoid fascia. We incised the raphae and placed some deep retractors in the subacromial space. We released some overlying bursa which was present. On the back table where created a 4.5 cm x 4cm dermal allograft patch. This was then sequentially relayed through our sutures. First placed 2 horizontal stitches most medial and then all 8 limbs were brought out at approximate 1.5 cm laterally. We then really the graft over top of the rotator cuff to cover the defect. We sequentially tied anterior and posterior first this dramatically reduced the cuff to the medial footprint of the repair. We then tied our remaining suture anchor limbs which given additional secure fixation to the graft. We then proceeded with proceed with a transosseous technique to secure the lateral aspect of the graft. The suture limbs were then relayed through 2 separate lateral anchors. 4.75 mm vessel lock anchors were placed x2 with all 8 stitches in a crossing fashion. This dramatically reduced the cuff and allograft back to the footprint. Placed a single backup stitch to remove any dogear. Sutures were cut and the shoulder taken through full range of motion construct remained stable no evidence of failure or gapping in the rotator cuff repair. This concluded the procedure. Wounds were irrigated with normal saline. Fascia was closed deep with a #1 Vicryl, 2-0 Vicryl subcutaneous. Monocryl and nylon for skin closure. Sterile dressings applied. Patient placed in a sling and sent to the PACU in stable condition. Rachael Johnston PA-C was present throughout the entire case. Given the nature of the disease process and the procedure, a skilled surgical first assistant was necessary during the case. The assistant was necessary to hold retractors and to manipulate the extremity during the procedure.  A certified scrub tech was at the back table managing the instruments and supplies for the surgical case.    Electronically signed by Avi Fox MD on 1/11/2022 at 4:54 PM

## 2022-01-11 NOTE — ANESTHESIA POSTPROCEDURE EVALUATION
Department of Anesthesiology  Postprocedure Note    Patient: Tomás Becker  MRN: [id number]  YOB: 1950  Date of evaluation: 1/11/2022  Time:  5:25 PM     Procedure Summary     Date: 01/11/22 Room / Location: [address]    Anesthesia Start: 2214 Anesthesia Stop: 8674    Procedure: RIGHT SHOULDER ARTHROSCOPY ROTATOR CUFF REPAIR WITH DERMAL ALLOGRAFT, BICEPS TENOTOMY OPEN REPAIR (Right Shoulder) Diagnosis: (RIGHT SHOULDER ROTATOR CUFF TEAR)    Surgeons: Yuly Bob MD Responsible Provider: [address] [place] [address]    Anesthesia Type: general, regional ASA Status: 3          Anesthesia Type: general, regional    Flavio Phase I:      Flavio Phase II:      Last vitals: Reviewed and per EMR flowsheets.        Anesthesia Post Evaluation    Patient location during evaluation: PACU  Patient participation: waiting for patient participation  Level of consciousness: awake and alert and sleepy but conscious  Pain score: 1  Airway patency: patent  Nausea & Vomiting: no nausea and no vomiting  Complications: no  Cardiovascular status: hemodynamically stable  Respiratory status: acceptable and face mask  Hydration status: euvolemic  Comments: Report to RN, normal sinus rhythm  Multimodal analgesia pain management approach

## 2022-01-11 NOTE — ANESTHESIA PROCEDURE NOTES
Peripheral Block    Patient location during procedure: pre-op  Start time: 1/11/2022 11:36 AM  End time: 1/11/2022 11:46 AM  Staffing  Performed: anesthesiologist   Anesthesiologist: Diane Vasquez MD  Preanesthetic Checklist  Completed: patient identified, IV checked, site marked, risks and benefits discussed, surgical consent, monitors and equipment checked, pre-op evaluation, timeout performed, anesthesia consent given, oxygen available and patient being monitored  Peripheral Block  Patient position: supine  Prep: ChloraPrep  Patient monitoring: cardiac monitor, continuous pulse ox, frequent blood pressure checks and IV access  Block type: Brachial plexus  Laterality: right  Injection technique: single-shot  Guidance: nerve stimulator and ultrasound guided  Local infiltration: ropivacaine  Infiltration strength: 0.5 %  Dose: 30 mL  Interscalene  Provider prep: mask and sterile gloves (Sterile probe cover)  Local infiltration: ropivacaine  Needle  Needle type: combined needle/nerve stimulator   Needle gauge: 22 G  Needle length: 5 cm  Needle localization: anatomical landmarks and ultrasound guidance  Assessment  Injection assessment: negative aspiration for heme, no paresthesia on injection and local visualized surrounding nerve on ultrasound  Paresthesia pain: immediately resolved  Slow fractionated injection: yes  Hemodynamics: stable  Additional Notes  Ultrasound image printed and saved in patient chart.     Sterile probe cover used    Medications Administered  Ropivacaine (NAROPIN) injection 0.5%, 30 mL  Reason for block: post-op pain management and at surgeon's request

## 2022-01-11 NOTE — PROGRESS NOTES
Dr. Aylin Curtis viewed intra-op x-rays of right shoulder regarding incorrect suture count at closing. Surgeon acknowledged and verified no needle was displayed on images. Orders obtained to take patient to PACU before radiologist completes stat read.

## 2022-01-11 NOTE — ANESTHESIA PRE PROCEDURE
Department of Anesthesiology  Preprocedure Note       Name:  Nj Morelos   Age:  66 y.o.  :  1950                                          MRN:  [id number]         Date:  2022      Surgeon: Doris De La Cruz):  Arelis Del Valle MD    Procedure: Procedure(s):  RIGHT SHOULDER ARTHROSCOPY ROTATOR CUFF REPAIR WITH POSSIBLE DERMAL ALLOGRAFT    Medications prior to admission:   Prior to Admission medications    Medication Sig Start Date End Date Taking?  Authorizing Provider   diphenhydrAMINE-APAP, sleep, (TYLENOL PM EXTRA STRENGTH)  MG tablet Take 1 tablet by mouth nightly as needed for Sleep Pt took 0.5 tab   Yes Historical Provider, MD   carbidopa-levodopa (SINEMET)  MG per tablet 3 times daily TAKE 1 TABLET BY MOUTH TWICE A DAY 21  Yes Marcine Duverney, MD   VITAMIN D PO Take 1 tablet by mouth daily    Historical Provider, MD   Cyanocobalamin (VITAMIN B-12 PO) Take 1 tablet by mouth daily    Historical Provider, MD   MAGNESIUM OXIDE PO Take 1 tablet by mouth daily    Historical Provider, MD   spironolactone-hydroCHLOROthiazide (ALDACTAZIDE) 25-25 MG per tablet TAKE 2 TABLETS BY MOUTH EVERY DAY 21   Marcine Duverney, MD   ammonium lactate (AMLACTIN) 12 % cream  8/15/21   Historical Provider, MD   aspirin 81 MG EC tablet Take 81 mg by mouth daily    Historical Provider, MD   Multiple Vitamin (MULTI VITAMIN DAILY PO) Take 1 tablet by mouth daily    Historical Provider, MD   Turmeric 500 MG CAPS Take 1 tablet by mouth daily    Historical Provider, MD       Current medications:    Current Facility-Administered Medications   Medication Dose Route Frequency Provider Last Rate Last Admin    lactated ringers infusion   IntraVENous Continuous Flavia Carbone  mL/hr at 22 1357 Rate Change at 22 1357    sodium chloride 0.9 % irrigation    Continuous PRN Arelis Del Valle MD   1,000 mL at 22 1444     Facility-Administered Medications Ordered in Other Encounters   Medication Dose Route Frequency Provider Last Rate Last Admin    midazolam (VERSED) injection   IntraVENous PRN Kadie Sarkar MD   2 mg at 22 1133    propofol injection   IntraVENous PRN Jennifer Coronel APRN - CRNA   200 mg at 22 1320    ondansetron (ZOFRAN) injection   IntraVENous PRN Jennifer Coronel APRN - CRNA   4 mg at 22 1320    dexamethasone (DECADRON) injection   IntraVENous PRN Jennifer Coronel APRN - CRNA   10 mg at 22 1320    rocuronium (ZEMURON) injection   IntraVENous PRN Jennifer Coronel APRN - CRNA   50 mg at 22 1320       Allergies:     Allergies   Allergen Reactions    Tramadol Nausea And Vomiting    Sulfa Antibiotics Hives     hives & chills       Problem List:    Patient Active Problem List   Diagnosis Code    Osteoarthritis M19.90    HTN (hypertension) I10    Parkinsonism ([address] G20    Positive FIT (fecal immunochemical test) R19.5    Osteoarthritis of right hip M16.11    Morbidly obese ([address] E66.01    Primary osteoarthritis of right knee M17.11    Transient synovitis, right knee M67.361    Tremors of nervous system R25.1    Adenomatous polyp of ascending colon D12.2    Colitis K52.9    Rectal bleeding K62.5    Dyskinesia G24.9    Balance problem R26.89    Complete tear of right rotator cuff M75.121    Complete tear of left rotator cuff M75.122    Rotator cuff tendinitis M75.80    History of injury Z87.828    Shoulder pain M25.519    Acute deep vein thrombosis (DVT) of lower extremity (HCC) I82.409    Tear of rotator cuff M75.100       Past Medical History:        Diagnosis Date    Hypertension     meds > 10 yrs / denies TIA or stroke    Osteoarthritis     DJD right hip    Parkinson disease ([address]     Parkinsonism ([address]        Past Surgical History:        Procedure Laterality Date     SECTION  1988    COLONOSCOPY N/A 2020    COLONOSCOPY WITH POLYPECTOMY performed by Lake Murdock MD at [address] [place] [address] OF UTERUS      AUB    KNEE SURGERY Left     arthrotomy    SHOULDER ARTHROSCOPY Left 4/7/2021    LEFT SHOULDER ARTHROSCOPY ROTATOR CUFF REPAIR, SUBACROMIAL DECOMPRESSION performed by Nataly Hurd MD at [address], [address] Right 8/17/2017    RIGHT HIP TOTAL HIP ARTHROPLASTY WILLIAM MDM SPINAL  performed by Amira Hector MD at [address] EXTRACTION         Social History:    Social History     Tobacco Use    Smoking status: Never Smoker    Smokeless tobacco: Never Used   Substance Use Topics    Alcohol use: Yes     Alcohol/week: 1.0 standard drink     Types: 1 Glasses of wine per week     Comment: occasional                                Counseling given: Not Answered      Vital Signs (Current):   Vitals:    01/11/22 1058   BP: (!) 150/66   Pulse: 89   Resp: 16   Temp: 97.1 °F (36.2 °C)   TempSrc: Temporal   SpO2: 95%   Weight: 203 lb 3.2 oz (92.2 kg)   Height: 5' (1.524 m)                                              BP Readings from Last 3 Encounters:   01/11/22 (!) 150/66   01/11/22 124/71   01/03/22 121/62       NPO Status: Time of last liquid consumption: 2300                        Time of last solid consumption: 1830                        Date of last liquid consumption: 01/10/22                        Date of last solid food consumption: 01/10/22    BMI:   Wt Readings from Last 3 Encounters:   01/11/22 203 lb 3.2 oz (92.2 kg)   01/03/22 203 lb 3.2 oz (92.2 kg)   12/20/21 202 lb (91.6 kg)     Body mass index is 39.68 kg/m².     CBC:   Lab Results   Component Value Date    WBC 7.3 01/03/2022    RBC 4.58 01/03/2022    HGB 13.6 01/03/2022    HCT 41.3 01/03/2022    MCV 90.0 01/03/2022    RDW 13.7 01/03/2022     01/03/2022       CMP:   Lab Results   Component Value Date     01/03/2022    K 3.9 01/03/2022     01/03/2022    CO2 26 01/03/2022    BUN 23 01/03/2022    CREATININE 0.72 01/03/2022    GFRAA >60.0 01/03/2022    LABGLOM >60.0 01/03/2022    GLUCOSE 92 01/03/2022    PROT 7.5 01/03/2022    CALCIUM 9.8 01/03/2022    BILITOT <0.2 01/03/2022    ALKPHOS 79 01/03/2022    AST 21 01/03/2022    ALT 7 01/03/2022       POC Tests: No results for input(s): POCGLU, POCNA, POCK, POCCL, POCBUN, POCHEMO, POCHCT in the last 72 hours. Coags:   Lab Results   Component Value Date    PROTIME 14.1 01/03/2022    INR 1.1 01/03/2022    APTT 32.9 01/03/2022       HCG (If Applicable): No results found for: PREGTESTUR, PREGSERUM, HCG, HCGQUANT     ABGs: No results found for: PHART, PO2ART, HOK2YOK, ERZ6QEI, BEART, Q8IKXBVY     Type & Screen (If Applicable):  No results found for: LABABO, LABRH    Drug/Infectious Status (If Applicable):  No results found for: HIV, HEPCAB    COVID-19 Screening (If Applicable):   Lab Results   Component Value Date    COVID19 Not Detected 01/11/2022    COVID19 Not Detected 03/31/2021           Anesthesia Evaluation  Patient summary reviewed and Nursing notes reviewed no history of anesthetic complications:   Airway: Mallampati: II  TM distance: >3 FB   Neck ROM: full  Mouth opening: > = 3 FB Dental: normal exam         Pulmonary:Negative Pulmonary ROS and normal exam                               Cardiovascular:  Exercise tolerance: good (>4 METS),   (+) hypertension:,       ECG reviewed               Beta Blocker:  Not on Beta Blocker      ROS comment: Normal sinus rhythm  Incomplete right bundle branch block  Minimal voltage criteria for LVH, may be normal variant  Borderline ECG  When compared with ECG of 31-MAR-2021 09:23,  Borderline criteria for Lateral infarct are no longer present     Neuro/Psych:   (+) neuromuscular disease: Parkinson's disease,             GI/Hepatic/Renal:   (+) morbid obesity         ROS comment: BMI 40. Endo/Other:    (+) : arthritis: OA., . Pt had PAT visit. Abdominal:             Vascular: negative vascular ROS.          Other Findings:             Anesthesia Plan      general and regional     ASA 3     (ETT)  Induction: intravenous. MIPS: Postoperative opioids intended and Prophylactic antiemetics administered. Anesthetic plan and risks discussed with patient. Plan discussed with CRNA.     Attending anesthesiologist reviewed and agrees with Brooks Klein DO   1/11/2022

## 2022-01-12 ENCOUNTER — HOSPITAL ENCOUNTER (INPATIENT)
Age: 72
LOS: 3 days | Discharge: HOME | DRG: 057 | End: 2022-01-15
Attending: PHYSICAL MEDICINE & REHABILITATION | Admitting: PHYSICAL MEDICINE & REHABILITATION
Payer: MEDICARE

## 2022-01-12 ENCOUNTER — APPOINTMENT (OUTPATIENT)
Dept: GENERAL RADIOLOGY | Age: 72
DRG: 502 | End: 2022-01-12
Attending: ORTHOPAEDIC SURGERY
Payer: MEDICARE

## 2022-01-12 VITALS
TEMPERATURE: 98.6 F | BODY MASS INDEX: 39.89 KG/M2 | HEIGHT: 60 IN | SYSTOLIC BLOOD PRESSURE: 127 MMHG | WEIGHT: 203.2 LBS | DIASTOLIC BLOOD PRESSURE: 53 MMHG | OXYGEN SATURATION: 98 % | HEART RATE: 92 BPM | RESPIRATION RATE: 20 BRPM

## 2022-01-12 DIAGNOSIS — Z96.611 STATUS POST REPLACEMENT OF RIGHT SHOULDER JOINT: ICD-10-CM

## 2022-01-12 DIAGNOSIS — M15.3 OTHER SECONDARY OSTEOARTHRITIS OF MULTIPLE SITES: Primary | ICD-10-CM

## 2022-01-12 LAB
ADENOVIRUS BY PCR: NOT DETECTED
ALBUMIN SERPL-MCNC: 3.9 G/DL (ref 3.5–4.6)
ALP BLD-CCNC: 64 U/L (ref 40–130)
ALT SERPL-CCNC: <5 U/L (ref 0–33)
ANION GAP SERPL CALCULATED.3IONS-SCNC: 16 MEQ/L (ref 9–15)
AST SERPL-CCNC: 28 U/L (ref 0–35)
BASOPHILS ABSOLUTE: 0 K/UL (ref 0–0.2)
BASOPHILS RELATIVE PERCENT: 0 %
BILIRUB SERPL-MCNC: 0.3 MG/DL (ref 0.2–0.7)
BILIRUBIN URINE: NEGATIVE
BLOOD, URINE: NEGATIVE
BORDETELLA PARAPERTUSSIS BY PCR: NOT DETECTED
BORDETELLA PERTUSSIS BY PCR: NOT DETECTED
BUN BLDV-MCNC: 23 MG/DL (ref 8–23)
CALCIUM SERPL-MCNC: 9.2 MG/DL (ref 8.5–9.9)
CHLAMYDOPHILIA PNEUMONIAE BY PCR: NOT DETECTED
CHLORIDE BLD-SCNC: 103 MEQ/L (ref 95–107)
CLARITY: CLEAR
CO2: 21 MEQ/L (ref 20–31)
COLOR: YELLOW
CORONAVIRUS 229E BY PCR: NOT DETECTED
CORONAVIRUS HKU1 BY PCR: NOT DETECTED
CORONAVIRUS NL63 BY PCR: NOT DETECTED
CORONAVIRUS OC43 BY PCR: NOT DETECTED
CREAT SERPL-MCNC: 0.66 MG/DL (ref 0.5–0.9)
EOSINOPHILS ABSOLUTE: 0 K/UL (ref 0–0.7)
EOSINOPHILS RELATIVE PERCENT: 0 %
GFR AFRICAN AMERICAN: >60
GFR NON-AFRICAN AMERICAN: >60
GLOBULIN: 3 G/DL (ref 2.3–3.5)
GLUCOSE BLD-MCNC: 119 MG/DL (ref 70–99)
GLUCOSE URINE: NEGATIVE MG/DL
HCT VFR BLD CALC: 39 % (ref 37–47)
HEMOGLOBIN: 12.8 G/DL (ref 12–16)
HUMAN METAPNEUMOVIRUS BY PCR: NOT DETECTED
HUMAN RHINOVIRUS/ENTEROVIRUS BY PCR: NOT DETECTED
INFLUENZA A BY PCR: NOT DETECTED
INFLUENZA B BY PCR: NOT DETECTED
KETONES, URINE: NEGATIVE MG/DL
LEUKOCYTE ESTERASE, URINE: NEGATIVE
LYMPHOCYTES ABSOLUTE: 1.2 K/UL (ref 1–4.8)
LYMPHOCYTES RELATIVE PERCENT: 9 %
MAGNESIUM: 1.6 MG/DL (ref 1.7–2.4)
MCH RBC QN AUTO: 29.4 PG (ref 27–31.3)
MCHC RBC AUTO-ENTMCNC: 32.7 % (ref 33–37)
MCV RBC AUTO: 89.9 FL (ref 82–100)
MONOCYTES ABSOLUTE: 1 K/UL (ref 0.2–0.8)
MONOCYTES RELATIVE PERCENT: 7.7 %
MYCOPLASMA PNEUMONIAE BY PCR: NOT DETECTED
NEUTROPHILS ABSOLUTE: 11 K/UL (ref 1.4–6.5)
NEUTROPHILS RELATIVE PERCENT: 83.3 %
NITRITE, URINE: NEGATIVE
PARAINFLUENZA VIRUS 1 BY PCR: NOT DETECTED
PARAINFLUENZA VIRUS 2 BY PCR: NOT DETECTED
PARAINFLUENZA VIRUS 3 BY PCR: NOT DETECTED
PARAINFLUENZA VIRUS 4 BY PCR: NOT DETECTED
PDW BLD-RTO: 13.4 % (ref 11.5–14.5)
PH UA: 6 (ref 5–9)
PLATELET # BLD: 321 K/UL (ref 130–400)
POTASSIUM SERPL-SCNC: 3.9 MEQ/L (ref 3.4–4.9)
PROTEIN UA: NEGATIVE MG/DL
RBC # BLD: 4.34 M/UL (ref 4.2–5.4)
RESPIRATORY SYNCYTIAL VIRUS BY PCR: NOT DETECTED
SARS-COV-2, PCR: NOT DETECTED
SODIUM BLD-SCNC: 140 MEQ/L (ref 135–144)
SPECIFIC GRAVITY UA: 1.01 (ref 1–1.03)
TOTAL PROTEIN: 6.9 G/DL (ref 6.3–8)
URINE REFLEX TO CULTURE: NORMAL
UROBILINOGEN, URINE: 0.2 E.U./DL
WBC # BLD: 13.2 K/UL (ref 4.8–10.8)

## 2022-01-12 PROCEDURE — 81003 URINALYSIS AUTO W/O SCOPE: CPT

## 2022-01-12 PROCEDURE — 6370000000 HC RX 637 (ALT 250 FOR IP): Performed by: ORTHOPAEDIC SURGERY

## 2022-01-12 PROCEDURE — 80053 COMPREHEN METABOLIC PANEL: CPT

## 2022-01-12 PROCEDURE — 2700000000 HC OXYGEN THERAPY PER DAY

## 2022-01-12 PROCEDURE — 6370000000 HC RX 637 (ALT 250 FOR IP): Performed by: NURSE PRACTITIONER

## 2022-01-12 PROCEDURE — 97163 PT EVAL HIGH COMPLEX 45 MIN: CPT

## 2022-01-12 PROCEDURE — 2580000003 HC RX 258: Performed by: ORTHOPAEDIC SURGERY

## 2022-01-12 PROCEDURE — 83735 ASSAY OF MAGNESIUM: CPT

## 2022-01-12 PROCEDURE — 1210000000 HC MED SURG R&B

## 2022-01-12 PROCEDURE — 97166 OT EVAL MOD COMPLEX 45 MIN: CPT

## 2022-01-12 PROCEDURE — 97116 GAIT TRAINING THERAPY: CPT

## 2022-01-12 PROCEDURE — 85025 COMPLETE CBC W/AUTO DIFF WBC: CPT

## 2022-01-12 PROCEDURE — 71045 X-RAY EXAM CHEST 1 VIEW: CPT

## 2022-01-12 PROCEDURE — 36415 COLL VENOUS BLD VENIPUNCTURE: CPT

## 2022-01-12 PROCEDURE — 97535 SELF CARE MNGMENT TRAINING: CPT

## 2022-01-12 PROCEDURE — 0202U NFCT DS 22 TRGT SARS-COV-2: CPT

## 2022-01-12 RX ORDER — ACETAMINOPHEN 500 MG
500 TABLET ORAL NIGHTLY PRN
Status: DISCONTINUED | OUTPATIENT
Start: 2022-01-12 | End: 2022-01-15 | Stop reason: HOSPADM

## 2022-01-12 RX ORDER — MULTIVITAMIN WITH IRON
1 TABLET ORAL DAILY
Status: CANCELLED | OUTPATIENT
Start: 2022-01-12

## 2022-01-12 RX ORDER — VITAMIN B COMPLEX
1000 TABLET ORAL DAILY
Status: DISCONTINUED | OUTPATIENT
Start: 2022-01-12 | End: 2022-01-12 | Stop reason: HOSPADM

## 2022-01-12 RX ORDER — SPIRONOLACTONE AND HYDROCHLOROTHIAZIDE 25; 25 MG/1; MG/1
2 TABLET ORAL DAILY
Status: DISCONTINUED | OUTPATIENT
Start: 2022-01-12 | End: 2022-01-12 | Stop reason: CLARIF

## 2022-01-12 RX ORDER — SPIRONOLACTONE 25 MG/1
50 TABLET ORAL DAILY
Status: DISCONTINUED | OUTPATIENT
Start: 2022-01-12 | End: 2022-01-15 | Stop reason: HOSPADM

## 2022-01-12 RX ORDER — DIPHENHYDRAMINE HCL 25 MG
25 TABLET ORAL NIGHTLY PRN
Status: CANCELLED | OUTPATIENT
Start: 2022-01-12

## 2022-01-12 RX ORDER — VITAMIN B COMPLEX
1000 TABLET ORAL DAILY
Status: DISCONTINUED | OUTPATIENT
Start: 2022-01-12 | End: 2022-01-13

## 2022-01-12 RX ORDER — LANOLIN ALCOHOL/MO/W.PET/CERES
400 CREAM (GRAM) TOPICAL DAILY
Status: DISCONTINUED | OUTPATIENT
Start: 2022-01-12 | End: 2022-01-12 | Stop reason: HOSPADM

## 2022-01-12 RX ORDER — SPIRONOLACTONE 25 MG/1
50 TABLET ORAL DAILY
Status: CANCELLED | OUTPATIENT
Start: 2022-01-12

## 2022-01-12 RX ORDER — MULTIVITAMIN WITH IRON
1 TABLET ORAL DAILY
Status: DISCONTINUED | OUTPATIENT
Start: 2022-01-12 | End: 2022-01-12 | Stop reason: HOSPADM

## 2022-01-12 RX ORDER — DIPHENHYDRAMINE HCL 25 MG
25 TABLET ORAL NIGHTLY PRN
Status: DISCONTINUED | OUTPATIENT
Start: 2022-01-12 | End: 2022-01-15 | Stop reason: HOSPADM

## 2022-01-12 RX ORDER — ASPIRIN 81 MG/1
81 TABLET ORAL DAILY
Status: DISCONTINUED | OUTPATIENT
Start: 2022-01-12 | End: 2022-01-15 | Stop reason: HOSPADM

## 2022-01-12 RX ORDER — ACETAMINOPHEN 325 MG/1
650 TABLET ORAL EVERY 6 HOURS
Status: DISCONTINUED | OUTPATIENT
Start: 2022-01-12 | End: 2022-01-15 | Stop reason: HOSPADM

## 2022-01-12 RX ORDER — CHOLECALCIFEROL (VITAMIN D3) 125 MCG
1000 CAPSULE ORAL DAILY
Status: DISCONTINUED | OUTPATIENT
Start: 2022-01-12 | End: 2022-01-15 | Stop reason: HOSPADM

## 2022-01-12 RX ORDER — ACETAMINOPHEN 325 MG/1
650 TABLET ORAL EVERY 6 HOURS
Status: CANCELLED | OUTPATIENT
Start: 2022-01-12

## 2022-01-12 RX ORDER — ACETAMINOPHEN,DIPHENHYDRAMINE HCL 500; 25 MG/1; MG/1
1 TABLET, FILM COATED ORAL NIGHTLY PRN
Status: DISCONTINUED | OUTPATIENT
Start: 2022-01-12 | End: 2022-01-12 | Stop reason: CLARIF

## 2022-01-12 RX ORDER — OXYCODONE HYDROCHLORIDE 5 MG/1
5 TABLET ORAL EVERY 4 HOURS PRN
Status: DISCONTINUED | OUTPATIENT
Start: 2022-01-12 | End: 2022-01-15 | Stop reason: HOSPADM

## 2022-01-12 RX ORDER — OXYCODONE HYDROCHLORIDE 5 MG/1
5 TABLET ORAL EVERY 4 HOURS PRN
Status: CANCELLED | OUTPATIENT
Start: 2022-01-12

## 2022-01-12 RX ORDER — LANOLIN ALCOHOL/MO/W.PET/CERES
400 CREAM (GRAM) TOPICAL DAILY
Status: DISCONTINUED | OUTPATIENT
Start: 2022-01-12 | End: 2022-01-15 | Stop reason: HOSPADM

## 2022-01-12 RX ORDER — ACETAMINOPHEN 500 MG
500 TABLET ORAL NIGHTLY PRN
Status: DISCONTINUED | OUTPATIENT
Start: 2022-01-12 | End: 2022-01-12 | Stop reason: HOSPADM

## 2022-01-12 RX ORDER — MAGNESIUM HYDROXIDE/ALUMINUM HYDROXICE/SIMETHICONE 120; 1200; 1200 MG/30ML; MG/30ML; MG/30ML
15 SUSPENSION ORAL EVERY 6 HOURS PRN
Status: CANCELLED | OUTPATIENT
Start: 2022-01-12

## 2022-01-12 RX ORDER — CHOLECALCIFEROL (VITAMIN D3) 125 MCG
1000 CAPSULE ORAL DAILY
Status: CANCELLED | OUTPATIENT
Start: 2022-01-12

## 2022-01-12 RX ORDER — MAGNESIUM HYDROXIDE/ALUMINUM HYDROXICE/SIMETHICONE 120; 1200; 1200 MG/30ML; MG/30ML; MG/30ML
15 SUSPENSION ORAL EVERY 6 HOURS PRN
Status: DISCONTINUED | OUTPATIENT
Start: 2022-01-12 | End: 2022-01-15 | Stop reason: HOSPADM

## 2022-01-12 RX ORDER — DIPHENHYDRAMINE HCL 25 MG
25 TABLET ORAL NIGHTLY PRN
Status: DISCONTINUED | OUTPATIENT
Start: 2022-01-12 | End: 2022-01-12 | Stop reason: HOSPADM

## 2022-01-12 RX ORDER — SENNA AND DOCUSATE SODIUM 50; 8.6 MG/1; MG/1
1 TABLET, FILM COATED ORAL 2 TIMES DAILY
Status: CANCELLED | OUTPATIENT
Start: 2022-01-12

## 2022-01-12 RX ORDER — MULTIVITAMIN WITH IRON
1 TABLET ORAL DAILY
Status: DISCONTINUED | OUTPATIENT
Start: 2022-01-12 | End: 2022-01-15 | Stop reason: HOSPADM

## 2022-01-12 RX ORDER — SPIRONOLACTONE 25 MG/1
50 TABLET ORAL DAILY
Status: DISCONTINUED | OUTPATIENT
Start: 2022-01-12 | End: 2022-01-12 | Stop reason: HOSPADM

## 2022-01-12 RX ORDER — ASPIRIN 81 MG/1
81 TABLET ORAL DAILY
Status: DISCONTINUED | OUTPATIENT
Start: 2022-01-12 | End: 2022-01-12 | Stop reason: HOSPADM

## 2022-01-12 RX ORDER — HYDROCHLOROTHIAZIDE 25 MG/1
50 TABLET ORAL DAILY
Status: DISCONTINUED | OUTPATIENT
Start: 2022-01-12 | End: 2022-01-12 | Stop reason: HOSPADM

## 2022-01-12 RX ORDER — HYDROCHLOROTHIAZIDE 25 MG/1
50 TABLET ORAL DAILY
Status: DISCONTINUED | OUTPATIENT
Start: 2022-01-12 | End: 2022-01-15 | Stop reason: HOSPADM

## 2022-01-12 RX ORDER — HYDROCHLOROTHIAZIDE 25 MG/1
50 TABLET ORAL DAILY
Status: CANCELLED | OUTPATIENT
Start: 2022-01-12

## 2022-01-12 RX ORDER — SENNA AND DOCUSATE SODIUM 50; 8.6 MG/1; MG/1
1 TABLET, FILM COATED ORAL 2 TIMES DAILY
Status: DISCONTINUED | OUTPATIENT
Start: 2022-01-12 | End: 2022-01-15 | Stop reason: HOSPADM

## 2022-01-12 RX ORDER — VITAMIN B COMPLEX
1000 TABLET ORAL DAILY
Status: CANCELLED | OUTPATIENT
Start: 2022-01-12

## 2022-01-12 RX ORDER — LANOLIN ALCOHOL/MO/W.PET/CERES
400 CREAM (GRAM) TOPICAL DAILY
Status: CANCELLED | OUTPATIENT
Start: 2022-01-12

## 2022-01-12 RX ORDER — ACETAMINOPHEN 500 MG
500 TABLET ORAL NIGHTLY PRN
Status: CANCELLED | OUTPATIENT
Start: 2022-01-12

## 2022-01-12 RX ORDER — ASPIRIN 81 MG/1
81 TABLET ORAL DAILY
Status: CANCELLED | OUTPATIENT
Start: 2022-01-12

## 2022-01-12 RX ORDER — CHOLECALCIFEROL (VITAMIN D3) 125 MCG
1000 CAPSULE ORAL DAILY
Status: DISCONTINUED | OUTPATIENT
Start: 2022-01-12 | End: 2022-01-12 | Stop reason: HOSPADM

## 2022-01-12 RX ADMIN — OXYCODONE HYDROCHLORIDE 5 MG: 5 TABLET ORAL at 21:47

## 2022-01-12 RX ADMIN — ASPIRIN 81 MG: 81 TABLET, COATED ORAL at 21:50

## 2022-01-12 RX ADMIN — CYANOCOBALAMIN TAB 500 MCG 1000 MCG: 500 TAB at 07:53

## 2022-01-12 RX ADMIN — CARBIDOPA AND LEVODOPA 1 TABLET: 25; 100 TABLET ORAL at 07:53

## 2022-01-12 RX ADMIN — Medication 650 MG: at 10:40

## 2022-01-12 RX ADMIN — SPIRONOLACTONE 50 MG: 25 TABLET ORAL at 07:53

## 2022-01-12 RX ADMIN — Medication 650 MG: at 21:47

## 2022-01-12 RX ADMIN — CARBIDOPA AND LEVODOPA 1 TABLET: 25; 100 TABLET ORAL at 21:47

## 2022-01-12 RX ADMIN — OXYCODONE 5 MG: 5 TABLET ORAL at 07:53

## 2022-01-12 RX ADMIN — Medication 650 MG: at 04:25

## 2022-01-12 RX ADMIN — CARBIDOPA AND LEVODOPA 1 TABLET: 25; 100 TABLET ORAL at 00:29

## 2022-01-12 RX ADMIN — THERA TABS 1 TABLET: TAB at 07:53

## 2022-01-12 RX ADMIN — SENNOSIDES AND DOCUSATE SODIUM 1 TABLET: 50; 8.6 TABLET ORAL at 07:53

## 2022-01-12 RX ADMIN — Medication 650 MG: at 15:22

## 2022-01-12 RX ADMIN — SENNOSIDES, DOCUSATE SODIUM 1 TABLET: 8.6; 5 TABLET ORAL at 21:48

## 2022-01-12 RX ADMIN — Medication 400 MG: at 07:53

## 2022-01-12 RX ADMIN — OXYCODONE 5 MG: 5 TABLET ORAL at 17:30

## 2022-01-12 RX ADMIN — Medication 1000 UNITS: at 07:53

## 2022-01-12 RX ADMIN — ASPIRIN 81 MG: 81 TABLET, COATED ORAL at 07:53

## 2022-01-12 RX ADMIN — OXYCODONE 5 MG: 5 TABLET ORAL at 12:52

## 2022-01-12 RX ADMIN — HYDROCHLOROTHIAZIDE 50 MG: 25 TABLET ORAL at 07:52

## 2022-01-12 RX ADMIN — OXYCODONE 5 MG: 5 TABLET ORAL at 03:38

## 2022-01-12 RX ADMIN — SODIUM CHLORIDE, PRESERVATIVE FREE 10 ML: 5 INJECTION INTRAVENOUS at 08:00

## 2022-01-12 ASSESSMENT — ENCOUNTER SYMPTOMS
SHORTNESS OF BREATH: 0
PHOTOPHOBIA: 0
WHEEZING: 0
NAUSEA: 0
RHINORRHEA: 0
SORE THROAT: 0
BACK PAIN: 0
ALLERGIC/IMMUNOLOGIC NEGATIVE: 1
VOMITING: 0
EYES NEGATIVE: 1
ABDOMINAL PAIN: 0

## 2022-01-12 ASSESSMENT — PAIN SCALES - GENERAL
PAINLEVEL_OUTOF10: 3
PAINLEVEL_OUTOF10: 6
PAINLEVEL_OUTOF10: 7
PAINLEVEL_OUTOF10: 2
PAINLEVEL_OUTOF10: 2
PAINLEVEL_OUTOF10: 6
PAINLEVEL_OUTOF10: 6
PAINLEVEL_OUTOF10: 4
PAINLEVEL_OUTOF10: 8
PAINLEVEL_OUTOF10: 7
PAINLEVEL_OUTOF10: 6
PAINLEVEL_OUTOF10: 7
PAINLEVEL_OUTOF10: 6
PAINLEVEL_OUTOF10: 9
PAINLEVEL_OUTOF10: 7

## 2022-01-12 ASSESSMENT — PAIN DESCRIPTION - ORIENTATION
ORIENTATION: RIGHT

## 2022-01-12 ASSESSMENT — PAIN DESCRIPTION - ONSET: ONSET: PROGRESSIVE

## 2022-01-12 ASSESSMENT — PAIN DESCRIPTION - DESCRIPTORS
DESCRIPTORS: THROBBING;ACHING
DESCRIPTORS: DISCOMFORT
DESCRIPTORS: ACHING

## 2022-01-12 ASSESSMENT — PAIN DESCRIPTION - PROGRESSION: CLINICAL_PROGRESSION: GRADUALLY IMPROVING

## 2022-01-12 ASSESSMENT — PAIN DESCRIPTION - LOCATION
LOCATION: SHOULDER

## 2022-01-12 ASSESSMENT — PAIN DESCRIPTION - FREQUENCY
FREQUENCY: CONTINUOUS
FREQUENCY: CONTINUOUS
FREQUENCY: INTERMITTENT

## 2022-01-12 ASSESSMENT — PAIN DESCRIPTION - PAIN TYPE
TYPE: SURGICAL PAIN

## 2022-01-12 ASSESSMENT — PAIN - FUNCTIONAL ASSESSMENT: PAIN_FUNCTIONAL_ASSESSMENT: PREVENTS OR INTERFERES WITH ALL ACTIVE AND SOME PASSIVE ACTIVITIES

## 2022-01-12 NOTE — PROGRESS NOTES
Breath sounds clear to anterior auscultation. SAO2 95%, occassional moist non productiove cough noted. Pt states left hip and left lower back pain \"are both better\" at \"1\" on pain scale. Right arm \"is numb\" ans \"pain free,\" Right foot \"still feels numb\" equal and strong pedal pushes noted.

## 2022-01-12 NOTE — CARE COORDINATION
Inpatient Rehab referral received. Met with patient and explained [address] [facility] Acute Inpatient Rehab program, all questions answered. Freedom of choice provided and patient requests admit to [facility] as 1st choice and [facility] 2nd. Patient states she was on the Unit in the past after a hip procedure. PM&R consult pending. Electronically signed by Ahsan Billingsley RN on 1/12/22 at 10:21 AM EST    158PM- Call received from [facility], patient is now requesting [facility] as 1st choice after working with PT this afternoon. 356pm-Case reviewed with PM&R Dr Neelam Burgos, patient accepted to Acute Rehab and OK to come today. Patient can transfer to room 246. Covid screening not needed as rapid was negative on 1/11/22. [facility] RN aware of room assignment. Patient and  notified and in agreement with plan.   Electronically signed by Ahsan Billingsley RN on 1/12/22 at 4:16 PM EST

## 2022-01-12 NOTE — PROGRESS NOTES
Patient voided 150 cc dark yellow urine on bedpan, patient cleaned and resonsitioned onto cart. SAO2 98% on 3L NC. Right shoulder dressing clean and dry, ice to right shoulder , right fingers warm with brisk capillary refill noted. Sling on. Pt states \"left hip and lower back pain is easing very slightly\".

## 2022-01-12 NOTE — PROGRESS NOTES
Occupational Therapy  [facility] OCCUPATIONAL THERAPY EVALUATION - ACUTE     NAME: Nj Morelos  : 1950 (17 y.o.)  MRN: [id number]  CODE STATUS: Full Code  Room: A916/I151-22    Date of Service: 2022    Patient Diagnosis(es): S/P arthroscopy of right shoulder [Z98.890]   No chief complaint on file.     Patient Active Problem List    Diagnosis Date Noted    S/P arthroscopy of right shoulder 2022    Acute deep vein thrombosis (DVT) of lower extremity ([address] 2021    Tear of rotator cuff 2021    Rotator cuff tendinitis 2021    History of injury 2021    Shoulder pain 2021    Complete tear of right rotator cuff 2021    Complete tear of left rotator cuff 2021    Dyskinesia 2021    Balance problem 2021    Adenomatous polyp of ascending colon     Colitis     Rectal bleeding     Tremors of nervous system 2020    Morbidly obese ([address] 02/10/2020    Osteoarthritis of right hip 08/10/2017    Positive FIT (fecal immunochemical test) 2017    Osteoarthritis     HTN (hypertension)     Parkinsonism ([address]     Primary osteoarthritis of right knee 2016    Transient synovitis, right knee 2016        Past Medical History:   Diagnosis Date    Hypertension     meds > 10 yrs / denies TIA or stroke    Osteoarthritis     DJD right hip    Parkinson disease ([address]     Parkinsonism ([address]      Past Surgical History:   Procedure Laterality Date     SECTION  1988    COLONOSCOPY N/A 2020    COLONOSCOPY WITH POLYPECTOMY performed by Eleanor Lesches, MD at [address] Left     arthrotomy    SHOULDER ARTHROSCOPY Left 2021    LEFT SHOULDER ARTHROSCOPY ROTATOR CUFF REPAIR, SUBACROMIAL DECOMPRESSION performed by Arelis Del Valle MD at [address] ARTHROSCOPY Right 2022    RIGHT SHOULDER ARTHROSCOPY ROTATOR CUFF REPAIR WITH DERMAL ALLOGRAFT, BICEPS TENOTOMY OPEN REPAIR performed by Kamila Johnston MD at [address], [address] Right 8/17/2017    RIGHT HIP TOTAL HIP ARTHROPLASTY WILLIAM MDM SPINAL  performed by Kalyn Post MD at [address] [facility] EXTRACTION          Restrictions  Restrictions/Precautions: Weight Bearing,Fall Risk  Upper Extremity Weight Bearing Restrictions  Right Upper Extremity Weight Bearing: Non Weight Bearing (s/p rotator cuff repair arthroscopy)     Safety Devices: Safety Devices  Safety Devices in place: Yes  Type of devices:  All fall risk precautions in place   Initially in place: No    Subjective  Pre Treatment Pain Screening  Pain at present: 6  Scale Used: Numeric Score  Intervention List: Patient able to continue with treatment    Pain Reassessment:   Pain Assessment  Patient Currently in Pain: Yes  Pain Assessment: 0-10  Pain Level: 6  Pain Type: Surgical pain  Pain Location: Shoulder  Pain Orientation: Right  Pain Descriptors: Discomfort  Pain Frequency: Continuous       Prior Level of Function:  Social/Functional History  Lives With: Spouse  Type of Home: House  Home Layout: Two level,1/2 bath on main level,Able to Live on Main level with bedroom/bathroom (will be sleeping in recliner upon discharge)  Home Access: Stairs to enter without rails  Entrance Stairs - Number of Steps: 2  Bathroom Shower/Tub: Tub/Shower unit  Bathroom Equipment: Shower chair,Grab bars in [employer] Home Equipment: [employer]  ADL Assistance: Needs assistance  Homemaking Assistance: Needs assistance  Homemaking Responsibilities: No  Ambulation Assistance: Independent  Transfer Assistance: Independent  Active : No  Occupation: Retired  Type of occupation:   Leisure & Hobbies: reading, watching TV    OBJECTIVE:     Orientation Status:  Orientation  Overall Orientation Status: Within Functional Limits    Observation:  Observation/Palpation  Observation: resting tremors, pleasant, shoulder immobilizer to the R shoulder, agreeable to OT evaluation, on 2L O2    Cognition Status:  Cognition  Overall Cognitive Status: Exceptions  Arousal/Alertness: Appropriate responses to stimuli  Following Commands: Follows one step commands with repetition  Attention Span: Appears intact  Memory: Appears intact  Safety Judgement: Good awareness of safety precautions  Problem Solving: Assistance required to correct errors made,Assistance required to generate solutions,Assistance required to identify errors made,Assistance required to implement solutions  Insights: Fully aware of deficits  Initiation: Requires cues for some  Sequencing: Requires cues for some    Perception Status:  Perception  Overall Perceptual Status: WFL    Sensation Status:  Sensation  Overall Sensation Status: Impaired  Additional Comments: R foot numbness since sx    Vision and Hearing Status:  Vision  Vision: Impaired  Vision Exceptions: Wears glasses at all times  Hearing  Hearing: Within functional limits     ROM:   LUE AROM (degrees)  LUE AROM : Exceptions  LUE General AROM: limited shoulder flexion to ~85º  Left Hand AROM (degrees)  Left Hand AROM: WFL  RUE AROM (degrees)  RUE AROM : Exceptions  RUE General AROM: NT d/t shoulder sx  Right Hand AROM (degrees)  Right Hand AROM: WFL    Strength:  LUE Strength  Gross LUE Strength: Exceptions to WFL  L Hand General: 4-/5  LUE Strength Comment: 3+/5 all planes  RUE Strength  RUE Strength Comment: NT d/t shoulder sx    Coordination, Tone, Quality of Movement:    Tone RUE  RUE Tone: Normotonic  Tone LUE  LUE Tone: Normotonic  Coordination  Movements Are Fluid And Coordinated: No  Coordination and Movement description: Fine motor impairments,Decreased accuracy,Right UE,Left UE,Resting tremors,Decreased speed    Hand Dominance:  Hand Dominance  Hand Dominance: Right    ADL Status:  ADL  Feeding: Dependent/Total  Grooming: Minimal assistance,Increased time to complete  UE Bathing: Minimal assistance,Increased time to complete  LE Bathing: Dependent/Total  UE Dressing: Maximum assistance  LE Dressing: Dependent/Total  Toileting: Dependent/Total  Additional Comments: Simulated all ADLs as above  Toilet Transfers  Toilet Transfer: Unable to assess  Toilet Transfers Comments: Anticipate Dependent       Therapy key for assistance levels -   Independent = Pt. is able to perform task with no assistance but may require a device   Stand by assistance = Pt. does not perform task at an independent level but does not need physical assistance, requires verbal cues  Minimal, Moderate, Maximal Assistance = Pt. requires physical assistance (25%, 50%, 75% assist from helper) for task but is able to actively participate in task   Dependent = Pt. requires total assistance with task and is not able to actively participate with task completion     Functional Mobility:  Functional Mobility  Functional - Mobility Device:  (HHA)  Activity: Other  Assist Level: Dependent/Total  Functional Mobility Comments: x2  Transfers  Sit to stand: Dependent/Total  Stand to sit: Dependent/Total  Transfer Comments: x2    Bed Mobility  Bed mobility  Supine to Sit: Dependent/Total  Sit to Supine: Unable to assess  Comment: Pt up to chair    Seated and Standing Balance:  Balance  Sitting Balance: Supervision  Standing Balance: Dependent/Total (x2)    Functional Endurance:  Activity Tolerance  Activity Tolerance: Patient Tolerated treatment well    D/C Recommendations:  OT D/C RECOMMENDATIONS  REQUIRES OT FOLLOW UP: Yes    Equipment Recommendations:  OT Equipment Recommendations  Other: continue to assess    OT Education:   OT Education  OT Education: OT Role,Plan of Care    OT Follow Up:  OT D/C RECOMMENDATIONS  REQUIRES OT FOLLOW UP: Yes       Assessment/Discharge Disposition:  Assessment: Pt is a 67 y/o female from home with spouse who presents to [facility] with the above deficits s/p shoulder arthroscopy.  Pt would benefit from OT services to maximize independence and safety during ADLs and IADLs. Performance deficits / Impairments: Decreased functional mobility ,Decreased strength,Decreased endurance,Decreased ADL status,Decreased cognition,Decreased balance,Decreased fine motor control,Decreased posture,Decreased coordination  Discharge Recommendations: Continue to assess pending progress  Decision Making: Medium Complexity  History: Pt's medcial history is moderatley complex  Exam: 9 performance deficits  Assistance / Modification: Dependent    Six Click Score   How much help for putting on and taking off regular lower body clothing?: Total  How much help for Bathing?: Total  How much help for Toileting?: Total  How much help for putting on and taking off regular upper body clothing?: A Lot  How much help for taking care of personal grooming?: A Little  How much help for eating meals?: Total  AM-PAC Inpatient Daily Activity Raw Score: 9  AM-PAC Inpatient ADL T-Scale Score : 25.33  ADL Inpatient CMS 0-100% Score: 79.59    Plan:  Plan  Times per week: 1-3x  Plan weeks: length of acute stay  Current Treatment Recommendations: Strengthening,Functional Mobility Training,Self-Care / ADL,Neuromuscular Re-education,Endurance Training,Cognitive/Perceptual Training,Patient/Caregiver Education & Training,ROM    Goals:   Patient will:    - Improve functional endurance to tolerate/complete 30 mins of ADL's  - Be Min A in UB ADLs   - Be Mod A in LB ADLs  - Be Mod A in ADL transfers without LOB  - Be Mod A in toileting tasks  - Improve B hand fine motor coordination to [facility] in order to manage clothing fasteners/self-care containers in a timely manner  - Improve L UE strength and endurance to [facility] in order to participate in self-care activities as projected. - Access appropriate D/C site with as few architectural barriers as possible.   - Sequence self-care tasks with no verbal cues    Patient Goal: Patient goals : \"to go home\"     Discussed and agreed

## 2022-01-12 NOTE — CONSULTS
Consult Note    Reason for Consult:   Medical management    Requesting Physician:  Iram Stockton MD    HISTORY OF PRESENT ILLNESS:    Patient admitted for right shoulder rotator cuff repair. We were consulted for medical management of patient's comorbidities. Patient has been able to tolerate p.o. intake but states that she feels chilled and has a mild cough. Patient also reports numbness in her right arm and right shoulder. However, patient is status postsurgery on the right shoulder. Otherwise, patient denies any nausea or vomiting. Patient reports that she takes medications for hypertension and Parkinson's. Past Medical History:   Diagnosis Date    Hypertension     meds > 10 yrs / denies TIA or stroke    Osteoarthritis     DJD right hip    [facility]     Parkinsonism ([facility]        Past Surgical History:   Procedure Laterality Date     SECTION  1988    COLONOSCOPY N/A 2020    COLONOSCOPY WITH POLYPECTOMY performed by Isauro Gary MD at [address] Left     arthrotomy    SHOULDER ARTHROSCOPY Left 2021    LEFT SHOULDER ARTHROSCOPY ROTATOR CUFF REPAIR, SUBACROMIAL DECOMPRESSION performed by Iram Stockton MD at [address], [address] Right 2017    RIGHT HIP TOTAL HIP ARTHROPLASTY WILLIAM MDM SPINAL  performed by Danny Hannah MD at [facility]         Prior to Admission medications    Medication Sig Start Date End Date Taking?  Authorizing Provider   diphenhydrAMINE-APAP, sleep, (TYLENOL PM EXTRA STRENGTH)  MG tablet Take 1 tablet by mouth nightly as needed for Sleep Pt took 0.5 tab   Yes Historical Provider, MD   carbidopa-levodopa (SINEMET)  MG per tablet 3 times daily TAKE 1 TABLET BY MOUTH TWICE A DAY 21  Yes John Best MD   VITAMIN D PO Take 1 tablet by mouth daily    Historical Provider, MD Cyanocobalamin (VITAMIN B-12 PO) Take 1 tablet by mouth daily    Historical Provider, MD   MAGNESIUM OXIDE PO Take 1 tablet by mouth daily    Historical Provider, MD   spironolactone-hydroCHLOROthiazide (ALDACTAZIDE) 25-25 MG per tablet TAKE 2 TABLETS BY MOUTH EVERY DAY 11/30/21   Kasey Beyer MD   ammonium lactate (AMLACTIN) 12 % cream  8/15/21   Historical Provider, MD   aspirin 81 MG EC tablet Take 81 mg by mouth daily    Historical Provider, MD   Multiple Vitamin (MULTI VITAMIN DAILY PO) Take 1 tablet by mouth daily    Historical Provider, MD   Turmeric 500 MG CAPS Take 1 tablet by mouth daily    Historical Provider, MD       Scheduled Meds:   aspirin  81 mg Oral Daily    carbidopa-levodopa  1 tablet Oral BID    Vitamin D  1,000 Units Oral Daily    multivitamin  1 tablet Oral Daily    magnesium oxide  400 mg Oral Daily    vitamin B-12  1,000 mcg Oral Daily    spironolactone  50 mg Oral Daily    And    hydroCHLOROthiazide  50 mg Oral Daily    sodium chloride flush  10 mL IntraVENous 2 times per day    sodium chloride flush  10 mL IntraVENous 2 times per day    acetaminophen  650 mg Oral Q6H    sennosides-docusate sodium  1 tablet Oral BID     Continuous Infusions:   lactated ringers Stopped (01/11/22 2100)    sodium chloride      sodium chloride 50 mL/hr (01/11/22 2331)    sodium chloride       PRN Meds:acetaminophen **AND** diphenhydrAMINE, sodium chloride flush, sodium chloride, oxyCODONE, morphine **OR** morphine, sodium chloride flush, sodium chloride, oxyCODONE, aluminum & magnesium hydroxide-simethicone, morphine **OR** morphine, ondansetron    Allergies   Allergen Reactions    Tramadol Nausea And Vomiting    Sulfa Antibiotics Hives     hives & chills       Social History     Socioeconomic History    Marital status:      Spouse name: Not on file    Number of children: Not on file    Years of education: Not on file    Highest education level: Not on file   Occupational History  Not on file   Tobacco Use    Smoking status: Never Smoker    Smokeless tobacco: Never Used   Vaping Use    Vaping Use: Never used   Substance and Sexual Activity    Alcohol use: Yes     Alcohol/week: 1.0 standard drink     Types: 1 Glasses of wine per week     Comment: occasional    Drug use: Never    Sexual activity: Not on file   Other Topics Concern    Not on file   Social History Narrative    Not on file     Social Determinants of Health     Financial Resource Strain: Low Risk     Difficulty of Paying Living Expenses: Not hard at all   Food Insecurity: No Food Insecurity    Worried About Running Out of Food in the Last Year: Never true    [address] in the Last Year: Never true   Transportation Needs: No Transportation Needs    Lack of Transportation (Medical): No    Lack of Transportation (Non-Medical):  No   Physical Activity:     Days of Exercise per Week: Not on file    Minutes of Exercise per Session: Not on file   Stress:     Feeling of Stress : Not on file   Social Connections:     Frequency of Communication with Friends and Family: Not on file    Frequency of Social Gatherings with Friends and Family: Not on file    Attends [employer] Services: Not on file    Active Member of [address] [place] [address] [employer] or Organizations: Not on file    Attends Club or Organization Meetings: Not on file    Marital Status: Not on file   Intimate Partner Violence:     Fear of Current or Ex-Partner: Not on file    Emotionally Abused: Not on file    Physically Abused: Not on file    Sexually Abused: Not on file   Housing Stability:     Unable to Pay for Housing in the Last Year: Not on file    Number of [place] in the Last Year: Not on file    Unstable Housing in the Last Year: Not on file       Family History   Problem Relation Age of Onset    High Blood Pressure Mother     Stroke Mother     Emphysema Father     Other Sister         fibromyalgia    Stroke Brother     Heart Disease Brother     Stroke Sister         brain aneurysm at age 43    Other Brother         drowning accident at age 39       Review Of Systems:   Review of Systems   Constitutional: Positive for chills. Negative for appetite change, fatigue, fever and unexpected weight change. HENT: Negative for congestion, rhinorrhea and sore throat. Eyes: Negative. Negative for photophobia and visual disturbance. Respiratory: Negative for shortness of breath and wheezing. Cardiovascular: Negative for chest pain. Gastrointestinal: Negative for abdominal pain, nausea and vomiting. Endocrine: Negative. Negative for polydipsia, polyphagia and polyuria. Genitourinary: Negative for difficulty urinating, dysuria and pelvic pain. Musculoskeletal: Positive for arthralgias. Negative for back pain. Skin: Negative. Negative for rash. Allergic/Immunologic: Negative. Neurological: Negative. Negative for dizziness, speech difficulty and weakness. Hematological: Negative. Psychiatric/Behavioral: Negative. Negative for behavioral problems and confusion. ROS as noted in HPI, 12 point ROS reviewed and otherwise negative. Physical Exam:  Vitals:    01/11/22 2130 01/11/22 2151 01/11/22 2208 01/12/22 0151   BP:  (!) 144/73  118/61   Pulse: 99 103  93   Resp: 14 22  18   Temp:  99.3 °F (37.4 °C) 99.3 °F (37.4 °C) 97.9 °F (36.6 °C)   TempSrc:  Oral  Oral   SpO2: 96% 97% 94% 97%   Weight:       Height:           Physical Exam  Vitals and nursing note reviewed. Constitutional:       General: She is not in acute distress. Appearance: Normal appearance. She is not ill-appearing. HENT:      Head: Normocephalic and atraumatic. Mouth/Throat:      Mouth: Mucous membranes are moist.   Eyes:      Pupils: Pupils are equal, round, and reactive to light. Cardiovascular:      Rate and Rhythm: Normal rate and regular rhythm. Pulses: Normal pulses. Heart sounds: Normal heart sounds.    Pulmonary:      Effort: Pulmonary effort is normal. No respiratory distress. Breath sounds: Normal breath sounds. No wheezing, rhonchi or rales. Abdominal:      General: Abdomen is flat. Bowel sounds are normal.      Tenderness: There is no abdominal tenderness. Musculoskeletal:      Comments: Right shoulder immobilized. Skin:     General: Skin is warm and dry. Capillary Refill: Capillary refill takes less than 2 seconds. Neurological:      General: No focal deficit present. Mental Status: She is alert and oriented to person, place, and time. Psychiatric:         Mood and Affect: Mood normal.          Labs:  Recent Results (from the past 72 hour(s))   COVID-19, Rapid    Collection Time: 01/11/22 10:51 AM    Specimen: Nasopharyngeal Swab; Nasal swab   Result Value Ref Range    SARS-CoV-2, NAAT Not Detected Not Detected       Data:    No results found. Assessment/Plan:  S/P arthroscopy of right shoulder: Status post surgery  Being managed by orthopedics. Low-grade fever: Temperature 99.3 with heart rate 110. Patient complains of mild cough. Lung sounds clear. SPO2 96%. We will get CBC with diff, UA, and respiratory panel. We will get chest x-ray. Will medicate with Tylenol for fever and pain. Active Problems:  HTN: Patient on home meds to control. Will resume home meds  Parkinson's: Patient on home meds to control. Will resume home meds      Electronically signed by RUDY Meyer CNP on 1/12/22 at 3:09 AM ORTIZ Rodrigues MD - supervising physician

## 2022-01-12 NOTE — PROGRESS NOTES
Physical Therapy Med Surg Daily Treatment Note  Facility/Department: [facility]  Room: N221/N221-     NAME: Stacey Rebollar  : 1950 (89 y.o.)  MRN: [id number]  CODE STATUS: Full Code    Date of Service: 2022    Patient Diagnosis(es): S/P arthroscopy of right shoulder [Z98.890]   No chief complaint on file.     Patient Active Problem List    Diagnosis Date Noted    S/P arthroscopy of right shoulder 2022    Acute deep vein thrombosis (DVT) of lower extremity ([address] 2021    Tear of rotator cuff 2021    Rotator cuff tendinitis 2021    History of injury 2021    Shoulder pain 2021    Complete tear of right rotator cuff 2021    Complete tear of left rotator cuff 2021    Dyskinesia 2021    Balance problem 2021    Adenomatous polyp of ascending colon     Colitis     Rectal bleeding     Tremors of nervous system 2020    Morbidly obese ([address] 02/10/2020    Osteoarthritis of right hip 08/10/2017    Positive FIT (fecal immunochemical test) 2017    Osteoarthritis     HTN (hypertension)     Parkinsonism ([address]     Primary osteoarthritis of right knee 2016    Transient synovitis, right knee 2016      Past Medical History:   Diagnosis Date    Hypertension     meds > 10 yrs / denies TIA or stroke    Osteoarthritis     DJD right hip    Parkinson disease ([address]     Parkinsonism ([address]      Past Surgical History:   Procedure Laterality Date     SECTION  1988    COLONOSCOPY N/A 2020    COLONOSCOPY WITH POLYPECTOMY performed by Selina Rodriguez MD at [address] Left     arthrotomy    SHOULDER ARTHROSCOPY Left 2021    LEFT SHOULDER ARTHROSCOPY ROTATOR CUFF REPAIR, SUBACROMIAL DECOMPRESSION performed by Asya Mueller MD at [address] ARTHROSCOPY Right 2022    RIGHT SHOULDER ARTHROSCOPY ROTATOR CUFF REPAIR WITH DERMAL ALLOGRAFT, BICEPS TENOTOMY OPEN REPAIR performed by Kamila Johnston MD at [address], [address] Right 8/17/2017    RIGHT HIP TOTAL HIP ARTHROPLASTY WILLIAM MDM SPINAL  performed by Kalyn Post MD at [facility]       Restrictions  Restrictions/Precautions: Weight Bearing; Fall Risk  Upper Extremity Weight Bearing Restrictions  Right Upper Extremity Weight Bearing: Non Weight Bearing (s/p rotator cuff repair arthroscopy)    SUBJECTIVE   General  Chart Reviewed: Yes  Response To Previous Treatment: Patient with no complaints from previous session. Family / Caregiver Present: Yes (spouse)  Subjective  Subjective: Pt reports R foot tingling unchanged.  Pt reports fear of falling  General Comment  Comments: Pt resting in bed, agreeable to PT eval    Pre-Session Pain Report  Pre Treatment Pain Screening  Pain at present: 6  Scale Used: Numeric Score  Intervention List: Patient able to continue with treatment;Patient declined any intervention  Pain Screening  Patient Currently in Pain: Yes  Pre Treatment Pain Screening  Pain at present: 6  Scale Used: Numeric Score  Intervention List: Patient able to continue with treatment;Patient declined any intervention    Post-Session Pain Report  Pain Assessment  Pain Assessment: 0-10  Pain Level: 6  Pain Type: Surgical pain  Pain Location: Shoulder  Pain Orientation: Right  Pain Descriptors: Aching  Pain Frequency: Continuous  Pain Onset: Progressive  Clinical Progression: Gradually improving  Functional Pain Assessment: Prevents or interferes with all active and some passive activities  Non-Pharmaceutical Pain Intervention(s): Ambulation/Increased Activity;Repositioned     OBJECTIVE   Orientation  Overall Orientation Status: Within Functional Limits  Orientation Level: Oriented X4    Bed mobility  Supine to Sit: 2 Person assistance;Maximum assistance  Sit to Supine: Unable to assess (pt up to bedside chair to promote OOB activity)  Comment: TC and VC for sequenicng LE to EOB, [place] used to pivot pt to EOB with use of LUE to pull to assist with 2 person assist with trunk    Transfers  Sit to Stand: Minimal Assistance;2 Person Assistance  Stand to sit: 2 Person Assistance;Minimal Assistance  Bed to Chair: 2 Person Assistance; Moderate assistance  Stand Pivot Transfers: 2 Person Assistance; Moderate Assistance  Comment: multiple trials bed<>BSC, BSC<>bedside chair, L hand held, gait belt donned, VC and lifting assist with VC for LE placement and anterior wt shift, assist to correct posterior lean    Ambulation  Ambulation?: Yes  Ambulation 1  Surface: level tile  Device: Hand-Held Assist  Other Apparatus: Left  Assistance: 2 Person assistance; Moderate assistance (progressing to Min A)  Gait Deviations: Slow Lexy; Increased CAMPOS; Decreased step length;Decreased step height;Decreased head and trunk rotation; Shuffles  Distance: 6ft x 2  Comments: Pt reports fear of falling. Ed and cues of sequencing prior to mobility and during mobility. Unsteady with posterior lean progressing to standing wt shifts prior to initiating gait. Shuffling steps with 2 person for safety with gait belt donned.  Increased time for performance    Stairs/Curb  Stairs?: No (unsafe to attempt at this time)            Balance  Sitting - Static: Fair;+  Sitting - Dynamic: Fair;+  Standing - Static: Fair- requires Min A with 1 UE support   Standing - Dynamic: Fair  Exercises  Comments: Pt instructed in AP and LAQ x 10 reps 5x daily   AROM RLE (degrees)  RLE AROM: WFL  AROM LLE (degrees)  LLE AROM : WFL  AROM RUE (degrees)  RUE General AROM: not formally assessed s/p RTC repair per ortho/Dr. Tarah Valerio protocol                Activity Tolerance  Activity Tolerance: Patient Tolerated treatment well    PT Education  PT Education: Goals;PT Role;Plan of Care;General Safety;Home Exercise Program;Weight-bearing Education     ASSESSMENT   Body structures, Functions, Activity limitations: Decreased functional mobility ; Decreased balance;Decreased ROM; Decreased strength;Decreased sensation; Increased pain;Decreased posture;Decreased coordination  Assessment: Pt continues to require 2 person assist for safe transfers and gait. Pt able to progress to ambulation with handheld assist 6 ft. Ongoing physical therapy recommended to decrease fall risk, improve indep with all functional mobility and faciliate safe d/c home. Specific instructions for Next Treatment: Transfers, pregait/gait, NMR  Prognosis: Good  Barriers to Learning: none  REQUIRES PT FOLLOW UP: Yes     Discharge Recommendations:  Continue to assess pending progress,Patient would benefit from continued therapy after discharge    Goals  Long term goals  Long term goal 1: Pt will perform bed mobility with Mod A  Long term goal 2: Pt will perform functional transfers with SBA  Long term goal 3: Pt will amb 50ft with LRAD vs no AD CGA  Long term goal 4: Pt will navigate 2 steps with no hand rail and Min A to enter/exit home  Long term goal 5: Pt will perform HEP with SBA to improve LE strength, balance and functional activity tolerance  Patient Goals   Patient goals : \"to get stronger and be able to go home\"    PLAN    Times per week: 5-7  Times per day: Daily  Specific instructions for Next Treatment: Transfers, pregait/gait, NMR  Plan Comment: 2nd treatment session on 1/12/2022 per the request of Dr. Leland Flores Devices  Type of devices: Call light within reach,Chair alarm in place,Left in chair,Nurse notified     [facility] (6 CLICK) [address] Mobility Raw Score : 9   Therapy Time   Individual   Time In 1307   Time Out 1337   Minutes [address], [place], 01/12/22 at 1:56 PM     Definitions for assistance levels  Independent = pt does not require any physical supervision or assistance from another person for activity completion. Device may be needed.   Stand by assistance = pt requires verbal cues or instructions from another person, close to but not touching, to perform the activity  Minimal assistance= pt performs 75% or more of the activity; assistance is required to complete the activity  Moderate assistance= pt performs 50% of the activity; assistance is required to complete the activity  Maximal assistance = pt performs 25% of the activity; assistance is required to complete the activity  Dependent = pt requires total physical assistance to accomplish the task

## 2022-01-12 NOTE — DISCHARGE INSTR - COC
Continuity of Care Form    Patient Name: Milena Ahumada   :  1950  MRN:  [id number]    Admit date:  2022  Discharge date:  2022    Code Status Order: Full Code   Advance Directives:   Advance Care Flowsheet Documentation       Date/Time Healthcare Directive Type of Healthcare Directive Copy in [address] Agent's Name Healthcare Agent's Phone Number    22 1101 No, patient does not have an advance directive for healthcare treatment -- -- -- -- --            Admitting Physician:  Kamila Johnston MD  PCP: Trisha Mccollum MD    Discharging Nurse: CAROLYN Delavan Unit/Room#: A671/O184-74  Discharging Unit Phone Number: [phone]    Emergency Contact:   Extended Emergency Contact Information  Primary Emergency Contact: Zach Ingram  Address: [address] [place] [address] Phone: [phone]  Work Phone: [phone]  Mobile Phone: [phone]  Relation: Spouse    Past Surgical History:  Past Surgical History:   Procedure Laterality Date     SECTION  1988    COLONOSCOPY N/A 2020    COLONOSCOPY WITH POLYPECTOMY performed by Lake Murdock MD at [address] Left     arthrotomy    SHOULDER ARTHROSCOPY Left 2021    LEFT SHOULDER ARTHROSCOPY ROTATOR CUFF REPAIR, SUBACROMIAL DECOMPRESSION performed by Kamila Johnston MD at [address] ARTHROSCOPY Right 2022    RIGHT SHOULDER ARTHROSCOPY ROTATOR CUFF REPAIR WITH DERMAL ALLOGRAFT, BICEPS TENOTOMY OPEN REPAIR performed by Kamila Johnston MD at [facility] Right 2017    RIGHT HIP TOTAL HIP ARTHROPLASTY WILLIAM MDM SPINAL  performed by Kalyn Post MD at [address] EXTRACTION         Immunization History:   Immunization History   Administered Date(s) Administered    COVID-19, Sonia Rahman, Primary or Immunocompromised, PF, 100mcg/0.5mL 02/10/2021, 03/09/2021    Influenza, High Dose (Fluzone 65 yrs and older) 09/17/2018    Influenza, Quadv, adjuvanted, 65 yrs +, IM, PF (Fluad) 10/19/2020    Pneumococcal Conjugate 13-valent ([id number]) 07/31/2017    Pneumococcal Polysaccharide ([id number]) 09/17/2018       Active Problems:  Patient Active Problem List   Diagnosis Code    Osteoarthritis M19.90    HTN (hypertension) I10    Parkinsonism ([address] G20    Positive FIT (fecal immunochemical test) R19.5    Osteoarthritis of right hip M16.11    Morbidly obese (HCC) E66.01    Primary osteoarthritis of right knee M17.11    Transient synovitis, right knee M67.361    Tremors of nervous system R25.1    Adenomatous polyp of ascending colon D12.2    Colitis K52.9    Rectal bleeding K62.5    Dyskinesia G24.9    Balance problem R26.89    Complete tear of right rotator cuff M75.121    Complete tear of left rotator cuff M75.122    Rotator cuff tendinitis M75.80    History of injury Z87.828    Shoulder pain M25.519    Acute deep vein thrombosis (DVT) of lower extremity ([facility]) I82.409    Tear of rotator cuff M75.100    S/P arthroscopy of right shoulder Z98.890       Isolation/Infection:   Isolation            No Isolation          Patient Infection Status       Infection Onset Added Last Indicated Last Indicated By Review Planned Expiration Resolved Resolved By    None active    Resolved    COVID-19 (Rule Out) 01/12/22 01/12/22 01/12/22 Respiratory Panel, Molecular, with COVID-19 (Restricted: peds pts or suitable admitted adults) (Ordered)   01/12/22 Rule-Out Test Resulted            Nurse Assessment:  Last Vital Signs: /68   Pulse 110   Temp 99.3 °F (37.4 °C)   Resp 20   Ht 5' (1.524 m)   Wt 203 lb 3.2 oz (92.2 kg)   LMP 08/10/2007   SpO2 96%   BMI 39.68 kg/m²     Last documented pain score (0-10 scale): Pain Level: 6  Last Weight:   Wt Readings from Last 1 Encounters:   01/11/22 203 lb 3.2 oz (92.2 kg)     Mental Status: oriented, alert, coherent, logical, thought processes intact, and able to concentrate and follow conversation    IV Access:  - Peripheral IV - site  20 left forearm, insertion date: 1/11/2022    Nursing Mobility/ADLs:  Walking   Assisted  Transfer  Assisted  Bathing  Assisted  Dressing  Assisted  Toileting  Assisted  Feeding  [facility] Delivery   whole    Wound Care Documentation and Therapy:        Elimination:  Continence: Bowel: Yes  Bladder: Yes  Urinary Catheter: None   Colostomy/Ileostomy/Ileal Conduit: No       Date of Last BM: 1/12/2022    Intake/Output Summary (Last 24 hours) at 1/12/2022 1011  Last data filed at 1/11/2022 2101  Gross per 24 hour   Intake 2000 ml   Output 150 ml   Net 1850 ml     I/O last 3 completed shifts: In: 2000 [I.V.:2000]  Out: 150 [Urine:150]    Safety Concerns: At Risk for Falls    Impairments/Disabilities:      Impaired mobility    Nutrition Therapy:  Current Nutrition Therapy:   - Oral Diet:  General    Routes of Feeding: Oral  Liquids: Thin Liquids  Daily Fluid Restriction: no  Last Modified Barium Swallow with Video (Video Swallowing Test): not done    Treatments at the Time of Hospital Discharge:   Respiratory Treatments: incentive spirometer  Oxygen Therapy:  2L NC  Ventilator:    - No ventilator support    Rehab Therapies: Physical Therapy and Occupational Therapy  Weight Bearing Status/Restrictions: Non-weight bearing right arm  Other Medical Equipment (for information only, NOT a DME order):  ultrasling to right arm, bedside commode  Other Treatments: ICE    Patient's personal belongings (please select all that are sent with patient):   All personal belongings sent with patient    RN SIGNATURE:  Electronically signed by Travis Brandon RN on 1/12/22 at 5:37 PM EST    CASE MANAGEMENT/SOCIAL WORK SECTION    Inpatient Status Date:     Readmission Risk Assessment Score:  Readmission Risk              Risk of Unplanned Readmission:  0 Discharging to Facility/ Agency   Name:   Address:  Phone:  Fax:    Dialysis Facility (if applicable)   Name:  Address:  Dialysis Schedule:  Phone:  Fax:    / signature: Electronically signed by CHRISTINA Willis on 1/12/22 at 10:13 AM EST    PHYSICIAN SECTION    Prognosis: {Prognosis:0632332171}    Condition at Discharge: [address] Patient Condition:[id number]}    Rehab Potential (if transferring to Rehab): {Prognosis:5957866383}    Recommended Labs or Other Treatments After Discharge: ***    Physician Certification: I certify the above information and transfer of Chidi Vickers  is necessary for the continuing treatment of the diagnosis listed and that she requires {Admit to Appropriate Level of Care:37489} for {GREATER/LESS:967234406} 30 days.      Update Admission H&P: {CHP DME Changes in MYFYI:199012563}    PHYSICIAN SIGNATURE:  {Esignature:[id number]}

## 2022-01-12 NOTE — DISCHARGE SUMMARY
Discharge summary  This patient Danyell Tomas was admitted to the hospital on 1/12/2022  after undergoing  without complications that morning. During the postoperative period,while in hospital, patient was medically managed by the hospitalist. Please see medial notes and H&P for patients additional diagnoses. Ortho agrees with all medical diagnoses and treatments while patient in hospital.  No significant or unexpected findings or abnormal ortho imaging were noted during the hospital stay    Hospital course  Patient tolerated surgical procedure well and there was no complications. Patient progressed adequtly through their recovery during hospital stay including PT and rehabilitation. DVT prophylaxis was implemented POD#1  Patient was then D/C on  to Rehab  in stable condition. Patient was instructed on the use of pain medications, the signs and symptoms of infection, and was given our number to call should they have any questions or concerns following discharge. Patient NWB to operative extremity   Dressing may be removed pod2   Patient to follow up with surgeon in two weeks     EXAMINATION: XR SHOULDER RIGHT 1 VW       CLINICAL HISTORY:  INCORRECT COUNT        COMPARISONS: None available.        Technique: Single AP       RIGHT SHOULDER X-RAY FINDINGS:    There are no lytic or sclerotic bone lesions.  The humeral head is located. There is no fracture or subluxation.       The visualized portions of the lung and chest wall are within normal limits.  There are no radiopaque foreign bodies. There is air in the subcutaneous soft tissues.          Impression   There are no acute osseous injuries. There are no radiopaque foreign bodies.

## 2022-01-12 NOTE — CARE COORDINATION
[facility] Pre-Admission Screening Document      Patient Name: Richard Moore       MRN: [id number]    : 1950    Age: 66 y.o. Gender: female   Payor: Payor: MEDICARE / Plan: MEDICARE PART A AND B / Product Type: *No Product type* /   MSSP: Yes    Admitted from: [facility] Floor: 2N  Attending Care Provider: Paulo Santana MD  Inpatient Rehab Referring Care Provider: Paulo Santana MD  Primary Care Provider: Kelsey Stark MD  Inpatient Treatment Team including Consults: Treatment Team: Attending Provider: Paulo Santana MD; Surgeon: Paulo Santana MD; Consulting Physician: Darnell Mueller DO; Registered Nurse: Paul Batres RN; : Linh Frias RN; : Spencer Schwartz RN; Tech: Saundra Paniagua    Reason for Hospitalization: No diagnosis found. No chief complaint on file. Isolation:No active isolations    Hospital Course:  Admit Date: 2022 10:35 AM  Inpatient Rehab Referral Date: 22  Narrative of hospital course/history of present illness: 66 yr old female with PM Parkinson's presented to hospital for scheduled OR 22: RIGHT SHOULDER ARTHROSCOPY ROTATOR CUFF REPAIR WITH DERMAL ALLOGRAFT, OPEN REPAIR. No surgical complications noted. ENDY COSME in sling.      Medical & Surgical History/Current Comorbidities:  Past Medical History:   Diagnosis Date    Hypertension     meds > 10 yrs / denies TIA or stroke    Osteoarthritis     DJD right hip    Parkinson disease ([facility]     Parkinsonism ([facility] [address]      Past Surgical History:   Procedure Laterality Date     SECTION  1988    COLONOSCOPY N/A 2020    COLONOSCOPY WITH POLYPECTOMY performed by Pamela Villafana MD at [address] Left     arthrotomy    SHOULDER ARTHROSCOPY Left 2021    LEFT SHOULDER ARTHROSCOPY ROTATOR CUFF REPAIR, SUBACROMIAL DECOMPRESSION performed by Rene Ewing Louis Jacobs MD at [address] ARTHROSCOPY Right 1/11/2022    RIGHT SHOULDER ARTHROSCOPY ROTATOR CUFF REPAIR WITH DERMAL ALLOGRAFT, BICEPS TENOTOMY OPEN REPAIR performed by Paulo Santana MD at [address] [facility] Right 8/17/2017    RIGHT HIP TOTAL HIP ARTHROPLASTY WILLIAM MDM SPINAL  performed by Gagan Reddy MD at [address] EXTRACTION         Advanced Directives:  Advance Directives     Power of [address] [place] [address] Will ACP-Advance Directive ACP-Power of     Not on File Not on File Not on File Not on File          Labs/Infection Control:  Recent Labs     01/12/22  0525   WBC 13.2*   HGB 12.8   HCT 39.0      BUN 23   CREATININE 0.66   GLUCOSE 119*      K 3.9   CALCIUM 9.2   PROT 6.9      Blood cultures:  No results for input(s): BC in the last 72 hours. Urinalysis/C&S:  Recent Labs     01/12/22  0713   LEUKOCYTESUR Negative   BLOODU Negative   GLUCOSEU Negative   KETUA Negative       Radiology:  XR SHOULDER RIGHT 1 VW  Result Date: 1/11/2022  There are no acute osseous injuries. There are no radiopaque foreign bodies. XR CHEST PORTABLE  Result Date: 1/12/2022  NO ACUTE CARDIOPULMONARY DISEASE. POSSIBLE BILATERAL ROTATOR CUFF INSTABILITY. Medications/IV's:  The patient is currently on aspirin for DVT prophylaxis.      Scheduled:    aspirin, 81 mg, Oral, Daily    carbidopa-levodopa, 1 tablet, Oral, BID    Vitamin D, 1,000 Units, Oral, Daily    multivitamin, 1 tablet, Oral, Daily    magnesium oxide, 400 mg, Oral, Daily    vitamin B-12, 1,000 mcg, Oral, Daily    spironolactone, 50 mg, Oral, Daily **AND** hydroCHLOROthiazide, 50 mg, Oral, Daily    sodium chloride flush, 10 mL, IntraVENous, 2 times per day    sodium chloride flush, 10 mL, IntraVENous, 2 times per day    acetaminophen, 650 mg, Oral, Q6H    sennosides-docusate sodium, 1 tablet, Oral, BID    PRN:  acetaminophen **AND** diphenhydrAMINE, sodium chloride flush, sodium chloride, oxyCODONE, morphine **OR** morphine, sodium chloride flush, sodium chloride, oxyCODONE, aluminum & magnesium hydroxide-simethicone, morphine **OR** morphine, ondansetron    Allergies: Allergies   Allergen Reactions    Tramadol Nausea And Vomiting    Sulfa Antibiotics Hives     hives & chills         Most Recent Vitals, Height and Weight  BP (!) 127/53   Pulse 92   Temp 98.6 °F (37 °C) (Oral)   Resp 20   Ht 5' (1.524 m)   Wt 203 lb 3.2 oz (92.2 kg)   LMP 08/10/2007   SpO2 98%   BMI 39.68 kg/m²     Weight Bearing Restrictions:    Upper Extremity Weight Bearing Restrictions  Right Upper Extremity Weight Bearing: Non Weight Bearing (s/p rotator cuff repair arthroscopy)     Current Diet Order: ADULT DIET;  Regular    Skin: surgical incision right shoulder  Wound Care Documentation: monitor surgical incision          Lungs:   Respiratory  Respiratory Pattern: Regular  Respiratory Depth: Normal  Respiratory Quality/Effort: Unlabored  Chest Assessment: Chest expansion symmetrical  L Breath Sounds: Clear  R Breath Sounds: Clear      Cognition and Behavior:  Language Preference (if other than English):      Alertness/Behavior  Neuro (WDL): Exceptions to WDL  Level of Consciousness: Alert (0)  History of Falling: No      History of Falling: No        Prior Level of Function and Living Arrangements:  Social/Functional History  Lives With: Spouse  Type of Home: House  Home Layout: Two level,1/2 bath on main level,Able to Live on Main level with bedroom/bathroom (will be sleeping in recliner upon discharge)  Home Access: Stairs to enter without rails  Entrance Stairs - Number of Steps: 2  Bathroom Shower/Tub: Tub/Shower unit  Bathroom Equipment: Shower chair,Grab bars in [employer] Home Equipment: Cane  ADL Assistance: Needs assistance  Homemaking Assistance: Needs assistance  Homemaking Responsibilities: No  Ambulation Assistance: Independent  Transfer Assistance: Independent  Active : No  Occupation: Retired  Type of occupation:   Leisure & Hobbies: reading, watching TV  Current Residence: Private Residence  Living Arrangements: Spouse/Significant Other  Milton Gomez: Radha Toledo Todd Members,Spouse/Significant Other  Current Services Prior To Admission: None  Type of Home Care Services: PT,OT  Dental Appliances:  (permanent retainer)  Vision - Corrective Lenses: Glasses  Hearing Aid: None  Personal Equipment:   Dental Appliances:  (permanent retainer)  Vision - Corrective Lenses: Glasses  Hearing Aid: None    CURRENT FUNCTIONAL LEVEL:  Physical Therapy  Bed mobility:  Supine to Sit: 2 Person assistance;Maximum assistance (01/12/22 1347)  Sit to Supine: Unable to assess (pt up to bedside chair to promote OOB activity) (01/12/22 0958)  Transfers:  Sit to Stand: Minimal Assistance;2 Person Assistance (01/12/22 1348)  Bed to Chair: 2 Person Assistance; Moderate assistance (01/12/22 1348)  Gait:   Device: Hand-Held Assist (01/12/22 1349)  Other Apparatus: Left (01/12/22 1349)  Assistance: 2 Person assistance; Moderate assistance (progressing to Min A) (01/12/22 1349)  Distance: 6ft x 2 (01/12/22 1349)  Comments: Pt reports fear of falling. Ed and cues of sequencing prior to mobility and during mobility. Unsteady with posterior lean progressing to standing wt shifts prior to initiating gait. Shuffling steps with 2 person for safety with gait belt donned. Increased time for performance (01/12/22 1349)  Stairs: NT     W/C mobility: NT       Occupational Therapy  Hand Dominance: Right  ADL  Feeding: Dependent/Total (01/12/22 0945)  Grooming: Minimal assistance; Increased time to complete (01/12/22 0945)  UE Bathing: Minimal assistance; Increased time to complete (01/12/22 0945)  LE Bathing: Dependent/Total (01/12/22 0945)  UE Dressing: Maximum assistance (01/12/22 0945)  LE Dressing: Dependent/Total (01/12/22 0945)  Toileting: Dependent/Total (01/12/22 0945)  Additional Comments: Simulated all ADLs as above (01/12/22 0945)  Toilet Transfers  Toilet Transfer: Unable to assess (01/12/22 0948)  Toilet Transfers Comments: Anticipate Dependent (01/12/22 0948)     Shower Transfers  Shower Transfers: Not tested (01/12/22 0948)    Speech Language Pathology n/a      Current Conditions Requiring Inpatient Rehabilitation  Bowel/Bladder Dysfunction: Yes  Intervention Required = Frequent toileting  Risk for Medical/Clinical Complications = moderate  Skin Healing/Breakdown Risk: Yes  Intervention Required = Side to side turns, Surgical incision and Monitor for S/S of infection  Risk for Medical/Clinical Complications = high  Nutrition/Hydration Deficiency: Yes  Intervention Required = Monitor I&Os  Risk for Medical/Clinical Complications = moderate  Medical Comorbidities: Yes  Intervention Required = DVT risk and PD, OA, HTN  Risk for Medical/Clinical Complications = high    Rehab/Skilled Needs:   3 hours of Intensive Acute Rehab therapy daily, 5 days/week for a total of 900 minutes  PT Treatment Time:  1.5 hrs/day  OT Treatment Time: 1.5 hrs/day  Rehabilitation Nursing   Case management/Social work  Cultural needs:   Values / Beliefs  Do you have any ethnic, cultural, sacramental, or spiritual Shinto needs you would like us to be aware of while you are in the hospital?: No   Funding needs:   Potential Assistance Purchasing Medications: No     Expected Level of Improvement with Rehab  Assist for ADL Contact Guard / [facility] [address] for Transfers Supervision / Standby Assist  Assist for Gait Supervision / Standby Assist    Patient's willingness to participate: Yes  Patient's ability to tolerate proposed care: Yes  Patient/Family Goals of Rehab (in patient's/family's own words): \"to get stronger and be able to go home\"    Anticipated Discharge Plan:  Home with   [address] [place] [address] Avi De Gaulle, RN PT OT Aide to be determined      Barriers to Discharge:  Home entry accessibility  Multi-level home  Equipment needs      Rehab evaluation plan:   Rehabilitation Impairment Group Code: 3.2  Rehab Impairment Group: Neurologic: PD exacerbation   Estimated Length of Stay (days): 16  Rehab Diagnosis: Impaired mobility and ADL's due to Parkinson's exacerbation s/p Right shoulder arthroscopy. Reviewer's Signature: Electronically signed by Spencer Schwartz RN on 1/12/22 at 4:17 PM EST    I have reviewed and concur with the above Preadmission Screening.    Rehab Admitting Doctor: Dr. Popeye Paulson MD

## 2022-01-12 NOTE — PROGRESS NOTES
Physical Therapy Med Surg Initial Assessment  Facility/Department: [facility] NEURO  Room: N221/N221-     NAME: Milena Ahumada  : 1950 (20 y.o.)  MRN: [id number]  CODE STATUS: Full Code    Date of Service: 2022    Patient Diagnosis(es): S/P arthroscopy of right shoulder [Z98.890]   No chief complaint on file.     Patient Active Problem List    Diagnosis Date Noted    S/P arthroscopy of right shoulder 2022    Acute deep vein thrombosis (DVT) of lower extremity ([address] 2021    Tear of rotator cuff 2021    Rotator cuff tendinitis 2021    History of injury 2021    Shoulder pain 2021    Complete tear of right rotator cuff 2021    Complete tear of left rotator cuff 2021    Dyskinesia 2021    Balance problem 2021    Adenomatous polyp of ascending colon     Colitis     Rectal bleeding     Tremors of nervous system 2020    Morbidly obese ([address] 02/10/2020    Osteoarthritis of right hip 08/10/2017    Positive FIT (fecal immunochemical test) 2017    Osteoarthritis     HTN (hypertension)     Parkinsonism ([address]     Primary osteoarthritis of right knee 2016    Transient synovitis, right knee 2016      Past Medical History:   Diagnosis Date    Hypertension     meds > 10 yrs / denies TIA or stroke    Osteoarthritis     DJD right hip    Parkinson disease ([address]     Parkinsonism ([address]      Past Surgical History:   Procedure Laterality Date     SECTION  1988    COLONOSCOPY N/A 2020    COLONOSCOPY WITH POLYPECTOMY performed by Lake Murdock MD at [address] Left     arthrotomy    SHOULDER ARTHROSCOPY Left 2021    LEFT SHOULDER ARTHROSCOPY ROTATOR CUFF REPAIR, SUBACROMIAL DECOMPRESSION performed by Kamila Johnston MD at [address] ARTHROSCOPY Right 2022    RIGHT SHOULDER ARTHROSCOPY ROTATOR CUFF REPAIR WITH Impaired  Vision Exceptions: Wears glasses at all times  Hearing: Within functional limits    Cognition:  Overall Orientation Status: Within Functional Limits  Orientation Level: Oriented X4  Follows Commands: Within Functional Limits    Observation/Palpation  Posture: Fair (forward head, rounded shoulders, posterior lean)  Observation: pleasant, cooperative, no acute distress noted, trialed RA, SPO2 94%, R shoulder in immobilizer    ROM:  RLE AROM: WFL  LLE AROM : WFL  RUE General AROM: not formally assessed s/p RTC repair per ortho/Dr. Munguia Laser protocol    Strength:  Strength RLE  R Hip Flexion: 3+/5  R Knee Flexion: 3+/5  R Knee Extension: 4-/5  R Ankle Dorsiflexion: 4/5  R Ankle Plantar flexion: 4/5  Strength LLE  L Hip Flexion: 3+/5  L Knee Flexion: 3+/5  L Knee Extension: 4-/5  L Ankle Dorsiflexion: 4-/5  L Ankle Plantar Flexion: 4-/5  Strength RUE  Comment: impaired, not formally assessed, post op RTC repair  Strength LUE  Comment: impaired, defer assessment to OT    Neuro:  Balance  Sitting - Static: Fair;+  Sitting - Dynamic: Fair;+  Standing - Static: Fair (x 4 trials with 2 person Mod A progressing to 2 person min A for safety, retropulsive)  Standing - Dynamic: Fair     Motor Control  Gross Motor?: Exceptions  Comments: bradykinesia, rigid, difficulty with initiation of motor plan and effective motor sequencing  Sensation  Overall Sensation Status: Impaired  Additional Comments: R foot numbness since sx    Bed mobility  Supine to Sit: Dependent/Total;2 Person assistance  Sit to Supine: Unable to assess (pt up to bedside chair to promote OOB activity)  Comment: TC and assist with sequening LEs and trunk, significant difficulty noted, assisted with [place] to pivot pt to EOB and OT assisting with trunk to come up to sitting EOB    Transfers  Sit to Stand: 2 Person Assistance; Moderate Assistance  Stand to sit: 2 Person Assistance; Moderate Assistance  Bed to Chair: 2 Person Assistance; Moderate assistance  Comment: x 4 trials, VC to foot placement and B feet blocked, retropulsive with poor anterior wt shifting, use of bed behind legs to leverage self to come to standing    Ambulation  Ambulation?: Yes  Ambulation 1  Other Apparatus:  (R shoulder immobilizer-adjusted for improved fit prior to standing)  Gait Deviations: Slow Lexy;Decreased step length;Decreased step height; Increased CAMPOS  Distance: 2ft  Comments: pregait, lateral wt shifting prior to initiating gait to bedside chair      Activity Tolerance  Activity Tolerance: Patient Tolerated treatment well     PT Education  PT Education: Goals;PT Role;Plan of Care;General Safety;Home Exercise Program;Weight-bearing Education    ASSESSMENT:   Body structures, Functions, Activity limitations: Decreased functional mobility ; Decreased balance;Decreased ROM; Decreased strength;Decreased sensation; Increased pain;Decreased posture;Decreased coordination  Decision Making: High Complexity  History: high  Exam: high  Clinical Presentation: high    Specific instructions for Next Treatment: Transfers, pregait/gait, NMR  Prognosis: Good  Barriers to Learning: none    DISCHARGE RECOMMENDATIONS:  Discharge Recommendations: Continue to assess pending progress,Patient would benefit from continued therapy after discharge  Assessment: Pt is a73 y/o female with hx of parkinson's disease s/p arthroscopic right  RTC repair. Pt c/o of R foot and lower leg tingling/numbness since coming out of surgery and worsening low back pain. Pt demonstrates the above deficits and decline in functional mobility status placing them at increased risk for falls. Pt requires 2 person assist to stand and ambulate short distance to bedside chair. Pt would benefit from physical therapy to address above deficits and allow for safe return home at highest level of function, decrease risk for falls, and improve QOL.     REQUIRES PT FOLLOW UP: Yes      PLAN OF CARE:  Plan  Times per week: 5-7  Times per day: Daily  Specific instructions for Next Treatment: Transfers, pregait/gait, NMR  Current Treatment Recommendations: Katie Jaime Re-education,Patient/Caregiver Education & Training,Equipment Evaluation, Education, & procurement,Modalities,Home Exercise Program,Gait Training,Balance Training,Functional Mobility Training,Stair training,Safety Education & Training,Positioning,ROM  Safety Devices  Type of devices: Call light within reach,Chair alarm in place,Left in chair,Nurse notified    Goals:  Patient goals : \"to get stronger and be able to go home\"  Long term goals  Long term goal 1: Pt will perform bed mobility with Mod A  Long term goal 2: Pt will perform functional transfers with SBA  Long term goal 3: Pt will amb 50ft with LRAD vs no AD CGA  Long term goal 4: Pt will navigate 2 steps with no hand rail and Min A to enter/exit home  Long term goal 5: Pt will perform HEP with SBA to improve LE strength, balance and functional activity tolerance    [facility] (6 CLICK) BASIC MOBILITY  AM-PAC Inpatient Mobility Raw Score : 9     Therapy Time:   Individual   Time In 0913   Time Out 0939   Minutes 26       bedmob/transfers 9 min    Goran Lopez PT, 01/12/22 at 10:18 AM         Definitions for assistance levels  Independent = pt does not require any physical supervision or assistance from another person for activity completion. Device may be needed.   Stand by assistance = pt requires verbal cues or instructions from another person, close to but not touching, to perform the activity  Minimal assistance= pt performs 75% or more of the activity; assistance is required to complete the activity  Moderate assistance= pt performs 50% of the activity; assistance is required to complete the activity  Maximal assistance = pt performs 25% of the activity; assistance is required to complete the activity  Dependent = pt requires total physical assistance to accomplish the

## 2022-01-12 NOTE — PROGRESS NOTES
Resting quietly without distress. SAO2 95% on 3L NC. Right shoulder dressing clean and dry, ice on . Sling on. Right fingers warm and pink.  Right radial palpable 2+

## 2022-01-12 NOTE — CARE COORDINATION
CHRISTINA along with the care team met with the pt to discuss her DC needs. Pt had a surgery yesterday for her shoulder repair and today she cannot move well enough to return home safely. Pt and spouse are agreeable to rehab or SNF.  [facility] is their first choice.   CHRISTINA spoke with Santo Villar at [facility] and she agreed to look at the referral but they are not taking admissions at this time

## 2022-01-12 NOTE — FLOWSHEET NOTE
Shift assessment complete. VS obtained. Patient medicated per EMAR orders. Patient up to chair at this time. States she is still experiencing some numbness and tingling in her leg and foot. Strong right radial pulse. Sling in place. Tremor baseline. Hx: Parkinsons. Denies chest pain, shortness of breath, and nausea. Therapy recommending placement. Will continue with plan of care.  Electronically signed by Micha Stevesn RN on 1/12/2022 at 10:09 AM

## 2022-01-12 NOTE — PROGRESS NOTES
DAILY PROGRESS NOTE      POD: 1 right shoulder massive rotator cuff repair with allograft reconstruction    Patient doing well. Patient is complaining of some moderate aching pain and soreness in her right foot. She is able to weight-bear but has little bit of numbness and tingling. She was placed on the 23-hour observation/inpatient evaluation given her inability to ambulate postoperatively. Patient's been seen evaluate his morning by physical therapy. She states she is feeling a little bit better but still has some subjective feelings of weakness in the right foot  Vitals:    22 1156   BP: (!) 127/53   Pulse: 92   Resp: 20   Temp: 98.6 °F (37 °C)   SpO2: 98%      Temp (24hrs), Av.7 °F (34.8 °C), Min:94.1 °F (34.5 °C), Max:99.3 °F (37.4 °C)       Pain Control Good  No chest pain or shortness of breath. No calf pain  Exam:   Incision(s): no drainage  Dressing clean, dry, and intact  Operative extremity shows neuro vascular status intact. Flexion and extension intact on operative extremity  Calves soft and non-tender without evidence of DVT. .      Labs reviewed:  CBC:   Recent Labs     22  0525   WBC 13.2*   HGB 12.8        BMP:    Recent Labs     22  0525      K 3.9      CO2 21   BUN 23   CREATININE 0.66   GLUCOSE 119*       I&O  I/O last 3 completed shifts: In:  [I.V.:]  Out: 150 [Urine:150]      Assessment:  Good Post Operative Course. Plan: Right shoulder massive rotator cuff repair with allograft  1. Patient is nonweightbearing in the right upper extremity. 2.  I reassured patient she appears to have a small amount of sensory neuropraxia to the right foot. I do not see any obvious motor deficit and this should functionally resolve over time. 3.  Patient having difficulties ambulating. We will have her formally seen by PT/OT eval and treat.   Discussed with patient if she is unable to make satisfactory gains in terms of stability and ambulating she may require inpatient rehabilitation/SNF.   We will reevaluate her after she has been evaluated by PT this afternoon    Landon Conner MD MD  1/12/2022 3:06 PM

## 2022-01-12 NOTE — FLOWSHEET NOTE
Call to Uriel [facility]kins PT to request patient be seen again this afternoon per Dr. Georgette Brito request. Electronically signed by James Still RN on 1/12/2022 at 11:31 AM

## 2022-01-12 NOTE — PLAN OF CARE
See OT evaluation for all goals and OT POC.  Electronically signed by Ozzy Mcdonald OT on 1/12/2022 at 10:02 AM

## 2022-01-12 NOTE — FLOWSHEET NOTE
Patient is stating she is having tingling in her right foot and decreased sensation, her plantar and dorsiflexion is stronger in the right foot. She has +2 pedal pulses bilaterally. Tremors in BUE. Denies chest pain and SOB.  Electronically signed by Cintia Lieberman RN on 1/11/2022 at 11:51 PM

## 2022-01-12 NOTE — FLOWSHEET NOTE
2200: Patient arrived to floor, she is having anxiety and stating she feels like she cannot breathe. O2 increased to 5L while we get her situated. Breath sounds clear. Patient states she is having pain in her back and left hip. Home medications reviewed and perfectserve sent to Dr. Amelia Merritt about the patients anxiety and home medications/medical management.  Patient now sating 94% on 3L NC Electronically signed by Lyndsey Teran RN on 1/11/2022 at 10:13 PM

## 2022-01-13 PROBLEM — R26.9 ABNORMALITY OF GAIT AND MOBILITY: Status: ACTIVE | Noted: 2022-01-13

## 2022-01-13 PROBLEM — Z78.9 IMPAIRED MOBILITY AND ACTIVITIES OF DAILY LIVING: Status: ACTIVE | Noted: 2022-01-13

## 2022-01-13 PROBLEM — Z74.09 IMPAIRED MOBILITY AND ACTIVITIES OF DAILY LIVING: Status: ACTIVE | Noted: 2022-01-13

## 2022-01-13 LAB — MAGNESIUM: 1.7 MG/DL (ref 1.7–2.4)

## 2022-01-13 PROCEDURE — 97535 SELF CARE MNGMENT TRAINING: CPT

## 2022-01-13 PROCEDURE — 97163 PT EVAL HIGH COMPLEX 45 MIN: CPT

## 2022-01-13 PROCEDURE — 97129 THER IVNTJ 1ST 15 MIN: CPT

## 2022-01-13 PROCEDURE — 83735 ASSAY OF MAGNESIUM: CPT

## 2022-01-13 PROCEDURE — 6360000002 HC RX W HCPCS: Performed by: PHYSICAL MEDICINE & REHABILITATION

## 2022-01-13 PROCEDURE — 6370000000 HC RX 637 (ALT 250 FOR IP): Performed by: ORTHOPAEDIC SURGERY

## 2022-01-13 PROCEDURE — 6370000000 HC RX 637 (ALT 250 FOR IP): Performed by: FAMILY MEDICINE

## 2022-01-13 PROCEDURE — 97112 NEUROMUSCULAR REEDUCATION: CPT

## 2022-01-13 PROCEDURE — 99222 1ST HOSP IP/OBS MODERATE 55: CPT | Performed by: PHYSICAL MEDICINE & REHABILITATION

## 2022-01-13 PROCEDURE — 97140 MANUAL THERAPY 1/> REGIONS: CPT

## 2022-01-13 PROCEDURE — 2700000000 HC OXYGEN THERAPY PER DAY

## 2022-01-13 PROCEDURE — 97166 OT EVAL MOD COMPLEX 45 MIN: CPT

## 2022-01-13 PROCEDURE — 1180000000 HC REHAB R&B

## 2022-01-13 PROCEDURE — 6370000000 HC RX 637 (ALT 250 FOR IP): Performed by: PHYSICAL MEDICINE & REHABILITATION

## 2022-01-13 PROCEDURE — 97530 THERAPEUTIC ACTIVITIES: CPT

## 2022-01-13 PROCEDURE — 36415 COLL VENOUS BLD VENIPUNCTURE: CPT

## 2022-01-13 PROCEDURE — 97116 GAIT TRAINING THERAPY: CPT

## 2022-01-13 RX ORDER — SODIUM PHOSPHATE, DIBASIC AND SODIUM PHOSPHATE, MONOBASIC 7; 19 G/133ML; G/133ML
1 ENEMA RECTAL DAILY PRN
Status: DISCONTINUED | OUTPATIENT
Start: 2022-01-13 | End: 2022-01-15 | Stop reason: HOSPADM

## 2022-01-13 RX ORDER — CYANOCOBALAMIN 1000 UG/ML
1000 INJECTION INTRAMUSCULAR; SUBCUTANEOUS WEEKLY
Status: DISCONTINUED | OUTPATIENT
Start: 2022-01-13 | End: 2022-01-15 | Stop reason: HOSPADM

## 2022-01-13 RX ORDER — VITAMIN B COMPLEX
2000 TABLET ORAL
Status: DISCONTINUED | OUTPATIENT
Start: 2022-01-13 | End: 2022-01-15 | Stop reason: HOSPADM

## 2022-01-13 RX ORDER — ACETAMINOPHEN 325 MG/1
650 TABLET ORAL EVERY 4 HOURS PRN
Status: DISCONTINUED | OUTPATIENT
Start: 2022-01-13 | End: 2022-01-15 | Stop reason: HOSPADM

## 2022-01-13 RX ORDER — UBIDECARENONE 100 MG
100 CAPSULE ORAL DAILY
Status: DISCONTINUED | OUTPATIENT
Start: 2022-01-13 | End: 2022-01-15 | Stop reason: HOSPADM

## 2022-01-13 RX ORDER — LIDOCAINE 4 G/G
3 PATCH TOPICAL DAILY
Status: DISCONTINUED | OUTPATIENT
Start: 2022-01-13 | End: 2022-01-15 | Stop reason: HOSPADM

## 2022-01-13 RX ORDER — BISACODYL 10 MG
10 SUPPOSITORY, RECTAL RECTAL DAILY PRN
Status: DISCONTINUED | OUTPATIENT
Start: 2022-01-13 | End: 2022-01-15 | Stop reason: HOSPADM

## 2022-01-13 RX ADMIN — SPIRONOLACTONE 50 MG: 25 TABLET ORAL at 09:10

## 2022-01-13 RX ADMIN — Medication 500 MG: at 06:23

## 2022-01-13 RX ADMIN — SENNOSIDES, DOCUSATE SODIUM 1 TABLET: 8.6; 5 TABLET ORAL at 20:34

## 2022-01-13 RX ADMIN — CYANOCOBALAMIN TAB 500 MCG 1000 MCG: 500 TAB at 09:09

## 2022-01-13 RX ADMIN — HYDROCHLOROTHIAZIDE 50 MG: 25 TABLET ORAL at 09:09

## 2022-01-13 RX ADMIN — CARBIDOPA AND LEVODOPA 1 TABLET: 25; 100 TABLET ORAL at 20:34

## 2022-01-13 RX ADMIN — THERA TABS 1 TABLET: TAB at 09:09

## 2022-01-13 RX ADMIN — CYANOCOBALAMIN 1000 MCG: 1000 INJECTION, SOLUTION INTRAMUSCULAR at 09:37

## 2022-01-13 RX ADMIN — ASPIRIN 81 MG: 81 TABLET, COATED ORAL at 09:09

## 2022-01-13 RX ADMIN — OXYCODONE HYDROCHLORIDE 5 MG: 5 TABLET ORAL at 20:34

## 2022-01-13 RX ADMIN — Medication 10 MG: at 09:08

## 2022-01-13 RX ADMIN — CARBIDOPA AND LEVODOPA 1 TABLET: 25; 100 TABLET ORAL at 09:09

## 2022-01-13 RX ADMIN — Medication 2000 UNITS: at 17:05

## 2022-01-13 RX ADMIN — OXYCODONE HYDROCHLORIDE 5 MG: 5 TABLET ORAL at 12:35

## 2022-01-13 RX ADMIN — Medication 100 MG: at 09:09

## 2022-01-13 RX ADMIN — SENNOSIDES, DOCUSATE SODIUM 1 TABLET: 8.6; 5 TABLET ORAL at 09:09

## 2022-01-13 RX ADMIN — Medication 650 MG: at 17:05

## 2022-01-13 RX ADMIN — OXYCODONE HYDROCHLORIDE 5 MG: 5 TABLET ORAL at 02:36

## 2022-01-13 RX ADMIN — CARBIDOPA AND LEVODOPA 1 TABLET: 25; 100 TABLET ORAL at 14:12

## 2022-01-13 RX ADMIN — OXYCODONE HYDROCHLORIDE 5 MG: 5 TABLET ORAL at 06:22

## 2022-01-13 RX ADMIN — Medication 650 MG: at 11:03

## 2022-01-13 RX ADMIN — OXYCODONE HYDROCHLORIDE 5 MG: 5 TABLET ORAL at 17:09

## 2022-01-13 RX ADMIN — Medication 400 MG: at 09:09

## 2022-01-13 ASSESSMENT — ENCOUNTER SYMPTOMS
PHOTOPHOBIA: 0
COUGH: 0
STRIDOR: 0
DIARRHEA: 0
VISUAL CHANGE: 1
VOMITING: 0
SORE THROAT: 0
EYE PAIN: 0
BLOOD IN STOOL: 0
NAUSEA: 0
ABDOMINAL PAIN: 0
EYE REDNESS: 0
BACK PAIN: 1
SHORTNESS OF BREATH: 1
WHEEZING: 0
CONSTIPATION: 1

## 2022-01-13 ASSESSMENT — PAIN SCALES - GENERAL
PAINLEVEL_OUTOF10: 7
PAINLEVEL_OUTOF10: 5
PAINLEVEL_OUTOF10: 6
PAINLEVEL_OUTOF10: 5
PAINLEVEL_OUTOF10: 4
PAINLEVEL_OUTOF10: 6
PAINLEVEL_OUTOF10: 4
PAINLEVEL_OUTOF10: 7
PAINLEVEL_OUTOF10: 6
PAINLEVEL_OUTOF10: 6

## 2022-01-13 ASSESSMENT — PAIN DESCRIPTION - PAIN TYPE
TYPE: SURGICAL PAIN;ACUTE PAIN
TYPE: SURGICAL PAIN
TYPE: SURGICAL PAIN

## 2022-01-13 ASSESSMENT — PAIN DESCRIPTION - DESCRIPTORS
DESCRIPTORS: ACHING
DESCRIPTORS: ACHING;SORE
DESCRIPTORS: THROBBING
DESCRIPTORS: ACHING;DISCOMFORT

## 2022-01-13 ASSESSMENT — PAIN DESCRIPTION - FREQUENCY
FREQUENCY: INTERMITTENT

## 2022-01-13 ASSESSMENT — PAIN DESCRIPTION - LOCATION
LOCATION: SHOULDER
LOCATION: SHOULDER
LOCATION: SHOULDER;BACK
LOCATION: BACK;SHOULDER;HIP
LOCATION: BACK;HIP;SHOULDER
LOCATION: SHOULDER

## 2022-01-13 ASSESSMENT — PAIN DESCRIPTION - ORIENTATION
ORIENTATION: RIGHT;LOWER
ORIENTATION: RIGHT
ORIENTATION: RIGHT
ORIENTATION: LEFT;RIGHT
ORIENTATION: RIGHT;LOWER
ORIENTATION: RIGHT

## 2022-01-13 NOTE — PLAN OF CARE
See OT evaluation for all goals and OT POC.  Electronically signed by Patience Goncalves OT on 1/13/2022 at 12:30 PM

## 2022-01-13 NOTE — PROGRESS NOTES
Facility/Department: [facility]  Rehabilitation Initial Assessment: Physical Therapy  [facility]    NAME: Brisa Dsouza  : 1950  MRN: [id number]    Date of Service: 2022    Rehab Diagnosis(es):Impaired mobility and activities of daily living dt Parkinsonson and right shoulder replacement  Patient Active Problem List    Diagnosis Date Noted    Impaired mobility and activities of daily living dt [address] and right shoulder replacement 2022    Abnormality of gait and mobility 2022    S/P arthroscopy of right shoulder 2022    Acute deep vein thrombosis (DVT) of lower extremity ([address] 2021    Tear of rotator cuff 2021    Rotator cuff tendinitis 2021    History of injury 2021    Shoulder pain 2021    Complete tear of right rotator cuff 2021    Complete tear of left rotator cuff 2021    Dyskinesia 2021    Balance problem 2021    Adenomatous polyp of ascending colon     Colitis     Rectal bleeding     Tremors of nervous system 2020    Morbidly obese ([address] 02/10/2020    Osteoarthritis of right hip 08/10/2017    Positive FIT (fecal immunochemical test) 2017    Osteoarthritis     HTN (hypertension)     Parkinsonism ([address]     Primary osteoarthritis of right knee 2016    Transient synovitis, right knee 2016       Past Medical History:   Diagnosis Date    Hypertension     meds > 10 yrs / denies TIA or stroke    Osteoarthritis     DJD right hip    Parkinson disease ([address]     Parkinsonism ([address]      Past Surgical History:   Procedure Laterality Date     SECTION  1988    COLONOSCOPY N/A 2020    COLONOSCOPY WITH POLYPECTOMY performed by Adrian Echeverria MD at [address] Left     arthrotomy    SHOULDER ARTHROSCOPY Left 2021    LEFT SHOULDER ARTHROSCOPY ROTATOR CUFF REPAIR, SUBACROMIAL DECOMPRESSION performed by Laruth Schwab, MD at [address] ARTHROSCOPY Right 1/11/2022    RIGHT SHOULDER ARTHROSCOPY ROTATOR CUFF REPAIR WITH DERMAL ALLOGRAFT, BICEPS TENOTOMY OPEN REPAIR performed by Laruth Schwab, MD at [address] [facility],  [address] Right 8/17/2017    RIGHT HIP TOTAL HIP ARTHROPLASTY WILLIAM [facility] SPINAL  performed by Lily Rodriguez MD at [facility] EXTRACTION         Chart Reviewed: Yes  Patient assessed for rehabilitation services?: Yes  Diagnosis: Impaired mobility and activities of daily living dt Parkinsonson and right shoulder replacement    Restrictions:  Restrictions/Precautions: Weight Bearing,Fall Risk  Upper Extremity Weight Bearing Restrictions  Right Upper Extremity Weight Bearing: Non Weight Bearing  Other: sling in place       SUBJECTIVE:      Pre Treatment Pain Screening  Pain at present: 6  Intervention List: Patient able to continue with treatment;Patient declined any intervention  Comments / Details: Pt with right shoulder pain following shower with OT    Post Treatment Pain Screening:  Pain Assessment  Pain Level: 6  Pain Location: Shoulder  Pain Orientation: Right  Pain Descriptors: Aching   - Nursing arrived to give pt tylonol at end of session    Prior Level of Function:  Social/Functional History  Lives With: Spouse  Type of Home: House  Home Layout: Two level,1/2 bath on main level,Able to Live on Main level with bedroom/bathroom (will be sleeping in recliner upon discharge)  Home Access: Stairs to enter without rails  Entrance Stairs - Number of Steps: 2 - in addition to 2 small steps between indoor levels in the home  Bathroom Shower/Tub: Tub/Shower unit  [place] Equipment: Shower chair,Grab bars in [address]: [address]: Needs assistance  [address]: Needs assistance  Homemaking Responsibilities: No  Ambulation Assistance: Independent  Transfer Assistance: Independent  Active : No  Occupation: Retired  Type of occupation:   Leisure & Hobbies: reading, watching TV  IADL Comments: spouse completes medications/finances    OBJECTIVE:   Vision/Hearing:  Vision: Impaired  Vision Exceptions: Wears glasses at all times  Hearing: Within functional limits    Cognition:  Overall Orientation Status: Within Functional Limits  Follows Commands: Within Functional Limits       ROM:  RLE AROM: WFL  LLE AROM : WFL    Strength:  Strength RLE  Comment: 4-/5  Strength LLE  Comment: 4-/5    Neuro:        Balance  Sitting - Static: Good;- (leans post without LOB and no assist for control required)  Sitting - Dynamic: Good;- (reaches 2-4 inches with left UE without LOB)  Standing - Static: Fair;- (pt able to stand without UE support 15 sec without assistance - LOB noted then with assistance for recovery required)  Standing - Dynamic: Poor (assist for balance maintenance in gait without device required)  Motor Control  Gross Motor?: Exceptions  Comments: mildbradykinesia, mild rigid, mild difficulty with initiation of motor plan and effective motor sequencing    Bed mobility  Rolling to Left: Minimal assistance  Rolling to Right: Unable to assess (due to right shoulder surgery)  Supine to Sit: Maximum assistance  Sit to Supine: Maximum assistance  Comment: + 1 assist - HOB flat with no rail    Transfers  Sit to Stand: Moderate Assistance  Stand to sit: Moderate Assistance  Bed to Chair: Moderate assistance  Comment: +1 assist, multiple trails with improved performance from previous sessions    Ambulation  Ambulation?: Yes  Ambulation 1  Surface: level tile  Device: No Device  Other Apparatus: Wheelchair follow  Assistance:  Moderate assistance  Quality of Gait: fast pace, decreased pelvic stability with non- complensated trendelenberg present, short step length, decreased foot clearance, initiation assist required for first step only, mild anterior propulsion with delayed balance reactions with assist to ensure safe balance maintenance required  Distance: 50 feet - no turns  Comments: pt with improved motor response since last acute care tx. Safety concern is noted for pts speed and delay of balance reactions    Stairs/Curb  Stairs?: No    Wheelchair Activities  Propulsion: No    Activity Tolerance  Activity Tolerance: Patient Tolerated treatment well          Quality Indicators (IRF-LLOYD):  Rolling L and R: Not attempted due to Medical Condition or Safety Concerns (I.e. unsafe or physician orders) - 88  Sit>Supine: Substantial/Maximal Assistance (helper does >50%) - 2  Supine>Sit: Substantial/Maximal Assistance (helper does >50%) - 2  Sit>Stand: Partial/Moderate Assistance (helper does <50%) - 3  Chair/Bed>Chair Transfer: Partial/Moderate Assistance (helper does <50%) - 3  Car Transfers: Not attempted due to Medical Condition or Safety Concerns (I.e. unsafe or physician orders) - 80  Walk 10 ft: Dependent (helper does all or +2 assist required) - 1  Walk 50 ft with two 90 degree turns: Dependent (helper does all or +2 assist required) - 1  Walk 150 ft in [address]: Not attempted due to Medical Condition or Safety Concerns (I.e. unsafe or physician orders) - 80  Walking 10 ft on Unlevel Surface: Not attempted due to Medical Condition or Safety Concerns (I.e. unsafe or physician orders) - 80  Picking up Objects from Standing Position: Not attempted due to Medical Condition or Safety Concerns (I.e. unsafe or physician orders) - 80  Stairs: No Not attempted due to medical condition or safety concerns (i.e. unsafe or physician order) - 88  WC Mobility: No Not Applicable (pt did not complete item prior to admission) - 9      ASSESSMENT:  Body structures, Functions, Activity limitations: Decreased functional mobility ; Decreased balance;Decreased ROM; Decreased strength;Decreased sensation; Increased pain;Decreased posture;Decreased coordination  Decision Making: High Complexity  History: high  Exam: high  Clinical Presentation: high    PT Education: Goals;PT Role;Plan of Care  Barriers to Learning: none    CLINICAL IMPRESSION: Pt demonstrates deficits as listed post right shoulder surgery. She is requirng assistance for mobility at this time. Pt would benefit from continued PT at this level of care prior to safe return to home    PLAN OF CARE:  Frequency: 1-2 treatment sessions per day, 5-7 days per week     Current Treatment Recommendations: Vidhya Potts Re-education,Patient/Caregiver Education & Training,Equipment Evaluation, Education, & procurement,Modalities,Home Exercise Program,Gait Training,Balance Training,Functional Mobility Training,Stair training,Safety Education & Training,Positioning,ROM    Patient's Goal: to go home not needing assist for walking       GOALS:  Long term goals  Long term goal 1: indep bed mobility  Long term goal 2: indep sit to stand, bed and car transfers  Long term goal 3: Pt to require SBA for gait with or without device 150 feet  Long term goal 4: Pt able to perform 2-4 stairs with SBA and curb style step with SBA  Long term goal 5:  Indep with standing HEP    ELOS:   Plan weeks: 1    Therapy Time:    Individual   Time In 1030   Time Out 1100   Minutes [address] [place], [place], 01/13/22 at 12:11 PM

## 2022-01-13 NOTE — PROGRESS NOTES
Occupational Therapy   Occupational Therapy Initial Assessment  Date: 2022   Patient Name: Karo Byrd  MRN: [id number]     : 1950    Date of Service: 2022    Discharge Recommendations:  Continue to assess pending progress  OT Equipment Recommendations  Other: continue to assess    Assessment   Performance deficits / Impairments: Decreased functional mobility ; Decreased strength;Decreased endurance;Decreased ADL status; Decreased cognition;Decreased balance;Decreased fine motor control;Decreased posture;Decreased coordination;Decreased safe awareness  Assessment: Pt is a 67 y/o female from home with spouse who presents to [facility] with the above deficits s/p shoulder arthroscopy. Pt would benefit from OT services to maximize independence and safety during ADLs and IADLs. Prognosis: Good  Decision Making: Medium Complexity  History: Pt's medcial history is moderately complex  Exam: 10 performance deficits  Assistance / Modification: Max-Dependent  REQUIRES OT FOLLOW UP: Yes  Activity Tolerance  Activity Tolerance: Patient Tolerated treatment well  Safety Devices  Safety Devices in place: Yes  Type of devices: All fall risk precautions in place  Restraints  Initially in place: No           Patient Diagnosis(es): There were no encounter diagnoses. has a past medical history of Hypertension, Osteoarthritis, Parkinson disease ([facility] and Parkinsonism ([facility] has a past surgical history that includes knee surgery (Left);  section (1988); Dilation and curettage of uterus; Total hip arthroplasty (Right, 2017); Colonoscopy (N/A, 2020); Shoulder arthroscopy (Left, 2021); Tonsillectomy; [place] tooth extraction; and Shoulder arthroscopy (Right, 2022).            Restrictions  Restrictions/Precautions  Restrictions/Precautions: Weight Bearing,Fall Risk  Upper Extremity Weight Bearing Restrictions  Right Upper Extremity Weight Bearing: Non Weight Bearing (s/p rotator cuff arthroscopy)    Subjective   General  Chart Reviewed: Yes  Patient assessed for rehabilitation services?: Yes  Response to previous treatment: Patient with no complaints from previous session  Family / Caregiver Present: No  Referring Practitioner: Dr. Riri Abreu  Diagnosis: Impaired mobility and ADL's d/t parkinson's exacerbation s/p right shoulder arthroscopy  Patient Currently in Pain: Yes  Pain Assessment  Pain Assessment: 0-10  Pain Level: 6  Pain Type: Surgical pain  Pain Location: Shoulder  Pain Orientation: Right  Pain Descriptors:  Throbbing  Pain Frequency: Intermittent  Pre Treatment Pain Screening  Pain at present: 6  Scale Used: Numeric Score  Intervention List: Patient able to continue with treatment  Vital Signs  Patient Currently in Pain: Yes  Social/Functional History  Social/Functional History  Lives With: Spouse  Type of Home: House  Home Layout: Two level,1/2 bath on main level,Able to Live on Main level with bedroom/bathroom (will be sleeping in recliner upon discharge)  Home Access: Stairs to enter without rails  Entrance Stairs - Number of Steps: 2 - in addition to 2 small steps between indoor levels in the home  Bathroom Shower/Tub: Tub/Shower unit  [place] Equipment: Shower chair,Grab bars in shower,Hand-held shower  Home Equipment: Cane  ADL Assistance: Needs assistance  Homemaking Assistance: Needs assistance  Homemaking Responsibilities: No  Ambulation Assistance: Independent  Transfer Assistance: Independent  Active : No  Occupation: Retired  Type of occupation:   Leisure & Hobbies: reading, watching TV  IADL Comments: spouse completes medications/finances       Objective   Vision: Impaired  Vision Exceptions: Wears glasses at all times  Hearing: Within functional limits      Orientation  Overall Orientation Status: Within Functional Limits     Observation/Palpation  Posture: Fair  Observation: resting tremors, pleasant, shoulder immobilizer to the R shoulder, agreeable to OT evaluation     Balance  Sitting Balance: Supervision  Standing Balance: Minimal assistance  Functional Mobility  Functional - Mobility Device: Wheelchair  Activity: To/from bathroom  Assist Level: Dependent/Total  Toilet Transfers  Toilet - Technique: Stand step  Equipment Used: Grab bars  Toilet Transfer: Contact guard assistance  Shower Transfers  Shower - Transfer From: Wheelchair  Shower - Transfer Type: To and From  Shower - Transfer To: Shower seat with back  Shower - Technique:  (stand step)  Shower Transfers: Contact Guard     ADL  Feeding: Minimal assistance  Grooming: Minimal assistance (to comb back of hair)  UE Bathing: Minimal assistance; Increased time to complete (to wash shoulders)  LE Bathing: Minimal assistance (to wash feet)  UE Dressing: Maximum assistance; Increased time to complete  LE Dressing: Dependent/Total  Toileting: Unable to assess(comment) (pt did not need to go)     Tone RUE  RUE Tone: Normotonic  Tone LUE  LUE Tone: Normotonic  Coordination  Movements Are Fluid And Coordinated: No  Coordination and Movement description: Fine motor impairments;Decreased accuracy; Right UE;Left UE;Resting tremors;Decreased speed     Bed mobility  Supine to Sit: Unable to assess  Sit to Supine: Unable to assess  Comment: Pt up in chair before and after ADL     Transfers  Sit to stand: Contact guard assistance  Stand to sit: Contact guard assistance     Vision - Basic Assessment  Prior Vision: Wears glasses only for reading  Visual History: No significant visual history  Patient Visual Report: No visual complaint reported. Visual Field Cut: No  Oculo Motor Control: WNL     Cognition  Overall Cognitive Status: Exceptions  Arousal/Alertness: Appropriate responses to stimuli  Following Commands:  Follows one step commands with repetition  Attention Span: Appears intact  Memory: Decreased short term memory  Safety Judgement: Decreased awareness of need for assistance;Decreased awareness of need for safety  Problem Solving: Assistance required to correct errors made;Assistance required to generate solutions;Assistance required to identify errors made;Assistance required to implement solutions  Insights: Fully aware of deficits  Initiation: Requires cues for some  Sequencing: Requires cues for some  Cognition Comment: Comp: Supervision Express: Independent Social: Independent Prob: Min A Mem: Min A     Perception  Overall Perceptual Status: WFL     Sensation  Overall Sensation Status: Impaired  Additional Comments: R foot numbness since sx but improving per pt, numbness in R hand        LUE AROM (degrees)  LUE AROM : Exceptions  LUE General AROM: limited shoulder flexion to ~85º  Left Hand AROM (degrees)  Left Hand AROM: WFL  RUE AROM (degrees)  RUE AROM : Exceptions  RUE General AROM: NT d/t shoulder sx  Right Hand AROM (degrees)  Right Hand AROM: WFL     LUE Strength  Gross LUE Strength: Exceptions to [facility]  L Hand General: 4-/5  LUE Strength Comment: 3+/5 all planes  RUE Strength  R Hand General: NT  RUE Strength Comment: NT d/t shoulder sx     Hand Dominance  Hand Dominance: Right             Plan   Plan  Times per week: 5-7x  Plan weeks: 2 weeks  Current Treatment Recommendations: Strengthening,Functional Mobility Training,Self-Care / ADL,Neuromuscular Re-education,Endurance Training,Cognitive/Perceptual Training,Patient/Caregiver Education & Training,ROM,Equipment Evaluation, Education, & procurement,Safety Education & Training    G-Code     OutComes Score                                                  AM-PAC Score             Goals  Long term goals  Time Frame for Long term goals :  Within 2 weeks pt to demonstrate progress in the following areas listed below to achieve specific LTG's as stated in inital evaluation  Long term goal 1: Improve independence during ADLs and IADLs  Long term goal 2: Improve strength and enduarnce to tolerate ADLs  Long term goal 3: Improve fine motor coordination during self care  Long term goal 4: Demonstrate carryover of UE precations  Long term goal 5: Demonstrate understanding of HEP  Patient Goals   Patient goals : \"to be able to walk up and down the stairs and get out of bed\"      [x]  Patient will complete self care as followed using the recommended adaptive equipment and/or adaptive techniques as instructed:  Feeding:Setup  Grooming: Min A  Bathing: Min A  UE Dressing: Min A  LE Dressing: SBA  Toileting: SBA  Toilet Transfers: Mod I  Tub Transfers: Mod I     [x]  Patient will sequence self-care routine with no verbal/tactile cues. []  Patient will improve UE sensation and/or utilize compensatory techniques for safe completion of self-care as projected. [x]  Patient will improve static and dynamic standing balance to complete pants management at SBA level     []  Patient will improve UE Function (AROM, strength, motor control, tone normalization) to complete ADLs as projected. [x]  Patient will improve functional endurance to tolerate/complete 60 minutes of ADLs. [x]  Patient will improve L UE strength and endurance to [facility] in order to participate in self-care activities as projected. [x]  Patient will improve B hand fine motor coordination to [facility] in order to manage clothing fasteners/self-care containers in a timely manner     []  Patient will improve visual perception to in order to improve participation in self care and leisure activities     [x]  Patient will perform kitchen mobility at device level without episodes of LOB and good safety awareness      [x]  Patient will perform basic room mobility at Mod I level. [x]  Patient will access appropriate D/C site with as few architectural barriers as possible. [x]  Patient and/or caregiver will demonstrate understanding of shoulder precautions with no  verbal/tactile cues. [x]  Patient and/or caregiver will demonstrate understanding of energy conservation techniques with no verbal/tactile cues.

## 2022-01-13 NOTE — PROGRESS NOTES
General  Chart Reviewed: Yes  Patient assessed for rehabilitation services?: Yes  Response to previous treatment: Patient with no complaints from previous session  Family / Caregiver Present: No  Referring Practitioner: Dr. Monse Garcia  Diagnosis: Impaired mobility and ADL's d/t parkinson's exacerbation s/p right shoulder arthroscopy  Pain Assessment  Pain Assessment: 0-10  Pain Level: 4  Pain Type: Surgical pain;Acute pain  Pain Location: Back; Shoulder;Hip  Pain Orientation: Right; Lower  Pain Descriptors: Aching;Discomfort  Pain Frequency: Intermittent  Pre Treatment Pain Screening  Pain at present: 4  Scale Used: Numeric Score  Intervention List: Patient able to continue with treatment  Vital Signs  Patient Currently in Pain: Yes   Orientation  Orientation  Overall Orientation Status: Within Functional Limits  Objective      The patient completed the [facility] Mental Status (UMS) Examination on this date, which is a useful screening tool for detecting mild cognitive impairment and signs of dementia. Range of impairments based on score are indicated in the chart below:      High school education  Scoring Less than High School Education   27-30 Normal 25-30   21-26 Mild Neurocognitive disorder 20-24   1-20 Dementia 1-19     Patient's level of education: high school  Patient scored 23/30 on this date, which indicates a possible mild neurocognitive disorder. Pt. with cognitive deficits which will be addressed in OT    Results of SLUMS assessment will be shared in team meeting to assess need for further action. While seated at tabletop, pt flattened orange theraputty onto table using the L UE only. Therapist placed 15 beads onto theraputty and pt hid them by pressing them deeper into putty. Therapist rolled theraputty into a ball and pt manipulated through theraputty to retrieve all beads. Pt requires 2 verbal cues to not use the R UE d/t shoulder precautions.  Pt demonstrates min difficulty to retrieve all beads and requires increased effort. Pt squeezed green gripper 3x10 reps using the L UE only to improve  strength during self care. Pt was given rest breaks in between trials. Pt demonstrates mod difficulty to squeeze requiring increased effort. Plan   Plan  Times per week: 5-7x  Plan weeks: 2 weeks  Current Treatment Recommendations: Strengthening,Functional Mobility Training,Self-Care / ADL,Neuromuscular Re-education,Endurance Training,Cognitive/Perceptual Training,Patient/Caregiver Education & Training,ROM,Equipment Evaluation, Education, & procurement,Safety Education & Training  Plan Comment: Continue per OT plan of care  G-Code     OutComes Score                                                  AM-PAC Score             Goals  Long term goals  Time Frame for Long term goals :  Within 2 weeks pt to demonstrate progress in the following areas listed below to achieve specific LTG's as stated in inital evaluation  Long term goal 1: Improve independence during ADLs and IADLs  Long term goal 2: Improve strength and enduarnce to tolerate ADLs  Long term goal 3: Improve fine motor coordination during self care  Long term goal 4: Demonstrate carryover of UE precations  Long term goal 5: Demonstrate understanding of HEP  Patient Goals   Patient goals : \"to be able to walk up and down the stairs and get out of bed\"       Therapy Time   Individual Concurrent Group Co-treatment   Time In 1430         Time Out 1500         Minutes 30           Therapeutic activities: 18 minutes  Cognitive Retrainin minutes     Jeremy Ruiz OT   Electronically signed by Jeremy Ruiz OT on 2022 at 3:00 PM

## 2022-01-13 NOTE — H&P
HISTORY & PHYSICAL       DATE OF ADMISSION:  1/12/2022    DATE OF SERVICE:  1/13/22    Subjective:    Mikaela Downs, 66 y.o. female presents today with:     CHIEF COMPLAINT:  Patient has noted an increase in her difficulty mobilizing status post right total shoulder replacement. She had hoped to go home after her right shoulder replacement despite nonweightbearing and Parkinson's disease however she developed right lower extremity numbness postoperatively. She was evaluated to fouled to have exacerbation of Parkinson's disease and neurologically needed monitoring regarding her right lower extremity numbness. She was admitted to acute rehab to titrate her Parkinson's medications control her pain and focus on mobility despite nonweightbearing right upper extremity postop right shoulder replacement. She is found require acute low rotation PT OT and acute medical monitoring of her numbness. Neurologic Problem  The patient's primary symptoms include focal sensory loss, a visual change and weakness. The patient's pertinent negatives include no altered mental status, clumsiness, focal weakness, loss of balance, memory loss, near-syncope, slurred speech or syncope. This is a new problem. The current episode started in the past 7 days. The neurological problem developed suddenly. The problem has been gradually improving since onset. There was right-sided and lower extremity focality noted. Associated symptoms include back pain, fatigue and shortness of breath. Pertinent negatives include no abdominal pain, chest pain, diaphoresis, dizziness, fever, headaches, nausea, neck pain, palpitations or vomiting. The treatment provided mild relief. There is no history of a bleeding disorder, dementia, head trauma, liver disease, mood changes or seizures. Fatigue  Associated symptoms include fatigue, joint swelling, myalgias, a visual change and weakness.  Pertinent negatives include no abdominal pain, chest pain, chills, congestion, coughing, diaphoresis, fever, headaches, nausea, neck pain, rash, sore throat or vomiting. Shoulder Pain   Pertinent negatives include no fever. I reviewed recent nursing note, \" Rehabilitation Impairment Group Code: 3.2  Rehab Impairment Group: Neurologic: PD exacerbation   Estimated Length of Stay (days): 16  Rehab Diagnosis: Impaired mobility and ADL's due to Parkinson's exacerbation s/p Right shoulder arthroscopy\". The patient has stabilized medically andis able to participate at acute level rehab but is too medically complex for SNF due to need for therapy at the acute level with at least 15 hours a week of PT OT and cognitive and recreational therapy at an acute level with daily medical monitoring. Imaging:    Imaging and other studies reviewed and discussed with patient and staff    XR SHOULDER RIGHT  1/11/2022  EXAMINATION: XR SHOULDER RIGHT 1 VW CLINICAL HISTORY:  INCORRECT COUNT COMPARISONS: None available. Technique: Single AP RIGHT SHOULDER X-RAY FINDINGS: There are no lytic or sclerotic bone lesions. The humeral head is located. There is no fracture or subluxation. The visualized portions of the lung and chest wall are within normal limits. There are no radiopaque foreign bodies. There is air in the subcutaneous soft tissues. There are no acute osseous injuries. There are no radiopaque foreign bodies. XR CHEST  1/12/2022  EXAMINATION: XR CHEST PORTABLE CLINICAL HISTORY: COUGH COMPARISONS: None available. FINDINGS: Low lung volumes. Decrease interval bilateral medium bilateral humeral heads, greater on right. Cardiopericardial silhouette normal for technique. Aorta calcified. Lungs clear. NO ACUTE CARDIOPULMONARY DISEASE. POSSIBLE BILATERAL ROTATOR CUFF INSTABILITY.              Labs:     labs reviewed and discussed with patient and staff    No results found for: POCGLU  Lab Results   Component Value Date     01/12/2022    K 3.9 01/12/2022    CL 103 01/12/2022    CO2 21 01/12/2022    BUN 23 01/12/2022    CREATININE 0.66 01/12/2022    CALCIUM 9.2 01/12/2022    LABALBU 3.9 01/12/2022    BILITOT 0.3 01/12/2022    ALKPHOS 64 01/12/2022    AST 28 01/12/2022    ALT <5 01/12/2022     Lab Results   Component Value Date    WBC 13.2 01/12/2022    RBC 4.34 01/12/2022    HGB 12.8 01/12/2022    HCT 39.0 01/12/2022    MCV 89.9 01/12/2022    MCH 29.4 01/12/2022    MCHC 32.7 01/12/2022    RDW 13.4 01/12/2022     01/12/2022     No results found for: VITD25  Lab Results   Component Value Date    COLORU Yellow 01/12/2022    NITRU Negative 01/12/2022    GLUCOSEU Negative 01/12/2022    KETUA Negative 01/12/2022    UROBILINOGEN 0.2 01/12/2022    BILIRUBINUR Negative 01/12/2022    BILIRUBINUR 1+ 01/05/2020     Lab Results   Component Value Date    PROTIME 14.1 01/03/2022     Lab Results   Component Value Date    INR 1.1 01/03/2022         I discussed results with patient. The patient remains highly medically complex and continues to have severe problems with activities of daily living and mobility. The patient was assessed to be able to tolerate intensive rehabilitation and therefore was admitted to Rehabilitation to address these needs. The patient has been found to have severe abnormality of gait and mobility with impaired self care and is admitted to the acute inpatient rehab program.       Prior Function; everyday activities:     Social History     Socioeconomic History    Marital status:      Spouse name: Not on file    Number of children: Not on file    Years of education: Not on file    Highest education level: Not on file   Occupational History    Not on file   Tobacco Use    Smoking status: Never Smoker    Smokeless tobacco: Never Used   Vaping Use    Vaping Use: Never used   Substance and Sexual Activity    Alcohol use:  Yes     Alcohol/week: 1.0 standard drink     Types: 1 Glasses of wine per week     Comment: occasional    Drug use: Never    Sexual activity: Not on file   Other Topics Concern    Not on file   Social History Narrative    Lives With: Spouse Gene    Type of Home: [id number] in [address]: Two level,1/2 bath on main level,Able to Live on Main level with bedroom/bathroom (will be sleeping in recliner upon discharge)    Home Access: Stairs to enter without rails- Number of Steps: 2    Bathroom Shower/Tub: Tub/Shower unit, Shower chair,Grab bars in shower,Hand-held shower    Home Equipment: [employer]    ADL Assistance: Needs assistance    Homemaking Assistance: Needs assistance    Homemaking Responsibilities: No    Ambulation Assistance: Independent    Transfer Assistance: Independent    Active : No    Occupation: Retired     Leisure & Hobbies: reading, watching TV         Social Determinants of [address] Strain: Low Risk     Difficulty of Paying Living Expenses: Not hard at all   Food Insecurity: No [address] Worried About [facility] in the Last Year: Never true   [address] in the Last Year: Never true   Transportation Needs: No Transportation Needs    Lack of Transportation (Medical): No    Lack of Transportation (Non-Medical):  No   Physical Activity:     Days of Exercise per Week: Not on file    Minutes of Exercise per Session: Not on file   Stress:     Feeling of Stress : Not on file   Social Connections:     Frequency of Communication with Friends and Family: Not on file    Frequency of Social Gatherings with Friends and Family: Not on file    Attends Yazidism Services: Not on file    Active Member of Clubs or Organizations: Not on file    Attends Club or Organization Meetings: Not on file    Marital Status: Not on file   Intimate Partner Violence:     Fear of Current or Ex-Partner: Not on file    Emotionally Abused: Not on file    Physically Abused: Not on file    Sexually Abused: Not on file   Housing Stability:     Unable to Pay for Housing in the Last Year: Not on file    Number of Places Lived in the Last Year: Not on file    Unstable Housing in the Last Year: Not on file     Social supports listed above. Prior Device(s) used:  As above    History of falls:  Rarely falls    In depth analysis of complex functional data; the patient has been:    Current Rehabilitation Assessments:    Physical therapy: FIMS:  Bed Mobility:      Transfers:Sit to Stand: Stand by assistance  Stand to sit: Contact guard assistance  Bed to Chair: Minimal assistance, Ambulation 1  Surface: carpet  Device: Hand-Held Assist  Other Apparatus: Wheelchair follow  Assistance: Minimal assistance  Quality of Gait: Pt with slight forward pitch with LOB. Lateral sway. Gait Deviations: Decreased step length,Decreased step height,Slow Lexy  Distance: 79' with 3 turns. Comments: pt with less need for assistance in room with ambulation short distance,      FIMS:  , , Assessment: Pt with decreased balance with gait and requiring HHA. Pt pill rolling in both hands with gait. Pt required several rest breaks secondary to fatigue, but was willing to work with therapy. Occupational therapy: FIMS:   ,  , Assessment: Pt is a 65 y/o female from home with spouse who presents to [address] [place] [address] with the above deficits s/p shoulder arthroscopy. Pt would benefit from OT services to maximize independence and safety during ADLs and IADLs. OCCUPATIONAL THERAPY  Hand Dominance: Right  ADL  Feeding: Minimal assistance (01/13/22 1047)  Grooming: Minimal assistance (to comb back of hair) (01/13/22 1047)  UE Bathing: Minimal assistance; Increased time to complete (to wash shoulders) (01/13/22 1047)  LE Bathing: Minimal assistance (to wash feet) (01/13/22 1047)  UE Dressing: Maximum assistance; Increased time to complete (01/13/22 1047)  LE Dressing: Dependent/Total (01/13/22 1047)  Toileting: Unable to assess(comment) (pt did not need to go) (01/13/22 1047)  Toilet Transfers  Toilet - Technique: Stand step (22)  Equipment Used: Grab bars (22)  Toilet Transfer: Contact guard assistance (22)     Shower Transfers  Shower - Transfer From: Wheelchair (22)  Shower - Transfer Type: To and From (22)  Shower - Transfer To: Shower seat with back (22)  Shower - Technique:  (stand step) (22)  Shower Transfers: Contact Guard (22)    Speech therapy: FIMS:       SPEECH THERAPY               Diet/Swallow:                           COGNITION  OT: Cognition Comment: Comp: [employer]: Independent Social: Independent Prob: Min A Mem: Min A  SP:          Prior to admission patient was independent with all ADLs and mobilityand did not require any outside services.        Past Medical History:   Diagnosis Date    Hypertension     meds > 10 yrs / denies TIA or stroke    Osteoarthritis     DJD right hip    Parkinson disease ([facility] [facility]     Parkinsonism ([facility] [facility]        Past Surgical History:   Procedure Laterality Date     SECTION  1988    COLONOSCOPY N/A 2020    COLONOSCOPY WITH POLYPECTOMY performed by Paul Thorne MD at [address] Left     arthrotomy    SHOULDER ARTHROSCOPY Left 2021    LEFT SHOULDER ARTHROSCOPY ROTATOR CUFF REPAIR, SUBACROMIAL DECOMPRESSION performed by Thao Camilo MD at [address] ARTHROSCOPY Right 2022    RIGHT SHOULDER ARTHROSCOPY ROTATOR CUFF REPAIR WITH DERMAL ALLOGRAFT, BICEPS TENOTOMY OPEN REPAIR performed by Thao Camilo MD at [facility],  [address] Right 2017    RIGHT HIP TOTAL HIP ARTHROPLASTY WILLIAM [facility]DM SPINAL  performed by Andres Dominguez MD at [facility]         Current Facility-Administered Medications   Medication Dose Route Frequency Provider Last Rate Last Admin    Vitamin D (CHOLECALCIFEROL) tablet 2,000 Units  2,000 Units Oral Dinner Myrna Scullin, DO   2,000 Units at 01/13/22 1705    cyanocobalamin injection 1,000 mcg  1,000 mcg IntraMUSCular Weekly Myrna Scullin, DO   1,000 mcg at 01/13/22 0937    coenzyme Q10 capsule 100 mg  100 mg Oral Daily Myrna Scullin, DO   100 mg at 01/13/22 7770    lidocaine 4 % external patch 3 patch  3 patch TransDERmal Daily Myrna Scullin, DO        acetaminophen (TYLENOL) tablet 650 mg  650 mg Oral Q4H PRN Myrna Scullin, DO        bisacodyl (DULCOLAX) suppository 10 mg  10 mg Rectal Daily PRN Myrna Scullin, DO   10 mg at 01/13/22 0908    fleet rectal enema 1 enema  1 enema Rectal Daily PRN Myrna Scullin, DO        carbidopa-levodopa (SINEMET)  MG per tablet 1 tablet  1 tablet Oral TID Herlinda Jaramillo MD   1 tablet at 01/13/22 2034    acetaminophen (TYLENOL) tablet 650 mg  650 mg Oral Q6H Dalia Stover MD   650 mg at 01/13/22 1705    aluminum & magnesium hydroxide-simethicone (MAALOX) 200-200-20 MG/5ML suspension 15 mL  15 mL Oral Q6H PRN Dalia Stover MD        oxyCODONE (ROXICODONE) immediate release tablet 5 mg  5 mg Oral Q4H PRN Dalia Stover MD   5 mg at 01/14/22 0200    sennosides-docusate sodium (SENOKOT-S) 8.6-50 MG tablet 1 tablet  1 tablet Oral BID Dalia Stover MD   1 tablet at 01/13/22 2034    acetaminophen (TYLENOL) tablet 500 mg  500 mg Oral Nightly PRN Dalia Stover MD   500 mg at 01/14/22 0200    And    diphenhydrAMINE (BENADRYL) tablet 25 mg  25 mg Oral Nightly PRN Dalia Stover MD        aspirin EC tablet 81 mg  81 mg Oral Daily Dalia Stover MD   81 mg at 01/13/22 9907    magnesium oxide (MAG-OX) tablet 400 mg  400 mg Oral Daily Dalia Stover MD   400 mg at 01/13/22 2659    multivitamin 1 tablet  1 tablet Oral Daily Dalia Stover MD   1 tablet at 01/13/22 1639    spironolactone (ALDACTONE) tablet 50 mg  50 mg Oral Daily Dalia Stover MD   50 mg at 01/13/22 5756    And    hydroCHLOROthiazide (HYDRODIURIL) tablet 50 mg  50 mg Oral Daily Nataly Hurd MD   50 mg at 01/13/22 2261    vitamin B-12 (CYANOCOBALAMIN) tablet 1,000 mcg  1,000 mcg Oral Daily Nataly Hurd MD   1,000 mcg at 01/13/22 0909       Allergies   Allergen Reactions    Tramadol Nausea And Vomiting    Sulfa Antibiotics Hives     hives & chills                      FAMILY HISTORY:  Does not pertain tochief complaint. Review of Systems   Constitutional: Positive for activity change and fatigue. Negative for chills, diaphoresis and fever. HENT: Negative for congestion, ear discharge, ear pain, hearing loss, nosebleeds, sore throat and tinnitus. Eyes: Negative for photophobia, pain and redness. Respiratory: Positive for shortness of breath. Negative for cough, wheezing and stridor. Shortness of breath on exertion   Cardiovascular: Negative for chest pain, palpitations, leg swelling and near-syncope. Gastrointestinal: Positive for constipation. Negative for abdominal pain, blood in stool, diarrhea, nausea and vomiting. Endocrine: Negative for polydipsia. Genitourinary: Negative for dysuria, flank pain, frequency, hematuria and urgency. Musculoskeletal: Positive for back pain, gait problem, joint swelling and myalgias. Negative for neck pain. Skin: Positive for wound. Negative for rash. Allergic/Immunologic: Positive for immunocompromised state. Negative for environmental allergies. Neurological: Positive for weakness. Negative for dizziness, tremors, focal weakness, seizures, syncope, headaches and loss of balance. Hematological: Does not bruise/bleed easily. Psychiatric/Behavioral: Positive for decreased concentration and dysphoric mood. Negative for hallucinations, memory loss and suicidal ideas. The patient is not nervous/anxious.            Objective  /71   Pulse 86   Temp 98.4 °F (36.9 °C)   Resp 20   LMP 08/10/2007   SpO2 95% *    Physical Exam  Constitutional: not withdrawn. Thought Content: Thought content normal.         Cognition and Memory: Memory is not impaired. She exhibits impaired recent memory. She does not exhibit impaired remote memory. Judgment: Judgment is not impulsive or inappropriate. Ortho Exam  Neurologic Exam     Mental Status   Level of consciousness: alert  Knowledge: good. Cranial Nerves     CN III, IV, VI   Pupils are equal, round, and reactive to light. Motor Exam   Muscle bulk: normal  Right arm tone: cogwheel rigidity  Left arm tone: cogwheel rigidity    Gait, Coordination, and Reflexes     Reflexes   Left brachioradialis: 1+  Left biceps: 1+  Left triceps: 1+  Right patellar: 0  Left patellar: 0  Right achilles: 0  Left achilles: 0      After extensive review of the records and above physical exam, I have formulated the followingdiagnoses and plan:      DIAGNOSES:    1. The patient was admitted to the acute rehabilitation unit with the primary rehab diagnoses being severe abnormality of gait and mobility and impaired self care and ADL's due to exacerbation of Parkinson's disease status post right total shoulder replacement. Compared to Pre-Admission Assessment, patients medical and functional status remain challengingly complex and patient continues to requireintensive therapeutic intervention from multiple therapies, therefore, initiate acute intensive comprehensive inpatient rehabilitation program including PT/OT to improve balance, ambulation, ADLs, and to improve the P/AROM. Functional and medical status reassessed regarding patients ability to participate in therapies and patient found to be able to participate in acute intensivecomprehensive inpatient rehabilitation program.  Therapeutic modifications regarding activities in therapies, place, amount of time per day and intensity of therapy made daily.       Enroll in acute course of therapy program to include 1/2 hours per day of PT 5 to 7days per week and 1 1/2 hours per day of OT 5 to 7 days per week,   SLP and Rec T 1/2 hour per day 3-5 days per week. The patient is stable medically and physically on previous exam.  When necessary therapy will be spread out over a 7-day window. This patient present with significant new onset decreased mobility andinability to perform activities of daily living skills independently and is at significant risk for prolonged disability  For this reason they have been admitted to [facility]. Thepatient's current functional and medical status are highly complex but the patient is able to participate in intensive rehabilitation. A comprehensive inpatient rehabilitation program is appropriate. The patient Cami Starr initial evaluation by the rehabilitation team and be discussed at regular treatment team meetings to assess progress, mobility, self care, mood and discharge issues. Physical therapy will be consulted for mobilityand endurance issues and should be performed 1 to 2 times per day, 7 days per week for the length of stay. Occupational therapy will be consulted for activities of daily living and should be performed 1 to 2 times per day,7 days per week for the length of stay. Their capacity to participate at an acute level, decision to be treated in the gym, room or on the unit, their activity goals for the day and the number of minutes of activeparticipation will be reassessed and re-prescribed daily. Because this patient is medically complex, I will check a CBC, BMP, UA and orthostatic blood pressures. They will be reassessed daily regarding their ability toparticipate in an acute level rehabilitation program.  Recreational Therapy will be consulted for community re-entry and adjustment to disability. Communication, cognitive and emotional issues will also be addressed duringthis rehabilitation stay by rehabilitation psychologist or speech therapist as appropriate.     I reviewed the patient's old and current charts and discussed other rehabilitation options with the rehabilitation team including the rehab RN and the admission team as well as the patient. I feel that the patient's functional recovery would be best served at an acute inpatient rehabilitation program because the patient needs intense therapy three hours per day, direct RN supervision and daily monitoring by a physician for medical status. This cannot be sufficiently provided by home health care, a skilled nursing facility or in an outpatient setting. I further feel that the patient has the potential to improve functional abilities in an acute intensive rehabilitation program.    Old records were reviewed and summarized. 2.  Other diagnoses which complicate rehabilitation stay include:     Principal Problem:    Impaired mobility and activities of daily living dt Parkinsonson and right shoulder replacement  Active Problems:  1. HTN (hypertension)-continue blood signs every shift focusing on heart rate and blood pressure checks, consult hospitalist for backup medical and adjust/add medications (Aldactone magnesium oxide, HydroDIURIL). Monitor heart rate and blood pressure as well as medications effects on vital signs before during and after therapy. 2.   Parkinsonism,   Dyskinesia-titrate dopaminergic medications  3. Lower extremity numbness-lower extremity-improving  4. Osteoarthritis of right hip, Primary osteoarthritis of right knee-add Tylenol and topicals  5. Shoulder pain, S/P arthroscopy of right shoulder       I am especially concerned about their recent medical complexities. The patient's high risk medication use includes Oxy IR. The patient is high risk for urinary tract infection, an admission urinalysis has been ordered. I will have the nurses check post void residual bladder volumes and place acatheter if excessive urine is retained in the bladder after voiding.      The patient is risk for deep venous thromosis, complex deep venous thrombosis protocol prophylaxis has been ordered. The patient is high risk for orthostasis and a hydration program and orthostatic blood pressure screening have been ordered. I will attempt to get old records from the patient's previous hospital stay. [facility]. 3.  Current and previous medications were reviewed and summarized and compared to old medication lists from home and from the acute floor. 4.  Complex labs and x-rays were reviewed. I will review patient's old EKG and labs. 5.  Will provide emotional support for this patient regarding adjustment to their disability. Cognition and mood will be screened daily and addressed by rehabilitationpsychology and/or speech therapy as appropriate. I have encouraged the patient to attend the Rehab Adjustment to Disability Support Group and recreational therapy. 6.  Estimated length of stay is 1week. Discharge to home with help from family and home health PT, OT, RN, and aide. Patient should be independent at discharge. 7.  The patient's medical and rehab prognosis are good. [address] regarding the patient's back up to general medical needs. A welcome letter was presented with an explanation of my services, my specialty and what to expect during the rehabilitation process. As well as introducing myself, I also wrote my name on their bedside marker board with their name as well as the names of the other physicians with an explanation of our individual roles in their care, as well as the rehab process.             Bette Fuller D.O., F.A.A.P.AMADO.&MAURO

## 2022-01-13 NOTE — PROGRESS NOTES
Physical Therapy Rehab Treatment Note  Facility/Department: [facility]  [facility]       NAME: Danyell Tomas  : 1950 (66 y.o.)  MRN: [id number]  CODE STATUS: Full Code    Date of Service: 2022  Chart Reviewed: Yes  Family / Caregiver Present: Yes ( present initially)  General Comment  Comments: agreeable to tx    Restrictions:  Restrictions/Precautions: Weight Bearing,Fall Risk  Upper Extremity Weight Bearing Restrictions  Other: sling in place       SUBJECTIVE: Subjective: reports constant pain in left lateral hip and back. Pain Screening  Patient Currently in Pain: Yes       Post Treatment Pain Screenin  Pain Assessment  Pain Assessment: 0-10  Pain Type: Surgical pain  Pain Location: Shoulder  Pain Orientation: Right  Pain Descriptors: Aching  Pain Frequency: Intermittent    OBJECTIVE:   Overall Orientation Status: Within Functional Limits  Follows Commands: Within Functional Limits                     Transfers  Sit to Stand: Minimal Assistance  Stand to sit: Minimal Assistance  Bed to Chair: Minimal assistance    Ambulation  Ambulation?: Yes  Ambulation 1  Surface: level tile  Device: No Device  Assistance: Minimal assistance  Quality of Gait: mildly retropulsive, wbos, decreased step length bilaterally  Gait Deviations: Increased CAMPOS; Decreased step length  Distance: 20 feet x 2 in room. Comments: pt with less need for assistance in room with ambulation short distance                   Manual therapy  Other: stretches bilaterally while longseated. ASSESSMENT/PROGRESS TOWARDS GOALS: progressing. Goals:  Long term goals  Long term goal 1: indep bed mobility  Long term goal 2: indep sit to stand, bed and car transfers  Long term goal 3: Pt to require SBA for gait with or without device 150 feet  Long term goal 4: Pt able to perform 2-4 stairs with SBA and curb style step with SBA  Long term goal 5:  Indep with standing HEP    PLAN OF CARE/Safety:   Safety Devices  Type of devices: Chair alarm in place; Left in chair      Therapy Time:   Individual   Time In 1300   Time Out 1330   Minutes 30     Minutes:30      Transfer/Bed mobility training:10      Gait training:10      Neuro re education:0     Therapeutic ex:0      Manual: 10      [facility] JASON CARTY PTA, 01/13/22 at 1:37 PM

## 2022-01-13 NOTE — PROGRESS NOTES
Physical Therapy Rehab Treatment Note  [facility]  [facility]       NAME: Raza Rivera  : 1950 (66 y.o.)  MRN: [id number]  CODE STATUS: Full Code    Date of Service: 2022  Chart Reviewed: Yes  Family / Caregiver Present: No    Restrictions:  Restrictions/Precautions: Weight Bearing,Fall Risk  Upper Extremity Weight Bearing Restrictions  Right Upper Extremity Weight Bearing: Non Weight Bearing  Other: sling in place       SUBJECTIVE: Response To Previous Treatment: Patient with no complaints from previous session. Pain Screening  Patient Currently in Pain: Yes  Pre Treatment Pain Screening  Pain at present: 5  Scale Used: Numeric Score  Intervention List: Patient able to continue with treatment;Patient declined any intervention  Comments / Details: CP applied to back/hip during exercises. Cushion provided for chair and lumbar support. Post Treatment Pain Screening:  Pain Assessment  Pain Level: 6  Pain Location: Back;Hip; Shoulder  Pain Orientation: Left;Right  Pain Descriptors: Aching; Sore    OBJECTIVE:   Overall Orientation Status: Within Functional Limits  Follows Commands: Within Functional Limits           Neuromuscular Education  NDT Treatment: Standing (weight shifting.)         Transfers  Sit to Stand: Stand by assistance  Stand to sit: Contact guard assistance    Ambulation  Ambulation?: Yes  Ambulation 1  Surface: carpet  Device: Hand-Held Assist  Assistance: Minimal assistance  Quality of Gait: Pt with slight forward pitch with LOB. Lateral sway. Gait Deviations: Decreased step length;Decreased step height;Slow Lexy  Distance: 79' with 3 turns. Stairs/Curb  Stairs?: No              Exercises  Hip Flexion: x 20  Knee Long Arc Quad: x 20  Ankle Pumps: x 20  Neurodevelopmental Techniques: MRE'S: ABD/ADD x 20     ASSESSMENT/PROGRESS TOWARDS GOALS:  Body structures, Functions, Activity limitations: Decreased functional mobility ; Decreased balance;Decreased ROM;Decreased strength;Decreased sensation; Increased pain;Decreased posture;Decreased coordination  Assessment: Pt with decreased balance with gait and requiring HHA. Pt pill rolling in both hands with gait. Pt required several rest breaks secondary to fatigue, but was willing to work with therapy. Goals:  Long term goals  Long term goal 1: indep bed mobility  Long term goal 2: indep sit to stand, bed and car transfers  Long term goal 3: Pt to require SBA for gait with or without device 150 feet  Long term goal 4: Pt able to perform 2-4 stairs with SBA and curb style step with SBA  Long term goal 5: Indep with standing HEP    PLAN OF CARE/Safety:   Safety Devices  Type of devices: Chair alarm in place; Left in chair      Therapy Time:   Individual   Time In 1330   Time Out 1400   Minutes 30     Minutes:30      Transfer/Bed mobility trainin      Gait trainin      Neuro re education: 15     Therapeutic ex: 7      Janusz Peña PTA, 22 at 5:10 PM

## 2022-01-13 NOTE — FLOWSHEET NOTE
Patient's assessment is complete. RN removed the dressing to her right shoulder. There was no odor, no drainage. The incision was well approximated. She applied xeroform and an ABD pad with cloth tape.   Patient was medicated for pain early this morning then again at 1100 am.

## 2022-01-14 PROCEDURE — 6370000000 HC RX 637 (ALT 250 FOR IP): Performed by: FAMILY MEDICINE

## 2022-01-14 PROCEDURE — 6370000000 HC RX 637 (ALT 250 FOR IP): Performed by: ORTHOPAEDIC SURGERY

## 2022-01-14 PROCEDURE — 97116 GAIT TRAINING THERAPY: CPT

## 2022-01-14 PROCEDURE — 6370000000 HC RX 637 (ALT 250 FOR IP): Performed by: PHYSICAL MEDICINE & REHABILITATION

## 2022-01-14 PROCEDURE — 97535 SELF CARE MNGMENT TRAINING: CPT

## 2022-01-14 PROCEDURE — 97110 THERAPEUTIC EXERCISES: CPT

## 2022-01-14 PROCEDURE — 97140 MANUAL THERAPY 1/> REGIONS: CPT

## 2022-01-14 PROCEDURE — 99233 SBSQ HOSP IP/OBS HIGH 50: CPT | Performed by: PHYSICAL MEDICINE & REHABILITATION

## 2022-01-14 PROCEDURE — 1180000000 HC REHAB R&B

## 2022-01-14 PROCEDURE — 97530 THERAPEUTIC ACTIVITIES: CPT

## 2022-01-14 RX ORDER — DOCUSATE SODIUM 100 MG/1
100 CAPSULE, LIQUID FILLED ORAL 2 TIMES DAILY PRN
Status: DISCONTINUED | OUTPATIENT
Start: 2022-01-14 | End: 2022-01-15 | Stop reason: HOSPADM

## 2022-01-14 RX ORDER — METAXALONE 800 MG/1
800 TABLET ORAL 3 TIMES DAILY
Status: DISCONTINUED | OUTPATIENT
Start: 2022-01-14 | End: 2022-01-15 | Stop reason: HOSPADM

## 2022-01-14 RX ORDER — OXYCODONE AND ACETAMINOPHEN 7.5; 325 MG/1; MG/1
1 TABLET ORAL
Status: DISCONTINUED | OUTPATIENT
Start: 2022-01-14 | End: 2022-01-15 | Stop reason: HOSPADM

## 2022-01-14 RX ADMIN — METAXALONE 800 MG: 800 TABLET ORAL at 08:41

## 2022-01-14 RX ADMIN — METAXALONE 800 MG: 800 TABLET ORAL at 21:02

## 2022-01-14 RX ADMIN — OXYCODONE AND ACETAMINOPHEN 1 TABLET: 7.5; 325 TABLET ORAL at 17:10

## 2022-01-14 RX ADMIN — SENNOSIDES, DOCUSATE SODIUM 1 TABLET: 8.6; 5 TABLET ORAL at 08:36

## 2022-01-14 RX ADMIN — Medication 650 MG: at 21:01

## 2022-01-14 RX ADMIN — OXYCODONE AND ACETAMINOPHEN 1 TABLET: 7.5; 325 TABLET ORAL at 21:01

## 2022-01-14 RX ADMIN — CYANOCOBALAMIN TAB 500 MCG 1000 MCG: 500 TAB at 08:36

## 2022-01-14 RX ADMIN — Medication 100 MG: at 08:36

## 2022-01-14 RX ADMIN — THERA TABS 1 TABLET: TAB at 08:36

## 2022-01-14 RX ADMIN — ASPIRIN 81 MG: 81 TABLET, COATED ORAL at 08:35

## 2022-01-14 RX ADMIN — METAXALONE 800 MG: 800 TABLET ORAL at 13:36

## 2022-01-14 RX ADMIN — HYDROCHLOROTHIAZIDE 50 MG: 25 TABLET ORAL at 08:35

## 2022-01-14 RX ADMIN — OXYCODONE HYDROCHLORIDE 5 MG: 5 TABLET ORAL at 02:00

## 2022-01-14 RX ADMIN — Medication 400 MG: at 08:36

## 2022-01-14 RX ADMIN — CARBIDOPA AND LEVODOPA 1 TABLET: 25; 100 TABLET ORAL at 13:36

## 2022-01-14 RX ADMIN — CARBIDOPA AND LEVODOPA 1 TABLET: 25; 100 TABLET ORAL at 21:02

## 2022-01-14 RX ADMIN — SENNOSIDES, DOCUSATE SODIUM 1 TABLET: 8.6; 5 TABLET ORAL at 21:01

## 2022-01-14 RX ADMIN — Medication 650 MG: at 17:06

## 2022-01-14 RX ADMIN — Medication 2000 UNITS: at 17:06

## 2022-01-14 RX ADMIN — SPIRONOLACTONE 50 MG: 25 TABLET ORAL at 08:36

## 2022-01-14 RX ADMIN — CARBIDOPA AND LEVODOPA 1 TABLET: 25; 100 TABLET ORAL at 08:36

## 2022-01-14 RX ADMIN — DOCUSATE SODIUM 100 MG: 100 CAPSULE ORAL at 08:41

## 2022-01-14 RX ADMIN — Medication 650 MG: at 06:47

## 2022-01-14 RX ADMIN — Medication 500 MG: at 02:00

## 2022-01-14 RX ADMIN — OXYCODONE AND ACETAMINOPHEN 1 TABLET: 7.5; 325 TABLET ORAL at 13:37

## 2022-01-14 RX ADMIN — OXYCODONE HYDROCHLORIDE 5 MG: 5 TABLET ORAL at 06:51

## 2022-01-14 ASSESSMENT — PAIN SCALES - GENERAL
PAINLEVEL_OUTOF10: 7
PAINLEVEL_OUTOF10: 7
PAINLEVEL_OUTOF10: 4
PAINLEVEL_OUTOF10: 9
PAINLEVEL_OUTOF10: 5
PAINLEVEL_OUTOF10: 9
PAINLEVEL_OUTOF10: 2
PAINLEVEL_OUTOF10: 9
PAINLEVEL_OUTOF10: 9
PAINLEVEL_OUTOF10: 7

## 2022-01-14 ASSESSMENT — PAIN DESCRIPTION - PAIN TYPE
TYPE: ACUTE PAIN
TYPE: SURGICAL PAIN

## 2022-01-14 ASSESSMENT — PAIN DESCRIPTION - ORIENTATION: ORIENTATION: RIGHT

## 2022-01-14 ASSESSMENT — PAIN DESCRIPTION - LOCATION
LOCATION: SHOULDER;BACK

## 2022-01-14 ASSESSMENT — PAIN DESCRIPTION - FREQUENCY: FREQUENCY: INTERMITTENT

## 2022-01-14 ASSESSMENT — PAIN DESCRIPTION - DESCRIPTORS: DESCRIPTORS: ACHING

## 2022-01-14 NOTE — PROGRESS NOTES
slight forward pitch with LOB. Lateral sway. (01/13/22 1401)  Comments: pt with less need for assistance in room with ambulation short distance (01/13/22 1332)  Stairs:     W/C mobility:       Focus on energy conservation and consistency of function. OCCUPATIONAL THERAPY  Hand Dominance: Right  ADL  Feeding: Minimal assistance (01/13/22 1047)  Grooming: Minimal assistance (to comb back of hair) (01/13/22 1047)  UE Bathing: Minimal assistance; Increased time to complete (to wash shoulders) (01/13/22 1047)  LE Bathing: Minimal assistance (to wash feet) (01/13/22 1047)  UE Dressing: Maximum assistance; Increased time to complete (01/13/22 1047)  LE Dressing: Dependent/Total (01/13/22 1047)  Toileting: Unable to assess(comment) (pt did not need to go) (01/13/22 1047)  Toilet Transfers  Toilet - Technique: Stand step (01/13/22 1051)  Equipment Used: Grab bars (01/13/22 1051)  Toilet Transfer: Contact guard assistance (01/13/22 1051)     Shower Transfers  Shower - Transfer From: Wheelchair (01/13/22 1051)  Shower - Transfer Type: To and From (01/13/22 1051)  Shower - Transfer To: Shower seat with back (01/13/22 1051)  Shower - Technique:  (stand step) (01/13/22 1051)  Shower Transfers: Contact Guard (01/13/22 1051)    Focus on sequencing. SPEECH THERAPY/SLP               Diet/Swallow:                           COGNITION  OT: Cognition Comment: Comp: Supervision Express: Independent Social: Independent Prob: Min A Mem: Min A  SP:            THERAPY, MEDICAL AND NURSING COORDINATION:    [x]  Pain medication before therapies     [x]  Check orthostatic BP and monitor heart rate and medications effects with therapy      [x]  Ambulate to the bathroom in room    [x]  Add scheduled rest beaks     []  In room therapies      Discharge date set for:              1/15/22      Home with:     with help from               And:      Home Health Care:     [x]  PT    []  OT    []  ST   [x]  Aide   []  SW    [x]  RN Outpatient Therapy:  []  PT    []  OT    []  ST   []  Rehab Psych                 Equipment:  [employer]      At D/C their function is goaled at:   PT:Long term goal 1: indep bed mobility  Long term goal 2: indep sit to stand, bed and car transfers  Long term goal 3: Pt to require SBA for gait with or without device 150 feet  Long term goal 4: Pt able to perform 2-4 stairs with SBA and curb style step with SBA  Long term goal 5:  Indep with standing HEP  OT:Eating  Assistance Needed: Partial/moderate assistance  CARE Score: 3  Discharge Goal: Set-up or clean-up assistance, Oral Hygiene  Assistance Needed: Partial/moderate assistance  CARE Score: 3  Discharge Goal: Supervision or touching assistance, Toileting Hygiene  Reason if not Attempted: Not applicable (pt did not need to go)  CARE Score: 9  Discharge Goal: Supervision or touching assistance, Shower/Bathe Self  Assistance Needed: Partial/moderate assistance  CARE Score: 3  Discharge Goal: Substantial/maximal assistance  Upper Body Dressing  Assistance Needed: Substantial/maximal assistance  CARE Score: 2  Discharge Goal: Supervision or touching assistance, Lower Body Dressing  Assistance Needed: Partial/moderate assistance  CARE Score: 3  Discharge Goal: Supervision or touching assistance, Putting On/Taking Off Footwear  Assistance Needed: Dependent  CARE Score: 1  Discharge Goal: Independent, Toilet Transfer  Assistance Needed: Supervision or touching assistance  CARE Score: 4  Discharge Goal: Independent  SP:               From a cognitive standpoint they will need:        24 hr supervision  --progress to occasional           Significant problems/ barriers to functional progress include: Pt is at a high risk for functional loss,    [x]  Acute infection/UTI    []  Low BP's     []  COPD flare-up   []  Uncontrolled blood sugar     []  Progressive anemia     []  poor endurance    [x]  Severe pain exacerbation     []  Impaired mental status    []  Urinary incontinence    []  Bowel incontinence           Plan to correct barriers to functional progress: Add scheduled rest breaks, CoQ 10 and Vit B 12, control pain by using ice Lidoderm rest and massage as well as pain medications prior to therapy. Spread therapy of 15 hours out over a 7 day window to accommodate rest breaks and medical interventions. Patient seems to be making fair to good response to these interventions. Based on a comprehensive evaluation of the above, the individualized therapy and Discharge plan will be:    -Times stated are an average that will be varied based on the patient's daily need. PT    1 1/2  hrs/day 5-7 days per week           OT    1 1/2 hrs per day 5-7 days per week        Estimated LOS 1/ 2 week(s)    - Overall functional prognosis:     [x]  Good    []  Fair    []  Poor -Medical Prognosis:   [x]  Good    []  Fair    []  Poor    This patient was made aware of the discussion of Plan of Care, their projected dicharge date and their projected function at discharge.        Kitty Otto, DO

## 2022-01-14 NOTE — PLAN OF CARE
Problem: Pain:  Goal: Pain level will decrease  Description: Pain level will decrease  Outcome: Ongoing  Goal: Control of acute pain  Description: Control of acute pain  Outcome: Ongoing  Goal: Control of chronic pain  Description: Control of chronic pain  Outcome: Ongoing     Problem: Falls - Risk of:  Goal: Will remain free from falls  Description: Will remain free from falls  Outcome: Ongoing  Goal: Absence of physical injury  Description: Absence of physical injury  Outcome: Ongoing     Problem: IP BALANCE  Goal: LTG - patient will maintain standing balance to allow for completion of daily activities  Outcome: Ongoing     Problem: Skin Integrity:  Goal: Will show no infection signs and symptoms  Description: Will show no infection signs and symptoms  Outcome: Ongoing  Goal: Absence of new skin breakdown  Description: Absence of new skin breakdown  Outcome: Ongoing

## 2022-01-14 NOTE — CONSULTS
Consult Note            Date:2022        Patient Jovany Quan     YOB: 1950     Age:71 y.o. Reason for Consult:  Medical management    Chief Complaint   No chief complaint on file. Left sided lower back pain, right shoulder pain    History Obtained From   patient    History of Present Illness   64-year-old female with significant past medical history of Parkinson's disease, hypertension, osteoarthritis, status post rotator cuff repair per Dr. Khalida Hill in 2022. Patient's initially had left lower extremity numbness which has resolved. She does complain of lower back pain which she thinks is just from the position that she was lying in. She has been admitted to acute rehabilitation at [facility] for further management in her care. Her pain medications have been adjusted by the acute rehab physician. Laboratory values reviewed with magnesium 1.7, glucose was controlled, electrolytes within normal limits including potassium 3.9, sodium 140, chloride 103 and CO2 21. Kidney function is normal with a creatinine of 0.66. White blood cell count is elevated but she is without fever. She denies any urinary symptoms. Denies cough or congestion. Denies abdominal pain nausea vomiting constipation or diarrhea.      Past Medical History     Past Medical History:   Diagnosis Date    Hypertension     meds > 10 yrs / denies TIA or stroke    Osteoarthritis     DJD right hip    [facility] [address]     Parkinsonism [address] [address]         Past Surgical History     Past Surgical History:   Procedure Laterality Date     SECTION  1988    COLONOSCOPY N/A 2020    COLONOSCOPY WITH POLYPECTOMY performed by Kika Morel MD at [address] Left     arthrotomy    SHOULDER ARTHROSCOPY Left 2021    LEFT SHOULDER ARTHROSCOPY ROTATOR CUFF REPAIR, SUBACROMIAL DECOMPRESSION performed by Shai Duron MD at [facility] external patch 3 patch, Daily  acetaminophen (TYLENOL) tablet 650 mg, Q4H PRN  bisacodyl (DULCOLAX) suppository 10 mg, Daily PRN  fleet rectal enema 1 enema, Daily PRN  carbidopa-levodopa (SINEMET)  MG per tablet 1 tablet, TID  acetaminophen (TYLENOL) tablet 650 mg, Q6H  aluminum & magnesium hydroxide-simethicone (MAALOX) 200-200-20 MG/5ML suspension 15 mL, Q6H PRN  oxyCODONE (ROXICODONE) immediate release tablet 5 mg, Q4H PRN  sennosides-docusate sodium (SENOKOT-S) 8.6-50 MG tablet 1 tablet, BID  acetaminophen (TYLENOL) tablet 500 mg, Nightly PRN   And  diphenhydrAMINE (BENADRYL) tablet 25 mg, Nightly PRN  aspirin EC tablet 81 mg, Daily  magnesium oxide (MAG-OX) tablet 400 mg, Daily  multivitamin 1 tablet, Daily  spironolactone (ALDACTONE) tablet 50 mg, Daily   And  hydroCHLOROthiazide (HYDRODIURIL) tablet 50 mg, Daily  vitamin B-12 (CYANOCOBALAMIN) tablet 1,000 mcg, Daily        Allergies   Tramadol and Sulfa antibiotics    Social History     Social History     Tobacco History     Smoking Status  Never Smoker    Smokeless Tobacco Use  Never Used          Alcohol History     Alcohol Use Status  Yes Drinks/Week  1 Glasses of wine per week Amount  1.0 standard drink of alcohol/wk Comment  occasional          Drug Use     Drug Use Status  Never          Sexual Activity     Sexually Active  Not Asked                Family History     Family History   Problem Relation Age of Onset    High Blood Pressure Mother     Stroke Mother     Emphysema Father     Other Sister         fibromyalgia    Stroke Brother     Heart Disease Brother     Stroke Sister         brain aneurysm at age 39    Other Brother         drowning accident at age 39       Review of Systems   12 point review of systems reviewed with patient pertinent positives listed in HPI otherwise review of systems negative    Physical Exam   BP (!) 149/71   Pulse 92   Temp 98.4 °F (36.9 °C) (Oral)   Resp 16   LMP 08/10/2007   SpO2 95% CONSTITUTIONAL:  awake, alert, cooperative, no apparent distress, and appears stated age  EYES:  pupils equal, round and reactive to light and extra-ocular muscles intact  ENT:  Normocephalic, without obvious abnormality, atraumatic, sinuses nontender on palpation, external ears without lesions, oral pharynx with moist mucus membranes, tonsils without erythema or exudates, gums normal and good dentition. NECK:  Supple, symmetrical, trachea midline, no adenopathy, thyroid symmetric, not enlarged and no tenderness, skin normal  BACK:  Symmetric, no curvature, spinous processes are non-tender on palpation, paraspinous muscles are non-tender on palpation, no costal vertebral tenderness  LUNGS:  No increased work of breathing, good air exchange, clear to auscultation bilaterally, no crackles or wheezing  CARDIOVASCULAR:  Normal apical impulse, regular rate and rhythm, normal S1 and S2, no S3 or S4, and no murmur noted  ABDOMEN:  normal bowel sounds, non-tender and obese abdomen  MUSCULOSKELETAL: Right upper extremity immobilized, rigidity to extremities  NEUROLOGIC:  Awake, alert, oriented to name, place and time. Cranial nerves II-XII are grossly intact. Motor is 5 out of 5 bilaterally. Cerebellar finger to nose, heel to shin intact. Sensory is intact. Babinski down going, Romberg negative, and gait is normal.    Labs    CBC:  Recent Labs     01/12/22  0525   WBC 13.2*   RBC 4.34   HGB 12.8   HCT 39.0   MCV 89.9   RDW 13.4        CHEMISTRIES:  Recent Labs     01/12/22  0525 01/13/22  0713     --    K 3.9  --      --    CO2 21  --    BUN 23  --    CREATININE 0.66  --    GLUCOSE 119*  --    MG 1.6* 1.7     PT/INR:No results for input(s): PROTIME, INR in the last 72 hours. APTT:No results for input(s): APTT in the last 72 hours.   LIVER PROFILE:  Recent Labs     01/12/22  0525   AST 28   ALT <5   BILITOT 0.3   ALKPHOS 64       Imaging/Diagnostics   XR SHOULDER RIGHT 1 VW    Result Date: 1/11/2022  There are no acute osseous injuries. There are no radiopaque foreign bodies. XR CHEST PORTABLE    Result Date: 1/12/2022  NO ACUTE CARDIOPULMONARY DISEASE. POSSIBLE BILATERAL ROTATOR CUFF INSTABILITY. Assessment      Hospital Problems           Last Modified POA    * (Principal) Impaired mobility and activities of daily living dt Parkinsonson and right shoulder replacement 1/13/2022 Yes    HTN (hypertension) 1/13/2022 Yes    Parkinsonism ([address] 1/13/2022 Yes    Osteoarthritis of right hip 1/13/2022 Yes    Primary osteoarthritis of right knee 1/13/2022 Yes    Dyskinesia 1/13/2022 Yes    Shoulder pain 1/13/2022 Yes    Acute deep vein thrombosis (DVT) of lower extremity ([address] 1/13/2022 Yes    S/P arthroscopy of right shoulder 1/13/2022 Yes    Overview Signed 1/13/2022  8:29 AM by DO BA Salazar         Abnormality of gait and mobility 1/13/2022 Yes      Acute episodic insomnia-Ambien has been ordered  ;eukocytosis  Plan   Patient was admitted to acute rehabilitation on January 13, 2022  She has been seen and examined  Afebrile  White blood cell count 13- cbc in am no sign of infection. Orthopedic to evaluate incision as needed  Urinalysis negative  No respiratory symptoms no GI symptoms  Pain management per acute rehabilitation physician-Oxy IR has been increased to 7.5.   Hypertension-patient is on hydrochlorothiazide and Aldactone  Magnesium 1.7- on 400 po daily  Sinemet for parkinsons  No Acute DVT  PT/OT  HAZEL hose  BMP magnesium and CBC   Plan of care discussed with patient and RN  Time spent 55-minute            Electronically signed by Griselda Bong, APRN - CNS on 1/14/22 at 12:01 PM EST

## 2022-01-14 NOTE — PROGRESS NOTES
Occupational Therapy  Facility/Department: [facility]  Daily Treatment Note  NAME: Tom Jay  : 1950  MRN: [id number]    Date of Service: 2022    Discharge Recommendations:  Continue to assess pending progress       Assessment      Activity Tolerance  Activity Tolerance: Patient Tolerated treatment well  Safety Devices  Safety Devices in place: Yes  Type of devices: All fall risk precautions in place         Patient Diagnosis(es): There were no encounter diagnoses. has a past medical history of Hypertension, Osteoarthritis, Parkinson disease ([facility] [address] and Parkinsonism ([facility] [address] has a past surgical history that includes knee surgery (Left);  section (1988); Dilation and curettage of uterus; Total hip arthroplasty (Right, 2017); Colonoscopy (N/A, 2020); Shoulder arthroscopy (Left, 2021); Tonsillectomy; [place] tooth extraction; and Shoulder arthroscopy (Right, 2022). Restrictions  Restrictions/Precautions  Restrictions/Precautions: Weight Bearing,Fall Risk  Upper Extremity Weight Bearing Restrictions  Right Upper Extremity Weight Bearing: Non Weight Bearing  Other: sling in place  Subjective Patient reported that she was supposed to go right home after surgery but did not because her foot fell asleep. Patient feels that she will be better off at home and will be able to sleep better.    General  Chart Reviewed: Yes  Patient assessed for rehabilitation services?: Yes  Response to previous treatment: Patient with no complaints from previous session  Family / Caregiver Present: No  Referring Practitioner: Dr. Martin Engle  Diagnosis: Impaired mobility and ADL's d/t parkinson's exacerbation s/p right shoulder arthroscopy  Pain Assessment  Pain Level: 4  Pain Type: Acute pain  Pain Location: Shoulder;Back  Pre Treatment Pain Screening  Pain at present: 4  Intervention List: Patient able to continue with treatment   Orientation   WNL  Objective    ADL  Feeding: Setup  Grooming: Supervision  UE Bathing: Minimal assistance (to wash left arm)  LE Bathing: None (did not complete)  UE Dressing: Maximum assistance  LE Dressing: None  Toileting: Moderate assistance (Assist to pull pants down and then pull them up. Patient was able to clean herself after urinating)  Additional Comments: During all ADL tasks patient reported that her  will assist her with whatever she needs. She reported that he assisted her after the last surgery and he will again. Patient reported that she had no desire to learn how to doff and don her sling because her  would do it        Balance  Sitting Balance: Modified independent   Standing Balance: Supervision  Functional Mobility  Functional - Mobility Device: Cane  Activity: To/from bathroom  Assist Level: Supervision  Toilet Transfers  Toilet - Technique: Ambulating  Equipment Used: Grab bars  Toilet Transfer: Supervision  Toilet Transfers Comments: cane     Transfers  Sit to stand: Supervision  Stand to sit: Supervision      arrived at the end of the session and was very supportive of patient        Plan   Plan  Times per week: 5-7x  Plan weeks: 2 weeks  Current Treatment Recommendations: Strengthening,Functional Mobility Training,Self-Care / ADL,Neuromuscular Re-education,Endurance Training,Cognitive/Perceptual Training,Patient/Caregiver Education & Training,ROM,Equipment Evaluation, Education, & procurement,Safety Education & Training  Plan Comment: Continue per OT plan of care       Goals  Long term goals  Time Frame for Long term goals :  Within 2 weeks pt to demonstrate progress in the following areas listed below to achieve specific LTG's as stated in inital evaluation  Long term goal 1: Improve independence during ADLs and IADLs  Long term goal 2: Improve strength and enduarnce to tolerate ADLs  Long term goal 3: Improve fine motor coordination during self care  Long term goal 4: Demonstrate carryover of UE precations  Long term goal 5: Demonstrate understanding of HEP  Patient Goals   Patient goals : \"to be able to walk up and down the stairs and get out of bed\"       Therapy Time   Individual Concurrent Group Co-treatment   Time In 1100         Time Out 1200         Minutes 60              ADL/IADL trainin minutes    HETAL Moreira    Electronically signed by HETAL Moreira on 2022 at 2:30 PM

## 2022-01-14 NOTE — PROGRESS NOTES
Occupational Therapy  Facility/Department: [facility]  Daily Treatment Note  NAME: Aureliano Landry  : 1950  MRN: [id number]    Date of Service: 2022    Discharge Recommendations:  Continue to assess pending progress       Assessment      Activity Tolerance  Activity Tolerance: Patient Tolerated treatment well  Safety Devices  Safety Devices in place: Yes  Type of devices: All fall risk precautions in place         Patient Diagnosis(es): There were no encounter diagnoses. has a past medical history of Hypertension, Osteoarthritis, Parkinson disease ([facility] [address] and Parkinsonism ([facility] [address] has a past surgical history that includes knee surgery (Left);  section (1988); Dilation and curettage of uterus; Total hip arthroplasty (Right, 2017); Colonoscopy (N/A, 2020); Shoulder arthroscopy (Left, 2021); Tonsillectomy; [place] tooth extraction; and Shoulder arthroscopy (Right, 2022). Restrictions  Restrictions/Precautions  Restrictions/Precautions: Weight Bearing,Fall Risk  Upper Extremity Weight Bearing Restrictions  Right Upper Extremity Weight Bearing: Non Weight Bearing  Other: sling in place  Subjective   General  Chart Reviewed: Yes  Patient assessed for rehabilitation services?: Yes  Response to previous treatment: Patient with no complaints from previous session  Family / Caregiver Present: No  Referring Practitioner: Dr. Brock Dinh  Diagnosis: Impaired mobility and ADL's d/t parkinson's exacerbation s/p right shoulder arthroscopy  Subjective  Subjective: \"they say I am going home tomorrow\" \"I wish it would be today\"  Pain Assessment  Pain Level: 2  Pain Type: Acute pain  Pain Location: Shoulder;Back  Pre Treatment Pain Screening  Pain at present: 2  Intervention List: Patient able to continue with treatment   Orientation   WFL  Objective        Patient's right arm was removed from the ultra sling in order to allow patient to perform ROM of the elbow and distal joints. Patient needed frequent cues to not elevate the shoulder when she was moving the elbow. Patient needed support for AAROM while moving the elbow but was able to complete supination/pronation and wrist flexion/extension as well as hand opening and closing. Sling was replaced at dependent level. Patient was asked to fold towels and was thrown off by completing it with one hand. Patient was cued to lay it flat on the table and when she did that she was able to fold 3 towels without assist.    Patient performed towel on table exercises using her left hand to encourage shoulder ROM. Plan   Plan  Times per week: 5-7x  Plan weeks: 2 weeks  Current Treatment Recommendations: Strengthening,Functional Mobility Training,Self-Care / ADL,Neuromuscular Re-education,Endurance Training,Cognitive/Perceptual Training,Patient/Caregiver Education & Training,ROM,Equipment Evaluation, Education, & procurement,Safety Education & Training  Plan Comment: Continue per OT plan of care    Goals  Long term goals  Time Frame for Long term goals :  Within 2 weeks pt to demonstrate progress in the following areas listed below to achieve specific LTG's as stated in inital evaluation  Long term goal 1: Improve independence during ADLs and IADLs  Long term goal 2: Improve strength and enduarnce to tolerate ADLs  Long term goal 3: Improve fine motor coordination during self care  Long term goal 4: Demonstrate carryover of UE precations  Long term goal 5: Demonstrate understanding of HEP  Patient Goals   Patient goals : \"to be able to walk up and down the stairs and get out of bed\"       Therapy Time   Individual Concurrent Group Co-treatment   Time In 1430         Time Out 1500         Minutes 30              Therapeutic activities: 15 minutes  Manual Therapy: 15 minutes    Corliss Kanner, OTR/L    Electronically signed by Corliss Kanner, OTR/L on 1/14/2022 at 3:48 PM

## 2022-01-14 NOTE — CARE COORDINATION
[address]  TEAM CONFERENCE NOTE  [facility]  Admit Date: 2022       Date: 2022  Patient Name: Laura Flynn        MRN: [id number]    : 1950  (76 y.o.)  Gender: female        REHAB DIAGNOSIS:   Diagnosis: Impaired mobility and activities of daily living dt Parkinsonson and right shoulder replacement    CO MORBIDITIES:      Past Medical History:   Diagnosis Date    Hypertension     meds > 10 yrs / denies TIA or stroke    Osteoarthritis     DJD right hip    Parkinson disease ([facility] [address]     Parkinsonism ([facility] [address]      Past Surgical History:   Procedure Laterality Date     SECTION  1988    COLONOSCOPY N/A 2020    COLONOSCOPY WITH POLYPECTOMY performed by Ashly Mcnamara MD at [address] Left     arthrotomy    SHOULDER ARTHROSCOPY Left 2021    LEFT SHOULDER ARTHROSCOPY ROTATOR CUFF REPAIR, SUBACROMIAL DECOMPRESSION performed by Ashu Serrano MD at [address] ARTHROSCOPY Right 2022    RIGHT SHOULDER ARTHROSCOPY ROTATOR CUFF REPAIR WITH DERMAL ALLOGRAFT, BICEPS TENOTOMY OPEN REPAIR performed by [address]errano MD [address] Right 2017    RIGHT HIP TOTAL HIP ARTHROPLASTY WILLIAM [facility]INAL  performed by Kristie Galindo MD at [address] EXTRACTION       Chart Reviewed: Yes  Family / Caregiver Present: No  General Comment  Comments: agreeable to tx  Restrictions  Restrictions/Precautions: Weight Bearing,Fall Risk  Upper Extremity Weight Bearing Restrictions  Right Upper Extremity Weight Bearing: Non Weight Bearing  Other: sling in place  CASE MANAGEMENT    Social/Functional History  Social/Functional History  Lives With: Spouse  Type of Home: House  Home Layout: Two level,1/2 bath on main level,Able to Live on Main level with bedroom/bathroom (will be sleeping in recliner on first floor upon discharge)  Home Access: Stairs to enter without rails (per pt, spouse is installing a L HR prior to d/c)  Entrance Stairs - Number of Steps: 2 - in addition to 2 small steps between indoor levels in the home, 12 stairs to second floor  Bathroom Shower/Tub: Tub/Shower unit  [place] Equipment: Shower chair,Grab bars in shower,Hand-held shower,3-in-1 commode  Home Equipment: Cane,Rolling walker  ADL Assistance: Needs assistance  Homemaking Assistance: Needs assistance  Homemaking Responsibilities: No (spouse completes)  Ambulation Assistance: Independent  Transfer Assistance: Independent  Active : No  Occupation: Retired  Type of occupation:   Leisure & Hobbies: reading, watching TV  IADL Comments: spouse completes medications/finances  Additional Comments: Pt has a dtr and son that live in the area, and another child that lives in [place]. Pts personal preferences: n/a    Pts assets/resources/support system: spouse supportive    COVERAGE INFORMATION:Payor: MEDICARE / Plan: MEDICARE PART A AND B / Product Type: *No Product type* /       NURSING    / There is no height or weight on file to calculate BMI. ADULT DIET; Regular    SpO2: 95 % (01/14/22 0826)  No active isolations    Skin Issues: No    Pain Managed: Yes    Bladder continence: No    Bowel continence: Yes      Other: Egg crate on bed  Colace  Percocet        PHYSICAL THERAPY  Bed mobility:  Supine to Sit: Maximum assistance (01/13/22 1047)  Sit to Supine: Maximum assistance (01/13/22 1047)  Transfers:  Sit to Stand: Stand by assistance (01/13/22 1401)  Bed to Chair: Minimal assistance (01/13/22 1331)  Gait:   Device: Hand-Held Assist (01/13/22 1401)  Other Apparatus: Wheelchair follow (01/13/22 1048)  Assistance: Minimal assistance (01/13/22 1401)  Distance: 79' with 3 turns. (01/13/22 1401)  Quality of Gait: Pt with slight forward pitch with LOB. Lateral sway.  (01/13/22 1401)  Comments: pt with less need for assistance  Upper Body Dressing  Assistance Needed: Substantial/maximal assistance  CARE Score: 2  Discharge Goal: Supervision or touching assistance, Lower Body Dressing  Assistance Needed: Partial/moderate assistance  CARE Score: 3  Discharge Goal: Supervision or touching assistance, Putting On/Taking Off Footwear  Assistance Needed: Dependent  CARE Score: 1  Discharge Goal: Independent, Toilet Transfer  Assistance Needed: Supervision or touching assistance  CARE Score: 4  Discharge Goal: Independent  OT Treatment Time: 1.5 hrs      SPEECH THERAPY                 Diet/Swallow:                       LTG:                COGNITION  OT: Cognition Comment: Comp: Supervision Express: Independent Social: Independent Prob: Min A Mem: Min A  SP:        RECREATIONAL THERAPY  Attendance to recreational therapy programs:    []  Pet Therapy  [] Music Therapy  [] Art Therapy    [] Recreation Therapy Group [] Support Group           Patient social interaction (mood, participation): good      Patient strengths: independent prior, supportive spouse    Patients goal: home with spouse    Problems/Barriers: needs physical assist        1. Safety:          - Intervention / Plan:    [x]  falls protocol     [x]  PT/OT    []  SP        - Results:         2. Potential DME needs:         - Intervention / Plan:  [x]  PT/OT     [x]  Assess equipment needs/access       - Results:         3. Weakness:          - Intervention / Plan:  [x]  PT/OT      []  Other:         - Results:         4.   Discharge planning needs:          - Intervention / Plan:  [x]  Weekly team conference      [x]  family training        - Results:         5.            - Intervention / Plan:          - Results:         6.            - Intervention / Plan:         - Results:         7.            - Intervention / Plan:         - Results:           Discharge Plan   Estimated Length of Stay: TBD    Tentative Discharge date: 1/15/22      Anticipated Discharge Destination: Home      Team recommendations:    1. Follow up Therapy :    PT  OT  RN    2. Home Health vs OP    Other:     Equipment needed at Discharge:  Other: TBD      Team Members Present at Conference:    Physician: Dr. Andra Og Worker: KWADWO Cho, CHRISTINA  RN: Mariella Arnold RN  Physical Therapist: Narda Lipscomb PT  Occupational Therapist: Roxy Livingston OTR  Speech Therapist: Lexie Fox, SLP       Electronically signed by KWADWO Cho LSW on 1/14/2022 at 5:46 PM

## 2022-01-14 NOTE — FLOWSHEET NOTE
Spiritual Care Services     Summary of Visit:  Pt was in the therapy area when I met with her, pt is very pleasant to talk to and is concerned for her sister who currently is in ICU in her [place] [facility], prayed for the pt and her sister for health and recovery and for the Lord's healing touch on her sister Rachael Young Assessment/Intervention/Outcomes:    Encounter Summary  Services provided to[de-identified] Patient  [facility]  Support System: (P) Spouse  Place of Pentecostal: (P) [facility]   [facility] Visiting: (P) No  Complexity of Encounter: (P) Moderate  Length of Encounter: (P) 30 minutes  Spiritual Assessment Completed: (P) Yes  Routine  Type: (P) Initial     Spiritual/Sikhism  Type: (P) Spiritual support  Assessment: (P) Approachable,Calm,Hopeful,Coping  Intervention: (P) Active listening,Explored feelings, thoughts, concerns,Prayer,Sustaining presence/ Ministry of presence,Discussed meaning/purpose,Discussed illness/injury and it's impact  Outcome: (P) Expressed gratitude,Comfort,Engaged in conversation,Encouraged,Expressed feelings/needs/concerns        Primary Decision Maker (Healthcare Proxy)  [facility] is[de-identified] Legal Next of [address] / Beliefs  Do you have any ethnic, cultural, sacramental, or spiritual Confucianism needs you would like us to be aware of while you are in the hospital?: No    Care Plan:        [id number] [address] Love   Electronically signed by Lety Ham on 1/14/22 at 3:03 PM EST     To reach a  for emotional and spiritual support, place an [facility] consult request.   If a  is needed immediately, dial 0 and ask to page the on-call .

## 2022-01-14 NOTE — CARE COORDINATION
Social/Functional Status:  Social/Functional History  Lives With: Spouse  Type of Home: House  Home Layout: Two level,1/2 bath on main level,Able to Live on Main level with bedroom/bathroom (will be sleeping in recliner on first floor upon discharge)  Home Access: Stairs to enter without rails (per pt, spouse is installing a L HR prior to d/c)  Entrance Stairs - Number of Steps: 2 - in addition to 2 small steps between indoor levels in the home, 12 stairs to second floor  Bathroom Shower/Tub: Tub/Shower unit  [place] Equipment: Shower chair,Grab bars in shower,Hand-held shower,3-in-1 commode  Home Equipment: Cane,Rolling walker  ADL Assistance: Needs assistance  Homemaking Assistance: Needs assistance  Homemaking Responsibilities: No (spouse completes)  Ambulation Assistance: Independent  Transfer Assistance: Independent  Active : No  Occupation: Retired  Type of occupation:   Leisure & Hobbies: reading, watching TV  IADL Comments: spouse completes medications/finances  Additional Comments: Pt has a dtr and son that live in the area, and another child that lives in [facility]. Spoke with patient and explained role in the team. Patient questions answered appropriately. Explained discharge process. Patient stated understanding. Pt plans to return to her two story home where she resides with her spouse. There are 2 BATSHEVA without HR, however her spouse plans to install a L HR prior to discharge. There are also two small steps in between indoor levels in the home. Pt plans to live on the first floor for the next 6 weeks and will sleep in a recliner. Pt has a shower chair, grab bars-shower, hand held shower, cane, bsc, and ww. Pt stated that she was independent with ADL's prior to hospitalization and that her spouse completed all IADL's.  Electronically signed by Mosie Rubinstein, MSW, REYNAW on 1/14/2022 at 11:36 AM

## 2022-01-14 NOTE — PROGRESS NOTES
Physical Therapy Rehab Treatment Note  Facility/Department: [facility]  Room: R246/R246-01       NAME: Marrie Fabry  : 1950 (66 y.o.)  MRN: [id number]  CODE STATUS: Full Code    Date of Service: 2022  Chart Reviewed: Yes    Restrictions:  Restrictions/Precautions: Weight Bearing,Fall Risk  Upper Extremity Weight Bearing Restrictions  Right Upper Extremity Weight Bearing: Non Weight Bearing  Other: sling in place       SUBJECTIVE:       Pre Treatment Pain Screening  Pain at present: 7  Intervention List: Patient able to continue with treatment  Comments / Details: back ache    Post Treatment Pain Screenin/10 in low back       OBJECTIVE:                    Bed mobility  Comment: NT - pt plans to stay in recliner at home    Transfers  Sit to Stand: Stand by assistance  Stand to sit: Stand by assistance  Bed to Chair: Stand by assistance  Car Transfer: Contact guard assistance  Comment: improved ability in all transfers - mildly unsteady in attaining standing but improved ability to correct balance; SBA with toilet transfer and mod assist for toileting. Ambulation  Ambulation?: Yes  Ambulation 1  Surface: carpet;uneven;level tile  Device: Hand-Held Assist;No Device; Single point cane;Rollator  Other Apparatus:  (no w/c follow)  Assistance: Stand by assistance;Minimal assistance  Quality of Gait: short step length, decreased trunk rotation, narrow CAMPOS, intermittent LOB with variable balance recovery assist needed  Distance: 20 ft(multiple trials with limitation by destination); 100 feet  Comments: Pt demonstrated safest gait with least amount of assistance with st cane. She progressed to SBA by end of session for 50 feet and CGA for remaining 50 feet. Stairs/Curb  Stairs?: Yes  Stairs  # Steps : 4  Stairs Height: 6\"  Rails: Left ascending  Assistance: Stand by assistance  Comment: Pt also performed 4\" curb step for home mobility between rooms requiring close SBA with st cane.  Pt with mild increased sway and concern for safety noted         Activity Tolerance  Activity Tolerance: Patient Tolerated treatment well          ASSESSMENT/PROGRESS TOWARDS GOALS:  Assessment: Pt deonstrated safest gait with st cane. She agrees with this assessment and does have one at [facility] for assistance. She reports her spouse is able to assist her at this level. Pt progressing well  PT Education: Adaptive Device Training;Transfer Training;General Safety;Gait Training  Patient Education: mild balance deficits a safety concern for pt    Goals:  Long term goals  Long term goal 1: indep bed mobility  Long term goal 2: indep sit to stand, bed and car transfers  Long term goal 3: Pt to require SBA for gait with or without device 150 feet  Long term goal 4: Pt able to perform 2-4 stairs with SBA and curb style step with SBA  Long term goal 5: Indep with standing HEP    PLAN OF CARE/Safety:   Safety Devices  Type of devices: Chair alarm in place; Left in chair      Therapy Time:   Individual   Time In 30   Time Out 1000   Minutes 30     Minutes:      Transfer/Bed mobility training: 10      Gait traininMireille Dailey PT, 22 at 12:37 PM

## 2022-01-14 NOTE — PROGRESS NOTES
Physical Therapy Treatment   Facility/Department: [facility] Q796/F722-99    NAME: Chidi Vickers    : 1950 (66 y.o.)  MRN: [id number]    Account: [de-identified]  Gender: female    Pts spouse present for observation and training. Pt performed chair and car transfers, gait with st cane, stairs with rail and curb step with cane at SBA-CGA level. Pts spouse stated he felt comfortable assisting pt at this level of care and did not feel the need to practice hands on as he had assisted her with her last shoulder surgery.      Therapy Time:    Individual   Time In 1240   Time Out 1255   Minutes 15   Gait train: 10 min  Transfer train: 5 min    Jose Kumar PT, 22 at 1:08 PM

## 2022-01-14 NOTE — PROGRESS NOTES
Nutrition Assessment     Type and Reason for Visit: Initial,Consult    Nutrition Recommendations/Plan:  Continue Current Diet    Nutrition Assessment:  Pt stable from a nutritional standpoint with verbalized fair appetite currently/good pta, with no nutritional complaints. To continue to monitor. Malnutrition Assessment:  Malnutrition Status: No malnutrition    Nutrition Related Findings:   PMH-Parkinson's, obesity, htn; s/p R shoulder surgery. Pt reports good appetite/intake pta, with slight decrease in appetite currently, but eating fairly well, and no nutritional complaints. Current Nutrition Therapies:    ADULT DIET; Regular    Anthropometric Measures:  · Height:  5'  · Current Body Wt:   230lb (stated on adm)  · BMI:  39.7    Nutrition Diagnosis:   · No nutrition diagnosis at this time     Nutrition Interventions:   Food and/or Nutrient Delivery:  Continue Current Diet  Nutrition Education/Counseling:  Education not indicated   Coordination of Nutrition Care:  Continue to monitor while inpatient    Goals:  Intake >75% of meals.        Nutrition Monitoring and Evaluation:   Behavioral-Environmental Outcomes:   None Identified  Food/Nutrient Intake Outcomes:  Food and Nutrient Intake  Physical Signs/Symptoms Outcomes:  Weight     Electronically signed by Rula Calero RD, LD on 1/14/22 at 1:54 PM EST

## 2022-01-14 NOTE — CARE COORDINATION
LSW met with pt and discussed discharge for 1/15, pt is pleased. No new DME is needed. LSW discussed HHC vs OP and pt declined both. Pt stated that she plans to follow up with her PCP and then will most likely go to the [facility] for OP when she feels ready. Pt's spouse will pick her up. No questions or concerns voiced.  Electronically signed by KWADWO Adler, CHRISTINA on 1/14/2022 at 5:48 PM

## 2022-01-14 NOTE — FLOWSHEET NOTE
Patient assessment is complete. A recliner was brought into her room for comfort. An eggcrate mattress was put on her bed.

## 2022-01-14 NOTE — PROGRESS NOTES
Assumed care for this patient. I gave oxy with a ty prn for 9/10 pain the patient has high anxiety and tremoring noted. She became on an off tearful. Positive reassurance ousmane listened to show she feels. She has back pain and leg pain since the surgery. New to her. I asked if she mentioned to Dr. Raymond Diaz assistant but she said no an she doesn't know why she didn't mention it to them. She was able to fall asleep after giving her pain med's an slept til 6 am. Which she asked for oxy with her am tylenol. Total bed change this am. She also wore the purwick  Last night it leaks with her movement. Call light in reach bed alarm on monitoring for safety an needs.

## 2022-01-14 NOTE — PLAN OF CARE
Problem: Pain:  Goal: Pain level will decrease  Description: Pain level will decrease  1/14/2022 1116 by Tonya Pereira RN  Outcome: Ongoing  1/14/2022 0256 by Veronica Hill RN  Outcome: Ongoing  Goal: Control of acute pain  Description: Control of acute pain  1/14/2022 1116 by Tonya Pereira RN  Outcome: Ongoing  1/14/2022 0256 by Veronica Hill RN  Outcome: Ongoing  Goal: Control of chronic pain  Description: Control of chronic pain  1/14/2022 1116 by Tonya Pereira RN  Outcome: Ongoing  1/14/2022 0256 by Veronica Hill RN  Outcome: Ongoing     Problem: Falls - Risk of:  Goal: Will remain free from falls  Description: Will remain free from falls  1/14/2022 1116 by Tonya Pereira RN  Outcome: Ongoing  1/14/2022 0256 by Veronica Hill RN  Outcome: Ongoing  Goal: Absence of physical injury  Description: Absence of physical injury  1/14/2022 1116 by Tonya Pereira RN  Outcome: Ongoing  1/14/2022 0256 by Veronica Hill RN  Outcome: Ongoing     Problem: Skin Integrity:  Goal: Will show no infection signs and symptoms  Description: Will show no infection signs and symptoms  1/14/2022 1116 by Tonya Pereira RN  Outcome: Ongoing  1/14/2022 0256 by Veronica Hill RN  Outcome: Ongoing  Goal: Absence of new skin breakdown  Description: Absence of new skin breakdown  1/14/2022 1116 by Tonya Pereira RN  Outcome: Ongoing  1/14/2022 0256 by Veronica Hill RN  Outcome: Ongoing     Problem: IP BALANCE  Goal: LTG - patient will maintain standing balance to allow for completion of daily activities  1/14/2022 1116 by Tonya Pereira RN  Outcome: Ongoing  1/14/2022 0256 by Veronica Hill RN  Outcome: Ongoing

## 2022-01-14 NOTE — PROGRESS NOTES
Subjective: The patient complains of severe acute on chronic progressive fatigue and right shoulder pain partially relieved by rest,medications, PT,  OT, Oxy IR  and rest and exacerbated by parkinsonian stiffness and recent right total shoulder replacement    I am concerned about patients medical complexities including  difficulty mobilizing status post right total shoulder replacement. She had hoped to go home after her right shoulder replacement despite nonweightbearing and Parkinson's disease however she developed right lower extremity numbness postoperatively. She was evaluated to fouled to have exacerbation of Parkinson's disease and neurologically needed monitoring regarding her right lower extremity numbness. She was admitted to acute rehab to titrate her Parkinson's medications control her pain and focus on mobility despite nonweightbearing right upper extremity postop right shoulder . He was very anxious last night a lot of problems pain and muscle spasm we discussed Bengay Skelaxin and increasing Percocet and trialing the eggcrate mattress. I reviewed current care and plans for further care with other rehab providers including nursing and case management. According to recent nursing note, \" Medicated with Oxycodone for 7/10 pain to RUE and lower back. Drsg to right shoulder is clean, dry and intact. Sling in place to RUE. Pur wick applied d/t pain being incont at times. In bed with alarm activated. Call light in reach. \".    ROS x10: The patient also complains of severely impaired mobility and activities of daily living. Otherwise no new problems with vision, hearing, nose, mouth, throat, dermal, cardiovascular, GI, , pulmonary, musculoskeletal, psychiatric or neurological. See Rehab H&P on Rehab chart dated .        Vital signs:  /71   Pulse 86   Temp 98.4 °F (36.9 °C)   Resp 20   LMP 08/10/2007   SpO2 95%   I/O:   PO/Intake:  fair PO intake, no problems observed or reported. Bowel/Bladder:  incontinent, no problems noted. General:  Patient is well developed, adequately nourished, non-obese and     well kempt. HEENT:    PERRLA, hearing intact to loud voice, external inspection of ear     and nose benign. Inspection of lips, tongue and gums benign  Musculoskeletal: No significant change in strength or tone. All joints stable. Inspection and palpation of digits and nails show no clubbing,       cyanosis or inflammatory conditions. Neuro/Psychiatric: Affect: flat but pleasant. Alert and oriented to person, place and     Situation with  No needed cues. No significant change in deep tendon reflexes or     sensation  Lungs:  Diminished, CTA-B. Respiration effort is normal at rest.     Heart:   S1 = S2, RRR. No loud murmurs. Abdomen:  Soft, non-tender, no enlargement of liver or spleen. Extremities:  No significant lower extremity edema or tenderness. Shoulder brace right shoulder tenderness  Skin:   Intact to general survey, healing right shoulder incision    Rehabilitation:  Physical therapy:   Bed Mobility:      Transfers: Sit to Stand: Stand by assistance  Stand to sit: Contact guard assistance  Bed to Chair: Minimal assistance, Ambulation 1  Surface: carpet  Device: Hand-Held Assist  Other Apparatus: Wheelchair follow  Assistance: Minimal assistance  Quality of Gait: Pt with slight forward pitch with LOB. Lateral sway. Gait Deviations: Decreased step length,Decreased step height,Slow Lexy  Distance: 79' with 3 turns. Comments: pt with less need for assistance in room with ambulation short distance,      FIMS:  ,  , Assessment: Pt with decreased balance with gait and requiring HHA. Pt pill rolling in both hands with gait. Pt required several rest breaks secondary to fatigue, but was willing to work with therapy.     Occupational therapy:    ,  , Assessment: Pt is a 67 y/o female from home with spouse who presents to [email or web address] with the above deficits s/p shoulder arthroscopy. Pt would benefit from OT services to maximize independence and safety during ADLs and IADLs. Speech therapy:        Lab/X-ray studies reviewed, analyzed and discussed with patient and staff:   Recent Results (from the past 24 hour(s))   Magnesium    Collection Time: 01/13/22  7:13 AM   Result Value Ref Range    Magnesium 1.7 1.7 - 2.4 mg/dL       XR SHOULDER RIGHT  1/11/2022  EXAMINATION: XR SHOULDER RIGHT 1 VW CLINICAL HISTORY:  INCORRECT COUNT COMPARISONS: None available. Technique: Single AP RIGHT SHOULDER X-RAY FINDINGS: There are no lytic or sclerotic bone lesions. The humeral head is located. There is no fracture or subluxation. The visualized portions of the lung and chest wall are within normal limits. There are no radiopaque foreign bodies. There is air in the subcutaneous soft tissues. There are no acute osseous injuries. There are no radiopaque foreign bodies. XR CHEST PORTABLE    Result Date: 1/12/2022  EXAMINATION: XR CHEST PORTABLE CLINICAL HISTORY: COUGH COMPARISONS: None available. FINDINGS: Low lung volumes. Decrease interval bilateral medium bilateral humeral heads, greater on right. Cardiopericardial silhouette normal for technique. Aorta calcified. Lungs clear. NO ACUTE CARDIOPULMONARY DISEASE. POSSIBLE BILATERAL ROTATOR CUFF INSTABILITY. Previous extensive, complex labs, notes and diagnostics reviewed and analyzed. ALLERGIES:    Allergies as of 01/12/2022 - Fully Reviewed 01/12/2022   Allergen Reaction Noted    Tramadol Nausea And Vomiting 08/17/2017    Sulfa antibiotics Hives 01/22/2013      (please also verify by checking MAR)     Today I evaluated this patient for periodic reassessment of medical and functional status.   The patient was discussed in detail at the treatment team meeting focusing on current medical issues, progress in therapies, social issues, psychological issues, barriers to progress and strategies to address these barriers, and discharge planning. See the addendum to rehab progress note-as a second progress note in the chart. The patient continues to be high risk for future disability and their medical and rehabilitation prognosis continue to be good and therefore, we will continue the patient's rehabilitation course as planned. The patient's tentative discharge date was set. Patient and family education was discussed. The patient was made aware of the team discussion regarding their progress. Complex Physical Medicine & Rehab Issues Assess & Plan:   1. Severe abnormality of gait and mobility and impaired self-care and ADL's secondary to progressive Parkinson's disease status post right total shoulder replacement. Functional and medical status reassessed regarding patients ability to participate in therapies and patient found to be able to participate in acute intensive comprehensive inpatient rehabilitation program including PT/OT to improve balance, ambulation, ADLs, and to improve the P/AROM. Therapeutic modifications regarding activities in therapies, place, amount of time per day and intensity of therapy made daily. In bed therapies or bedside therapies prn.   2. Bowel and Bladder dysfunction opiate related constipation:  frequent toileting, ambulate to bathroom with assistance, check post void residuals. Check for C.difficile x1 if >2 loose stools in 24 hours, continue bowel & bladder program.  Monitor bowel and bladder function. Lactinex 2 PO every AC. MOM prn, Brown Bomb prn, Glycerin suppository prn, enema prn.  3. Severe postop right shoulder pain as well as generalized OA pain: reassess pain every shift and prior to and after each therapy session, give prn Tylenol and Oxy IR, modalities prn in therapy, masage, Lidoderm, K-pad prn. Consider scheduled AM pain meds.   4. Skin healing right shoulder incision and breakdown risk:  continue pressure relief program.  Daily skin exams and reports from nursing. 5. Severe fatigue due to nutritional and hydration deficiency: Add and titrate vitamin B12 vitamin D and CoQ10 continue to monitor I&Os, calorie counts prn, dietary consult prn.  6. Acute episodic insomnia with situational adjustment disorder:  prn Ambien, monitor for day time sedation. 7. Falls risk elevated:  patient to use call light to get nursing assistance to get up, bed and chair alarm. 8. Elevated DVT risk: progressive activities in PT, continue prophylaxis HAZEL hose, elevation . 9. Complex discharge planning:  Weekly team meeting every Monday  to assess progress towards goals, discuss and address social, psychological and medical comorbidities and to address difficulties they may be having progressing in therapy. Patient and family education is in progress. The patient is to follow-up with their family physician after discharge. Complex Active General Medical Issues that complicate care Assess & Plan:    1. HTN (hypertension)-continue blood signs every shift focusing on heart rate and blood pressure checks, consult hospitalist for backup medical and adjust/add medications (Aldactone magnesium oxide, HydroDIURIL). Monitor heart rate and blood pressure as well as medications effects on vital signs before during and after therapy. 2.   Parkinsonism,   Dyskinesia-titrate dopaminergic medications  3. Lower extremity numbness-lower extremity-improving  4. Osteoarthritis of right hip, Primary osteoarthritis of right knee-add Tylenol and topicals  5.    Shoulder pain, S/P arthroscopy of right shoulder       Electronically signed by Robbi Stein DO on 1/14/22 at 6:36 AM ORTIZ Amanda D.O., PM&R     Attending    [address] [place] [address] Cindy

## 2022-01-15 VITALS
SYSTOLIC BLOOD PRESSURE: 141 MMHG | TEMPERATURE: 98.4 F | DIASTOLIC BLOOD PRESSURE: 75 MMHG | HEART RATE: 88 BPM | RESPIRATION RATE: 18 BRPM | OXYGEN SATURATION: 94 %

## 2022-01-15 LAB
ANION GAP SERPL CALCULATED.3IONS-SCNC: 13 MEQ/L (ref 9–15)
BUN BLDV-MCNC: 15 MG/DL (ref 8–23)
CALCIUM SERPL-MCNC: 9.5 MG/DL (ref 8.5–9.9)
CHLORIDE BLD-SCNC: 101 MEQ/L (ref 95–107)
CO2: 27 MEQ/L (ref 20–31)
CREAT SERPL-MCNC: 0.58 MG/DL (ref 0.5–0.9)
GFR AFRICAN AMERICAN: >60
GFR NON-AFRICAN AMERICAN: >60
GLUCOSE BLD-MCNC: 104 MG/DL (ref 70–99)
HCT VFR BLD CALC: 37.4 % (ref 37–47)
HEMOGLOBIN: 12.6 G/DL (ref 12–16)
MAGNESIUM: 2 MG/DL (ref 1.7–2.4)
MCH RBC QN AUTO: 30.2 PG (ref 27–31.3)
MCHC RBC AUTO-ENTMCNC: 33.7 % (ref 33–37)
MCV RBC AUTO: 89.6 FL (ref 82–100)
PDW BLD-RTO: 13.3 % (ref 11.5–14.5)
PLATELET # BLD: 260 K/UL (ref 130–400)
POTASSIUM REFLEX MAGNESIUM: 3.4 MEQ/L (ref 3.4–4.9)
RBC # BLD: 4.17 M/UL (ref 4.2–5.4)
SODIUM BLD-SCNC: 141 MEQ/L (ref 135–144)
WBC # BLD: 9.8 K/UL (ref 4.8–10.8)

## 2022-01-15 PROCEDURE — 83735 ASSAY OF MAGNESIUM: CPT

## 2022-01-15 PROCEDURE — 97535 SELF CARE MNGMENT TRAINING: CPT

## 2022-01-15 PROCEDURE — 80048 BASIC METABOLIC PNL TOTAL CA: CPT

## 2022-01-15 PROCEDURE — 97116 GAIT TRAINING THERAPY: CPT

## 2022-01-15 PROCEDURE — 99239 HOSP IP/OBS DSCHRG MGMT >30: CPT | Performed by: PHYSICAL MEDICINE & REHABILITATION

## 2022-01-15 PROCEDURE — 36415 COLL VENOUS BLD VENIPUNCTURE: CPT

## 2022-01-15 PROCEDURE — 97530 THERAPEUTIC ACTIVITIES: CPT

## 2022-01-15 PROCEDURE — 85027 COMPLETE CBC AUTOMATED: CPT

## 2022-01-15 PROCEDURE — 6370000000 HC RX 637 (ALT 250 FOR IP): Performed by: ORTHOPAEDIC SURGERY

## 2022-01-15 PROCEDURE — 97110 THERAPEUTIC EXERCISES: CPT

## 2022-01-15 PROCEDURE — 6370000000 HC RX 637 (ALT 250 FOR IP): Performed by: FAMILY MEDICINE

## 2022-01-15 PROCEDURE — 6370000000 HC RX 637 (ALT 250 FOR IP): Performed by: PHYSICAL MEDICINE & REHABILITATION

## 2022-01-15 RX ORDER — OXYCODONE AND ACETAMINOPHEN 7.5; 325 MG/1; MG/1
1 TABLET ORAL EVERY 4 HOURS PRN
Qty: 40 TABLET | Refills: 0 | Status: SHIPPED | OUTPATIENT
Start: 2022-01-15 | End: 2022-01-22

## 2022-01-15 RX ORDER — HYDROCHLOROTHIAZIDE 50 MG/1
50 TABLET ORAL DAILY
Qty: 14 TABLET | Refills: 0 | Status: SHIPPED | OUTPATIENT
Start: 2022-01-16 | End: 2022-04-05 | Stop reason: DRUGHIGH

## 2022-01-15 RX ADMIN — OXYCODONE AND ACETAMINOPHEN 1 TABLET: 7.5; 325 TABLET ORAL at 12:09

## 2022-01-15 RX ADMIN — CARBIDOPA AND LEVODOPA 1 TABLET: 25; 100 TABLET ORAL at 13:54

## 2022-01-15 RX ADMIN — OXYCODONE HYDROCHLORIDE 5 MG: 5 TABLET ORAL at 03:07

## 2022-01-15 RX ADMIN — HYDROCHLOROTHIAZIDE 50 MG: 25 TABLET ORAL at 08:59

## 2022-01-15 RX ADMIN — CARBIDOPA AND LEVODOPA 1 TABLET: 25; 100 TABLET ORAL at 08:58

## 2022-01-15 RX ADMIN — OXYCODONE AND ACETAMINOPHEN 1 TABLET: 7.5; 325 TABLET ORAL at 05:35

## 2022-01-15 RX ADMIN — CYANOCOBALAMIN TAB 500 MCG 1000 MCG: 500 TAB at 08:59

## 2022-01-15 RX ADMIN — OXYCODONE AND ACETAMINOPHEN 1 TABLET: 7.5; 325 TABLET ORAL at 09:05

## 2022-01-15 RX ADMIN — ASPIRIN 81 MG: 81 TABLET, COATED ORAL at 08:58

## 2022-01-15 RX ADMIN — SPIRONOLACTONE 50 MG: 25 TABLET ORAL at 08:59

## 2022-01-15 RX ADMIN — Medication 100 MG: at 08:58

## 2022-01-15 RX ADMIN — SENNOSIDES, DOCUSATE SODIUM 1 TABLET: 8.6; 5 TABLET ORAL at 08:59

## 2022-01-15 RX ADMIN — METAXALONE 800 MG: 800 TABLET ORAL at 13:54

## 2022-01-15 RX ADMIN — Medication 650 MG: at 03:06

## 2022-01-15 RX ADMIN — METAXALONE 800 MG: 800 TABLET ORAL at 08:58

## 2022-01-15 RX ADMIN — THERA TABS 1 TABLET: TAB at 08:59

## 2022-01-15 RX ADMIN — Medication 400 MG: at 08:58

## 2022-01-15 ASSESSMENT — PAIN DESCRIPTION - PAIN TYPE
TYPE: ACUTE PAIN

## 2022-01-15 ASSESSMENT — PAIN DESCRIPTION - FREQUENCY
FREQUENCY: INTERMITTENT
FREQUENCY: INTERMITTENT

## 2022-01-15 ASSESSMENT — PAIN DESCRIPTION - ORIENTATION
ORIENTATION: RIGHT

## 2022-01-15 ASSESSMENT — PAIN SCALES - GENERAL
PAINLEVEL_OUTOF10: 0
PAINLEVEL_OUTOF10: 3
PAINLEVEL_OUTOF10: 0
PAINLEVEL_OUTOF10: 0
PAINLEVEL_OUTOF10: 8
PAINLEVEL_OUTOF10: 5
PAINLEVEL_OUTOF10: 3
PAINLEVEL_OUTOF10: 7

## 2022-01-15 ASSESSMENT — PAIN DESCRIPTION - DESCRIPTORS
DESCRIPTORS: ACHING
DESCRIPTORS: ACHING

## 2022-01-15 ASSESSMENT — PAIN DESCRIPTION - LOCATION
LOCATION: SHOULDER

## 2022-01-15 NOTE — PROGRESS NOTES
Discharge instructions explained to patient. All belongings packed up. One more therapy session to complete then may discharge.  Electronically signed by John Melgoza RN on 1/15/22 at 1:59 PM EST

## 2022-01-15 NOTE — PROGRESS NOTES
Pt was medicated with Percocet 7.5/325 mg po at 2100 for right shoulder surgical pain 7/10. Pt has velcro sling immobilizer on right arm. Dressing to right shoulder incision is dry and intact. Pt took Benadryl at hs for sleep. Pt did sleep until 0300 and requested more pain meds for 8/10 pain in right shoulder. Pt was given Roxicodone 5 mg po at 0307 and scheduled Tylenol 650 mg po. Pt wanted to get up in chair and is sitting up at this time. Sleeping on and off. Call light in reach. Electronically signed by Real Rucker on 1/15/2022 at 4:06 AM

## 2022-01-15 NOTE — PROGRESS NOTES
sequencing and safety - 4 trials of curb step with improved steadiness each repititon    Wheelchair Activities  Propulsion: No     Exercises  Hip Flexion: x 20  Hip Abduction: seated x20, add with ball x20  Knee Long Arc Quad: x 20  Ankle Pumps: standing hr x20, standing march x20  Other: seated meng le stretches hs, gastroc, soleus 30 sec x3     ASSESSMENT/PROGRESS TOWARDS GOALS:  Assessment: Patient demonstrated good progess to all goals. Goals:  Long term goals  Long term goal 1: indep bed mobility-NT as patient states she will sleep in recliner  Long term goal 2: indep sit to stand, bed and car transfers  Long term goal 3: Pt to require SBA for gait with or without device 150 feet  Long term goal 4: Pt able to perform 2-4 stairs with SBA and curb style step with SBA  Long term goal 5: Indep with standing HEP    PLAN OF CARE/Safety:   Safety Devices  Type of devices: Chair alarm in place; Left in chair      Therapy Time:   Individual   Time In 1400   Time Out 1430   Minutes 30     Minutes:30      Transfer/Bed mobility training:10      Gait training:10      Neuro re education:2     Therapeutic ex:8      Breezy Dutta PTA, 01/15/22 at 3:44 PM

## 2022-01-15 NOTE — DISCHARGE SUMMARY
Discharge summary  This patient Wilber Donald was admitted to the hospital on 1/11/2022  after undergoing Procedure(s) (LRB):  RIGHT SHOULDER ARTHROSCOPY ROTATOR CUFF REPAIR WITH DERMAL ALLOGRAFT, BICEPS TENOTOMY OPEN REPAIR (Right) without complications that morning. During the postoperative period,while in hospital, patient was medically managed by the hospitalist. Please see medial notes and H&P for patients additional diagnoses. Ortho agrees with all medical diagnoses and treatments while patient in hospital.  No significant or unexpected findings or abnormal ortho imaging were noted during the hospital stay    Hospital course  Patient tolerated surgical procedure well and there was no complications. Patient progressed adequtly through their recovery during hospital stay including PT and rehabilitation. DVT prophylaxis was implemented POD#1  Patient was then D/C on 1/12/2022 to Rehab  in stable condition. Patient was instructed on the use of pain medications, the signs and symptoms of infection, and was given our number to call should they have any questions or concerns following discharge.

## 2022-01-15 NOTE — PROGRESS NOTES
Occupational Therapy  Facility/Department: [facility]  Daily Treatment Note  NAME: Kalyan Villar  : 1950  MRN: [id number]    Date of Service: 1/15/2022    Discharge Recommendations:  Continue to assess pending progress       Assessment      Activity Tolerance  Activity Tolerance: Patient Tolerated treatment well  Safety Devices  Safety Devices in place: Yes  Type of devices: All fall risk precautions in place         Patient Diagnosis(es): The primary encounter diagnosis was Other secondary osteoarthritis of multiple sites. A diagnosis of Status post replacement of right shoulder joint was also pertinent to this visit. has a past medical history of Hypertension, Osteoarthritis, Parkinson disease ([facility] [address] and Parkinsonism ([facility] [address] has a past surgical history that includes knee surgery (Left);  section (1988); Dilation and curettage of uterus; Total hip arthroplasty (Right, 2017); Colonoscopy (N/A, 2020); Shoulder arthroscopy (Left, 2021); Tonsillectomy; [place] tooth extraction; and Shoulder arthroscopy (Right, 2022).     Restrictions  Restrictions/Precautions  Restrictions/Precautions: Weight Bearing,Fall Risk  Upper Extremity Weight Bearing Restrictions  Right Upper Extremity Weight Bearing: Non Weight Bearing  Other: sling in place  Subjective   General  Chart Reviewed: Yes  Patient assessed for rehabilitation services?: Yes  Response to previous treatment: Patient with no complaints from previous session  Family / Caregiver Present: No  Referring Practitioner: Dr. Neelam Burgos  Diagnosis: Impaired mobility and ADL's d/t parkinson's exacerbation s/p right shoulder arthroscopy  Subjective  Subjective: \"they say I am going home tomorrow\" \"I wish it would be today\"  Pain Assessment  Pain Assessment: 0-10  Pain Level: 0  Pain Type: Acute pain  Pain Location: Shoulder  Pain Orientation: Right  Pain Descriptors: Aching  Pain Frequency: Intermittent  Pre Treatment Pain Screening  Pain at present: 4  Scale Used: Numeric Score  Intervention List: Patient able to continue with treatment  Vital Signs  Patient Currently in Pain: Denies   Orientation  Orientation  Overall Orientation Status: Within Functional Limits  Objective  Pt required cues for sequencing and increased time and effort to complete. Therapist demonstrated use of JEREMY to increase independence when completing bathing/dressing. Pt demonstrated understanding and asked questions when appropriate. Pt reporting that her  will assist when needed but appreciated learning the equipment and considers getting the reacher and sock aid after discharge. ADL  Equipment Provided: Reacher;Sock aid  Feeding: Setup  Grooming: Supervision  UE Bathing: Minimal assistance (wash left arm)  LE Bathing: Moderate assistance (assist to wash below knee)  UE Dressing: Verbal cueing;Minimal assistance (cues for sequencing, patient donning sweater)  LE Dressing: Minimal assistance;Verbal cueing (cues for sequencing, pt utilizing sock aid and reacher)  Toileting: Minimal assistance (assist to pull pants down on right side)  Additional Comments: During all ADL tasks patient reported that her  will assist her with whatever she needs. She reported that he assisted her after the last surgery and he will again. Patient was educated with dressing equipment as well as donning/doffing sling- verbalized and demonstrated understanding. Toilet Transfers  Toilet - Technique: Ambulating  Equipment Used: Grab bars  Toilet Transfer: Supervision  Toilet Transfers Comments: no AD  Shower Transfers  Shower - Transfer From: Other (no device)  Shower - Transfer Type: To and From  Shower - Transfer To:  Shower seat with back  Shower - Technique: Ambulating  Shower Transfers: Supervision  Wheelchair [employer] Transfers  Wheelchair/Bed - Technique: Ambulating  Equipment Used: Wheelchair  Level of Asssistance: Supervision     Transfers  Sit to stand: Modified independent  Stand to sit: Modified independent                       Cognition  Cognition Comment: Comp: Supervision Express: Independent Social: Independent Prob: SBA Mem: SBA                                         Plan   Plan  Times per week: 5-7x  Plan weeks: 2 weeks  Current Treatment Recommendations: Strengthening,Functional Mobility Training,Self-Care / ADL,Neuromuscular Re-education,Endurance Training,Cognitive/Perceptual Training,Patient/Caregiver Education & Training,ROM,Equipment Evaluation, Education, & procurement,Safety Education & Training  Plan Comment: Continue per OT plan of care    Goals  Long term goals  Time Frame for Long term goals :  Within 2 weeks pt to demonstrate progress in the following areas listed below to achieve specific LTG's as stated in inital evaluation  Long term goal 1: Improve independence during ADLs and IADLs  Long term goal 2: Improve strength and enduarnce to tolerate ADLs  Long term goal 3: Improve fine motor coordination during self care  Long term goal 4: Demonstrate carryover of UE precations  Long term goal 5: Demonstrate understanding of HEP  Patient Goals   Patient goals : \"to be able to walk up and down the stairs and get out of bed\"       Therapy Time   Individual Concurrent Group Co-treatment   Time In 1000         Time Out 1100         Minutes 61                 Glao Barba OTR/L    Electronically signed by Galo Barba OT on 1/15/2022 at 11:13 AM

## 2022-01-15 NOTE — PROGRESS NOTES
Physical Therapy Rehab Treatment Note  Facility/Department: [facility]  Room: [place] [id number]       NAME: Shivani Powers  : 1950 (66 y.o.)  MRN: [id number]  CODE STATUS: Full Code    Date of Service: 1/15/2022  Chart Reviewed: Yes  Family / Caregiver Present: No    Restrictions:  Restrictions/Precautions: Weight Bearing,Fall Risk  Upper Extremity Weight Bearing Restrictions  Right Upper Extremity Weight Bearing: Non Weight Bearing  Other: sling in place       SUBJECTIVE: Subjective: Patient states looking foward to going home.   Pain Screening  Patient Currently in Pain: Yes  Pre Treatment Pain Screening  Pain at present: 3  Scale Used: Numeric Score  Intervention List: Patient able to continue with treatment  Comments / Details: right shoulder ache recently medicated per patient    Post Treatment Pain Screening:  Pain Assessment  Pain Assessment: 0-10  Pain Level: 3  Pain Type: Acute pain  Pain Location: Shoulder  Pain Orientation: Right  Pain Descriptors: Aching  Pain Frequency: Intermittent    OBJECTIVE:              Bed mobility  Comment: NT - patient plans to stay in recliner at home    Transfers  Sit to Stand: Stand by assistance  Stand to sit: Stand by assistance  Bed to Chair: Stand by assistance  Car Transfer: Contact guard assistance    Ambulation  Ambulation?: Yes  Ambulation 1  Surface: carpet;level tile  Device: No Device  Assistance: Stand by assistance;Contact guard assistance  Quality of Gait: mild unsteadiness, short step length, continues variable balace reconvery assist cga- min  Gait Deviations: Decreased step length;Decreased step height;Slow Lexy  Distance: 50 feet x 2 with turns    Stairs/Curb  Stairs?: Yes  Stairs  # Steps : 4  Stairs Height: 6\"  Rails: Left ascending  Curbs: 4\"  Device: Single pt cane  Assistance: Contact guard assistance;Stand by assistance  Comment: initially cga for up and down curb step, improved with repitition, cues for sequencing    Wheelchair Activities  Propulsion: No     Exercises  Hip Flexion: x 20  Hip Abduction: seated x20, add with ball x20  Knee Long Arc Quad: x 20  Comments: review of hep-demos good understanding  Manual therapy  Other: seated meng le stretches hs, gastroc, soleus 30 sec x3     ASSESSMENT/PROGRESS TOWARDS GOALS:  Assessment: Patient completed gait and transfer tasks with consistent and safe technique. Goals:  Long term goals  Long term goal 1: indep bed mobility  Long term goal 2: indep sit to stand, bed and car transfers  Long term goal 3: Pt to require SBA for gait with or without device 150 feet  Long term goal 4: Pt able to perform 2-4 stairs with SBA and curb style step with SBA  Long term goal 5: Indep with standing HEP    PLAN OF CARE/Safety:   Safety Devices  Type of devices: Chair alarm in place; Left in chair      Therapy Time:   Individual   Time In 1100   Time Out 1200   Minutes 60     Minutes:60      Transfer/Bed mobility training:15      Gait trainin      Neuro re education:0     Therapeutic ex:20      Alejandro Duenas PTA, 01/15/22 at 12:42 PM

## 2022-01-15 NOTE — PLAN OF CARE
Problem: Pain:  Goal: Pain level will decrease  Description: Pain level will decrease  1/15/2022 0904 by Gordon Castorena RN  Outcome: Completed  1/15/2022 0423 by Victoria Serrano. German Pepe RN  Outcome: Ongoing  Goal: Control of acute pain  Description: Control of acute pain  1/15/2022 0904 by Gordon Castorena RN  Outcome: Completed  1/15/2022 0423 by Victoria Serrano. German Pepe RN  Outcome: Ongoing  Goal: Control of chronic pain  Description: Control of chronic pain  1/15/2022 0904 by Gordon Castorena RN  Outcome: Completed  1/15/2022 0423 by Victoria Serrano. German Pepe RN  Outcome: Ongoing     Problem: Falls - Risk of:  Goal: Will remain free from falls  Description: Will remain free from falls  1/15/2022 0904 by Gordon Castorena RN  Outcome: Completed  1/15/2022 0423 by Victoria Serrano. German Pepe RN  Outcome: Ongoing  Goal: Absence of physical injury  Description: Absence of physical injury  1/15/2022 0904 by Gordon Castorena RN  Outcome: Completed  1/15/2022 0423 by Victoria Serrano. German Pepe RN  Outcome: Ongoing     Problem: IP BALANCE  Goal: LTG - patient will maintain standing balance to allow for completion of daily activities  1/15/2022 0904 by Gordon Castorena RN  Outcome: Completed  1/15/2022 0423 by Victoria Serrano. German Pepe RN  Outcome: Ongoing     Problem: Skin Integrity:  Goal: Will show no infection signs and symptoms  Description: Will show no infection signs and symptoms  1/15/2022 0904 by Gordon Castorena RN  Outcome: Completed  1/15/2022 0423 by Victoria Serrano. German Pepe RN  Outcome: Ongoing  Goal: Absence of new skin breakdown  Description: Absence of new skin breakdown  1/15/2022 0904 by Gordon Castorena RN  Outcome: Completed  1/15/2022 0423 by Victoria Serrano.  German Pepe RN  Outcome: Ongoing

## 2022-01-15 NOTE — PROGRESS NOTES
Occupational Therapy  Facility/Department: [facility]  Daily Treatment Note  NAME: Radha Guidry  : 1950  MRN: [id number]    Date of Service: 1/15/2022    Discharge Recommendations:  Continue to assess pending progress       Assessment    Patient tolerated treatment session well with multiple rest breaks provided secondary to increased left UE fatigue. Activity Tolerance  Activity Tolerance: Patient Tolerated treatment well  Safety Devices  Safety Devices in place: Yes  Type of devices: All fall risk precautions in place         Patient Diagnosis(es): The primary encounter diagnosis was Other secondary osteoarthritis of multiple sites. A diagnosis of Status post replacement of right shoulder joint was also pertinent to this visit. has a past medical history of Hypertension, Osteoarthritis, Parkinson disease ([facility] [address] and Parkinsonism ([facility] [address] has a past surgical history that includes knee surgery (Left);  section (1988); Dilation and curettage of uterus; Total hip arthroplasty (Right, 2017); Colonoscopy (N/A, 2020); Shoulder arthroscopy (Left, 2021); Tonsillectomy; [place] tooth extraction; and Shoulder arthroscopy (Right, 2022).     Restrictions  Restrictions/Precautions  Restrictions/Precautions: Weight Bearing,Fall Risk  Upper Extremity Weight Bearing Restrictions  Right Upper Extremity Weight Bearing: Non Weight Bearing  Other: sling in place     Subjective   General  Chart Reviewed: Yes  Patient assessed for rehabilitation services?: Yes  Response to previous treatment: Patient with no complaints from previous session  Family / Caregiver Present: No  Referring Practitioner: Dr. Mavis Lockhart  Diagnosis: Impaired mobility and ADL's d/t parkinson's exacerbation s/p right shoulder arthroscopy  Subjective  Subjective: \"they say I am going home tomorrow\" \"I wish it would be today\"  Pain Assessment  Pain Assessment: 0-10  Pain Level: 0  Pre Treatment Pain Screening  Pain at present: 0  Scale Used: Numeric Score  Intervention List: Patient able to continue with treatment  Vital Signs  Patient Currently in Pain: Denies     Objective     Patient engages in multiple therapeutic activities to increase left UE strength and endurance for ADLs and functional transfers. Patient performs repetitive reaching at shoulder level to place and remove shower curtain rings from bottom two rungs of horizontal tree stand. Patient with increasing fatigue with activity. Patient attempts to utilize 1-2# resistive clip to remove and return rings to storage; however, unable to tolerate. Patient engages in repetitive reaching at tabletop level. Patient engages in game of Hi-Q with rest breaks required prn secondary to left UE fatigue. Patient with five remaining game pieces on board. Plan   Plan  Times per week: 5-7x  Plan weeks: 2 weeks  Current Treatment Recommendations: Strengthening,Functional Mobility Training,Self-Care / ADL,Neuromuscular Re-education,Endurance Training,Cognitive/Perceptual Training,Patient/Caregiver Education & Training,ROM,Equipment Evaluation, Education, & procurement,Safety Education & Training  Plan Comment: Continue per OT plan of care    Goals  Long term goals  Time Frame for Long term goals :  Within 2 weeks pt to demonstrate progress in the following areas listed below to achieve specific LTG's as stated in inital evaluation  Long term goal 1: Improve independence during ADLs and IADLs  Long term goal 2: Improve strength and enduarnce to tolerate ADLs  Long term goal 3: Improve fine motor coordination during self care  Long term goal 4: Demonstrate carryover of UE precations  Long term goal 5: Demonstrate understanding of HEP  Patient Goals   Patient goals : \"to be able to walk up and down the stairs and get out of bed\"       Therapy Time   Individual Concurrent Group Co-treatment   Time In 1300         Time Out 1330         Minutes 30                 Therapeutic activities: 30 minutes     Electronically signed by POPPY Locke/L on 1/15/2022 at 1:42 PM  POPPY Locke/MOSES

## 2022-01-15 NOTE — DISCHARGE SUMMARY
Subjective: The patient complains of severe acute on chronic progressive fatigue and right shoulder pain partially relieved by rest,medications, PT,  OT, Oxy IR  and rest and exacerbated by parkinsonian stiffness and recent right total shoulder replacement    I am concerned about patients medical complexities including  difficulty mobilizing status post right total shoulder replacement. She had hoped to go home after her right shoulder replacement despite nonweightbearing and Parkinson's disease however she developed right lower extremity numbness postoperatively. She was evaluated to fouled to have exacerbation of Parkinson's disease and neurologically needed monitoring regarding her right lower extremity numbness. She was admitted to acute rehab to titrate her Parkinson's medications control her pain and focus on mobility despite nonweightbearing right upper extremity postop right shoulder . He was very anxious last night a lot of problems pain and muscle spasm we discussed Bengay Skelaxin and increasing Percocet and trialing the eggcrate mattress. [address] Course: The patient was admitted to the Rehabilitation Unit to address ADL and mobility deficits. The patient was enrolled in acute PT, OT program.  Weekly team meetings were held to assess functional progress toward their goals. The patient's medical issues were addressed. The patient progressed in the rehab program and is now ready for discharge. Refer to FIM scores summary report for detailed functional status. Greater than 35 minutes was spent on coordinating patients discharge including follow-up care, medications and patient/family education. Extended time needed because of the potential use of opiate medications are high risk medications and a high risk population individual.  Patient and family were instructed to use lowest effective dose of these medications and slowly titrate off over the next 2 to 4 weeks. They are not to combine opiates with sedatives. I reviewed her [facility] prescription monitoring service data sheets in hopes of eliminating polypharmacy and weaning to the lowest effective dose of pain medications and eliminating the concomitant use of benzodiazepines. I see no medications of concern. I see no habits of combining sedatives and narcotics. I reviewed current care and plans for further care with other rehab providers including nursing and case management. According to recent nursing note, \" Pt assisted back to bed early this am after going to toilet this am. Dressing to right shoulder changed and new applied with xeroform gauze and ABD pad and tape. Incision intact with sutures. Pt requested more pain meds at 0530 for 7/10 to right shoulder. Percocet 7.5/325 mg po given for pain at this time. Pt in bed with call light in reach and purwic on. \".    ROS x10: The patient also complains of severely impaired mobility and activities of daily living. Otherwise no new problems with vision, hearing, nose, mouth, throat, dermal, cardiovascular, GI, , pulmonary, musculoskeletal, psychiatric or neurological. See Rehab H&P on Rehab chart dated . Vital signs:  BP (!) 141/75   Pulse 88   Temp 98.4 °F (36.9 °C) (Oral)   Resp 18   LMP 08/10/2007   SpO2 94%   I/O:   PO/Intake:  fair PO intake, no problems observed or reported. Bowel/Bladder:  incontinent, no problems noted. General:  Patient is well developed, adequately nourished, non-obese and     well kempt. HEENT:    PERRLA, hearing intact to loud voice, external inspection of ear     and nose benign. Inspection of lips, tongue and gums benign  Musculoskeletal: No significant change in strength or tone. All joints stable. Inspection and palpation of digits and nails show no clubbing,       cyanosis or inflammatory conditions. Neuro/Psychiatric: Affect: flat but pleasant.   Alert and oriented to person, place and     Situation with  No needed cues. No significant change in deep tendon reflexes or     sensation  Lungs:  Diminished, CTA-B. Respiration effort is normal at rest.     Heart:   S1 = S2, RRR. No loud murmurs. Abdomen:  Soft, non-tender, no enlargement of liver or spleen. Extremities:  No significant lower extremity edema or tenderness. Shoulder brace right shoulder tenderness  Skin:   Intact to general survey, healing right shoulder incision    Rehabilitation:  Physical therapy:   Bed Mobility:      Transfers: Sit to Stand: Stand by assistance  Stand to sit: Stand by assistance  Bed to Chair: Stand by assistance, Ambulation 1  Surface: carpet  Device: No Device  Other Apparatus:  (no w/c follow)  Assistance: Stand by assistance,Minimal assistance  Quality of Gait: short step length, decreased trunk rotation, narrow CAMPOS, intermittent LOB with variable balance recovery assist needed  Gait Deviations: Decreased step length,Decreased step height,Slow Lexy  Distance: 50 feet x 2 with turns  Comments: Pt demonstrated safest gait with least amount of assistance with st cane. She progressed to SBA by end of session for 50 feet and CGA for remaining 50 feet., Stairs  # Steps : 4  Stairs Height: 6\"  Rails: Left ascending  Assistance: Stand by assistance  Comment: Pt also performed 4\" curb step for home mobility between rooms requiring close SBA with st cane. Pt with mild increased sway and concern for safety noted    FIMS:  ,  , Assessment: Pt deonstrated safest gait with st cane. She agrees with this assessment and does have one at [facility] for assistance. She reports her spouse is able to assist her at this level. Pt progressing well    Occupational therapy:    ,  , Assessment: Pt is a 65 y/o female from home with spouse who presents to [facility] with the above deficits s/p shoulder arthroscopy. Pt would benefit from OT services to maximize independence and safety during ADLs and IADLs.     Speech therapy:        Lab/X-ray studies reviewed, analyzed and discussed with patient and staff:   Recent Results (from the past 24 hour(s))   Basic Metabolic Panel w/ Reflex to MG    Collection Time: 01/15/22  5:52 AM   Result Value Ref Range    Sodium 141 135 - 144 mEq/L    Potassium reflex Magnesium 3.4 3.4 - 4.9 mEq/L    Chloride 101 95 - 107 mEq/L    CO2 27 20 - 31 mEq/L    Anion Gap 13 9 - 15 mEq/L    Glucose 104 (H) 70 - 99 mg/dL    BUN 15 8 - 23 mg/dL    CREATININE 0.58 0.50 - 0.90 mg/dL    GFR Non-African American >60.0 >60    GFR  >60.0 >60    Calcium 9.5 8.5 - 9.9 mg/dL   CBC    Collection Time: 01/15/22  5:52 AM   Result Value Ref Range    WBC 9.8 4.8 - 10.8 K/uL    RBC 4.17 (L) 4.20 - 5.40 M/uL    Hemoglobin 12.6 12.0 - 16.0 g/dL    Hematocrit 37.4 37.0 - 47.0 %    MCV 89.6 82.0 - 100.0 fL    MCH 30.2 27.0 - 31.3 pg    MCHC 33.7 33.0 - 37.0 %    RDW 13.3 11.5 - 14.5 %    Platelets 340 148 - 779 K/uL   Magnesium    Collection Time: 01/15/22  5:52 AM   Result Value Ref Range    Magnesium 2.0 1.7 - 2.4 mg/dL       XR SHOULDER RIGHT  1/11/2022 There are no lytic or sclerotic bone lesions. The humeral head is located. There is no fracture or subluxation. The visualized portions of the lung and chest wall are within normal limits. There are no radiopaque foreign bodies. There is air in the subcutaneous soft tissues. There are no acute osseous injuries. There are no radiopaque foreign bodies. XR CHEST   1/12/2022 Low lung volumes. Decrease interval bilateral medium bilateral humeral heads, greater on right. Cardiopericardial silhouette normal for technique. Aorta calcified. Lungs clear. NO ACUTE CARDIOPULMONARY DISEASE. POSSIBLE BILATERAL ROTATOR CUFF INSTABILITY. Previous extensive, complex labs, notes and diagnostics reviewed and analyzed.      ALLERGIES:    Allergies as of 01/12/2022 - Fully Reviewed 01/12/2022   Allergen Reaction Noted    Tramadol Nausea And Vomiting 08/17/2017    Sulfa antibiotics Hives 01/22/2013      (please also verify by checking MAR)      Yesterday I evaluated this patient for periodic reassessment of medical and functional status. The patient was discussed in detail at the treatment team meeting focusing on current medical issues, progress in therapies, social issues, psychological issues, barriers to progress and strategies to address these barriers, and discharge planning. See the hand written addendum to rehab progress note. The patient continues to be high risk for future disability and their medical and rehabilitation prognosis continue to be good and therefore, we will continue the patient's rehabilitation course as planned. The patient's tentative discharge date was set. Patient and family education was discussed. The patient was made aware of the team discussion regarding their progress. Discharge plans were discussed along with barriers to progress and strategies to address these barriers, patient encouraged to continue to discuss discharge plans with . Complex Physical Medicine & Rehab Issues Assess & Plan:   1. Severe abnormality of gait and mobility and impaired self-care and ADL's secondary to progressive Parkinson's disease status post right total shoulder replacement. Functional and medical status have improved status postacute rehab at [facility].  2. Bowel and Bladder dysfunction opiate related constipation:  frequent toileting, ambulate to bathroom with assistance, check post void residuals. Check for C.difficile x1 if >2 loose stools in 24 hours, continue bowel & bladder program.  Monitor bowel and bladder function. Lactinex 2 PO every AC. MOM prn, Brown Bomb prn, Glycerin suppository prn, enema prn.  3. Severe postop right shoulder pain as well as generalized OA pain: reassess pain every shift and prior to and after each therapy session, give prn Tylenol and Oxy IR, modalities prn in therapy, masage, Lidoderm, K-pad prn.  Consider scheduled AM pain meds. 4. Skin healing right shoulder incision and breakdown risk:  continue pressure relief program.  Daily skin exams and reports from nursing. 5. Severe fatigue due to nutritional and hydration deficiency: Add and titrate vitamin B12 vitamin D and CoQ10 continue to monitor I&Os, calorie counts prn, dietary consult prn.  6. Acute episodic insomnia with situational adjustment disorder:  prn Ambien, monitor for day time sedation. 7. Falls risk elevated:  patient to use call light to get nursing assistance to get up, bed and chair alarm. 8. Elevated DVT risk: progressive activities in PT, continue prophylaxis HAZEL hose, elevation . 9. Complex discharge planning: Discharge is set for 1/15/2020 to home with her  and home health care. She will follow-up with orthopedics as an outpatient. She is status post weekly team meetings to assess progress towards goals, discuss and address social, psychological and medical comorbidities and to address difficulties they may be having progressing in therapy. Patient and family education is in progress. The patient is to follow-up with their family physician after discharge. Complex Active General Medical Issues that complicates care Assess & Plan:    1. HTN (hypertension)-continue blood signs every shift focusing on heart rate and blood pressure checks, consult hospitalist for backup medical and adjust/add medications (Aldactone magnesium oxide, HydroDIURIL). Monitor heart rate and blood pressure as well as medications effects on vital signs before during and after therapy. 2.   Parkinsonism,   Dyskinesia-titrate dopaminergic medications  3. Lower extremity numbness-lower extremity-improving  4. Osteoarthritis of right hip, Primary osteoarthritis of right knee-add Tylenol and topicals  5.    Shoulder pain, S/P arthroscopy of right shoulder       Electronically signed by Lazarus Macario DO on 1/14/22 at 6:36 AM ORTIZ Torres D.O., PM&R     Attending    [address]

## 2022-01-15 NOTE — DISCHARGE INSTR - DIET

## 2022-01-17 ENCOUNTER — TELEPHONE (OUTPATIENT)
Dept: FAMILY MEDICINE CLINIC | Age: 72
End: 2022-01-17

## 2022-01-17 NOTE — PROGRESS NOTES
Physical Therapy  Facility/Department: [facility]  Rehabilitation Discharge note    NAME: Tomás Becker  : 1950  MRN: [id number]    Date of discharge: 1/15/22    Subjective: Pt reports she is ready for discharge    Past Medical History:   Diagnosis Date    Hypertension     meds > 10 yrs / denies TIA or stroke    Osteoarthritis     DJD right hip    [facility] [address]     Parkinsonism ([facility] [address]      Past Surgical History:   Procedure Laterality Date     SECTION  1988    COLONOSCOPY N/A 2020    COLONOSCOPY WITH POLYPECTOMY performed by Osman Suarez MD at [address] Left     arthrotomy    SHOULDER ARTHROSCOPY Left 2021    LEFT SHOULDER ARTHROSCOPY ROTATOR CUFF REPAIR, SUBACROMIAL DECOMPRESSION performed by Yuly Bob MD at [address] ARTHROSCOPY Right 2022    RIGHT SHOULDER ARTHROSCOPY ROTATOR CUFF REPAIR WITH DERMAL ALLOGRAFT, BICEPS TENOTOMY OPEN [facility]PA[facility]  [address] Right 2017    RIGHT HIP TOTAL HIP ARTHROPLASTY [facility] SPINAL  performed by Pablo Wagoner MD at [facility] EXTRACTION         Restrictions  Restrictions/Precautions  Restrictions/Precautions: Weight Bearing,Fall Risk  Upper Extremity Weight Bearing Restrictions  Right Upper Extremity Weight Bearing: Non Weight Bearing  Other: sling in place    Objective    Bed mobility  Rolling to Left: Minimal assistance  Rolling to Right: Unable to assess (due to right shoulder surgery)  Supine to Sit: Maximum assistance  Sit to Supine: Maximum assistance  Comment: NT - patient plans to stay in recliner at home   - performed at initial eval only - pt declined subsequent trials due to plans to sleep in recliner upon return to home    Transfers  Sit to Stand: Stand by assistance  Stand to sit: Stand by assistance  Bed to Chair: Stand by assistance  Car Transfer: Contact guard assistance  Comment: improved ability in all transfers - mildly unsteady in attaining standing but improved ability to correct balance; SBA with toilet transfer and mod assist for toileting. Ambulation  Ambulation?: Yes  More Ambulation?: No  Ambulation 1  Surface: carpet  Device: No Device  Other Apparatus:  (no w/c follow)  Assistance: Stand by assistance,Contact guard assistance  Quality of Gait: mild unsteadiness, no lob, short flat steps  Gait Deviations: Decreased step length,Decreased step height,Slow Lexy  Distance: 50 feet, 75 feet several turns  Comments: Pt demonstrated safest gait with least amount of assistance with st cane. She progressed to SBA by end of session for 50 feet and CGA for remaining 50 feet. Stairs/Curb  Stairs?: Yes  Stairs  # Steps : 4  Stairs Height: 6\"  Rails: Left ascending  Curbs: 6\"  Device: Single pt cane  Assistance: Stand by assistance,Contact guard assistance  Comment: good carryover from previous sessions with technique, sequencing and safety - 4 trials of curb step with improved steadiness each repititon  Wheelchair Activities  Propulsion: No    Pt/ family education/training: Pts spouse was educated in pts mobility needs. He states he can assist her following demonstration but did not feel the need for practice. Pt given HEP and requires cues at this time    Assessment: Pt has met goal for SBA in gait and curb. She did not meet other goals. Pt and spouse wanted quick discharge and felt they could manage at this level of care      LTG established:  Long term goal 1: indep bed mobility-NT as patient states she will sleep in recliner  Long term goal 2: indep sit to stand, bed and car transfers  Long term goal 3: Pt to require SBA for gait with or without device 150 feet  Long term goal 4: Pt able to perform 2-4 stairs with SBA and curb style step with SBA  Long term goal 5:  Indep with standing HEP    Discharge Plan: d/c to home with follow up PT recommended        Electronically signed by Shira Castillo PT on 1/17/2022 at 8:20 AM

## 2022-01-17 NOTE — TELEPHONE ENCOUNTER
[facility] Initial Follow Up Call    Outreach made within 2 business days of discharge: Yes    Patient: Lencho Miller Patient : 1950   MRN: [id number]  Reason for Admission: There are no discharge diagnoses documented for the most recent discharge. Discharge Date: 1/15/22       Spoke with: pt    Discharge department/facility: rehab    TCM Interactive Patient Contact:  Was patient able to fill all prescriptions: Yes  Was patient instructed to bring all medications to the follow-up visit: Yes  Is patient taking all medications as directed in the discharge summary?  Yes  Does patient understand their discharge instructions: Yes  Does patient have questions or concerns that need addressed prior to 7-14 day follow up office visit: no    Will ricardo @ a later date  Scheduled appointment with PCP within 7-14 days    Follow Up  Future Appointments   Date Time Provider Rubens Richter   3/7/2022  3:45 PM Marika Haider MD [address]   2022  2:30 PM Kenneth Malcolm  [address]

## 2022-01-19 NOTE — PROGRESS NOTES
[facility]CC[facility]ERAP[facility] DISCHARGE SUMMARY- REHAB     Date: 2022  Patient Name: Lencho Miller        MRN: [id number]  Account: [de-identified]   : 1950  (66 y.o.)  Room: [place] [address]    Diagnosis:  Impaired mobility and ADL's d/t parkinson's exacerbation s/p right shoulder arthroscopy    Past Medical History:   Diagnosis Date    Hypertension     meds > 10 yrs / denies TIA or stroke    Osteoarthritis     DJD right hip    [facility]     Parkinsonism ([facility]      Past Surgical History:   Procedure Laterality Date     SECTION  1988    COLONOSCOPY N/A 2020    COLONOSCOPY WITH POLYPECTOMY performed by Marika Haider MD at [address] Left     arthrotomy    SHOULDER ARTHROSCOPY Left 2021    LEFT SHOULDER ARTHROSCOPY ROTATOR CUFF REPAIR, SUBACROMIAL DECOMPRESSION performed by Smita Walker MD at [address] ARTHROSCOPY Right 2022    RIGHT SHOULDER ARTHROSCOPY ROTATOR CUFF REPAIR WITH DERMAL ALLOGRAFT, BICEPS TENOTOMY OPEN REPAIR performed by Smita Walker MD at [address], [address] Right 2017    RIGHT HIP TOTAL HIP ARTHROPLASTY WILLIAM MDM SPINAL  performed by Luli Wood MD at [address] EXTRACTION         Precautions:   Restrictions/Precautions: Weight Bearing,Fall Risk  Right Upper Extremity Weight Bearing: Non Weight Bearing     Social/Functional History:  Social/Functional History  Lives With: Spouse  Type of Home: House  Home Layout: Two level,1/2 bath on main level,Able to Live on Main level with bedroom/bathroom (will be sleeping in recliner on first floor upon discharge)  Home Access: Stairs to enter without rails (per pt, spouse is installing a L HR prior to d/c)  Entrance Stairs - Number of Steps: 2 - in addition to 2 small steps between indoor levels in the home, 12 stairs to second floor  Bathroom Shower/Tub: Tub/Shower unit  Bathroom Equipment: Shower chair,Grab bars in shower,Hand-held shower,3-in-1 commode  Home Equipment: Cane,Rolling walker  ADL Assistance: Needs assistance  Homemaking Assistance: Needs assistance  Homemaking Responsibilities: No (spouse completes)  Ambulation Assistance: Independent  Transfer Assistance: Independent  Active : No  Occupation: Retired  Type of occupation:   Leisure & Hobbies: reading, watching TV  IADL Comments: spouse completes medications/finances  Additional Comments: Pt has a dtr and son that live in the area, and another child that lives in [place]. Current Functional Status:  ADL  Equipment Provided: Reacher,Sock aid  Feeding: Setup  Grooming: Supervision  UE Bathing: Minimal assistance (wash left arm)  LE Bathing: Moderate assistance (assist to wash below knee)  UE Dressing: Verbal cueing,Minimal assistance (cues for sequencing, patient donning sweater)  LE Dressing: Minimal assistance,Verbal cueing (cues for sequencing, pt utilizing sock aid and reacher)  Toileting: Minimal assistance (assist to pull pants down on right side)    Toilet Transfers  Toilet - Technique: Ambulating  Equipment Used: Grab bars  Toilet Transfer: Supervision  Toilet Transfers Comments: no AD     Shower Transfers  Shower - Transfer From: Other (no device)  Shower - Transfer Type: To and From  Shower - Transfer To: Shower seat with back  Shower - Technique: Ambulating  Shower Transfers: Supervision    Orientation Status:  Orientation  Overall Orientation Status: Within Functional Limits    Cognition Status:  Cognition  Overall Cognitive Status: Exceptions  Arousal/Alertness: Appropriate responses to stimuli  Following Commands:  Follows one step commands with repetition  Attention Span: Appears intact  Memory: Decreased short term memory  Safety Judgement: Decreased awareness of need for assistance,Decreased awareness of need for safety  Problem Solving: Assistance required to correct errors made,Assistance required to generate solutions,Assistance required to identify errors made,Assistance required to implement solutions  Insights: Fully aware of deficits  Initiation: Requires cues for some  Sequencing: Requires cues for some  Cognition Comment: Comp: Supervision Express: Independent Social: Independent Prob: SBA Mem: SBA    Perception Status:  Perception  Overall Perceptual Status: WFL    Sensation Status:  Sensation  Overall Sensation Status: Impaired  Additional Comments: R foot numbness since sx but improving per pt, numbness in R hand    Vision and Hearing Status:  Vision  Vision: Impaired  Vision Exceptions: Wears glasses at all times  Hearing  Hearing: Within functional limits     UE Function Status:    ROM:   LUE AROM (degrees)  LUE AROM : Exceptions  LUE General AROM: limited shoulder flexion to ~85º  Left Hand AROM (degrees)  Left Hand AROM: WFL  RUE AROM (degrees)  RUE AROM : Exceptions  RUE General AROM: NT d/t shoulder sx  Right Hand AROM (degrees)  Right Hand AROM: WFL    Strength:  LUE Strength  Gross LUE Strength: Exceptions to WFL  L Hand General: 4-/5  LUE Strength Comment: 3+/5 all planes  RUE Strength  R Hand General: NT  RUE Strength Comment: NT d/t shoulder sx    Coordination, Tone, Quality of Movement:    Tone RUE  RUE Tone: Normotonic  Tone LUE  LUE Tone: Normotonic  Coordination  Movements Are Fluid And Coordinated: No  Coordination and Movement description: Fine motor impairments,Decreased accuracy,Right UE,Left UE,Resting tremors,Decreased speed    D/C Recommendations:    Equipment Recommendations:   none needed    OT Follow Up:  OT D/C RECOMMENDATIONS  REQUIRES OT FOLLOW UP: Yes    Home Exercise Program Provided: [] Yes [x] No       Electronically signed by:    HETAL Almanza,   1/19/2022, 8:00 AM

## 2022-02-10 DIAGNOSIS — G20 PARKINSONISM, UNSPECIFIED PARKINSONISM TYPE (HCC): ICD-10-CM

## 2022-02-10 NOTE — TELEPHONE ENCOUNTER
Patient is  requesting medication refill.  Please approve or deny this request.    Rx requested:  Requested Prescriptions     Pending Prescriptions Disp Refills    carbidopa-levodopa (SINEMET)  MG per tablet 180 tablet 2     Sig: Take 1 tablet by mouth 3 times daily TAKE 1 TABLET BY MOUTH TWICE A DAY         Last Office Visit:   12/20/2021      Next Visit Date:  Future Appointments   Date Time Provider Rubens Richter   3/7/2022  3:45 PM Agusto Bird, [facility]   4/18/2022  2:30 PM Erlinda Oneal MD [facility]

## 2022-02-18 ENCOUNTER — TELEPHONE (OUTPATIENT)
Dept: FAMILY MEDICINE CLINIC | Age: 72
End: 2022-02-18

## 2022-03-25 NOTE — TELEPHONE ENCOUNTER
Amarjittcsukhwinder to schedule an [facility] appt with Dr Isidro Rodríguez

## 2022-04-05 ENCOUNTER — OFFICE VISIT (OUTPATIENT)
Dept: FAMILY MEDICINE CLINIC | Age: 72
End: 2022-04-05
Payer: MEDICARE

## 2022-04-05 ENCOUNTER — HOSPITAL ENCOUNTER (OUTPATIENT)
Dept: ULTRASOUND IMAGING | Age: 72
Discharge: HOME | End: 2022-04-07
Payer: MEDICARE

## 2022-04-05 VITALS
TEMPERATURE: 97.2 F | HEART RATE: 91 BPM | OXYGEN SATURATION: 98 % | BODY MASS INDEX: 39.08 KG/M2 | SYSTOLIC BLOOD PRESSURE: 138 MMHG | HEIGHT: 61 IN | WEIGHT: 207 LBS | DIASTOLIC BLOOD PRESSURE: 76 MMHG

## 2022-04-05 DIAGNOSIS — M79.89 LEFT LEG SWELLING: ICD-10-CM

## 2022-04-05 DIAGNOSIS — R06.01 ORTHOPNEA: ICD-10-CM

## 2022-04-05 DIAGNOSIS — M79.89 LEFT LEG SWELLING: Primary | ICD-10-CM

## 2022-04-05 LAB — PRO-BNP: 55 PG/ML

## 2022-04-05 PROCEDURE — G8427 DOCREV CUR MEDS BY ELIG CLIN: HCPCS | Performed by: STUDENT IN AN ORGANIZED HEALTH CARE EDUCATION/TRAINING PROGRAM

## 2022-04-05 PROCEDURE — 93971 EXTREMITY STUDY: CPT

## 2022-04-05 PROCEDURE — G8417 CALC BMI ABV UP PARAM F/U: HCPCS | Performed by: STUDENT IN AN ORGANIZED HEALTH CARE EDUCATION/TRAINING PROGRAM

## 2022-04-05 PROCEDURE — 1090F PRES/ABSN URINE INCON ASSESS: CPT | Performed by: STUDENT IN AN ORGANIZED HEALTH CARE EDUCATION/TRAINING PROGRAM

## 2022-04-05 PROCEDURE — 4040F PNEUMOC VAC/ADMIN/RCVD: CPT | Performed by: STUDENT IN AN ORGANIZED HEALTH CARE EDUCATION/TRAINING PROGRAM

## 2022-04-05 PROCEDURE — 1123F ACP DISCUSS/DSCN MKR DOCD: CPT | Performed by: STUDENT IN AN ORGANIZED HEALTH CARE EDUCATION/TRAINING PROGRAM

## 2022-04-05 PROCEDURE — 99213 OFFICE O/P EST LOW 20 MIN: CPT | Performed by: STUDENT IN AN ORGANIZED HEALTH CARE EDUCATION/TRAINING PROGRAM

## 2022-04-05 PROCEDURE — 3017F COLORECTAL CA SCREEN DOC REV: CPT | Performed by: STUDENT IN AN ORGANIZED HEALTH CARE EDUCATION/TRAINING PROGRAM

## 2022-04-05 PROCEDURE — 1036F TOBACCO NON-USER: CPT | Performed by: STUDENT IN AN ORGANIZED HEALTH CARE EDUCATION/TRAINING PROGRAM

## 2022-04-05 PROCEDURE — G8400 PT W/DXA NO RESULTS DOC: HCPCS | Performed by: STUDENT IN AN ORGANIZED HEALTH CARE EDUCATION/TRAINING PROGRAM

## 2022-04-05 ASSESSMENT — ENCOUNTER SYMPTOMS
ABDOMINAL PAIN: 0
VOMITING: 0
SORE THROAT: 0
COUGH: 0
SHORTNESS OF BREATH: 0
SINUS PRESSURE: 0

## 2022-04-05 ASSESSMENT — PATIENT HEALTH QUESTIONNAIRE - PHQ9
SUM OF ALL RESPONSES TO PHQ QUESTIONS 1-9: 0
2. FEELING DOWN, DEPRESSED OR HOPELESS: 0
SUM OF ALL RESPONSES TO PHQ QUESTIONS 1-9: 0
SUM OF ALL RESPONSES TO PHQ9 QUESTIONS 1 & 2: 0
1. LITTLE INTEREST OR PLEASURE IN DOING THINGS: 0

## 2022-04-05 NOTE — PROGRESS NOTES
2022    Mena Devi (:  1950) is a 66 y.o. female, here for evaluation of the following medical concerns:  Chief Complaint   Patient presents with    Swelling     Left leg, ankle, and foot. Pt has been taking tylenol. states this has been going on since her surgery in .  Leg Pain     Left. HPI  Left leg swelling  Started in January after her surgery (right rotator cuff repaired)  Swelling started about a week or so after surgery  Now having pain at the back of the upper leg  Pain with walking  Has been taking tylenol which helps a little bit for the pain    Taking daily baby aspirin  No hx DVT or PE    She has been sleeping on the recliner since rotator cuff surgery  This denies shortness of breath  No prior history of CHF    Review of Systems   Constitutional: Negative for chills and fever. HENT: Negative for congestion, sinus pressure and sore throat. Respiratory: Negative for cough and shortness of breath. Cardiovascular: Positive for leg swelling. Negative for chest pain and palpitations. Gastrointestinal: Negative for abdominal pain and vomiting. Musculoskeletal: Negative for arthralgias and myalgias. Left leg pain   Skin: Negative for rash and wound. Neurological: Negative for speech difficulty and light-headedness. Psychiatric/Behavioral: Negative for suicidal ideas. The patient is not nervous/anxious. Prior to Visit Medications    Medication Sig Taking?  Authorizing Provider   carbidopa-levodopa (SINEMET)  MG per tablet Take 1 tablet by mouth 3 times daily TAKE 1 TABLET BY MOUTH TWICE A DAY Yes Joesph Escobar MD   diphenhydrAMINE-APAP, sleep, (TYLENOL PM EXTRA STRENGTH)  MG tablet Take 1 tablet by mouth nightly as needed for Sleep Pt took 0.5 tab Yes Historical Provider, MD   VITAMIN D PO Take 1 tablet by mouth daily Yes Historical Provider, MD   Cyanocobalamin (VITAMIN B-12 PO) Take 1 tablet by mouth daily Yes Historical Provider, MD MAGNESIUM OXIDE PO Take 1 tablet by mouth daily Yes Historical Provider, MD   spironolactone-hydroCHLOROthiazide (ALDACTAZIDE) 25-25 MG per tablet TAKE 2 TABLETS BY MOUTH EVERY DAY Yes Jodie Rush MD   ammonium lactate (AMLACTIN) 12 % cream  Yes Historical Provider, MD   aspirin 81 MG EC tablet Take 81 mg by mouth daily Yes Historical Provider, MD   Multiple Vitamin (MULTI VITAMIN DAILY PO) Take 1 tablet by mouth daily Yes Historical Provider, MD   Turmeric 500 MG CAPS Take 1 tablet by mouth daily Yes Historical Provider, MD        Medications Discontinued During This Encounter   Medication Reason    hydroCHLOROthiazide (HYDRODIURIL) 50 MG tablet DOSE ADJUSTMENT       Allergies   Allergen Reactions    Tramadol Nausea And Vomiting    Sulfa Antibiotics Hives     hives & chills       Past Medical History:   Diagnosis Date    Hypertension     meds > 10 yrs / denies TIA or stroke    Osteoarthritis     DJD right hip    Parkinson disease ([facility]     Parkinsonism ([facility]        Past Surgical History:   Procedure Laterality Date     SECTION  1988    COLONOSCOPY N/A 2020    COLONOSCOPY WITH POLYPECTOMY performed by Sudha Chiu MD at [address] Left     arthrotomy    SHOULDER ARTHROSCOPY Left 2021    LEFT SHOULDER ARTHROSCOPY ROTATOR CUFF REPAIR, SUBACROMIAL DECOMPRESSION performed by Karen Rodas MD at [address] ARTHROSCOPY Right 2022    RIGHT SHOULDER ARTHROSCOPY ROTATOR CUFF REPAIR WITH DERMAL ALLOGRAFT, BICEPS TENOTOMY OPEN REPAIR performed by Karen Rodas MD at [facility], [address] Right 2017    RIGHT HIP TOTAL HIP ARTHROPLASTY WILLIAM MDM SPINAL  performed by Denny Oneill MD at [address] History     Socioeconomic History    Marital status:      Spouse name: Not on file    Number of children: Not on file    Years of education: Not on file    Highest education level: Not on file   Occupational History    Not on file   Tobacco Use    Smoking status: Never Smoker    Smokeless tobacco: Never Used   Vaping Use    Vaping Use: Never used   Substance and Sexual Activity    Alcohol use: Yes     Alcohol/week: 1.0 standard drink     Types: 1 Glasses of wine per week     Comment: occasional    Drug use: Never    Sexual activity: Not on file   Other Topics Concern    Not on file   Social History Narrative    Lives With: Spouse Gene    Type of Home: [address] [place] [address] Ave: Two level,1/2 bath on main level,Able to Live on Main level with bedroom/bathroom (will be sleeping in recliner upon discharge)    Home Access: Stairs to enter without rails- Number of Steps: 2    Bathroom Shower/Tub: Tub/Shower unit, Shower chair,Grab bars in shower,Hand-held shower    Home Equipment: [employer]    ADL Assistance: Needs assistance    Homemaking Assistance: Needs assistance    Homemaking Responsibilities: No    Ambulation Assistance: Independent    Transfer Assistance: Independent    Active : No    Occupation: Retired     Leisure & Hobbies: reading, watching TV         Social Determinants of [address] Strain: Low Risk     Difficulty of Paying Living Expenses: Not hard at all   Food Insecurity: No [address] Worried About [facility] in the Last Year: Never true   [facility] in the Last Year: Never true   Transportation Needs: No Transportation Needs    Lack of Transportation (Medical): No    Lack of Transportation (Non-Medical):  No   Physical Activity:     Days of Exercise per Week: Not on file    Minutes of Exercise per Session: Not on file   Stress:     Feeling of Stress : Not on file   Social Connections:     Frequency of Communication with Friends and Family: Not on file    Frequency of Social Gatherings with Friends and Family: Not on file    Attends Confucianist Services: Not on file    Active Member of Clubs or Organizations: Not on file    Attends Club or Organization Meetings: Not on file    Marital Status: Not on file   Intimate Partner Violence:     Fear of Current or Ex-Partner: Not on file    Emotionally Abused: Not on file    Physically Abused: Not on file    Sexually Abused: Not on file   Housing Stability:     Unable to Pay for Housing in the Last Year: Not on file    Number of [place] in the Last Year: Not on file    Unstable Housing in the Last Year: Not on file        Family History   Problem Relation Age of Onset    High Blood Pressure Mother     Stroke Mother     Emphysema Father     Other Sister         fibromyalgia    Stroke Brother     Heart Disease Brother     Stroke Sister         brain aneurysm at age 39    Other Brother         drowning accident at age 39       Vitals:    04/05/22 1134 04/05/22 1141   BP: (!) 142/78 138/76   Site: Right Upper Arm Right Upper Arm   Position: Sitting Sitting   Cuff Size: Large Adult Large Adult   Pulse: 91    Temp: 97.2 °F (36.2 °C)    SpO2: 98%    Weight: 207 lb (93.9 kg)    Height: 5' 1\" (1.549 m)        Estimated body mass index is 39.11 kg/m² as calculated from the following:    Height as of this encounter: 5' 1\" (1.549 m). Weight as of this encounter: 207 lb (93.9 kg). No results for input(s): WBC, RBC, HGB, HCT, MCV, MCH, MCHC, RDW, PLT, MPV in the last 72 hours. No results for input(s): NA, K, CL, CO2, BUN, CREATININE, GLUCOSE, CALCIUM, PROT, LABALBU, BILITOT, ALKPHOS, AST, ALT in the last 72 hours. No results found for: LABA1C    No results found. Physical Exam  Constitutional:       General: She is not in acute distress. Appearance: Normal appearance. HENT:      Head: Normocephalic and atraumatic. Eyes:      Extraocular Movements: Extraocular movements intact.       Conjunctiva/sclera: Conjunctivae normal.   Musculoskeletal: General: No deformity. Normal range of motion. Right lower leg: Edema present. Left lower leg: Edema present. Comments: No tenderness to palpation of the left calf, no pain with foot flexion or extension  No tenderness to palpation of the left hamstring   Skin:     Findings: No lesion or rash. Neurological:      General: No focal deficit present. Mental Status: She is alert. Mental status is at baseline. Psychiatric:         Mood and Affect: Mood normal.         Behavior: Behavior normal.         Thought Content: Thought content normal.         ASSESSMENT/PLAN:  1. Left leg swelling  On exam patient actually does have bilateral lower extremity edema even though her main concern is left leg swelling  Due to swelling and pain that has been occurring since her surgery I do think ultrasound to rule out DVT should be completed, ordered stat  We will also get BMP to rule out congestive heart failure    - Brain Natriuretic Peptide; Future  - US DUP LOWER EXTREMITY LEFT RYNE; Future    2. Orthopnea   - Brain Natriuretic Peptide; Future      Medications Discontinued During This Encounter   Medication Reason    hydroCHLOROthiazide (HYDRODIURIL) 50 MG tablet DOSE ADJUSTMENT       ---------------------------------------------------------------------  Side effects, adverse effects of the medication prescribed today, as well as treatment plan/ rationale and result expectations have been discussed with the patient who expresses understanding and desires to proceed. Close follow up to evaluate treatment results and for coordination of care. I have reviewed the patient's medical history in detail and updated the computerized patient record. As always, patient is advised that if symptoms worsen in any way they will proceed to the nearest emergency room. --------------------------------------------------------------------    Return if symptoms worsen or fail to improve.     An  electronic signature was used to authenticate this note.     --Elen Bojorquez DO on 4/5/2022 at 12:12 PM

## 2022-04-12 ENCOUNTER — OFFICE VISIT (OUTPATIENT)
Dept: GASTROENTEROLOGY | Age: 72
End: 2022-04-12
Payer: MEDICARE

## 2022-04-12 VITALS
HEIGHT: 61 IN | BODY MASS INDEX: 39.08 KG/M2 | HEART RATE: 89 BPM | WEIGHT: 207 LBS | SYSTOLIC BLOOD PRESSURE: 118 MMHG | OXYGEN SATURATION: 94 % | DIASTOLIC BLOOD PRESSURE: 64 MMHG

## 2022-04-12 DIAGNOSIS — K63.89 PROCTOSIGMOIDITIS: Primary | ICD-10-CM

## 2022-04-12 DIAGNOSIS — E66.01 SEVERE OBESITY (BMI 35.0-39.9) WITH COMORBIDITY (HCC): ICD-10-CM

## 2022-04-12 PROCEDURE — 1123F ACP DISCUSS/DSCN MKR DOCD: CPT | Performed by: SPECIALIST

## 2022-04-12 PROCEDURE — 99214 OFFICE O/P EST MOD 30 MIN: CPT | Performed by: SPECIALIST

## 2022-04-12 PROCEDURE — G8417 CALC BMI ABV UP PARAM F/U: HCPCS | Performed by: SPECIALIST

## 2022-04-12 PROCEDURE — 1090F PRES/ABSN URINE INCON ASSESS: CPT | Performed by: SPECIALIST

## 2022-04-12 PROCEDURE — 1036F TOBACCO NON-USER: CPT | Performed by: SPECIALIST

## 2022-04-12 PROCEDURE — 4040F PNEUMOC VAC/ADMIN/RCVD: CPT | Performed by: SPECIALIST

## 2022-04-12 PROCEDURE — G8427 DOCREV CUR MEDS BY ELIG CLIN: HCPCS | Performed by: SPECIALIST

## 2022-04-12 PROCEDURE — 3017F COLORECTAL CA SCREEN DOC REV: CPT | Performed by: SPECIALIST

## 2022-04-12 PROCEDURE — G8400 PT W/DXA NO RESULTS DOC: HCPCS | Performed by: SPECIALIST

## 2022-04-12 RX ORDER — MESALAMINE 4 G/60ML
4 SUSPENSION RECTAL NIGHTLY
Qty: 30 KIT | Refills: 3 | Status: SHIPPED | OUTPATIENT
Start: 2022-04-12 | End: 2022-04-14

## 2022-04-12 RX ORDER — MESALAMINE 1.2 G/1
1200 TABLET, DELAYED RELEASE ORAL
Qty: 30 TABLET | Refills: 3 | Status: SHIPPED | OUTPATIENT
Start: 2022-04-12 | End: 2022-04-14

## 2022-04-12 ASSESSMENT — ENCOUNTER SYMPTOMS
RESPIRATORY NEGATIVE: 1
ABDOMINAL PAIN: 0
RECTAL PAIN: 0
EYES NEGATIVE: 1
DIARRHEA: 1
ABDOMINAL DISTENTION: 0
CONSTIPATION: 0
BLOOD IN STOOL: 1
NAUSEA: 0
ANAL BLEEDING: 0
VOMITING: 0

## 2022-04-12 NOTE — PROGRESS NOTES
Gastroenterology Clinic Follow up Visit    Omero Valdovinos  [id number]  Chief Complaint   Patient presents with    Follow-up     last colonoscopy was 11/12/2020    Diarrhea      pt is having  diarrhea  with some dark blood in the stool at night  it last till mid morning this has been going on for the last few weeks    Constipation     states that she is using a stool softner to help       HPI and A/P at last visit summarized below:  Patient is here because of diarrhea which started couple weeks ago, also noticed blood in the stool, patient had colonoscopy by me in November 2020 which showed moderate proctosigmoiditis and was treated with Rowasa enema and Delzicol 800 g p.o. 3 times daily, in terms did improve however since last few months patient has not been taking any medications for IBD, had recent shoulder surgery and not sure whether she had taken any antibiotics, no emesis, abdominal discomfort, has 5-6 BM per day    Review of Systems   Constitutional: Negative. HENT: Negative. Eyes: Negative. Respiratory: Negative. Cardiovascular: Negative. Gastrointestinal: Positive for blood in stool and diarrhea. Negative for abdominal distention, abdominal pain, anal bleeding, constipation, nausea, rectal pain and vomiting. Endocrine: Negative. Genitourinary: Negative. Musculoskeletal: Positive for arthralgias. Skin: Negative. Allergic/Immunologic: Negative for food allergies. Neurological: Negative. Hematological: Negative. Psychiatric/Behavioral: Negative. Past medical history, past surgical history, medication list, social and familyhistory reviewed    Blood pressure 118/64, pulse 89, height 5' 1\" (1.549 m), weight 207 lb (93.9 kg), last menstrual period 08/10/2007, SpO2 94 %, not currently breastfeeding. Physical Exam  Constitutional:       Appearance: She is well-developed. HENT:      Head: Normocephalic and atraumatic.    Eyes:      Conjunctiva/sclera: Conjunctivae normal.      Pupils: Pupils are equal, round, and reactive to light. Cardiovascular:      Rate and Rhythm: Normal rate. Pulmonary:      Effort: Pulmonary effort is normal.   Abdominal:      General: Bowel sounds are normal.      Palpations: Abdomen is soft. Comments: Soft nontender no palpable mass   Musculoskeletal:         General: Normal range of motion. Cervical back: Normal range of motion. Skin:     General: Skin is warm. Neurological:      Mental Status: She is alert. Laboratory, Pathology, Radiology reviewed in detail with relevantimportant investigations summarized below:    No results for input(s): WBC, HGB, HCT, MCV, PLT in the last 720 hours. Lab Results   Component Value Date    ALT <5 01/12/2022    AST 28 01/12/2022    ALKPHOS 64 01/12/2022    BILITOT 0.3 01/12/2022     US DUP LOWER EXTREMITY LEFT RYNE    Result Date: 4/5/2022  LEFT LOWER EXTREMITY DEEP VENOUS ULTRASOUND WITH DOPPLER IMAGING CLINICAL HISTORY:  Lower extremity swelling COMPARISON: 4/16/2021 TECHNIQUE:  Jearlean Mullet scale with compression maneuvers, Color Doppler and Spectral Doppler at rest and with augmentation of the left distal external iliac, common femoral, femoral and popliteal veins was performed. Grey scale with compression maneuvers of the peroneal and posterior tibial veins was performed. The great and small saphenous veins were imaged in grey scale with compression maneuvers at their insertion to the deep system. The contralateral external iliac vein and common femoral vein were imaged for comparison. Images were obtained and stored in a permanent archive.  RESULT: LEFT LOWER EXTREMITY PROXIMAL DEEP VEINS Distal External Iliac and Common Femoral Veins:      Compression: Normal      Doppler: Normal, spontaneous respirophasic flow                      Normal response to augmentation Femoral vein:      Compression: Normal      Doppler: Normal, spontaneous flow                      Normal response to augmentation  Popliteal vein:      Compression: Normal      Doppler: Normal, spontaneous flow                      Normal response to augmentation CALF DEEP VEINS Peroneal veins: Normal compression Posterior tibial veins: Normal compression Gastrocnemius and Soleal veins: Not imaged SUPERFICIAL VEINS Great saphenous: Patent and compressible at insertion into common femoral vein; not otherwise assessed. Small Saphenous:  Patent and compressible in the proximal calf, not otherwise assessed. NEGATIVE STUDY FOR ACUTE PROXIMAL DVT IN THE LEFT LOWER EXTREMITY. NEGATIVE STUDY FOR ACUTE CALF DVT IN THE LEFT LOWER EXTREMITY. NEGATIVE STUDY FOR SUPERFICIAL THROMBOPHLEBITIS IN THE LEFT LOWER EXTREMITY       Endoscopic investigations:     Assessment and Plan:  Michael Hernandez 66 y.o. female for follow up. Flareup of IBD since patient stopped taking mesalamine. She has a history of proctosigmoiditis. Will restart Rowasa enema and mesalamine and also check stool for C. difficile toxin. Patient would like a written prescription since she wants to try different pharmacy. Diagnosis Orders   1. Proctosigmoiditis  Clostridium Difficile Toxin/Antigen   2. Severe obesity (BMI 35.0-39. 9) with comorbidity ([address]         No follow-ups on file. Juan Luis Christie MD   StaffGastroenterologist  [facility]    Please note this report has been partially produced using speech recognitionsoftware  and may cause contain errors related to that system including grammar, punctuation and spelling as well as words andphrases that may seem inappropriate. If there are questions or concerns please feel free to contact me to clarify.

## 2022-04-13 RX ORDER — MESALAMINE 400 MG/1
800 CAPSULE, DELAYED RELEASE ORAL 3 TIMES DAILY
COMMUNITY
End: 2022-04-14

## 2022-04-14 ENCOUNTER — OFFICE VISIT (OUTPATIENT)
Dept: FAMILY MEDICINE CLINIC | Age: 72
End: 2022-04-14
Payer: MEDICARE

## 2022-04-14 VITALS
HEIGHT: 61 IN | HEART RATE: 91 BPM | OXYGEN SATURATION: 95 % | DIASTOLIC BLOOD PRESSURE: 72 MMHG | SYSTOLIC BLOOD PRESSURE: 134 MMHG | TEMPERATURE: 99.2 F | BODY MASS INDEX: 39.11 KG/M2

## 2022-04-14 DIAGNOSIS — M54.42 ACUTE LEFT-SIDED LOW BACK PAIN WITH LEFT-SIDED SCIATICA: Primary | ICD-10-CM

## 2022-04-14 DIAGNOSIS — Z78.9 IMPAIRED MOBILITY AND ACTIVITIES OF DAILY LIVING: ICD-10-CM

## 2022-04-14 DIAGNOSIS — G20 PARKINSONISM, UNSPECIFIED PARKINSONISM TYPE (HCC): ICD-10-CM

## 2022-04-14 DIAGNOSIS — Z74.09 IMPAIRED MOBILITY AND ACTIVITIES OF DAILY LIVING: ICD-10-CM

## 2022-04-14 PROCEDURE — 1090F PRES/ABSN URINE INCON ASSESS: CPT | Performed by: FAMILY MEDICINE

## 2022-04-14 PROCEDURE — G8400 PT W/DXA NO RESULTS DOC: HCPCS | Performed by: FAMILY MEDICINE

## 2022-04-14 PROCEDURE — G8427 DOCREV CUR MEDS BY ELIG CLIN: HCPCS | Performed by: FAMILY MEDICINE

## 2022-04-14 PROCEDURE — 3017F COLORECTAL CA SCREEN DOC REV: CPT | Performed by: FAMILY MEDICINE

## 2022-04-14 PROCEDURE — 99213 OFFICE O/P EST LOW 20 MIN: CPT | Performed by: FAMILY MEDICINE

## 2022-04-14 PROCEDURE — 1123F ACP DISCUSS/DSCN MKR DOCD: CPT | Performed by: FAMILY MEDICINE

## 2022-04-14 PROCEDURE — G8417 CALC BMI ABV UP PARAM F/U: HCPCS | Performed by: FAMILY MEDICINE

## 2022-04-14 PROCEDURE — 1036F TOBACCO NON-USER: CPT | Performed by: FAMILY MEDICINE

## 2022-04-14 PROCEDURE — 4040F PNEUMOC VAC/ADMIN/RCVD: CPT | Performed by: FAMILY MEDICINE

## 2022-04-14 RX ORDER — MESALAMINE 800 MG/1
TABLET, DELAYED RELEASE ORAL
COMMUNITY
Start: 2022-04-12

## 2022-04-14 RX ORDER — HYDROCODONE BITARTRATE AND ACETAMINOPHEN 5; 325 MG/1; MG/1
1 TABLET ORAL EVERY 6 HOURS PRN
Qty: 20 TABLET | Refills: 0 | Status: SHIPPED | OUTPATIENT
Start: 2022-04-14 | End: 2022-04-19

## 2022-04-14 RX ORDER — METHYLPREDNISOLONE 4 MG/1
TABLET ORAL
Qty: 1 KIT | Refills: 0 | Status: SHIPPED | OUTPATIENT
Start: 2022-04-14

## 2022-04-14 RX ORDER — TIZANIDINE 2 MG/1
2 TABLET ORAL 3 TIMES DAILY PRN
Qty: 30 TABLET | Refills: 0 | Status: SHIPPED | OUTPATIENT
Start: 2022-04-14

## 2022-04-14 NOTE — PROGRESS NOTES
Chief Complaint   Patient presents with    Leg Pain     left leg, back of leg, behind knee and up into buttocks, last week was seen for swelling, cnan;t walk without walker due ot pain, has been taking oxycodone for pain  with little relieft       HPI:  Marguerite Hanna is a 66 y.o. female       Leg pain   Back of leg  Was in to see Dr. John Jerez  U/s done to r/o DVT, was ok     Tylenol/old rx of oxycodone  Not much help     Can't really walk without walker    Parkinsonism  Does see dr. Ronal Champion      HTN  Taking medication without issue  Overdue for screens/routine labs, etc  Has order for mamm        Past Medical History:   Diagnosis Date    Hypertension     meds > 10 yrs / denies TIA or stroke    Osteoarthritis     DJD right hip    Parkinson disease ([facility] [address] [facility]     Parkinsonism ([facility] [address] [facility]      Past Surgical History:   Procedure Laterality Date     SECTION  1988    COLONOSCOPY N/A 2020    COLONOSCOPY WITH POLYPECTOMY performed by Darell Sanabria MD at [address] Left     arthrotomy    SHOULDER ARTHROSCOPY Left 2021    LEFT SHOULDER ARTHROSCOPY ROTATOR CUFF REPAIR, SUBACROMIAL DECOMPRESSION performed by Belem Mei MD at [address] ARTHROSCOPY Right 2022    RIGHT SHOULDER ARTHROSCOPY ROTATOR CUFF REPAIR WITH DERMAL ALLOGRAFT, BICEPS TENOTOMY OPEN REPAIR performed by Belem Mei MD at [address], [address] Right 2017    RIGHT HIP TOTAL HIP ARTHROPLASTY WILLIAM MDM SPINAL  performed by Lamberto Sherman MD at [address] [facility] [address] EXTRACTION       Family History   Problem Relation Age of Onset    High Blood Pressure Mother     Stroke Mother     Emphysema Father     Other Sister         fibromyalgia    Stroke Brother     Heart Disease Brother     Stroke Sister         brain aneurysm at age 43    Other Brother         drowning accident at age 39     Social History Socioeconomic History    Marital status:      Spouse name: None    Number of children: None    Years of education: None    Highest education level: None   Occupational History    None   Tobacco Use    Smoking status: Never Smoker    Smokeless tobacco: Never Used   Vaping Use    Vaping Use: Never used   Substance and Sexual Activity    Alcohol use: Yes     Alcohol/week: 1.0 standard drink     Types: 1 Glasses of wine per week     Comment: occasional    Drug use: Never    Sexual activity: None   Other Topics Concern    None   Social History Narrative    Lives With: Spouse Gene    Type of Home: [place] [address] [address] [place] [address] Ave: Two level,1/2 bath on main level,Able to Live on Main level with bedroom/bathroom (will be sleeping in recliner upon discharge)    Home Access: Stairs to enter without rails- Number of Steps: 2    Bathroom Shower/Tub: Tub/Shower unit, Shower chair,Grab bars in shower,Hand-held shower    Home Equipment: Lango    ADL Assistance: Needs assistance    Homemaking Assistance: Needs assistance    Homemaking Responsibilities: No    Ambulation Assistance: Independent    Transfer Assistance: Independent    Active : No    Occupation: Retired     Leisure & Hobbies: reading, watching TV         Social Determinants of Health     Financial Resource Strain: Low Risk     Difficulty of Paying Living Expenses: Not hard at all   Food Insecurity: No [address] Worried About [facility] in the Last Year: Never true   [address] [facility] in the Last Year: Never true   Transportation Needs: No Transportation Needs    Lack of Transportation (Medical): No    Lack of Transportation (Non-Medical):  No   Physical Activity:     Days of Exercise per Week: Not on file    Minutes of Exercise per Session: Not on file   Stress:     Feeling of Stress : Not on file   Social Connections:     Frequency of Communication with Friends and Family: Not on file  Frequency of Social Gatherings with Friends and Family: Not on file    Attends Church Services: Not on file    Active Member of Clubs or Organizations: Not on file    Attends Club or Organization Meetings: Not on file    Marital Status: Not on file   Intimate Partner Violence:     Fear of Current or Ex-Partner: Not on file    Emotionally Abused: Not on file    Physically Abused: Not on file    Sexually Abused: Not on file   Housing Stability:     Unable to Pay for Housing in the Last Year: Not on file    Number of [place] in the Last Year: Not on file    Unstable Housing in the Last Year: Not on file     Current Outpatient Medications   Medication Sig Dispense Refill    mesalamine (DELZICOL) 800 MG TBEC TBEC tablet take 2 tablet by mouth three times a day      methylPREDNISolone (MEDROL DOSEPACK) 4 MG tablet Take by mouth. 1 kit 0    HYDROcodone-acetaminophen (NORCO) 5-325 MG per tablet Take 1 tablet by mouth every 6 hours as needed for Pain for up to 5 days. Intended supply: 5 days. Take lowest dose possible to manage pain 20 tablet 0    tiZANidine (ZANAFLEX) 2 MG tablet Take 1 tablet by mouth 3 times daily as needed (spasm) 30 tablet 0    carbidopa-levodopa (SINEMET)  MG per tablet Take 1 tablet by mouth 3 times daily TAKE 1 TABLET BY MOUTH TWICE A  tablet 2    diphenhydrAMINE-APAP, sleep, (TYLENOL PM EXTRA STRENGTH)  MG tablet Take 1 tablet by mouth nightly as needed for Sleep Pt took 0.5 tab      VITAMIN D PO Take 1 tablet by mouth daily      Cyanocobalamin (VITAMIN B-12 PO) Take 1 tablet by mouth daily      spironolactone-hydroCHLOROthiazide (ALDACTAZIDE) 25-25 MG per tablet TAKE 2 TABLETS BY MOUTH EVERY  tablet 1    ammonium lactate (AMLACTIN) 12 % cream       aspirin 81 MG EC tablet Take 81 mg by mouth daily      Multiple Vitamin (MULTI VITAMIN DAILY PO) Take 1 tablet by mouth daily       No current facility-administered medications for this visit. Allergies   Allergen Reactions    Tramadol Nausea And Vomiting    Sulfa Antibiotics Hives     hives & chills       Review of Systems:   General ROS: fatigue, chills on occasion  Respiratory ROS: no cough, shortness of breath, or wheezing  Cardiovascular ROS: no chest pain or dyspnea on exertion  Gastrointestinal ROS: no abdominal pain, change in bowel habits, or black or bloody stools  Genito-Urinary ROS: no dysuria, trouble voiding  Musculoskeletal ROS: right shoulder pain  In general patient otherwise reports feeling well. Physical Exam:  /72 (Site: Left Upper Arm)   Pulse 91   Temp 99.2 °F (37.3 °C)   Ht 5' 1\" (1.549 m)   LMP 08/10/2007   SpO2 95%   Breastfeeding No   BMI 39.11 kg/m²     Gen: Well, NAD, Alert, Oriented x 3   HEENT: EOMI, eyes clear, MMM  Skin: without rash or jaundice  Lungs CTAB  Heart s1s2 RRR  Neuro: left hand with pill rolling tremor, left arm with some rigidity  Ext: no CCE   Struggles to stand and gait is shuffling, not really able to walk more than a couple steps with assist       A&P   Diagnosis Orders   1. Acute left-sided low back pain with left-sided sciatica  methylPREDNISolone (MEDROL DOSEPACK) 4 MG tablet    HYDROcodone-acetaminophen (NORCO) 5-325 MG per tablet    tiZANidine (ZANAFLEX) 2 MG tablet   2. Parkinsonism, unspecified Parkinsonism type ([facility] [address]     3.  Impaired mobility and activities of daily living dt Parkinsonson and right shoulder replacement          Medrol   norco   zanaflex    Consider Jair Dash MD

## 2022-05-16 ENCOUNTER — TELEPHONE (OUTPATIENT)
Dept: GASTROENTEROLOGY | Age: 72
End: 2022-05-16

## 2022-05-16 NOTE — TELEPHONE ENCOUNTER
The patient is requesting a written prescription for mesalamine 800mg. She is looking to find a pharmacy with a less expensive cost. Thank you.

## 2022-05-25 DIAGNOSIS — I10 ESSENTIAL HYPERTENSION: ICD-10-CM

## 2022-05-25 NOTE — TELEPHONE ENCOUNTER
Provider Specialty Visit Type Primary Dx   04/14/2022 Lorenzo Blackwood MD Family Medicine Office Visit Acute left-sided low back pain with left-sided sciatica

## 2022-05-26 RX ORDER — SPIRONOLACTONE AND HYDROCHLOROTHIAZIDE 25; 25 MG/1; MG/1
TABLET ORAL
Qty: 180 TABLET | Refills: 1 | Status: SHIPPED | OUTPATIENT
Start: 2022-05-26

## 2022-06-02 ENCOUNTER — TELEPHONE (OUTPATIENT)
Dept: FAMILY MEDICINE CLINIC | Age: 72
End: 2022-06-02

## 2022-06-07 ENCOUNTER — TELEPHONE (OUTPATIENT)
Dept: FAMILY MEDICINE CLINIC | Age: 72
End: 2022-06-07

## 2022-06-24 ENCOUNTER — TELEPHONE (OUTPATIENT)
Dept: FAMILY MEDICINE CLINIC | Age: 72
End: 2022-06-24

## 2022-08-17 ENCOUNTER — OFFICE VISIT (OUTPATIENT)
Dept: GASTROENTEROLOGY | Age: 72
End: 2022-08-17
Payer: MEDICARE

## 2022-08-17 VITALS
OXYGEN SATURATION: 96 % | DIASTOLIC BLOOD PRESSURE: 84 MMHG | SYSTOLIC BLOOD PRESSURE: 132 MMHG | WEIGHT: 192 LBS | BODY MASS INDEX: 36.28 KG/M2 | HEART RATE: 90 BPM

## 2022-08-17 DIAGNOSIS — K63.89 PROCTOSIGMOIDITIS: Primary | ICD-10-CM

## 2022-08-17 PROCEDURE — G8400 PT W/DXA NO RESULTS DOC: HCPCS | Performed by: SPECIALIST

## 2022-08-17 PROCEDURE — 99213 OFFICE O/P EST LOW 20 MIN: CPT | Performed by: SPECIALIST

## 2022-08-17 PROCEDURE — 3017F COLORECTAL CA SCREEN DOC REV: CPT | Performed by: SPECIALIST

## 2022-08-17 PROCEDURE — G8417 CALC BMI ABV UP PARAM F/U: HCPCS | Performed by: SPECIALIST

## 2022-08-17 PROCEDURE — 1036F TOBACCO NON-USER: CPT | Performed by: SPECIALIST

## 2022-08-17 PROCEDURE — G8427 DOCREV CUR MEDS BY ELIG CLIN: HCPCS | Performed by: SPECIALIST

## 2022-08-17 PROCEDURE — 1123F ACP DISCUSS/DSCN MKR DOCD: CPT | Performed by: SPECIALIST

## 2022-08-17 PROCEDURE — 1090F PRES/ABSN URINE INCON ASSESS: CPT | Performed by: SPECIALIST

## 2022-08-17 RX ORDER — MESALAMINE 4 G/60ML
4 ENEMA RECTAL NIGHTLY
Qty: 30 ENEMA | Refills: 3 | Status: SHIPPED | OUTPATIENT
Start: 2022-08-17 | End: 2022-08-17 | Stop reason: SDUPTHER

## 2022-08-17 RX ORDER — MESALAMINE 4 G/60ML
4 ENEMA RECTAL NIGHTLY
Qty: 30 ENEMA | Refills: 3 | Status: SHIPPED | OUTPATIENT
Start: 2022-08-17

## 2022-08-17 RX ORDER — AMOXICILLIN 500 MG/1
CAPSULE ORAL
COMMUNITY
Start: 2022-08-15

## 2022-08-17 ASSESSMENT — ENCOUNTER SYMPTOMS
ABDOMINAL DISTENTION: 0
CONSTIPATION: 0
RESPIRATORY NEGATIVE: 1
VOMITING: 0
NAUSEA: 0
GASTROINTESTINAL NEGATIVE: 1
EYES NEGATIVE: 1
DIARRHEA: 0
BLOOD IN STOOL: 0
RECTAL PAIN: 0
ABDOMINAL PAIN: 0
ANAL BLEEDING: 0

## 2022-08-17 NOTE — PROGRESS NOTES
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 ,  Symptoms has improved however she still has urgency for BM especially in the nighttime with passage of flatus and mucus, small amount of blood in the stool, she also feels at times she she has to strain more.,  Patient is getting Delzicol from [facility]. ,  No abdominal pain nausea or vomiting  Has 5 or 6 times of episodes a day with passage of mucus. Review of Systems   Constitutional: Negative. HENT: Negative. Eyes: Negative. Respiratory: Negative. Cardiovascular: Negative. Gastrointestinal: Negative. Negative for abdominal distention, abdominal pain, anal bleeding, blood in stool, constipation, diarrhea, nausea, rectal pain and vomiting. Fecal urgency and mucus in the stool   Endocrine: Negative. Genitourinary: Negative. Musculoskeletal: Negative. Skin: Negative. Allergic/Immunologic: Positive for food allergies. Neurological: Negative. Hematological: Negative. Psychiatric/Behavioral: Negative. Past medical history, past surgical history, medication list, social and familyhistory reviewed    Blood pressure 132/84, pulse 90, weight 192 lb (87.1 kg), last menstrual period 08/10/2007, SpO2 96 %, not currently breastfeeding. Physical Exam  Constitutional:       Appearance: She is well-developed. HENT:      Head: Normocephalic and atraumatic. Eyes:      Conjunctiva/sclera: Conjunctivae normal.      Pupils: Pupils are equal, round, and reactive to light. Cardiovascular:      Rate and Rhythm: Normal rate. Pulmonary:      Effort: Pulmonary effort is normal.   Abdominal:      General: Bowel sounds are normal.      Palpations: Abdomen is soft.       Comments: Soft nontender no palpable mass   Musculoskeletal:         General: Normal range of motion. Cervical back: Normal range of motion. Skin:     General: Skin is warm. Neurological:      Mental Status: She is alert. Laboratory, Pathology, Radiology reviewed in detail with relevantimportant investigations summarized below:    No results for input(s): WBC, HGB, HCT, MCV, PLT in the last 720 hours. Lab Results   Component Value Date    ALT <5 01/12/2022    AST 28 01/12/2022    ALKPHOS 64 01/12/2022    BILITOT 0.3 01/12/2022     No results found. Endoscopic investigations:     Assessment and Plan:  Gloria Age 66 y.o. female for follow up. 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 Return in about 3 months (around 11/17/2022). Sunny Essex, MD   StaffGastroenterologist  [facility]    Please note this report has been partially produced using speech recognitionsoftware  and may cause contain errors related to that system including grammar, punctuation and spelling as well as words andphrases that may seem inappropriate. If there are questions or concerns please feel free to contact me to clarify.

## 2022-08-18 ENCOUNTER — TELEPHONE (OUTPATIENT)
Dept: GASTROENTEROLOGY | Age: 72
End: 2022-08-18

## 2022-08-18 NOTE — TELEPHONE ENCOUNTER
Fax received from [facility] for PA for Mesalamine enemas. PA sent on [email or web address].  Awaiting response    GRP [id number]  BIN [id number]  [id number]

## 2022-11-19 DIAGNOSIS — I10 ESSENTIAL HYPERTENSION: ICD-10-CM

## 2022-11-21 RX ORDER — SPIRONOLACTONE AND HYDROCHLOROTHIAZIDE 25; 25 MG/1; MG/1
TABLET ORAL
Qty: 180 TABLET | Refills: 1 | Status: SHIPPED | OUTPATIENT
Start: 2022-11-21

## 2022-11-21 NOTE — TELEPHONE ENCOUNTER
Future Appointments    Encounter Information    Provider Department Appt Notes   11/30/2022 Arthur Zhu MD [facility] Gastroenterology 3m f/u     Past Visits    Date Provider Specialty Visit Type Primary Dx   08/17/2022 Arthur Zhu MD Gastroenterology Office Visit Proctosigmoiditis   04/14/2022 Dylan Morris MD Family Medicine Office Visit Acute left-sided low back pain with left-sided sciatica

## 2022-11-30 ENCOUNTER — OFFICE VISIT (OUTPATIENT)
Dept: GASTROENTEROLOGY | Age: 72
End: 2022-11-30
Payer: MEDICARE

## 2022-11-30 VITALS — HEIGHT: 61 IN | WEIGHT: 189 LBS | HEART RATE: 73 BPM | OXYGEN SATURATION: 99 % | BODY MASS INDEX: 35.68 KG/M2

## 2022-11-30 DIAGNOSIS — K63.89 PROCTOSIGMOIDITIS: Primary | ICD-10-CM

## 2022-11-30 PROCEDURE — G8427 DOCREV CUR MEDS BY ELIG CLIN: HCPCS | Performed by: SPECIALIST

## 2022-11-30 PROCEDURE — G8400 PT W/DXA NO RESULTS DOC: HCPCS | Performed by: SPECIALIST

## 2022-11-30 PROCEDURE — G8484 FLU IMMUNIZE NO ADMIN: HCPCS | Performed by: SPECIALIST

## 2022-11-30 PROCEDURE — 1036F TOBACCO NON-USER: CPT | Performed by: SPECIALIST

## 2022-11-30 PROCEDURE — G8417 CALC BMI ABV UP PARAM F/U: HCPCS | Performed by: SPECIALIST

## 2022-11-30 PROCEDURE — 1123F ACP DISCUSS/DSCN MKR DOCD: CPT | Performed by: SPECIALIST

## 2022-11-30 PROCEDURE — 3017F COLORECTAL CA SCREEN DOC REV: CPT | Performed by: SPECIALIST

## 2022-11-30 PROCEDURE — 1090F PRES/ABSN URINE INCON ASSESS: CPT | Performed by: SPECIALIST

## 2022-11-30 PROCEDURE — 99213 OFFICE O/P EST LOW 20 MIN: CPT | Performed by: SPECIALIST

## 2022-11-30 ASSESSMENT — ENCOUNTER SYMPTOMS
ANAL BLEEDING: 0
VOMITING: 0
RESPIRATORY NEGATIVE: 1
NAUSEA: 0
CONSTIPATION: 0
ABDOMINAL DISTENTION: 0
DIARRHEA: 0
ABDOMINAL PAIN: 0
EYES NEGATIVE: 1
BLOOD IN STOOL: 1
RECTAL PAIN: 0

## 2022-11-30 NOTE — PROGRESS NOTES
Gastroenterology Clinic Follow up Visit    Christian Enriquez  [id number]  Chief Complaint   Patient presents with    Follow-up    Diarrhea       HPI and A/P at last visit summarized below:  Patient is here for follow-up. She tried using Rowasa enema but patient is not able to retain the medication not more than 10 minutes. Has frequent mucousy discharge per rectum and occasional blood in the stool. No abdominal pain no nausea no emesis. Review of Systems   Constitutional: Negative. HENT: Negative. Eyes: Negative. Respiratory: Negative. Cardiovascular: Negative. Gastrointestinal:  Positive for blood in stool. Negative for abdominal distention, abdominal pain, anal bleeding, constipation, diarrhea, nausea, rectal pain and vomiting. Mucousy discharge per rectum. Endocrine: Negative. Genitourinary: Negative. Musculoskeletal: Negative. Skin: Negative. Allergic/Immunologic: Negative for food allergies. Neurological: Negative. Hematological: Negative. Psychiatric/Behavioral: Negative. Past medical history, past surgical history, medication list, social and familyhistory reviewed    Pulse 73, height 5' 1\" (1.549 m), weight 189 lb (85.7 kg), last menstrual period 08/10/2007, SpO2 99 %, not currently breastfeeding. Physical Exam  Constitutional:       Appearance: She is well-developed. HENT:      Head: Normocephalic and atraumatic. Eyes:      Conjunctiva/sclera: Conjunctivae normal.      Pupils: Pupils are equal, round, and reactive to light. Cardiovascular:      Rate and Rhythm: Normal rate. Pulmonary:      Effort: Pulmonary effort is normal.   Abdominal:      General: Bowel sounds are normal.      Palpations: Abdomen is soft. Comments: Soft nontender no palpable mass   Musculoskeletal:         General: Normal range of motion. Cervical back: Normal range of motion. Skin:     General: Skin is warm. Neurological:      Mental Status: She is alert. Laboratory, Pathology, Radiology reviewed in detail with relevantimportant investigations summarized below:    No results for input(s): WBC, HGB, HCT, MCV, PLT in the last 720 hours. Lab Results   Component Value Date    ALT <5 01/12/2022    AST 28 01/12/2022    ALKPHOS 64 01/12/2022    BILITOT 0.3 01/12/2022     No results found. Endoscopic investigations:     Assessment and Plan:  Johanny Patino 74 y.o. female for follow up. Proctosigmoiditis on Delzicol 2.4 g a day. Patient tried Rowasa enema however she was not able to retain for more than 10 minutes, will try Imodium 2-hour prior to administration of enema and see whether patient could retain enema for extended period of time, even with Imodium if patient is not able to retain Rowasa enema will try Uceris rectal foam.  Will increase Delzicol to 4.8 g a day. Patient is getting the prescription from [address] [facility]      Return in about 3 months (around 2/28/2023). Luisa Pruitt MD   StaffGastroenterologist  [facility]    Please note this report has been partially produced using speech recognitionsoftware  and may cause contain errors related to that system including grammar, punctuation and spelling as well as words andphrases that may seem inappropriate. If there are questions or concerns please feel free to contact me to clarify.

## 2022-12-20 ENCOUNTER — OFFICE VISIT (OUTPATIENT)
Dept: FAMILY MEDICINE CLINIC | Age: 72
End: 2022-12-20
Payer: MEDICARE

## 2022-12-20 VITALS
SYSTOLIC BLOOD PRESSURE: 138 MMHG | OXYGEN SATURATION: 99 % | DIASTOLIC BLOOD PRESSURE: 88 MMHG | TEMPERATURE: 99.3 F | BODY MASS INDEX: 35.71 KG/M2 | HEART RATE: 84 BPM | WEIGHT: 189 LBS

## 2022-12-20 DIAGNOSIS — B34.9 HEADACHE DUE TO VIRAL INFECTION: ICD-10-CM

## 2022-12-20 DIAGNOSIS — U07.1 COVID-19: Primary | ICD-10-CM

## 2022-12-20 DIAGNOSIS — R05.1 ACUTE COUGH: ICD-10-CM

## 2022-12-20 DIAGNOSIS — Z20.822 EXPOSURE TO COVID-19 VIRUS: ICD-10-CM

## 2022-12-20 DIAGNOSIS — R51.9 HEADACHE DUE TO VIRAL INFECTION: ICD-10-CM

## 2022-12-20 DIAGNOSIS — R53.83 FATIGUE, UNSPECIFIED TYPE: ICD-10-CM

## 2022-12-20 LAB
Lab: ABNORMAL
PERFORMING INSTRUMENT: ABNORMAL
QC PASS/FAIL: ABNORMAL
SARS-COV-2, POC: DETECTED

## 2022-12-20 PROCEDURE — G8427 DOCREV CUR MEDS BY ELIG CLIN: HCPCS | Performed by: NURSE PRACTITIONER

## 2022-12-20 PROCEDURE — G8417 CALC BMI ABV UP PARAM F/U: HCPCS | Performed by: NURSE PRACTITIONER

## 2022-12-20 PROCEDURE — 3074F SYST BP LT 130 MM HG: CPT | Performed by: NURSE PRACTITIONER

## 2022-12-20 PROCEDURE — 99213 OFFICE O/P EST LOW 20 MIN: CPT | Performed by: NURSE PRACTITIONER

## 2022-12-20 PROCEDURE — 1036F TOBACCO NON-USER: CPT | Performed by: NURSE PRACTITIONER

## 2022-12-20 PROCEDURE — 3078F DIAST BP <80 MM HG: CPT | Performed by: NURSE PRACTITIONER

## 2022-12-20 PROCEDURE — 3017F COLORECTAL CA SCREEN DOC REV: CPT | Performed by: NURSE PRACTITIONER

## 2022-12-20 PROCEDURE — 1090F PRES/ABSN URINE INCON ASSESS: CPT | Performed by: NURSE PRACTITIONER

## 2022-12-20 PROCEDURE — G8484 FLU IMMUNIZE NO ADMIN: HCPCS | Performed by: NURSE PRACTITIONER

## 2022-12-20 PROCEDURE — 87426 SARSCOV CORONAVIRUS AG IA: CPT | Performed by: NURSE PRACTITIONER

## 2022-12-20 PROCEDURE — G8400 PT W/DXA NO RESULTS DOC: HCPCS | Performed by: NURSE PRACTITIONER

## 2022-12-20 PROCEDURE — 1123F ACP DISCUSS/DSCN MKR DOCD: CPT | Performed by: NURSE PRACTITIONER

## 2022-12-20 RX ORDER — DEXTROMETHORPHAN POLISTIREX 30 MG/5ML
60 SUSPENSION ORAL 2 TIMES DAILY PRN
Qty: 148 ML | Refills: 0 | Status: SHIPPED | OUTPATIENT
Start: 2022-12-20 | End: 2022-12-30

## 2022-12-20 SDOH — ECONOMIC STABILITY: FOOD INSECURITY: WITHIN THE PAST 12 MONTHS, THE FOOD YOU BOUGHT JUST DIDN'T LAST AND YOU DIDN'T HAVE MONEY TO GET MORE.: NEVER TRUE

## 2022-12-20 SDOH — ECONOMIC STABILITY: FOOD INSECURITY: WITHIN THE PAST 12 MONTHS, YOU WORRIED THAT YOUR FOOD WOULD RUN OUT BEFORE YOU GOT MONEY TO BUY MORE.: NEVER TRUE

## 2022-12-20 ASSESSMENT — ENCOUNTER SYMPTOMS
COUGH: 1
SORE THROAT: 1
RHINORRHEA: 1
CHEST TIGHTNESS: 0
DIARRHEA: 0
SHORTNESS OF BREATH: 0
NAUSEA: 0
ABDOMINAL PAIN: 0

## 2022-12-20 ASSESSMENT — SOCIAL DETERMINANTS OF HEALTH (SDOH): HOW HARD IS IT FOR YOU TO PAY FOR THE VERY BASICS LIKE FOOD, HOUSING, MEDICAL CARE, AND HEATING?: NOT HARD AT ALL

## 2022-12-20 NOTE — PROGRESS NOTES
Subjective  Shadi Ast, 74 y.o. female presents today with:  Chief Complaint   Patient presents with    Other     Pt  tested +   Pt is now testing positive this morning  Sore throat, fever, cough, runny nose        HPI  Presents to [facility] for COVID-19 and to discuss antiviral medication   Recent exposure COVID-19  Positive rapid antigen test at home this morning at home  Symptoms began in past couple of days   Sore throat, fever, chills, cough, nasal congestion/drainage, headache, palpitations & body/joint pain   Denies diarrhea currently   Denies N/V   Lessened appetite   Hydrating   Fatigue   Taking Tylenol   Tylenol PM for sleep                   Past Medical History:   Diagnosis Date    Hypertension     meds > 10 yrs / denies TIA or stroke    Osteoarthritis     DJD right hip    Parkinson disease ([facility] [address]     Parkinsonism ([facility] [address]       Past Surgical History:   Procedure Laterality Date     SECTION  1988    COLONOSCOPY N/A 2020    COLONOSCOPY WITH POLYPECTOMY performed by Niecy Winters MD at [address] Left     arthrotomy    SHOULDER ARTHROSCOPY Left 2021    LEFT SHOULDER ARTHROSCOPY ROTATOR CUFF REPAIR, SUBACROMIAL DECOMPRESSION performed by Cindy Montiel MD at [address] ARTHROSCOPY Right 2022    RIGHT SHOULDER ARTHROSCOPY ROTATOR CUFF REPAIR WITH DERMAL ALLOGRAFT, BICEPS TENOTOMY OPEN REPAIR performed by Cindy Montiel MD at [address] [facility] Right 2017    RIGHT HIP TOTAL HIP ARTHROPLASTY WILLIAM MDM SPINAL  performed by Caesar Mahajan MD at [address] EXTRACTION       Family History   Problem Relation Age of Onset    High Blood Pressure Mother     Stroke Mother     Emphysema Father     Other Sister         fibromyalgia    Stroke Brother     Heart Disease Brother     Stroke Sister         brain aneurysm at age 39    Other Brother drowning accident at age 39       Review of Systems   Constitutional:  Positive for activity change, appetite change, chills, fatigue and fever. Negative for diaphoresis. HENT:  Positive for rhinorrhea, sore throat and tinnitus. Negative for congestion and ear pain. Respiratory:  Positive for cough. Negative for chest tightness and shortness of breath. Cardiovascular:  Positive for palpitations. Negative for chest pain. Gastrointestinal:  Negative for abdominal pain, diarrhea and nausea. Musculoskeletal:  Positive for myalgias. Negative for neck stiffness. Neurological:  Positive for headaches. Negative for dizziness and light-headedness. Psychiatric/Behavioral:  Negative for sleep disturbance. PMH, Surgical Hx, Family Hx, and Social Hx reviewed and updated. Objective  Vitals:    12/20/22 1504   BP: 138/88   Pulse: 84   Temp: 99.3 °F (37.4 °C)   SpO2: 99%   Weight: 189 lb (85.7 kg)     BP Readings from Last 3 Encounters:   12/20/22 138/88   08/17/22 132/84   04/14/22 134/72     Wt Readings from Last 3 Encounters:   12/20/22 189 lb (85.7 kg)   11/30/22 189 lb (85.7 kg)   08/17/22 192 lb (87.1 kg)           Physical Exam  Vitals reviewed. Constitutional:       General: She is not in acute distress. Appearance: She is ill-appearing. She is not toxic-appearing. HENT:      Right Ear: Tympanic membrane, ear canal and external ear normal.      Left Ear: Tympanic membrane, ear canal and external ear normal.      Nose: Nose normal.      Mouth/Throat:      Lips: Pink. Mouth: Mucous membranes are moist.      Pharynx: Oropharynx is clear. Uvula midline. No pharyngeal swelling, oropharyngeal exudate or posterior oropharyngeal erythema. Eyes:      General: Lids are normal.      Conjunctiva/sclera: Conjunctivae normal.   Cardiovascular:      Rate and Rhythm: Normal rate. Pulmonary:      Effort: Pulmonary effort is normal.      Breath sounds: Normal breath sounds and air entry. Musculoskeletal:      Cervical back: Normal range of motion. No rigidity. Lymphadenopathy:      Cervical: No cervical adenopathy. Skin:     General: Skin is warm and dry. Capillary Refill: Capillary refill takes less than 2 seconds. Coloration: Skin is not pale. Neurological:      Mental Status: She is alert and oriented to person, place, and time. Assessment & Plan    Diagnosis Orders   1. COVID-19  nirmatrelvir/ritonavir (PAXLOVID) 20 x 150 MG & 10 x 100MG TBPK      2. Acute cough  POCT COVID-19, Antigen    dextromethorphan (DELSYM) 30 MG/5ML extended release liquid      3. Fatigue, unspecified type  POCT COVID-19, Antigen      4. Headache due to viral infection  POCT COVID-19, Antigen      5. Exposure to COVID-19 virus  POCT COVID-19, Antigen        Orders Placed This Encounter   Procedures    POCT COVID-19, Antigen     Order Specific Question:   Pregnant? Answer:   No     Order Specific Question:   Previously tested for COVID-19? Answer:   Yes     Orders Placed This Encounter   Medications    nirmatrelvir/ritonavir (PAXLOVID) 20 x 150 MG & 10 x 100MG TBPK     Sig: Take 3 tablets (two 150 mg nirmatrelvir and one 100 mg ritonavir tablets) by mouth every 12 hours for 5 days. Dispense:  30 tablet     Refill:  0     Order Specific Question:   Does this patient qualify for COVID-19 antIviral therapy based on criteria for treatment? Answer:   Yes    dextromethorphan (DELSYM) 30 MG/5ML extended release liquid     Sig: Take 10 mLs by mouth 2 times daily as needed for Cough     Dispense:  148 mL     Refill:  0         If you experience any of the red flag s/s, seek care at the ER  Trouble breathing  Persistent chest pain  Confusion  Extreme fatigue  Dehydration         Reviewed with the patient: current clinical status. Discussed with patient COVID-19 s/s. Pt aware to remain on home isolation based on today's test result.  Pt instructed on red flag s/s to go to the ER for or to call 911. Pt verbalized understanding. Treatment includes supportive care with rest, hydration, and medications for symptom management as ordered/discussed. A prescription for Paxlovid has been sent to the pharmacy. The purpose of Paxlovid is to attempt to shorten the duration and severity of illness. This is a contagious illness which is transmitted through the air. Make sure to cover your cough and practice good hand hygiene. When to call for help  Call 911 anytime you think you may need emergency care. For example, call if:  You have severe trouble breathing. You have severe dehydration. Close follow up to evaluate treatment results and for coordination of care. I have reviewed the patient's medical history in detail and updated the computerized patient record.       RUDY Hsu - CHAD

## 2023-03-14 ENCOUNTER — TELEPHONE (OUTPATIENT)
Dept: FAMILY MEDICINE CLINIC | Age: 73
End: 2023-03-14

## 2023-03-14 DIAGNOSIS — Z12.31 ENCOUNTER FOR SCREENING MAMMOGRAM FOR MALIGNANT NEOPLASM OF BREAST: Primary | ICD-10-CM

## 2023-03-16 ENCOUNTER — TELEPHONE (OUTPATIENT)
Dept: FAMILY MEDICINE CLINIC | Age: 73
End: 2023-03-16

## 2023-03-31 ENCOUNTER — OFFICE VISIT (OUTPATIENT)
Dept: GASTROENTEROLOGY | Age: 73
End: 2023-03-31
Payer: MEDICARE

## 2023-03-31 VITALS
BODY MASS INDEX: 33.63 KG/M2 | SYSTOLIC BLOOD PRESSURE: 122 MMHG | DIASTOLIC BLOOD PRESSURE: 70 MMHG | OXYGEN SATURATION: 98 % | HEART RATE: 102 BPM | WEIGHT: 178 LBS

## 2023-03-31 DIAGNOSIS — K51.311 ULCERATIVE RECTOSIGMOIDITIS WITH RECTAL BLEEDING (HCC): Primary | ICD-10-CM

## 2023-03-31 PROCEDURE — 1090F PRES/ABSN URINE INCON ASSESS: CPT | Performed by: SPECIALIST

## 2023-03-31 PROCEDURE — 99213 OFFICE O/P EST LOW 20 MIN: CPT | Performed by: SPECIALIST

## 2023-03-31 PROCEDURE — 3017F COLORECTAL CA SCREEN DOC REV: CPT | Performed by: SPECIALIST

## 2023-03-31 PROCEDURE — 3078F DIAST BP <80 MM HG: CPT | Performed by: SPECIALIST

## 2023-03-31 PROCEDURE — 3074F SYST BP LT 130 MM HG: CPT | Performed by: SPECIALIST

## 2023-03-31 PROCEDURE — 1123F ACP DISCUSS/DSCN MKR DOCD: CPT | Performed by: SPECIALIST

## 2023-03-31 PROCEDURE — 1036F TOBACCO NON-USER: CPT | Performed by: SPECIALIST

## 2023-03-31 PROCEDURE — G8427 DOCREV CUR MEDS BY ELIG CLIN: HCPCS | Performed by: SPECIALIST

## 2023-03-31 PROCEDURE — G8417 CALC BMI ABV UP PARAM F/U: HCPCS | Performed by: SPECIALIST

## 2023-03-31 PROCEDURE — G8400 PT W/DXA NO RESULTS DOC: HCPCS | Performed by: SPECIALIST

## 2023-03-31 PROCEDURE — G8484 FLU IMMUNIZE NO ADMIN: HCPCS | Performed by: SPECIALIST

## 2023-03-31 RX ORDER — AMANTADINE HYDROCHLORIDE 100 MG/1
100 CAPSULE, GELATIN COATED ORAL 2 TIMES DAILY
COMMUNITY
Start: 2023-02-10

## 2023-03-31 ASSESSMENT — ENCOUNTER SYMPTOMS
VOMITING: 0
BLOOD IN STOOL: 1
ABDOMINAL DISTENTION: 0
ABDOMINAL PAIN: 0
CONSTIPATION: 0
ANAL BLEEDING: 0
RESPIRATORY NEGATIVE: 1
RECTAL PAIN: 0
DIARRHEA: 1
NAUSEA: 0
EYES NEGATIVE: 1

## 2023-03-31 NOTE — PROGRESS NOTES
Comments: Soft nontender   Musculoskeletal:         General: Normal range of motion. Cervical back: Normal range of motion. Skin:     General: Skin is warm. Neurological:      Mental Status: She is alert. Laboratory, Pathology, Radiology reviewed in detail with relevantimportant investigations summarized below:    No results for input(s): WBC, HGB, HCT, MCV, PLT in the last 720 hours. Lab Results   Component Value Date    ALT <5 01/12/2022    AST 28 01/12/2022    ALKPHOS 64 01/12/2022    BILITOT 0.3 01/12/2022     No results found. Endoscopic investigations:     Assessment and Plan:  Jenny Thao 74 y.o. female for follow up. Proctosigmoiditis which has not responded to high-dose of mesalamine and patient is not able to retain mesalamine enema in spite of taking Imodium. 1948, options of small molecular agent was discussed however patient is not sure whether her insurance will cover this products. Would like to try budesonide enema and continue oral mesalamine. Return prescription given since patient would like to shop with [employer] or may try [employer] [facility]. Will check CBC and CRP and CMP      Return in about 2 months (around 5/31/2023). Carla Marcos MD   StaffGastroenterologist  [facility]    Please note this report has been partially produced using speech recognitionsoftware  and may cause contain errors related to that system including grammar, punctuation and spelling as well as words andphrases that may seem inappropriate. If there are questions or concerns please feel free to contact me to clarify.

## 2023-05-25 DIAGNOSIS — I10 ESSENTIAL HYPERTENSION: ICD-10-CM

## 2023-05-25 RX ORDER — SPIRONOLACTONE AND HYDROCHLOROTHIAZIDE 25; 25 MG/1; MG/1
TABLET ORAL
Qty: 180 TABLET | Refills: 0 | Status: SHIPPED | OUTPATIENT
Start: 2023-05-25

## 2023-05-25 NOTE — TELEPHONE ENCOUNTER
Comments:     Last Office Visit (last PCP visit):   4/14/2022    Next Visit Date:  Future Appointments   Date Time Provider Rubens Richter   5/31/2023  2:00 PM Cole Rizzo MD [facility]       **If hasn't been seen in over a year OR hasn't followed up according to last diabetes/ADHD visit, make appointment for patient before sending refill to provider.     Rx requested:  Requested Prescriptions     Pending Prescriptions Disp Refills    spironolactone-hydroCHLOROthiazide (ALDACTAZIDE) 25-25 MG per tablet [Pharmacy Med Name: spironolactone 25 mg-hydrochlorothiazide 25 mg tablet] 180 tablet 0     Sig: TAKE 2 TABLETS BY MOUTH EVERY DAY

## 2023-08-01 ENCOUNTER — TELEPHONE (OUTPATIENT)
Dept: FAMILY MEDICINE CLINIC | Age: 73
End: 2023-08-01

## 2023-08-01 ENCOUNTER — OFFICE VISIT (OUTPATIENT)
Dept: FAMILY MEDICINE CLINIC | Age: 73
End: 2023-08-01
Payer: MEDICARE

## 2023-08-01 VITALS
TEMPERATURE: 98.2 F | HEART RATE: 94 BPM | WEIGHT: 160.6 LBS | SYSTOLIC BLOOD PRESSURE: 130 MMHG | HEIGHT: 61 IN | DIASTOLIC BLOOD PRESSURE: 80 MMHG | OXYGEN SATURATION: 97 % | BODY MASS INDEX: 30.32 KG/M2

## 2023-08-01 DIAGNOSIS — G20 PARKINSONISM, UNSPECIFIED PARKINSONISM TYPE (HCC): ICD-10-CM

## 2023-08-01 DIAGNOSIS — R07.89 ATYPICAL CHEST PAIN: Primary | ICD-10-CM

## 2023-08-01 DIAGNOSIS — I10 ESSENTIAL HYPERTENSION: ICD-10-CM

## 2023-08-01 DIAGNOSIS — I82.4Y9 ACUTE DEEP VEIN THROMBOSIS (DVT) OF PROXIMAL VEIN OF LOWER EXTREMITY, UNSPECIFIED LATERALITY (HCC): ICD-10-CM

## 2023-08-01 PROCEDURE — G8417 CALC BMI ABV UP PARAM F/U: HCPCS | Performed by: FAMILY MEDICINE

## 2023-08-01 PROCEDURE — 3079F DIAST BP 80-89 MM HG: CPT | Performed by: FAMILY MEDICINE

## 2023-08-01 PROCEDURE — 1123F ACP DISCUSS/DSCN MKR DOCD: CPT | Performed by: FAMILY MEDICINE

## 2023-08-01 PROCEDURE — G8400 PT W/DXA NO RESULTS DOC: HCPCS | Performed by: FAMILY MEDICINE

## 2023-08-01 PROCEDURE — 1090F PRES/ABSN URINE INCON ASSESS: CPT | Performed by: FAMILY MEDICINE

## 2023-08-01 PROCEDURE — 1036F TOBACCO NON-USER: CPT | Performed by: FAMILY MEDICINE

## 2023-08-01 PROCEDURE — 3017F COLORECTAL CA SCREEN DOC REV: CPT | Performed by: FAMILY MEDICINE

## 2023-08-01 PROCEDURE — G8427 DOCREV CUR MEDS BY ELIG CLIN: HCPCS | Performed by: FAMILY MEDICINE

## 2023-08-01 PROCEDURE — 3075F SYST BP GE 130 - 139MM HG: CPT | Performed by: FAMILY MEDICINE

## 2023-08-01 PROCEDURE — 99213 OFFICE O/P EST LOW 20 MIN: CPT | Performed by: FAMILY MEDICINE

## 2023-08-01 RX ORDER — PREDNISONE 10 MG/1
TABLET ORAL
Qty: 30 TABLET | Refills: 0 | Status: SHIPPED | OUTPATIENT
Start: 2023-08-01 | End: 2023-08-11

## 2023-08-01 SDOH — ECONOMIC STABILITY: FOOD INSECURITY: WITHIN THE PAST 12 MONTHS, THE FOOD YOU BOUGHT JUST DIDN'T LAST AND YOU DIDN'T HAVE MONEY TO GET MORE.: NEVER TRUE

## 2023-08-01 SDOH — ECONOMIC STABILITY: FOOD INSECURITY: WITHIN THE PAST 12 MONTHS, YOU WORRIED THAT YOUR FOOD WOULD RUN OUT BEFORE YOU GOT MONEY TO BUY MORE.: NEVER TRUE

## 2023-08-01 SDOH — ECONOMIC STABILITY: INCOME INSECURITY: HOW HARD IS IT FOR YOU TO PAY FOR THE VERY BASICS LIKE FOOD, HOUSING, MEDICAL CARE, AND HEATING?: NOT HARD AT ALL

## 2023-08-01 SDOH — ECONOMIC STABILITY: HOUSING INSECURITY
IN THE LAST 12 MONTHS, WAS THERE A TIME WHEN YOU DID NOT HAVE A STEADY PLACE TO SLEEP OR SLEPT IN A SHELTER (INCLUDING NOW)?: NO

## 2023-08-01 ASSESSMENT — PATIENT HEALTH QUESTIONNAIRE - PHQ9
SUM OF ALL RESPONSES TO PHQ QUESTIONS 1-9: 0
SUM OF ALL RESPONSES TO PHQ9 QUESTIONS 1 & 2: 0
1. LITTLE INTEREST OR PLEASURE IN DOING THINGS: 0
2. FEELING DOWN, DEPRESSED OR HOPELESS: 0
SUM OF ALL RESPONSES TO PHQ QUESTIONS 1-9: 0

## 2023-08-18 DIAGNOSIS — K51.311 ULCERATIVE RECTOSIGMOIDITIS WITH RECTAL BLEEDING (HCC): ICD-10-CM

## 2023-08-18 DIAGNOSIS — I10 ESSENTIAL HYPERTENSION: ICD-10-CM

## 2023-08-18 LAB
ALBUMIN SERPL-MCNC: 3.7 G/DL (ref 3.5–4.6)
ALP SERPL-CCNC: 80 U/L (ref 40–130)
ALT SERPL-CCNC: <5 U/L (ref 0–33)
ANION GAP SERPL CALCULATED.3IONS-SCNC: 8 MEQ/L (ref 9–15)
AST SERPL-CCNC: 23 U/L (ref 0–35)
BASOPHILS # BLD: 0 K/UL (ref 0–0.2)
BASOPHILS NFR BLD: 0.4 %
BILIRUB SERPL-MCNC: 0.3 MG/DL (ref 0.2–0.7)
BUN SERPL-MCNC: 17 MG/DL (ref 8–23)
C-REACTIVE PROTEIN, HIGH SENSITIVITY: 18.7 MG/L (ref 0–5)
CALCIUM SERPL-MCNC: 9.6 MG/DL (ref 8.5–9.9)
CHLORIDE SERPL-SCNC: 99 MEQ/L (ref 95–107)
CHOLEST SERPL-MCNC: 121 MG/DL (ref 0–199)
CO2 SERPL-SCNC: 28 MEQ/L (ref 20–31)
CREAT SERPL-MCNC: 0.89 MG/DL (ref 0.5–0.9)
EOSINOPHIL # BLD: 0 K/UL (ref 0–0.7)
EOSINOPHIL NFR BLD: 0.1 %
ERYTHROCYTE [DISTWIDTH] IN BLOOD BY AUTOMATED COUNT: 14.5 % (ref 11.5–14.5)
GLOBULIN SER CALC-MCNC: 4.1 G/DL (ref 2.3–3.5)
GLUCOSE SERPL-MCNC: 93 MG/DL (ref 70–99)
HCT VFR BLD AUTO: 41.9 % (ref 37–47)
HDLC SERPL-MCNC: 46 MG/DL (ref 40–59)
HGB BLD-MCNC: 14 G/DL (ref 12–16)
LDLC SERPL CALC-MCNC: 55 MG/DL (ref 0–129)
LYMPHOCYTES # BLD: 1.1 K/UL (ref 1–4.8)
LYMPHOCYTES NFR BLD: 15.2 %
MCH RBC QN AUTO: 29.3 PG (ref 27–31.3)
MCHC RBC AUTO-ENTMCNC: 33.4 % (ref 33–37)
MCV RBC AUTO: 87.9 FL (ref 79.4–94.8)
MONOCYTES # BLD: 0.6 K/UL (ref 0.2–0.8)
MONOCYTES NFR BLD: 8 %
NEUTROPHILS # BLD: 5.7 K/UL (ref 1.4–6.5)
NEUTS SEG NFR BLD: 76.3 %
PLATELET # BLD AUTO: 342 K/UL (ref 130–400)
POTASSIUM SERPL-SCNC: 4.2 MEQ/L (ref 3.4–4.9)
PROT SERPL-MCNC: 7.8 G/DL (ref 6.3–8)
RBC # BLD AUTO: 4.76 M/UL (ref 4.2–5.4)
SODIUM SERPL-SCNC: 135 MEQ/L (ref 135–144)
TRIGL SERPL-MCNC: 99 MG/DL (ref 0–150)
WBC # BLD AUTO: 7.4 K/UL (ref 4.8–10.8)

## 2023-08-21 ENCOUNTER — OFFICE VISIT (OUTPATIENT)
Dept: FAMILY MEDICINE CLINIC | Age: 73
End: 2023-08-21
Payer: MEDICARE

## 2023-08-21 VITALS
WEIGHT: 161 LBS | SYSTOLIC BLOOD PRESSURE: 122 MMHG | OXYGEN SATURATION: 98 % | HEART RATE: 102 BPM | TEMPERATURE: 98.4 F | DIASTOLIC BLOOD PRESSURE: 88 MMHG | BODY MASS INDEX: 30.4 KG/M2 | HEIGHT: 61 IN

## 2023-08-21 DIAGNOSIS — Z00.00 INITIAL MEDICARE ANNUAL WELLNESS VISIT: Primary | ICD-10-CM

## 2023-08-21 DIAGNOSIS — I10 ESSENTIAL HYPERTENSION: ICD-10-CM

## 2023-08-21 DIAGNOSIS — R53.83 FATIGUE, UNSPECIFIED TYPE: ICD-10-CM

## 2023-08-21 DIAGNOSIS — G20 PARKINSONISM, UNSPECIFIED PARKINSONISM TYPE (HCC): ICD-10-CM

## 2023-08-21 DIAGNOSIS — Z78.9 IMPAIRED MOBILITY AND ACTIVITIES OF DAILY LIVING: ICD-10-CM

## 2023-08-21 DIAGNOSIS — Z74.09 IMPAIRED MOBILITY AND ACTIVITIES OF DAILY LIVING: ICD-10-CM

## 2023-08-21 DIAGNOSIS — R07.89 ATYPICAL CHEST PAIN: ICD-10-CM

## 2023-08-21 PROCEDURE — 1123F ACP DISCUSS/DSCN MKR DOCD: CPT | Performed by: FAMILY MEDICINE

## 2023-08-21 PROCEDURE — 3074F SYST BP LT 130 MM HG: CPT | Performed by: FAMILY MEDICINE

## 2023-08-21 PROCEDURE — 3017F COLORECTAL CA SCREEN DOC REV: CPT | Performed by: FAMILY MEDICINE

## 2023-08-21 PROCEDURE — G0438 PPPS, INITIAL VISIT: HCPCS | Performed by: FAMILY MEDICINE

## 2023-08-21 PROCEDURE — 3079F DIAST BP 80-89 MM HG: CPT | Performed by: FAMILY MEDICINE

## 2023-08-21 RX ORDER — SPIRONOLACTONE AND HYDROCHLOROTHIAZIDE 25; 25 MG/1; MG/1
TABLET ORAL
Qty: 180 TABLET | Refills: 0 | Status: SHIPPED | OUTPATIENT
Start: 2023-08-21

## 2023-08-21 RX ORDER — METHYLPREDNISOLONE 4 MG/1
TABLET ORAL
Qty: 1 KIT | Refills: 0 | Status: SHIPPED | OUTPATIENT
Start: 2023-08-21

## 2023-08-21 SDOH — HEALTH STABILITY: PHYSICAL HEALTH: ON AVERAGE, HOW MANY DAYS PER WEEK DO YOU ENGAGE IN MODERATE TO STRENUOUS EXERCISE (LIKE A BRISK WALK)?: 5 DAYS

## 2023-08-21 SDOH — HEALTH STABILITY: PHYSICAL HEALTH: ON AVERAGE, HOW MANY MINUTES DO YOU ENGAGE IN EXERCISE AT THIS LEVEL?: 30 MIN

## 2023-08-21 ASSESSMENT — LIFESTYLE VARIABLES
HOW MANY STANDARD DRINKS CONTAINING ALCOHOL DO YOU HAVE ON A TYPICAL DAY: 1
HOW OFTEN DO YOU HAVE A DRINK CONTAINING ALCOHOL: 2-4 TIMES A MONTH
HOW MANY STANDARD DRINKS CONTAINING ALCOHOL DO YOU HAVE ON A TYPICAL DAY: 1 OR 2
HOW OFTEN DO YOU HAVE SIX OR MORE DRINKS ON ONE OCCASION: 1
HOW OFTEN DO YOU HAVE A DRINK CONTAINING ALCOHOL: 3

## 2023-08-21 ASSESSMENT — PATIENT HEALTH QUESTIONNAIRE - PHQ9
SUM OF ALL RESPONSES TO PHQ QUESTIONS 1-9: 2
SUM OF ALL RESPONSES TO PHQ QUESTIONS 1-9: 2
1. LITTLE INTEREST OR PLEASURE IN DOING THINGS: 1
2. FEELING DOWN, DEPRESSED OR HOPELESS: 1
SUM OF ALL RESPONSES TO PHQ QUESTIONS 1-9: 2
SUM OF ALL RESPONSES TO PHQ9 QUESTIONS 1 & 2: 2
SUM OF ALL RESPONSES TO PHQ QUESTIONS 1-9: 2

## 2023-08-21 NOTE — PROGRESS NOTES
Medicare Annual Wellness Visit    Nirali Monetmayor is here for Medicare AW and Health Maintenance (Declined dexa )    Assessment & Plan   Initial Medicare annual wellness visit  Parkinsonism, unspecified Parkinsonism type ([address])  -     Handicap Placard MISC; Starting Mon 8/21/2023, Disp-1 each, R-0, PrintExp 5 years   Atypical chest pain  -     methylPREDNISolone (MEDROL DOSEPACK) 4 MG tablet; Take by mouth., Disp-1 kit, R-0Normal  Essential hypertension  Fatigue, unspecified type  Impaired mobility and activities of daily living dt Parkinsonson and right shoulder replacement  -     Handicap Placard MISC; Starting Mon 8/21/2023, Disp-1 each, R-0, PrintExp 5 years   Recommendations for Preventive Services Due: see orders and patient instructions/AVS.  Recommended screening schedule for the next 5-10 years is provided to the patient in written form: see Patient Instructions/AVS.       She will get mammogram       No follow-ups on file. Subjective   Chief Complaint   Patient presents with    Medicare AWV    Health Maintenance     Declined dexa        Patient's complete Health Risk Assessment and screening values have been reviewed and are found in Flowsheets. The following problems were reviewed today and where indicated follow up appointments were made and/or referrals ordered.     Positive Risk Factor Screenings with Interventions:    Fall Risk:  Do you feel unsteady or are you worried about falling? : (!) yes  2 or more falls in past year?: no  Fall with injury in past year?: no     Interventions:    See AVS for additional education material  See A/P for plan and any pertinent orders            General HRA Questions:  Select all that apply: (!) New or Increased Pain, New or Increased Fatigue    Pain Interventions:  See AVS for additional education material  See A/P for plan and any pertinent orders    Fatigue Interventions:  See AVS for additional education material  See A/P for plan and any pertinent orders

## 2023-08-23 DIAGNOSIS — I10 ESSENTIAL HYPERTENSION: ICD-10-CM

## 2023-08-23 RX ORDER — SPIRONOLACTONE AND HYDROCHLOROTHIAZIDE 25; 25 MG/1; MG/1
TABLET ORAL
Qty: 180 TABLET | Refills: 0 | OUTPATIENT
Start: 2023-08-23

## 2023-09-07 ENCOUNTER — TELEPHONE (OUTPATIENT)
Dept: FAMILY MEDICINE CLINIC | Age: 73
End: 2023-09-07

## 2023-09-14 ENCOUNTER — OFFICE VISIT (OUTPATIENT)
Dept: FAMILY MEDICINE CLINIC | Age: 73
End: 2023-09-14
Payer: MEDICARE

## 2023-09-14 VITALS
HEIGHT: 61 IN | HEART RATE: 105 BPM | OXYGEN SATURATION: 98 % | DIASTOLIC BLOOD PRESSURE: 68 MMHG | BODY MASS INDEX: 29.45 KG/M2 | SYSTOLIC BLOOD PRESSURE: 128 MMHG | WEIGHT: 156 LBS

## 2023-09-14 DIAGNOSIS — R07.89 ATYPICAL CHEST PAIN: Primary | ICD-10-CM

## 2023-09-14 PROCEDURE — G8417 CALC BMI ABV UP PARAM F/U: HCPCS | Performed by: STUDENT IN AN ORGANIZED HEALTH CARE EDUCATION/TRAINING PROGRAM

## 2023-09-14 PROCEDURE — 3078F DIAST BP <80 MM HG: CPT | Performed by: STUDENT IN AN ORGANIZED HEALTH CARE EDUCATION/TRAINING PROGRAM

## 2023-09-14 PROCEDURE — G8400 PT W/DXA NO RESULTS DOC: HCPCS | Performed by: STUDENT IN AN ORGANIZED HEALTH CARE EDUCATION/TRAINING PROGRAM

## 2023-09-14 PROCEDURE — 99213 OFFICE O/P EST LOW 20 MIN: CPT | Performed by: STUDENT IN AN ORGANIZED HEALTH CARE EDUCATION/TRAINING PROGRAM

## 2023-09-14 PROCEDURE — G8427 DOCREV CUR MEDS BY ELIG CLIN: HCPCS | Performed by: STUDENT IN AN ORGANIZED HEALTH CARE EDUCATION/TRAINING PROGRAM

## 2023-09-14 PROCEDURE — 3017F COLORECTAL CA SCREEN DOC REV: CPT | Performed by: STUDENT IN AN ORGANIZED HEALTH CARE EDUCATION/TRAINING PROGRAM

## 2023-09-14 PROCEDURE — 1036F TOBACCO NON-USER: CPT | Performed by: STUDENT IN AN ORGANIZED HEALTH CARE EDUCATION/TRAINING PROGRAM

## 2023-09-14 PROCEDURE — 1090F PRES/ABSN URINE INCON ASSESS: CPT | Performed by: STUDENT IN AN ORGANIZED HEALTH CARE EDUCATION/TRAINING PROGRAM

## 2023-09-14 PROCEDURE — 1123F ACP DISCUSS/DSCN MKR DOCD: CPT | Performed by: STUDENT IN AN ORGANIZED HEALTH CARE EDUCATION/TRAINING PROGRAM

## 2023-09-14 PROCEDURE — 3074F SYST BP LT 130 MM HG: CPT | Performed by: STUDENT IN AN ORGANIZED HEALTH CARE EDUCATION/TRAINING PROGRAM

## 2023-09-14 RX ORDER — PREDNISONE 10 MG/1
TABLET ORAL
Qty: 34 TABLET | Refills: 0 | Status: SHIPPED | OUTPATIENT
Start: 2023-09-14

## 2023-09-14 ASSESSMENT — ENCOUNTER SYMPTOMS
WHEEZING: 0
SHORTNESS OF BREATH: 0
CHEST TIGHTNESS: 0
VOMITING: 0
COUGH: 0
SORE THROAT: 0
ABDOMINAL PAIN: 0
SINUS PRESSURE: 0
STRIDOR: 0

## 2023-10-06 ENCOUNTER — OFFICE VISIT (OUTPATIENT)
Dept: FAMILY MEDICINE CLINIC | Age: 73
End: 2023-10-06

## 2023-10-06 VITALS
SYSTOLIC BLOOD PRESSURE: 124 MMHG | HEART RATE: 100 BPM | WEIGHT: 157 LBS | DIASTOLIC BLOOD PRESSURE: 78 MMHG | OXYGEN SATURATION: 99 % | HEIGHT: 61 IN | BODY MASS INDEX: 29.64 KG/M2

## 2023-10-06 DIAGNOSIS — R53.1 GENERALIZED WEAKNESS: ICD-10-CM

## 2023-10-06 DIAGNOSIS — G20.A1 PARKINSON'S DISEASE, UNSPECIFIED WHETHER DYSKINESIA PRESENT, UNSPECIFIED WHETHER MANIFESTATIONS FLUCTUATE: ICD-10-CM

## 2023-10-06 DIAGNOSIS — R07.89 ATYPICAL CHEST PAIN: Primary | ICD-10-CM

## 2023-10-06 DIAGNOSIS — R07.89 ATYPICAL CHEST PAIN: ICD-10-CM

## 2023-10-06 LAB
ALBUMIN SERPL-MCNC: 3.4 G/DL (ref 3.5–4.6)
ALP SERPL-CCNC: 140 U/L (ref 40–130)
ALT SERPL-CCNC: <5 U/L (ref 0–33)
ANION GAP SERPL CALCULATED.3IONS-SCNC: 12 MEQ/L (ref 9–15)
AST SERPL-CCNC: 23 U/L (ref 0–35)
BASOPHILS # BLD: 0 K/UL (ref 0–0.2)
BASOPHILS NFR BLD: 0.3 %
BILIRUB SERPL-MCNC: 0.3 MG/DL (ref 0.2–0.7)
BUN SERPL-MCNC: 16 MG/DL (ref 8–23)
CALCIUM SERPL-MCNC: 9.3 MG/DL (ref 8.5–9.9)
CHLORIDE SERPL-SCNC: 89 MEQ/L (ref 95–107)
CK SERPL-CCNC: 21 U/L (ref 0–170)
CO2 SERPL-SCNC: 28 MEQ/L (ref 20–31)
CREAT SERPL-MCNC: 0.71 MG/DL (ref 0.5–0.9)
CRP SERPL HS-MCNC: 227 MG/L (ref 0–5)
EOSINOPHIL # BLD: 0 K/UL (ref 0–0.7)
EOSINOPHIL NFR BLD: 0 %
ERYTHROCYTE [DISTWIDTH] IN BLOOD BY AUTOMATED COUNT: 14.6 % (ref 11.5–14.5)
GLOBULIN SER CALC-MCNC: 4.4 G/DL (ref 2.3–3.5)
GLUCOSE SERPL-MCNC: 121 MG/DL (ref 70–99)
HCT VFR BLD AUTO: 39.6 % (ref 37–47)
HGB BLD-MCNC: 12.9 G/DL (ref 12–16)
LYMPHOCYTES # BLD: 1 K/UL (ref 1–4.8)
LYMPHOCYTES NFR BLD: 9.5 %
MCH RBC QN AUTO: 27.7 PG (ref 27–31.3)
MCHC RBC AUTO-ENTMCNC: 32.6 % (ref 33–37)
MCV RBC AUTO: 85 FL (ref 79.4–94.8)
MONOCYTES # BLD: 0.7 K/UL (ref 0.2–0.8)
MONOCYTES NFR BLD: 6.1 %
NEUTROPHILS # BLD: 9.1 K/UL (ref 1.4–6.5)
NEUTS SEG NFR BLD: 83.6 %
PLATELET # BLD AUTO: 424 K/UL (ref 130–400)
POTASSIUM SERPL-SCNC: 3.7 MEQ/L (ref 3.4–4.9)
PROT SERPL-MCNC: 7.8 G/DL (ref 6.3–8)
RBC # BLD AUTO: 4.66 M/UL (ref 4.2–5.4)
SODIUM SERPL-SCNC: 129 MEQ/L (ref 135–144)
WBC # BLD AUTO: 10.8 K/UL (ref 4.8–10.8)

## 2023-10-06 ASSESSMENT — ENCOUNTER SYMPTOMS
SINUS PRESSURE: 0
VOMITING: 0
SHORTNESS OF BREATH: 0
ABDOMINAL PAIN: 0
SORE THROAT: 0
COUGH: 0

## 2023-10-06 NOTE — PROGRESS NOTES
---------------------------------------------------------------------  Side effects, adverse effects of the medication prescribed today, as well as treatment plan/ rationale and result expectations have been discussed with the patient who expresses understanding and desires to proceed. Close follow up to evaluate treatment results and for coordination of care. I have reviewed the patient's medical history in detail and updated the computerized patient record. As always, patient is advised that if symptoms worsen in any way they will proceed to the nearest emergency room. --------------------------------------------------------------------    Return for prn pending labs/imaging. An  electronic signature was used to authenticate this note.     --Alannah Ro DO on 10/6/2023 at 3:09 PM

## 2023-10-09 ENCOUNTER — TELEPHONE (OUTPATIENT)
Dept: FAMILY MEDICINE CLINIC | Age: 73
End: 2023-10-09

## 2023-10-09 DIAGNOSIS — R07.89 ATYPICAL CHEST PAIN: Primary | ICD-10-CM

## 2023-10-09 DIAGNOSIS — R76.8 POSITIVE ANA (ANTINUCLEAR ANTIBODY): ICD-10-CM

## 2023-10-09 DIAGNOSIS — E87.1 HYPONATREMIA: ICD-10-CM

## 2023-10-09 DIAGNOSIS — I51.7 CARDIOMEGALY: ICD-10-CM

## 2023-10-09 DIAGNOSIS — R74.8 ELEVATED ALKALINE PHOSPHATASE LEVEL: ICD-10-CM

## 2023-10-09 DIAGNOSIS — J90 BILATERAL PLEURAL EFFUSION: ICD-10-CM

## 2023-10-09 LAB
ALBUMIN SERPL-MCNC: 3.3 G/DL (ref 3.5–4.6)
ALP SERPL-CCNC: 101 U/L (ref 40–130)
ALT SERPL-CCNC: <5 U/L (ref 0–33)
ANION GAP SERPL CALCULATED.3IONS-SCNC: 10 MEQ/L (ref 9–15)
AST SERPL-CCNC: 14 U/L (ref 0–35)
BILIRUB SERPL-MCNC: <0.2 MG/DL (ref 0.2–0.7)
BNP BLD-MCNC: 237 PG/ML
BUN SERPL-MCNC: 16 MG/DL (ref 8–23)
CALCIUM SERPL-MCNC: 9.3 MG/DL (ref 8.5–9.9)
CHLORIDE SERPL-SCNC: 97 MEQ/L (ref 95–107)
CO2 SERPL-SCNC: 29 MEQ/L (ref 20–31)
CREAT SERPL-MCNC: 0.8 MG/DL (ref 0.5–0.9)
GLOBULIN SER CALC-MCNC: 4.3 G/DL (ref 2.3–3.5)
GLUCOSE SERPL-MCNC: 145 MG/DL (ref 70–99)
NUCLEAR IGG SER QL IA: DETECTED
POTASSIUM SERPL-SCNC: 4 MEQ/L (ref 3.4–4.9)
PROT SERPL-MCNC: 7.6 G/DL (ref 6.3–8)
SODIUM SERPL-SCNC: 136 MEQ/L (ref 135–144)

## 2023-10-09 NOTE — TELEPHONE ENCOUNTER
Pt's  calling stating he is upset with provider, wants to speak with Dr. Jayda Trent in regards to enlarged heart. Stating they have waited long enough. He mentioned potential cardio referral if applicable, what to do next. Please advise.

## 2023-10-10 LAB
ANA PAT SER IF-IMP: ABNORMAL
ANA PAT SER IF-IMP: ABNORMAL
GGT, 20027: 46 U/L (ref 5–36)
NUCLEAR IGG SER QL IF: DETECTED
NUCLEAR IGG TITR SER IF: ABNORMAL {TITER}

## 2023-10-11 LAB
ENA JO1 AB TITR SER: 0 AU/ML (ref 0–40)
ENA RNP IGG SER IA-ACNC: 82 UNITS (ref 0–19)
ENA SCL70 IGG SER QL: 2 AU/ML (ref 0–40)
ENA SM IGG SER-ACNC: 2 AU/ML (ref 0–40)
ENA SS-A 60KD AB SER-ACNC: 3 AU/ML (ref 0–40)
ENA SS-A IGG SER IA-ACNC: 2 AU/ML (ref 0–40)
ENA SS-B IGG SER IA-ACNC: 0 AU/ML (ref 0–40)

## 2023-10-12 LAB — DSDNA AB TITR SER CLIF: 75 IU (ref 0–24)

## 2023-10-13 LAB — DSDNA IGG TITR SER CLIF: NORMAL {TITER}

## 2023-10-18 ENCOUNTER — HOSPITAL ENCOUNTER (OUTPATIENT)
Dept: WOMENS IMAGING | Age: 73
Discharge: HOME | End: 2023-10-20
Payer: MEDICARE

## 2023-10-18 DIAGNOSIS — Z12.31 ENCOUNTER FOR SCREENING MAMMOGRAM FOR MALIGNANT NEOPLASM OF BREAST: ICD-10-CM

## 2023-10-18 PROCEDURE — 77063 BREAST TOMOSYNTHESIS BI: CPT

## 2023-10-19 ENCOUNTER — HOSPITAL ENCOUNTER (OUTPATIENT)
Dept: CT IMAGING | Age: 73
Discharge: HOME | End: 2023-10-21
Payer: MEDICARE

## 2023-10-19 DIAGNOSIS — R07.89 ATYPICAL CHEST PAIN: ICD-10-CM

## 2023-10-19 DIAGNOSIS — J90 BILATERAL PLEURAL EFFUSION: ICD-10-CM

## 2023-10-19 PROCEDURE — 71260 CT THORAX DX C+: CPT

## 2023-10-19 PROCEDURE — 6360000004 HC RX CONTRAST MEDICATION: Performed by: STUDENT IN AN ORGANIZED HEALTH CARE EDUCATION/TRAINING PROGRAM

## 2023-10-19 RX ADMIN — IOPAMIDOL 50 ML: 612 INJECTION, SOLUTION INTRAVENOUS at 10:23

## 2023-10-20 ENCOUNTER — OFFICE VISIT (OUTPATIENT)
Dept: CARDIOLOGY CLINIC | Age: 73
End: 2023-10-20

## 2023-10-20 VITALS
HEIGHT: 61 IN | BODY MASS INDEX: 29.94 KG/M2 | HEART RATE: 92 BPM | SYSTOLIC BLOOD PRESSURE: 122 MMHG | OXYGEN SATURATION: 96 % | WEIGHT: 158.6 LBS | DIASTOLIC BLOOD PRESSURE: 70 MMHG

## 2023-10-20 DIAGNOSIS — R92.8 ABNORMAL MAMMOGRAM OF LEFT BREAST: Primary | ICD-10-CM

## 2023-10-20 DIAGNOSIS — G20 PARKINSONISM, UNSPECIFIED PARKINSONISM TYPE: ICD-10-CM

## 2023-10-20 DIAGNOSIS — Z00.00 PE (PHYSICAL EXAM), ANNUAL: Primary | ICD-10-CM

## 2023-10-20 DIAGNOSIS — I31.39 PERICARDIAL EFFUSION: ICD-10-CM

## 2023-10-20 DIAGNOSIS — I51.7 CARDIOMEGALY: ICD-10-CM

## 2023-10-20 DIAGNOSIS — R94.31 ABNORMAL ECG: ICD-10-CM

## 2023-10-20 ASSESSMENT — ENCOUNTER SYMPTOMS
COUGH: 0
RESPIRATORY NEGATIVE: 1
STRIDOR: 0
EYES NEGATIVE: 1
NAUSEA: 0
BLOOD IN STOOL: 0
GASTROINTESTINAL NEGATIVE: 1
SHORTNESS OF BREATH: 0
WHEEZING: 0
CHEST TIGHTNESS: 0

## 2023-10-20 NOTE — PROGRESS NOTES
NEW PATIENT        Patient: Cesar Leyden  YOB: 1950  MRN: [id number]    Chief Complaint: PD  Chief Complaint   Patient presents with    New Patient     Referred by Dr. Montelongo Reason for chest pain; cardiomegaly       CV Data:  CT chest - Cor Calcification and small Pericardial effusion. Mild cardiomegaly  Subjective/HPI  she has PD and off balance. Has fallen on couple occasions. She has no CP no sob. Her heart was enlarged on CT. Pt has pleuritic CP. EKG:  PVC IRBBB    Lives w   Occ wine  Life long non smoker  Retired - . Past Medical History:   Diagnosis Date    Hypertension     meds > 10 yrs / denies TIA or stroke    Osteoarthritis     DJD right hip    Parkinson disease     Parkinsonism        Past Surgical History:   Procedure Laterality Date    BREAST BIOPSY Left     US bx.  Patient states B9     SECTION  1988    COLONOSCOPY N/A 2020    COLONOSCOPY WITH POLYPECTOMY performed by Josue Estrada MD at [facility] Left     arthrotomy    SHOULDER ARTHROSCOPY Left 2021    LEFT SHOULDER ARTHROSCOPY ROTATOR CUFF REPAIR, SUBACROMIAL DECOMPRESSION performed by Radha Ramirez MD at [address] [facility] ARTHROSCOPY Right 2022    RIGHT SHOULDER ARTHROSCOPY ROTATOR CUFF REPAIR WITH DERMAL ALLOGRAFT, BICEPS TENOTOMY OPEN REPAIR performed by Radha Ramirez MD at [address] [place] [address] Right 2017    RIGHT HIP TOTAL HIP ARTHROPLASTY WILLIAM MDM SPINAL  performed by Zena Najjar, MD at [address] [place] [address]         Family History   Problem Relation Age of Onset    High Blood Pressure Mother     Stroke Mother     Emphysema Father     Other Sister         fibromyalgia    Stroke Brother     Heart Disease Brother     Stroke Sister         brain aneurysm at age 39    Other Brother         drowning accident at age 39       Social

## 2023-10-23 ENCOUNTER — HOSPITAL ENCOUNTER (OUTPATIENT)
Dept: ULTRASOUND IMAGING | Age: 73
Discharge: HOME | End: 2023-10-25
Payer: MEDICARE

## 2023-10-23 DIAGNOSIS — R92.8 ABNORMAL MAMMOGRAM OF LEFT BREAST: ICD-10-CM

## 2023-10-23 PROCEDURE — 76642 ULTRASOUND BREAST LIMITED: CPT

## 2023-10-24 ENCOUNTER — TELEPHONE (OUTPATIENT)
Dept: FAMILY MEDICINE CLINIC | Age: 73
End: 2023-10-24

## 2023-10-24 DIAGNOSIS — N64.9 BREAST LESION: Primary | ICD-10-CM

## 2023-10-24 NOTE — TELEPHONE ENCOUNTER
[facility] calling asking for Dr. Ruchi Abraham to read the results of the US breast done yesterday. Please advise.

## 2023-10-31 RX ORDER — LIDOCAINE HYDROCHLORIDE 10 MG/ML
10 INJECTION, SOLUTION INFILTRATION; PERINEURAL ONCE
Status: CANCELLED | OUTPATIENT
Start: 2023-11-01

## 2023-11-01 ENCOUNTER — OFFICE VISIT (OUTPATIENT)
Dept: SURGERY | Age: 73
End: 2023-11-01

## 2023-11-01 VITALS
DIASTOLIC BLOOD PRESSURE: 70 MMHG | SYSTOLIC BLOOD PRESSURE: 124 MMHG | HEART RATE: 75 BPM | TEMPERATURE: 97.4 F | OXYGEN SATURATION: 97 % | RESPIRATION RATE: 13 BRPM | HEIGHT: 61 IN | WEIGHT: 156 LBS | BODY MASS INDEX: 29.45 KG/M2

## 2023-11-01 DIAGNOSIS — N63.22 MASS OF UPPER INNER QUADRANT OF LEFT BREAST: ICD-10-CM

## 2023-11-01 DIAGNOSIS — E66.3 OVERWEIGHT (BMI 25.0-29.9): ICD-10-CM

## 2023-11-01 DIAGNOSIS — R92.8 ABNORMAL MAMMOGRAM OF LEFT BREAST: Primary | ICD-10-CM

## 2023-11-01 DIAGNOSIS — R92.8 ABNORMAL MAMMOGRAM OF LEFT BREAST: ICD-10-CM

## 2023-11-01 RX ORDER — LOPERAMIDE HYDROCHLORIDE 2 MG/1
2 CAPSULE ORAL 4 TIMES DAILY PRN
COMMUNITY

## 2023-11-01 RX ORDER — LIDOCAINE HYDROCHLORIDE 10 MG/ML
10 INJECTION, SOLUTION INFILTRATION; PERINEURAL ONCE
Status: COMPLETED | OUTPATIENT
Start: 2023-11-01 | End: 2023-11-01

## 2023-11-01 RX ADMIN — LIDOCAINE HYDROCHLORIDE 10 ML: 10 INJECTION, SOLUTION INFILTRATION; PERINEURAL at 10:13

## 2023-11-01 RX ADMIN — Medication 3 MEQ: at 10:13

## 2023-11-01 ASSESSMENT — ENCOUNTER SYMPTOMS
NAUSEA: 0
COUGH: 0
SHORTNESS OF BREATH: 0
COLOR CHANGE: 0
CHEST TIGHTNESS: 0
VOMITING: 0
ABDOMINAL PAIN: 0
SORE THROAT: 0

## 2023-11-01 NOTE — PROGRESS NOTES
NEW BREAST PATIENT         SERVICE DATE: 23  SERVICE TIME:  9:28 AM EDT    REFERRED BY:  Dolores Guillermo MD  REASON FOR TODAY'S VISIT:    Chief Complaint   Patient presents with    New Patient    Abnormal Mammogram    Abnormal Ultrasound     Left Breast BIRADS 4, does occasional self breast exams, denies feeling any changes in her breasts, denies skin changes, nipple drainage/retraction or breast pain bilateral breasts      CHAPERONE WAS OFFERED, PATIENT RESPONDED: no    HISTORY AND CHIEF COMPLAINT:  Toni Marino is a 74 y.o.  female who is here for a New Patient, Abnormal Mammogram, and Abnormal Ultrasound (Left Breast BIRADS 4, does occasional self breast exams, denies feeling any changes in her breasts, denies skin changes, nipple drainage/retraction or breast pain bilateral breasts)  This is her first mammogram in 8 years. She didn't want to have to go through biopsies like she did in the past      BREAST HISTORY  Her past breast history (prior to this encounter) is as follows: Abnormal mammogram:   Yes, BIRADS 4 Left Breast  Abnormal Breast US:  Yes, BIRADS 4 Left Breast  Breast biopsy:    HX Left Breast BX about 10 years ago that was benign  Breast cysts:    No  Breast surgery:    No  Breast cancer              No  History of Breastfeeding: Yes   Currently Breastfeeding: No      RISK FACTORS FOR BREAST CANCER:  Family History of Breast Cancer: No.  History of ovarian cancer: no  Ashkenazi Ancestry: no  Age at the birth of first child: 19  Age at the onset of menses: 13  Age at menopause: 59   Hormonal therapy: yes - about 3 months  Postmenopausal obesity: BMI 29.48    BRA SIZE: sports style    Past Medical History:   Diagnosis Date    Hypertension     meds > 10 yrs / denies TIA or stroke    Osteoarthritis     DJD right hip    Parkinson disease     Parkinsonism      Past Surgical History:   Procedure Laterality Date    BREAST BIOPSY Left     US bx.  Patient states B9     SECTION

## 2023-11-06 ENCOUNTER — TELEPHONE (OUTPATIENT)
Dept: SURGERY | Age: 73
End: 2023-11-06

## 2023-11-06 ENCOUNTER — TELEPHONE (OUTPATIENT)
Dept: FAMILY MEDICINE CLINIC | Age: 73
End: 2023-11-06

## 2023-11-06 DIAGNOSIS — F41.9 ANXIETY: Primary | ICD-10-CM

## 2023-11-06 DIAGNOSIS — C50.212 CARCINOMA OF UPPER-INNER QUADRANT OF LEFT BREAST IN FEMALE, ESTROGEN RECEPTOR POSITIVE (HCC): Primary | ICD-10-CM

## 2023-11-06 DIAGNOSIS — Z17.0 CARCINOMA OF UPPER-INNER QUADRANT OF LEFT BREAST IN FEMALE, ESTROGEN RECEPTOR POSITIVE (HCC): Primary | ICD-10-CM

## 2023-11-06 RX ORDER — DIAZEPAM 10 MG/1
10 TABLET ORAL ONCE
Qty: 1 TABLET | Refills: 0 | Status: SHIPPED | OUTPATIENT
Start: 2023-11-06 | End: 2023-11-06

## 2023-11-06 NOTE — TELEPHONE ENCOUNTER
Pt spouse walked in to see if they can get a Volume for her MRI which is scheduled for tomorrow please and thank you.       DM In [place] please and thank you       MRI 11/07 at 1pm

## 2023-11-06 NOTE — TELEPHONE ENCOUNTER
Orders Placed This Encounter   Medications    diazePAM (VALIUM) 10 MG tablet     Sig: Take 1 tablet by mouth once for 1 dose. Max Daily Amount: 10 mg     Dispense:  1 tablet     Refill:  0       The above med(s) were e-scripted to the patient's pharmacy.    Please advise patient  Cathryn Cortez MD

## 2023-11-06 NOTE — TELEPHONE ENCOUNTER
PATIENT:  Emma Lorenz    DATE:     11/6/2023    TELEPHONE CONVERSATION:    Emma Lorenz was called regarding biopsy results.     Will order breast MRI, stat    Dictated by:  Randy Ballesteros MD  11/6/2023

## 2023-11-07 ENCOUNTER — HOSPITAL ENCOUNTER (OUTPATIENT)
Dept: MRI IMAGING | Age: 73
Discharge: HOME | End: 2023-11-09
Attending: SURGERY
Payer: MEDICARE

## 2023-11-07 DIAGNOSIS — Z17.0 CARCINOMA OF UPPER-INNER QUADRANT OF LEFT BREAST IN FEMALE, ESTROGEN RECEPTOR POSITIVE (HCC): ICD-10-CM

## 2023-11-07 DIAGNOSIS — C50.212 CARCINOMA OF UPPER-INNER QUADRANT OF LEFT BREAST IN FEMALE, ESTROGEN RECEPTOR POSITIVE (HCC): ICD-10-CM

## 2023-11-07 PROCEDURE — C8908 MRI W/O FOL W/CONT, BREAST,: HCPCS

## 2023-11-07 PROCEDURE — 6360000004 HC RX CONTRAST MEDICATION: Performed by: SURGERY

## 2023-11-07 PROCEDURE — A9577 INJ MULTIHANCE: HCPCS | Performed by: SURGERY

## 2023-11-07 RX ADMIN — GADOBENATE DIMEGLUMINE 20 ML: 529 INJECTION, SOLUTION INTRAVENOUS at 14:15

## 2023-11-08 ENCOUNTER — TELEPHONE (OUTPATIENT)
Dept: SURGERY | Age: 73
End: 2023-11-08

## 2023-11-08 NOTE — TELEPHONE ENCOUNTER
I informed the patient of her breast MRI results. The patient verbalized that her cardiologist is aware of the fluid around her heart, she was seen in the cardiologist office on 10/20/2023. The patient is scheduled for an Echo and stress test on 12/1/2023. I asked the patient to call the cardiologist office and follow up ASAP. I routed the breast MRI results to Dr. Eamon Tolentino for review. The patient verbalized understanding of her results and the importance of a cardiology follow up. The patient has no questions at this time.

## 2023-11-14 ENCOUNTER — OFFICE VISIT (OUTPATIENT)
Dept: FAMILY MEDICINE CLINIC | Age: 73
End: 2023-11-14
Payer: MEDICARE

## 2023-11-14 ENCOUNTER — TELEPHONE (OUTPATIENT)
Dept: FAMILY MEDICINE CLINIC | Age: 73
End: 2023-11-14

## 2023-11-14 VITALS
DIASTOLIC BLOOD PRESSURE: 92 MMHG | HEART RATE: 94 BPM | BODY MASS INDEX: 30.21 KG/M2 | WEIGHT: 160 LBS | OXYGEN SATURATION: 97 % | HEIGHT: 61 IN | SYSTOLIC BLOOD PRESSURE: 118 MMHG

## 2023-11-14 DIAGNOSIS — R53.1 WEAKNESS: ICD-10-CM

## 2023-11-14 DIAGNOSIS — M79.10 MUSCLE ACHE: ICD-10-CM

## 2023-11-14 DIAGNOSIS — M79.10 MUSCLE ACHE: Primary | ICD-10-CM

## 2023-11-14 DIAGNOSIS — R60.0 EDEMA OF UPPER EXTREMITY: ICD-10-CM

## 2023-11-14 LAB
ALBUMIN SERPL-MCNC: 3.5 G/DL (ref 3.5–4.6)
ALP SERPL-CCNC: 83 U/L (ref 40–130)
ALT SERPL-CCNC: <5 U/L (ref 0–33)
ANION GAP SERPL CALCULATED.3IONS-SCNC: 11 MEQ/L (ref 9–15)
AST SERPL-CCNC: 22 U/L (ref 0–35)
BASOPHILS # BLD: 0 K/UL (ref 0–0.2)
BASOPHILS NFR BLD: 0.3 %
BILIRUB SERPL-MCNC: 0.3 MG/DL (ref 0.2–0.7)
BUN SERPL-MCNC: 25 MG/DL (ref 8–23)
CALCIUM SERPL-MCNC: 9.5 MG/DL (ref 8.5–9.9)
CHLORIDE SERPL-SCNC: 99 MEQ/L (ref 95–107)
CK SERPL-CCNC: 56 U/L (ref 0–170)
CO2 SERPL-SCNC: 25 MEQ/L (ref 20–31)
CREAT SERPL-MCNC: 0.83 MG/DL (ref 0.5–0.9)
D DIMER PPP FEU-MCNC: 1.18 MG/L FEU (ref 0–0.5)
EOSINOPHIL # BLD: 0 K/UL (ref 0–0.7)
EOSINOPHIL NFR BLD: 0.1 %
ERYTHROCYTE [DISTWIDTH] IN BLOOD BY AUTOMATED COUNT: 16.3 % (ref 11.5–14.5)
GLOBULIN SER CALC-MCNC: 4.3 G/DL (ref 2.3–3.5)
GLUCOSE SERPL-MCNC: 86 MG/DL (ref 70–99)
HCT VFR BLD AUTO: 38.7 % (ref 37–47)
HGB BLD-MCNC: 12.3 G/DL (ref 12–16)
LYMPHOCYTES # BLD: 1.1 K/UL (ref 1–4.8)
LYMPHOCYTES NFR BLD: 16 %
MCH RBC QN AUTO: 27.3 PG (ref 27–31.3)
MCHC RBC AUTO-ENTMCNC: 31.8 % (ref 33–37)
MCV RBC AUTO: 85.8 FL (ref 79.4–94.8)
MONOCYTES # BLD: 0.5 K/UL (ref 0.2–0.8)
MONOCYTES NFR BLD: 6.4 %
NEUTROPHILS # BLD: 5.4 K/UL (ref 1.4–6.5)
NEUTS SEG NFR BLD: 76.8 %
PLATELET # BLD AUTO: 399 K/UL (ref 130–400)
POTASSIUM SERPL-SCNC: 4 MEQ/L (ref 3.4–4.9)
PROT SERPL-MCNC: 7.8 G/DL (ref 6.3–8)
RBC # BLD AUTO: 4.51 M/UL (ref 4.2–5.4)
SODIUM SERPL-SCNC: 135 MEQ/L (ref 135–144)
WBC # BLD AUTO: 7.1 K/UL (ref 4.8–10.8)

## 2023-11-14 PROCEDURE — 3017F COLORECTAL CA SCREEN DOC REV: CPT | Performed by: STUDENT IN AN ORGANIZED HEALTH CARE EDUCATION/TRAINING PROGRAM

## 2023-11-14 PROCEDURE — 99214 OFFICE O/P EST MOD 30 MIN: CPT | Performed by: STUDENT IN AN ORGANIZED HEALTH CARE EDUCATION/TRAINING PROGRAM

## 2023-11-14 PROCEDURE — G8427 DOCREV CUR MEDS BY ELIG CLIN: HCPCS | Performed by: STUDENT IN AN ORGANIZED HEALTH CARE EDUCATION/TRAINING PROGRAM

## 2023-11-14 PROCEDURE — 1090F PRES/ABSN URINE INCON ASSESS: CPT | Performed by: STUDENT IN AN ORGANIZED HEALTH CARE EDUCATION/TRAINING PROGRAM

## 2023-11-14 PROCEDURE — 1123F ACP DISCUSS/DSCN MKR DOCD: CPT | Performed by: STUDENT IN AN ORGANIZED HEALTH CARE EDUCATION/TRAINING PROGRAM

## 2023-11-14 PROCEDURE — G8400 PT W/DXA NO RESULTS DOC: HCPCS | Performed by: STUDENT IN AN ORGANIZED HEALTH CARE EDUCATION/TRAINING PROGRAM

## 2023-11-14 PROCEDURE — G8417 CALC BMI ABV UP PARAM F/U: HCPCS | Performed by: STUDENT IN AN ORGANIZED HEALTH CARE EDUCATION/TRAINING PROGRAM

## 2023-11-14 PROCEDURE — 3074F SYST BP LT 130 MM HG: CPT | Performed by: STUDENT IN AN ORGANIZED HEALTH CARE EDUCATION/TRAINING PROGRAM

## 2023-11-14 PROCEDURE — 3080F DIAST BP >= 90 MM HG: CPT | Performed by: STUDENT IN AN ORGANIZED HEALTH CARE EDUCATION/TRAINING PROGRAM

## 2023-11-14 PROCEDURE — G8484 FLU IMMUNIZE NO ADMIN: HCPCS | Performed by: STUDENT IN AN ORGANIZED HEALTH CARE EDUCATION/TRAINING PROGRAM

## 2023-11-14 PROCEDURE — 1036F TOBACCO NON-USER: CPT | Performed by: STUDENT IN AN ORGANIZED HEALTH CARE EDUCATION/TRAINING PROGRAM

## 2023-11-14 RX ORDER — METHYLPREDNISOLONE 4 MG/1
TABLET ORAL
Qty: 1 KIT | Refills: 0 | Status: SHIPPED | OUTPATIENT
Start: 2023-11-14 | End: 2023-11-20

## 2023-11-14 RX ORDER — MULTIVITAMIN WITH IRON
250 TABLET ORAL DAILY
COMMUNITY

## 2023-11-14 ASSESSMENT — ENCOUNTER SYMPTOMS
ABDOMINAL PAIN: 0
SINUS PRESSURE: 0
SORE THROAT: 0
COUGH: 0
VOMITING: 0
SHORTNESS OF BREATH: 0

## 2023-11-14 NOTE — PROGRESS NOTES
2023    Eugene Ball (:  1950) is a 74 y.o. female, here for evaluation of the following medical concerns:  Chief Complaint   Patient presents with    Swelling     Pt c/o swelling and pain in upper extremities for over a week. States she is having difficulty with daily ADLs. HPI  Joint pain  Started about one week ago  Swelling and pain in the BL upper extremities  Initially started in the fingers and then has progressed to the elbows and shoulders  Also states her bilateral shoulders are very stiff, can hardly raise her arms up  Has never had similar symptoms in the past    No chest pain, SOB, orthopnea  No hx DVT or PE    [facility], has follow-up appointment tomorrow [facility] to discuss treatment      No recent changes in her medications    Review of Systems   Constitutional:  Negative for chills and fever. HENT:  Negative for congestion, sinus pressure and sore throat. Respiratory:  Negative for cough and shortness of breath. Cardiovascular:  Negative for chest pain and palpitations. Gastrointestinal:  Negative for abdominal pain and vomiting. Musculoskeletal:  Negative for arthralgias and myalgias. Bilateral upper extremity swelling and pain   Skin:  Negative for rash and wound. Neurological:  Negative for speech difficulty and light-headedness. Psychiatric/Behavioral:  Negative for suicidal ideas. The patient is not nervous/anxious. Prior to Visit Medications    Medication Sig Taking? Authorizing Provider   magnesium (MAGNESIUM-OXIDE) 250 MG TABS tablet Take 1 tablet by mouth daily Yes ProviderUlises MD   methylPREDNISolone (MEDROL DOSEPACK) 4 MG tablet Take by mouth.  Yes Melany Vang DO   Cannabinoids (THC FREE PO) Take by mouth One gummy at night daily Yes ProviderUlises MD   loperamide (IMODIUM) 2 MG capsule Take 1 capsule by mouth 4 times daily as needed for Diarrhea Yes

## 2023-11-15 ENCOUNTER — OFFICE VISIT (OUTPATIENT)
Dept: SURGERY | Age: 73
End: 2023-11-15

## 2023-11-15 ENCOUNTER — PREP FOR PROCEDURE (OUTPATIENT)
Dept: SURGERY | Age: 73
End: 2023-11-15

## 2023-11-15 VITALS
DIASTOLIC BLOOD PRESSURE: 80 MMHG | HEIGHT: 61 IN | TEMPERATURE: 98.7 F | BODY MASS INDEX: 29.87 KG/M2 | WEIGHT: 158.2 LBS | RESPIRATION RATE: 12 BRPM | SYSTOLIC BLOOD PRESSURE: 120 MMHG | HEART RATE: 97 BPM | OXYGEN SATURATION: 97 %

## 2023-11-15 DIAGNOSIS — Z17.0 CARCINOMA OF UPPER-INNER QUADRANT OF LEFT BREAST IN FEMALE, ESTROGEN RECEPTOR POSITIVE (HCC): Primary | ICD-10-CM

## 2023-11-15 DIAGNOSIS — C50.212 CARCINOMA OF UPPER-INNER QUADRANT OF LEFT BREAST IN FEMALE, ESTROGEN RECEPTOR POSITIVE (HCC): Primary | ICD-10-CM

## 2023-11-15 DIAGNOSIS — E66.3 OVERWEIGHT (BMI 25.0-29.9): ICD-10-CM

## 2023-11-15 PROBLEM — E66.811 OBESITY, CLASS I, BMI 30-34.9: Status: ACTIVE | Noted: 2023-11-15

## 2023-11-15 PROBLEM — E66.9 OBESITY, CLASS I, BMI 30-34.9: Status: ACTIVE | Noted: 2023-11-15

## 2023-11-15 RX ORDER — PREDNISONE 10 MG/1
TABLET ORAL
COMMUNITY

## 2023-11-15 NOTE — TELEPHONE ENCOUNTER
Patient contacted and made aware of laboratory results including critical D-dimer. Did discuss possible causes, however likely from recent diagnosis stage I breast cancer versus DVT, was advised to go to ED during appointment however declined at that time continues to decline at this time. She has had no worsening muscle aches, upper extremity swelling, redness, shortness of breath/troubles breathing, chest pain, education on the importance for ED visit if any new/worsening symptoms were to be noted.   Patient does have follow-up with Dr. Urvashi Richardson tomorrow, was provided with my contact information and can call with any further questions

## 2023-11-16 DIAGNOSIS — G20.A1 PARKINSON'S DISEASE, UNSPECIFIED WHETHER DYSKINESIA PRESENT, UNSPECIFIED WHETHER MANIFESTATIONS FLUCTUATE (MULTI): Primary | ICD-10-CM

## 2023-11-16 RX ORDER — MESALAMINE 400 MG/1
2 CAPSULE, DELAYED RELEASE ORAL 3 TIMES DAILY
COMMUNITY
Start: 2021-11-15

## 2023-11-16 RX ORDER — SPIRONOLACTONE AND HYDROCHLOROTHIAZIDE 25; 25 MG/1; MG/1
2 TABLET ORAL DAILY
COMMUNITY
Start: 2021-11-30

## 2023-11-16 RX ORDER — HYDROCODONE BITARTRATE AND ACETAMINOPHEN 5; 325 MG/1; MG/1
TABLET ORAL
COMMUNITY
Start: 2023-02-15

## 2023-11-16 RX ORDER — AMANTADINE HYDROCHLORIDE 100 MG/1
100 CAPSULE, GELATIN COATED ORAL 2 TIMES DAILY
Qty: 60 CAPSULE | Refills: 6 | Status: SHIPPED | OUTPATIENT
Start: 2023-11-16 | End: 2024-01-30 | Stop reason: SDUPTHER

## 2023-11-16 RX ORDER — CARBIDOPA AND LEVODOPA 25; 100 MG/1; MG/1
0.5 TABLET ORAL
Qty: 90 TABLET | Refills: 6 | Status: SHIPPED | OUTPATIENT
Start: 2023-11-16 | End: 2024-01-30

## 2023-11-16 RX ORDER — DIAZEPAM 5 MG/1
TABLET ORAL
COMMUNITY
Start: 2022-08-16

## 2023-11-16 RX ORDER — CARBIDOPA AND LEVODOPA 25; 100 MG/1; MG/1
TABLET ORAL
COMMUNITY
Start: 2021-09-10 | End: 2023-11-16 | Stop reason: SDUPTHER

## 2023-11-16 RX ORDER — AMANTADINE HYDROCHLORIDE 100 MG/1
1 CAPSULE, GELATIN COATED ORAL 2 TIMES DAILY
COMMUNITY
Start: 2022-04-13 | End: 2023-11-16 | Stop reason: SDUPTHER

## 2023-11-16 NOTE — RESULT ENCOUNTER NOTE
Patient aware of results, Maxim spoke with patient 11/14 when critical D-dimer resulted.  Patient again declined ER.  Patient not having any worsening upper extremity swelling, redness, shortness of breath, chest pain.  Patient aware to go to ER for any worsening symptoms.  Otherwise patient has follow-up appointment scheduled 11/27.

## 2023-11-20 DIAGNOSIS — C50.212 CARCINOMA OF UPPER-INNER QUADRANT OF LEFT BREAST IN FEMALE, ESTROGEN RECEPTOR POSITIVE (HCC): Primary | ICD-10-CM

## 2023-11-20 DIAGNOSIS — Z17.0 CARCINOMA OF UPPER-INNER QUADRANT OF LEFT BREAST IN FEMALE, ESTROGEN RECEPTOR POSITIVE (HCC): Primary | ICD-10-CM

## 2023-11-27 ENCOUNTER — TELEPHONE (OUTPATIENT)
Dept: SURGERY | Age: 73
End: 2023-11-27

## 2023-11-27 DIAGNOSIS — Z17.0 CARCINOMA OF UPPER-INNER QUADRANT OF LEFT BREAST IN FEMALE, ESTROGEN RECEPTOR POSITIVE (HCC): Primary | ICD-10-CM

## 2023-11-27 DIAGNOSIS — C50.212 CARCINOMA OF UPPER-INNER QUADRANT OF LEFT BREAST IN FEMALE, ESTROGEN RECEPTOR POSITIVE (HCC): Primary | ICD-10-CM

## 2023-11-27 DIAGNOSIS — I10 ESSENTIAL HYPERTENSION: ICD-10-CM

## 2023-11-27 NOTE — TELEPHONE ENCOUNTER
I was calling the patient with her finalized left breast biopsy surgical pathology results. LMOM for the patient to return a call to the office.

## 2023-11-27 NOTE — TELEPHONE ENCOUNTER
Comments:     Last Office Visit (last PCP visit):   8/21/2023    Next Visit Date:  Future Appointments   Date Time Provider [address]   11/27/2023 12:15 PM Christine Bergeron DO [address]   12/1/2023 10:30 AM [facility]OM 1 [facility] NUC MED [facility] Fac RAD   12/1/2023 11:30 AM [facility]ICINE RO[facility] 1 [facility] Fac RAD   12/1/2023 12:00 PM [facility] STRESS LAB 1 [facility]  MO [facility] Fac RAD   12/1/2023  1:30 PM [facility] 1 [facility] Fac RAD   12/1/2023  2:30 PM [facility] ECHO 1 [facility]  [facility] Fac RAD   12/8/2023  8:00 AM [facility] PAT RM 1 NP [facility] PAT [address]   12/12/2023  1:00 PM SCHEDULE, [facility] ELYRIA BREAST JITENDRA NURSE/MA [facility] ELY [facility] [place]   12/12/2023  2:00 PM Agustina Ngo, OTR/L [facility]   12/14/2023  8:00 AM [facility] 3 [facility] Fac RAD   1/3/2024  9:45 AM Shanelle Reyes MD [address]       **If hasn't been seen in over a year OR hasn't followed up according to last diabetes/ADHD visit, make appointment for patient before sending refill to provider.     Rx requested:  Requested Prescriptions     Pending Prescriptions Disp Refills    spironolactone-hydroCHLOROthiazide (ALDACTAZIDE) 25-25 MG per tablet 180 tablet 0     Sig: Take 2 tablets by mouth daily

## 2023-11-27 NOTE — TELEPHONE ENCOUNTER
I informed the patient that her left breast biopsy surgical pathology results finalized as HER 2 negative. The patient verbalized understanding of the HER 2 negative results. The patient verbalized that she would like a referral to plastic surgery to discuss options. The patient is aware that the surgical coordinator will reach out to her with an appointment date/time with Dr. Onesimo Lamb when she is able to speak to Dr. Kei Cantor coordinator. Dr Kei Cantor coordinator is gone for the day. The patient is aware that she should get a return call on 11/28/2023. The patient has no further questions at this time.

## 2023-11-28 NOTE — TELEPHONE ENCOUNTER
Patient is requesting medication refill.  Please approve or deny this request.    Rx requested:  Requested Prescriptions     Pending Prescriptions Disp Refills    spironolactone-hydroCHLOROthiazide (ALDACTAZIDE) 25-25 MG per tablet 180 tablet 0     Sig: Take 2 tablets by mouth daily         Last Office Visit:   8/21/2023      Next Visit Date:  Future Appointments   Date Time Provider [address]   12/1/2023 10:30 AM [facility] 1 [facility] Fac RAD   12/1/2023 11:30 AM [facility] 1 [facility] Fac RAD   12/1/2023 12:00 PM [facility] STRESS LAB 1 [facility]  MO [facility] Fac RAD   12/1/2023  1:30 PM [facility] 1 [facility] Fac RAD   12/1/2023  2:30 PM [facility] ECHO 1 [facility]  [facility] Fac RAD   12/8/2023  8:00 AM [facility] PAT RM 1 NP [facility] PAT [address]   12/12/2023  1:00 PM SCHEDULE, PEPE VAUGHAN BREAST JITENDRA NURSE/VILMA MIRELES [facility]   12/12/2023  2:00 PM Penelope Still OTR/L [facility]   12/14/2023  8:00 AM [facility] 3 [facility] N[facility] Fac RAD   1/3/2024  9:45 AM Monica Byers MD [facility] [place]

## 2023-11-29 RX ORDER — SPIRONOLACTONE AND HYDROCHLOROTHIAZIDE 25; 25 MG/1; MG/1
2 TABLET ORAL DAILY
Qty: 180 TABLET | Refills: 0 | Status: SHIPPED | OUTPATIENT
Start: 2023-11-29

## 2023-12-01 ENCOUNTER — HOSPITAL ENCOUNTER (OUTPATIENT)
Dept: NUCLEAR MEDICINE | Age: 73
Discharge: HOME | End: 2023-12-03
Attending: INTERNAL MEDICINE
Payer: MEDICARE

## 2023-12-01 ENCOUNTER — HOSPITAL ENCOUNTER (OUTPATIENT)
Age: 73
End: 2023-12-01
Attending: INTERNAL MEDICINE
Payer: MEDICARE

## 2023-12-01 VITALS
SYSTOLIC BLOOD PRESSURE: 120 MMHG | BODY MASS INDEX: 29.89 KG/M2 | WEIGHT: 158.29 LBS | DIASTOLIC BLOOD PRESSURE: 80 MMHG | HEIGHT: 61 IN

## 2023-12-01 DIAGNOSIS — R94.31 ABNORMAL ECG: ICD-10-CM

## 2023-12-01 LAB
ECHO AO ROOT DIAM: 3.2 CM
ECHO AO ROOT INDEX: 1.87 CM/M2
ECHO AV AREA PEAK VELOCITY: 2 CM2
ECHO AV AREA VTI: 2.4 CM2
ECHO AV AREA/BSA PEAK VELOCITY: 1.2 CM2/M2
ECHO AV AREA/BSA VTI: 1.4 CM2/M2
ECHO AV CUSP MM: 1.4 CM
ECHO AV MEAN GRADIENT: 7 MMHG
ECHO AV MEAN VELOCITY: 1.3 M/S
ECHO AV PEAK GRADIENT: 15 MMHG
ECHO AV PEAK VELOCITY: 2.1 M/S
ECHO AV VELOCITY RATIO: 0.67
ECHO AV VTI: 41.6 CM
ECHO BSA: 1.76 M2
ECHO EST RA PRESSURE: 10 MMHG
ECHO LA VOL A-L A2C: 49 ML (ref 22–52)
ECHO LA VOL A-L A4C: 54 ML (ref 22–52)
ECHO LA VOL MOD A2C: 48 ML (ref 22–52)
ECHO LA VOL MOD A4C: 51 ML (ref 22–52)
ECHO LA VOLUME AREA LENGTH: 54 ML
ECHO LA VOLUME INDEX A-L A2C: 29 ML/M2 (ref 16–34)
ECHO LA VOLUME INDEX A-L A4C: 32 ML/M2 (ref 16–34)
ECHO LA VOLUME INDEX AREA LENGTH: 32 ML/M2 (ref 16–34)
ECHO LA VOLUME INDEX MOD A2C: 28 ML/M2 (ref 16–34)
ECHO LA VOLUME INDEX MOD A4C: 30 ML/M2 (ref 16–34)
ECHO LV E' LATERAL VELOCITY: 15 CM/S
ECHO LV E' SEPTAL VELOCITY: 9 CM/S
ECHO LV EDV A2C: 87 ML
ECHO LV EDV A4C: 92 ML
ECHO LV EDV BP: 92 ML (ref 56–104)
ECHO LV EDV INDEX A4C: 54 ML/M2
ECHO LV EDV INDEX BP: 54 ML/M2
ECHO LV EDV NDEX A2C: 51 ML/M2
ECHO LV EJECTION FRACTION A2C: 58 %
ECHO LV EJECTION FRACTION A4C: 55 %
ECHO LV EJECTION FRACTION BIPLANE: 56 % (ref 55–100)
ECHO LV ESV A2C: 37 ML
ECHO LV ESV A4C: 41 ML
ECHO LV ESV BP: 40 ML (ref 19–49)
ECHO LV ESV INDEX A2C: 22 ML/M2
ECHO LV ESV INDEX A4C: 24 ML/M2
ECHO LV ESV INDEX BP: 23 ML/M2
ECHO LV FRACTIONAL SHORTENING: 37 % (ref 28–44)
ECHO LV INTERNAL DIMENSION DIASTOLE INDEX: 2.87 CM/M2
ECHO LV INTERNAL DIMENSION DIASTOLIC: 4.9 CM (ref 3.9–5.3)
ECHO LV INTERNAL DIMENSION SYSTOLIC INDEX: 1.81 CM/M2
ECHO LV INTERNAL DIMENSION SYSTOLIC: 3.1 CM
ECHO LV IVSD: 0.9 CM (ref 0.6–0.9)
ECHO LV IVSS: 1 CM
ECHO LV MASS 2D: 141.9 G (ref 67–162)
ECHO LV MASS INDEX 2D: 83 G/M2 (ref 43–95)
ECHO LV POSTERIOR WALL DIASTOLIC: 0.8 CM (ref 0.6–0.9)
ECHO LV POSTERIOR WALL SYSTOLIC: 1.2 CM
ECHO LV RELATIVE WALL THICKNESS RATIO: 0.33
ECHO LVOT AREA: 3.1 CM2
ECHO LVOT AV VTI INDEX: 0.76
ECHO LVOT DIAM: 2 CM
ECHO LVOT MEAN GRADIENT: 3 MMHG
ECHO LVOT PEAK GRADIENT: 6 MMHG
ECHO LVOT PEAK VELOCITY: 1.4 M/S
ECHO LVOT STROKE VOLUME INDEX: 58.4 ML/M2
ECHO LVOT SV: 99.9 ML
ECHO LVOT VTI: 31.8 CM
ECHO MV A VELOCITY: 1.11 M/S
ECHO MV AREA VTI: 3.2 CM2
ECHO MV E DECELERATION TIME (DT): 149.7 MS
ECHO MV E VELOCITY: 0.74 M/S
ECHO MV E/A RATIO: 0.67
ECHO MV E/E' LATERAL: 4.93
ECHO MV E/E' RATIO (AVERAGED): 6.58
ECHO MV LVOT VTI INDEX: 0.99
ECHO MV MAX VELOCITY: 1.4 M/S
ECHO MV MEAN GRADIENT: 4 MMHG
ECHO MV MEAN VELOCITY: 0.9 M/S
ECHO MV PEAK GRADIENT: 7 MMHG
ECHO MV VTI: 31.4 CM
ECHO PV MAX VELOCITY: 0.8 M/S
ECHO PV PEAK GRADIENT: 3 MMHG
ECHO RIGHT VENTRICULAR SYSTOLIC PRESSURE (RVSP): 19 MMHG
ECHO RV INTERNAL DIMENSION: 1.5 CM
ECHO RV TAPSE: 2.2 CM (ref 1.7–?)
ECHO TV REGURGITANT MAX VELOCITY: 1.5 M/S
ECHO TV REGURGITANT PEAK GRADIENT: 9 MMHG

## 2023-12-01 PROCEDURE — 93017 CV STRESS TEST TRACING ONLY: CPT

## 2023-12-01 PROCEDURE — 2580000003 HC RX 258: Performed by: INTERNAL MEDICINE

## 2023-12-01 PROCEDURE — 93306 TTE W/DOPPLER COMPLETE: CPT | Performed by: INTERNAL MEDICINE

## 2023-12-01 PROCEDURE — 78452 HT MUSCLE IMAGE SPECT MULT: CPT

## 2023-12-01 PROCEDURE — 6360000002 HC RX W HCPCS: Performed by: INTERNAL MEDICINE

## 2023-12-01 PROCEDURE — 93306 TTE W/DOPPLER COMPLETE: CPT

## 2023-12-01 PROCEDURE — A9502 TC99M TETROFOSMIN: HCPCS | Performed by: INTERNAL MEDICINE

## 2023-12-01 PROCEDURE — 3430000000 HC RX DIAGNOSTIC RADIOPHARMACEUTICAL: Performed by: INTERNAL MEDICINE

## 2023-12-01 RX ORDER — REGADENOSON 0.08 MG/ML
0.4 INJECTION, SOLUTION INTRAVENOUS
Status: COMPLETED | OUTPATIENT
Start: 2023-12-01 | End: 2023-12-01

## 2023-12-01 RX ORDER — SODIUM CHLORIDE 0.9 % (FLUSH) 0.9 %
10 SYRINGE (ML) INJECTION PRN
Status: DISCONTINUED | OUTPATIENT
Start: 2023-12-01 | End: 2023-12-04 | Stop reason: HOSPADM

## 2023-12-01 RX ADMIN — TETROFOSMIN 35.7 MILLICURIE: 1.38 INJECTION, POWDER, LYOPHILIZED, FOR SOLUTION INTRAVENOUS at 12:03

## 2023-12-01 RX ADMIN — SODIUM CHLORIDE, PRESERVATIVE FREE 10 ML: 5 INJECTION INTRAVENOUS at 10:39

## 2023-12-01 RX ADMIN — REGADENOSON 0.4 MG: 0.08 INJECTION, SOLUTION INTRAVENOUS at 12:03

## 2023-12-01 RX ADMIN — SODIUM CHLORIDE, PRESERVATIVE FREE 10 ML: 5 INJECTION INTRAVENOUS at 12:04

## 2023-12-01 RX ADMIN — SODIUM CHLORIDE, PRESERVATIVE FREE 10 ML: 5 INJECTION INTRAVENOUS at 12:03

## 2023-12-01 RX ADMIN — TETROFOSMIN 11.7 MILLICURIE: 1.38 INJECTION, POWDER, LYOPHILIZED, FOR SOLUTION INTRAVENOUS at 10:37

## 2023-12-04 LAB
ECHO BSA: 1.76 M2
NUC STRESS EJECTION FRACTION: 80 %
STRESS BASELINE DIAS BP: 69 MMHG
STRESS BASELINE HR: 93 BPM
STRESS BASELINE ST DEPRESSION: 0 MM
STRESS BASELINE SYS BP: 150 MMHG
STRESS ESTIMATED WORKLOAD: 1 METS
STRESS PEAK DIAS BP: 69 MMHG
STRESS PEAK SYS BP: 150 MMHG
STRESS PERCENT HR ACHIEVED: 81 %
STRESS POST PEAK HR: 117 BPM
STRESS RATE PRESSURE PRODUCT: NORMAL BPM*MMHG
STRESS ST DEPRESSION: 0 MM
STRESS TARGET HR: 145 BPM
TID: 1.16

## 2023-12-04 PROCEDURE — 93018 CV STRESS TEST I&R ONLY: CPT | Performed by: INTERNAL MEDICINE

## 2023-12-06 ENCOUNTER — TELEPHONE (OUTPATIENT)
Dept: SURGERY | Age: 73
End: 2023-12-06

## 2023-12-06 NOTE — TELEPHONE ENCOUNTER
Received call from Joshua bryan [facility] Pre Admission Testing requesting the following,    Procedure Consent  Diagnosis  Anesthesia    Any questions you may contact [phone]

## 2023-12-07 ENCOUNTER — TELEPHONE (OUTPATIENT)
Dept: SURGERY | Age: 73
End: 2023-12-07

## 2023-12-07 ENCOUNTER — OFFICE VISIT (OUTPATIENT)
Dept: CARDIOLOGY CLINIC | Age: 73
End: 2023-12-07
Payer: MEDICARE

## 2023-12-07 VITALS
WEIGHT: 158.2 LBS | HEART RATE: 96 BPM | OXYGEN SATURATION: 96 % | SYSTOLIC BLOOD PRESSURE: 130 MMHG | DIASTOLIC BLOOD PRESSURE: 74 MMHG | HEIGHT: 61 IN | BODY MASS INDEX: 29.87 KG/M2

## 2023-12-07 DIAGNOSIS — G20 PARKINSONISM, UNSPECIFIED PARKINSONISM TYPE: ICD-10-CM

## 2023-12-07 DIAGNOSIS — R94.31 ABNORMAL ECG: Primary | ICD-10-CM

## 2023-12-07 DIAGNOSIS — I31.39 PERICARDIAL EFFUSION: ICD-10-CM

## 2023-12-07 DIAGNOSIS — I51.7 CARDIOMEGALY: ICD-10-CM

## 2023-12-07 PROCEDURE — 3078F DIAST BP <80 MM HG: CPT | Performed by: INTERNAL MEDICINE

## 2023-12-07 PROCEDURE — 3017F COLORECTAL CA SCREEN DOC REV: CPT | Performed by: INTERNAL MEDICINE

## 2023-12-07 PROCEDURE — 1090F PRES/ABSN URINE INCON ASSESS: CPT | Performed by: INTERNAL MEDICINE

## 2023-12-07 PROCEDURE — 99214 OFFICE O/P EST MOD 30 MIN: CPT | Performed by: INTERNAL MEDICINE

## 2023-12-07 PROCEDURE — G8427 DOCREV CUR MEDS BY ELIG CLIN: HCPCS | Performed by: INTERNAL MEDICINE

## 2023-12-07 PROCEDURE — 1123F ACP DISCUSS/DSCN MKR DOCD: CPT | Performed by: INTERNAL MEDICINE

## 2023-12-07 PROCEDURE — G8484 FLU IMMUNIZE NO ADMIN: HCPCS | Performed by: INTERNAL MEDICINE

## 2023-12-07 PROCEDURE — G8400 PT W/DXA NO RESULTS DOC: HCPCS | Performed by: INTERNAL MEDICINE

## 2023-12-07 PROCEDURE — 3075F SYST BP GE 130 - 139MM HG: CPT | Performed by: INTERNAL MEDICINE

## 2023-12-07 PROCEDURE — 1036F TOBACCO NON-USER: CPT | Performed by: INTERNAL MEDICINE

## 2023-12-07 PROCEDURE — G8417 CALC BMI ABV UP PARAM F/U: HCPCS | Performed by: INTERNAL MEDICINE

## 2023-12-07 ASSESSMENT — ENCOUNTER SYMPTOMS
WHEEZING: 0
CHEST TIGHTNESS: 0
SHORTNESS OF BREATH: 0
RESPIRATORY NEGATIVE: 1
GASTROINTESTINAL NEGATIVE: 1
STRIDOR: 0
BLOOD IN STOOL: 0
NAUSEA: 0
EYES NEGATIVE: 1
COUGH: 0

## 2023-12-07 NOTE — TELEPHONE ENCOUNTER
I contacted the patient to reschedule the day and time for her teaching with Bere. She will see Bere on 12/8 here at the [facility] directly following her PAT appointment at [facility]. We reviewed all upcoming appointments and confirmed times and locations. Patient did not have any other questions at this time.

## 2023-12-07 NOTE — PROGRESS NOTES
OFFICE VISIT        Patient: Carmencita Mendenhall  YOB: 1950  MRN: [id number]    Chief Complaint: PD  Chief Complaint   Patient presents with    Cardiac Clearance     : L. Breast mastectomy and lymph node biopsy w/ Dr. Kidd Cast       CV Data:  CT chest - Cor Calcification and small Pericardial effusion. Mild cardiomegaly   spect NEGATIVE ef 80%    ECHO EF 55-60 No Pericardial effusion. Subjective/HPI  she has PD and off balance. Has fallen on couple occasions. She has no CP no sob. Her heart was enlarged on CT. Pt has pleuritic CP.     21 doing well no cp no sob no furhter falls no bleed takes meds. No palps. EKG:      Lives w   Occ wine  Life long non smoker  Retired - . 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  Patient states B9     SECTION  1988    COLONOSCOPY N/A 2020    COLONOSCOPY WITH POLYPECTOMY performed by Filomena Correa MD at [address] [facility] Left     arthrotomy    SHOULDER ARTHROSCOPY Left 2021    LEFT SHOULDER ARTHROSCOPY ROTATOR CUFF REPAIR, SUBACROMIAL DECOMPRESSION performed by Hillary Heimlich, MD at [facility] ARTHROSCOPY Right 2022    RIGHT SHOULDER ARTHROSCOPY ROTATOR CUFF REPAIR WITH DERMAL ALLOGRAFT, BICEPS TENOTOMY OPEN REPAIR performed by Hillary Heimlich, MD at [address] [place] [address] Right 2017    RIGHT HIP TOTAL HIP ARTHROPLASTY WILLIAM MDM SPINAL  performed by Jennifer Jones MD at [address] [place] [address]         Family History   Problem Relation Age of Onset

## 2023-12-08 ENCOUNTER — TELEPHONE (OUTPATIENT)
Dept: SURGERY | Age: 73
End: 2023-12-08

## 2023-12-08 NOTE — TELEPHONE ENCOUNTER
BRAYDEN for patient to follow up on her missed appointment this morning. We spoke on 12/7/2023 and went over her appointments together. When PAT reached out to the patient she stated that she canceled.

## 2023-12-08 NOTE — TELEPHONE ENCOUNTER
I was able to reach the patient and get to the bottomed of her missed appointment. The patient stated that Dr. Lakhwinder Roberts office would not be able to do the surgery on her scheduled date ( which was explained to her when she decided to have a plastic sx consult after we scheduled surgery). Per the patient we are canceling her case for 12/14/2023. She is seeing Dr. Alexander Baron on 12/12 and we will touch base after her consult to plan her procedure for the new year.

## 2023-12-11 ENCOUNTER — TELEPHONE (OUTPATIENT)
Dept: SURGERY | Age: 73
End: 2023-12-11

## 2023-12-11 NOTE — TELEPHONE ENCOUNTER
I returned the patient's 's phone call. I LM for him to contact the office with our hours of availability.

## 2023-12-13 ENCOUNTER — TELEPHONE (OUTPATIENT)
Dept: SURGERY | Age: 73
End: 2023-12-13

## 2023-12-13 NOTE — TELEPHONE ENCOUNTER
Received call from [facility]. They were not aware of patient's surgery being cancelled. They are asking to be informed once it is rescheduled, [phone].

## 2023-12-14 ENCOUNTER — TELEPHONE (OUTPATIENT)
Dept: SURGERY | Age: 73
End: 2023-12-14

## 2023-12-14 NOTE — TELEPHONE ENCOUNTER
BRAYDEN on home phone for the patient. The patient's  stopped into our clinic on 12/14 and scheduled an appointment to go over some questions that he and his wife have regarding treatment. The pt canceled her 12/14 sx as well as her 12/13 plastic sx consultation. While they are scheduled to come in on 1/10/2024 Dr. Ibrahima Rodriguez would like to see her prior to the holiday to go over her treatment plan. I left a message asking them to come in to us on Monday 12/18 at 9:45 am at the [facility]. I asked that they call the office back to confirm the appointment change.

## 2023-12-20 ENCOUNTER — APPOINTMENT (OUTPATIENT)
Dept: NEUROLOGY | Facility: CLINIC | Age: 73
End: 2023-12-20
Payer: MEDICARE

## 2024-01-02 ENCOUNTER — OFFICE VISIT (OUTPATIENT)
Dept: OPHTHALMOLOGY | Facility: CLINIC | Age: 74
End: 2024-01-02
Payer: MEDICARE

## 2024-01-02 DIAGNOSIS — H43.391 VITREOUS FLOATERS OF RIGHT EYE: Primary | ICD-10-CM

## 2024-01-02 DIAGNOSIS — H50.00 ESOTROPIA: ICD-10-CM

## 2024-01-02 PROCEDURE — 99213 OFFICE O/P EST LOW 20 MIN: CPT | Performed by: PSYCHIATRY & NEUROLOGY

## 2024-01-02 PROCEDURE — 92060 SENSORIMOTOR EXAMINATION: CPT | Performed by: PSYCHIATRY & NEUROLOGY

## 2024-01-02 RX ORDER — PREDNISONE 10 MG/1
TABLET ORAL
COMMUNITY

## 2024-01-02 RX ORDER — ACETAMINOPHEN AND DIPHENHYDRAMINE HYDROCHLORIDE 500; 25 MG/1; MG/1
1 TABLET, FILM COATED ORAL
COMMUNITY

## 2024-01-02 RX ORDER — LOPERAMIDE HYDROCHLORIDE 2 MG/1
2 CAPSULE ORAL
COMMUNITY

## 2024-01-02 RX ORDER — TIZANIDINE 2 MG/1
2 TABLET ORAL
COMMUNITY
Start: 2022-04-14

## 2024-01-02 RX ORDER — ANASTROZOLE 1 MG/1
1 TABLET ORAL DAILY
COMMUNITY
Start: 2023-12-18

## 2024-01-02 RX ORDER — AMMONIUM LACTATE 12 G/100G
CREAM TOPICAL
COMMUNITY
Start: 2021-08-15

## 2024-01-02 RX ORDER — METHYLPREDNISOLONE 4 MG/1
TABLET ORAL
COMMUNITY
Start: 2023-11-14

## 2024-01-02 ASSESSMENT — VISUAL ACUITY
OS_CC: 20/200
METHOD: SNELLEN - LINEAR
OD_CC: 20/30-1
CORRECTION_TYPE: GLASSES

## 2024-01-02 ASSESSMENT — REFRACTION_WEARINGRX
OD_AXIS: 085
OD_SPHERE: +2.50
OD_ADD: +3.00
OD_CYLINDER: -0.75

## 2024-01-02 ASSESSMENT — ENCOUNTER SYMPTOMS
CARDIOVASCULAR NEGATIVE: 0
CONSTITUTIONAL NEGATIVE: 0
EYES NEGATIVE: 1
RESPIRATORY NEGATIVE: 0
ENDOCRINE NEGATIVE: 0
HEMATOLOGIC/LYMPHATIC NEGATIVE: 0
ALLERGIC/IMMUNOLOGIC NEGATIVE: 0
NEUROLOGICAL NEGATIVE: 0
MUSCULOSKELETAL NEGATIVE: 0
GASTROINTESTINAL NEGATIVE: 0
PSYCHIATRIC NEGATIVE: 0

## 2024-01-02 ASSESSMENT — CONF VISUAL FIELD
OD_INFERIOR_TEMPORAL_RESTRICTION: 0
OS_NORMAL: 1
OD_INFERIOR_NASAL_RESTRICTION: 0
OS_INFERIOR_NASAL_RESTRICTION: 0
OD_NORMAL: 1
METHOD: COUNTING FINGERS
OS_INFERIOR_TEMPORAL_RESTRICTION: 0
OD_SUPERIOR_NASAL_RESTRICTION: 0
OS_SUPERIOR_TEMPORAL_RESTRICTION: 0
OS_SUPERIOR_NASAL_RESTRICTION: 0
OD_SUPERIOR_TEMPORAL_RESTRICTION: 0

## 2024-01-02 ASSESSMENT — SLIT LAMP EXAM - LIDS
COMMENTS: GOOD POSITION
COMMENTS: GOOD POSITION

## 2024-01-02 ASSESSMENT — TONOMETRY
OS_IOP_MMHG: 17
IOP_METHOD: GOLDMANN APPLANATION
OD_IOP_MMHG: 17

## 2024-01-02 ASSESSMENT — EXTERNAL EXAM - LEFT EYE: OS_EXAM: NORMAL

## 2024-01-02 ASSESSMENT — EXTERNAL EXAM - RIGHT EYE: OD_EXAM: NORMAL

## 2024-01-02 ASSESSMENT — CUP TO DISC RATIO
OS_RATIO: .2
OD_RATIO: .2

## 2024-01-02 NOTE — PROGRESS NOTES
Assessment and Plan    09/06/2022 MRI brain with contrast, which I personally reviewed previously, shows no acute finding with non-specific bihemispheric white matter FLAIR lesion.    08/25/2022 CRP 1.15 mg/dL. Total cholesterol 119. HDL 53. LDL 52. TSH 3.87 normal. ESR 20. HA1c 5.7%. ARTHUR titer 1:80.     This 73 year-old woman with a history of pre-diabetes, HLD, Parkinson disease, ulcerative colitis presents in follow up for evaluation of double vision previously with esotropia, also with a new floater of the right eye.    Esotropia that is mildly incomitant remains very similar to the last examination and most consistent with sagging eye syndrome. A congenital phoria with decompensation is the main alternative. I doubt any other cause given stability.    Regarding the floater, no retinal detachment, tears, or breaks appreciated    The CRS appears benign. She can follow with primary eye care or see a retina specialist.    Plan    Discussed referral to pediatric ophthalmologist for strabismus surgery evaluation  Continue Fresnel prism or switch to ground prism.  Precautions for retinal detachment reviewed.    Follow up depending on strabismus surgical evaluation with stereo plates. (dilated 01/02/2024)

## 2024-01-22 ENCOUNTER — TELEPHONE (OUTPATIENT)
Dept: FAMILY MEDICINE CLINIC | Age: 74
End: 2024-01-22

## 2024-01-25 ENCOUNTER — OFFICE VISIT (OUTPATIENT)
Dept: FAMILY MEDICINE CLINIC | Age: 74
End: 2024-01-25

## 2024-01-25 VITALS
TEMPERATURE: 98.6 F | HEART RATE: 101 BPM | SYSTOLIC BLOOD PRESSURE: 134 MMHG | HEIGHT: 61 IN | OXYGEN SATURATION: 96 % | WEIGHT: 158 LBS | DIASTOLIC BLOOD PRESSURE: 86 MMHG | BODY MASS INDEX: 29.83 KG/M2

## 2024-01-25 DIAGNOSIS — I82.4Y9 ACUTE DEEP VEIN THROMBOSIS (DVT) OF PROXIMAL VEIN OF LOWER EXTREMITY, UNSPECIFIED LATERALITY (HCC): ICD-10-CM

## 2024-01-25 DIAGNOSIS — C50.212 CARCINOMA OF UPPER-INNER QUADRANT OF LEFT BREAST IN FEMALE, ESTROGEN RECEPTOR POSITIVE (HCC): ICD-10-CM

## 2024-01-25 DIAGNOSIS — K51.311 ULCERATIVE RECTOSIGMOIDITIS WITH RECTAL BLEEDING (HCC): ICD-10-CM

## 2024-01-25 DIAGNOSIS — Z17.0 CARCINOMA OF UPPER-INNER QUADRANT OF LEFT BREAST IN FEMALE, ESTROGEN RECEPTOR POSITIVE (HCC): ICD-10-CM

## 2024-01-25 DIAGNOSIS — G20.A1 PARKINSON'S DISEASE, UNSPECIFIED WHETHER DYSKINESIA PRESENT, UNSPECIFIED WHETHER MANIFESTATIONS FLUCTUATE: ICD-10-CM

## 2024-01-25 DIAGNOSIS — L57.0 AK (ACTINIC KERATOSIS): ICD-10-CM

## 2024-01-25 DIAGNOSIS — L98.9 FACIAL LESION: ICD-10-CM

## 2024-01-25 DIAGNOSIS — I50.30 DIASTOLIC CONGESTIVE HEART FAILURE, UNSPECIFIED HF CHRONICITY (HCC): ICD-10-CM

## 2024-01-25 DIAGNOSIS — M25.50 POLYARTHRALGIA: Primary | ICD-10-CM

## 2024-01-25 RX ORDER — ANASTROZOLE 1 MG/1
1 TABLET ORAL DAILY
COMMUNITY
Start: 2023-12-18

## 2024-01-25 RX ORDER — PREDNISONE 10 MG/1
10 TABLET ORAL DAILY
Qty: 30 TABLET | Refills: 5 | Status: SHIPPED | OUTPATIENT
Start: 2024-01-25

## 2024-01-25 RX ORDER — PREDNISONE 10 MG/1
TABLET ORAL
Qty: 50 TABLET | Refills: 0 | Status: SHIPPED | OUTPATIENT
Start: 2024-01-25 | End: 2024-02-04

## 2024-01-25 ASSESSMENT — PATIENT HEALTH QUESTIONNAIRE - PHQ9
1. LITTLE INTEREST OR PLEASURE IN DOING THINGS: 0
SUM OF ALL RESPONSES TO PHQ QUESTIONS 1-9: 0
SUM OF ALL RESPONSES TO PHQ9 QUESTIONS 1 & 2: 0
2. FEELING DOWN, DEPRESSED OR HOPELESS: 0

## 2024-01-25 NOTE — PROGRESS NOTES
chills on occasion  Respiratory ROS: no cough, shortness of breath, or wheezing  Cardiovascular ROS: no chest pain or dyspnea on exertion  Gastrointestinal ROS: no abdominal pain, change in bowel habits, or black or bloody stools  Genito-Urinary ROS: no dysuria, trouble voiding  Musculoskeletal ROS: right shoulder pain  In general patient otherwise reports feeling well.     Physical Exam:  /86 (Site: Right Upper Arm)   Pulse (!) 101   Temp 98.6 °F (37 °C)   Ht 1.549 m (5' 1\")   Wt 71.7 kg (158 lb)   LMP 08/10/2007   SpO2 96%   BMI 29.85 kg/m²     Gen: Well, NAD, Alert, Oriented x 3   HEENT: EOMI, eyes clear, MMM  Skin: without rash or jaundice, right temple with scaly red flat lesion, likely AK  Lungs CTAB  Heart s1s2 RRR  Neuro: left hand with pill rolling tremor, left arm with some rigidity  Ext: no CCE         A&P   Diagnosis Orders   1. Polyarthralgia  Amb External Referral To Rheumatology    predniSONE (DELTASONE) 10 MG tablet    predniSONE (DELTASONE) 10 MG tablet      2. Diastolic congestive heart failure, unspecified HF chronicity (HCC)        3. Ulcerative rectosigmoiditis with rectal bleeding (HCC)        4. Acute deep vein thrombosis (DVT) of proximal vein of lower extremity, unspecified laterality (HCC)        5. 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 Parkinson's disease, unspecified whether dyskinesia present, unspecified whether manifestations fluctuate        7. Facial lesion        8. AK (actinic keratosis)  17538 - [place] DESTRUC PREMALIGNANT, FIRST LESION           As ordered     Rheum referral     LN2 times 5 seconds to affected areas times 1 lesion(s). Informed consent given was given. Risks including infection, bleeding, scarring discussed. No guarantee how scars will look. Post op instructions given. Best to leave blisters alone if they form. If blister(s) pop or patient pops with a sterilized needed, apply antibiotic

## 2024-01-30 ENCOUNTER — OFFICE VISIT (OUTPATIENT)
Dept: NEUROLOGY | Facility: CLINIC | Age: 74
End: 2024-01-30
Payer: MEDICARE

## 2024-01-30 VITALS
SYSTOLIC BLOOD PRESSURE: 138 MMHG | DIASTOLIC BLOOD PRESSURE: 85 MMHG | BODY MASS INDEX: 29.48 KG/M2 | RESPIRATION RATE: 18 BRPM | WEIGHT: 156 LBS | HEART RATE: 108 BPM

## 2024-01-30 DIAGNOSIS — G20.A1 PARKINSON'S DISEASE, UNSPECIFIED WHETHER DYSKINESIA PRESENT, UNSPECIFIED WHETHER MANIFESTATIONS FLUCTUATE (MULTI): ICD-10-CM

## 2024-01-30 PROCEDURE — 99214 OFFICE O/P EST MOD 30 MIN: CPT | Performed by: PSYCHIATRY & NEUROLOGY

## 2024-01-30 PROCEDURE — 1159F MED LIST DOCD IN RCRD: CPT | Performed by: PSYCHIATRY & NEUROLOGY

## 2024-01-30 PROCEDURE — 1160F RVW MEDS BY RX/DR IN RCRD: CPT | Performed by: PSYCHIATRY & NEUROLOGY

## 2024-01-30 RX ORDER — CARBIDOPA AND LEVODOPA 25; 100 MG/1; MG/1
TABLET ORAL
Qty: 90 TABLET | Refills: 3 | Status: SHIPPED | OUTPATIENT
Start: 2024-01-30 | End: 2024-04-15 | Stop reason: SDUPTHER

## 2024-01-30 RX ORDER — AMANTADINE HYDROCHLORIDE 100 MG/1
100 CAPSULE, GELATIN COATED ORAL 2 TIMES DAILY
Qty: 60 CAPSULE | Refills: 6 | Status: SHIPPED | OUTPATIENT
Start: 2024-01-30 | End: 2025-01-29

## 2024-01-30 RX ORDER — CARBIDOPA AND LEVODOPA 25; 100 MG/1; MG/1
1 TABLET ORAL 3 TIMES DAILY
Qty: 270 TABLET | Refills: 3 | Status: SHIPPED | OUTPATIENT
Start: 2024-01-30 | End: 2024-01-30

## 2024-01-30 RX ORDER — CARBIDOPA 25 MG/1
25 TABLET ORAL 3 TIMES DAILY
Qty: 270 TABLET | Refills: 3 | Status: SHIPPED | OUTPATIENT
Start: 2024-01-30 | End: 2025-01-29

## 2024-01-30 ASSESSMENT — ENCOUNTER SYMPTOMS
LOSS OF SENSATION IN FEET: 0
DEPRESSION: 0
OCCASIONAL FEELINGS OF UNSTEADINESS: 1

## 2024-01-30 ASSESSMENT — PATIENT HEALTH QUESTIONNAIRE - PHQ9: 1. LITTLE INTEREST OR PLEASURE IN DOING THINGS: NOT AT ALL

## 2024-01-30 NOTE — PATIENT INSTRUCTIONS
We prescribed carbidopa take it one tablet three times a day with each dose of carbidopa levodopa  Take carbidopa levodopa 1.5 tablet three times  a day after one week take 2 tablet three times  a day

## 2024-01-30 NOTE — PROGRESS NOTES
Subjective     Gisella Nicholas is a right handed  73 y.o. year old female who presents with FUV of PD.      HPI      Interval HX:  Increasing dose of medication made her nauseous and decreased to 1 tablet TID.  Tremor and slowness are ok.Main concern is imbalance  and had one fall and uses walker.  Memory is fine  No VH  No dyskenesia     Brain MRI on 9/2022:CSF Spaces: The ventricles, sulci and basal cisterns are appropriate  for the degree of volume loss. No abnormal extra-axial collection.  Prior Hx:  Diagnosed with PD by Dr. Scott in 2017. Onset of LUE tremor was 2017, gradual in onset. It is worse at rest. It bothers her a fair amount currently. Levodopa helps tremor by about 75%. Her walking is poor, left leg feels like it won't move. The medicine helps with this as well. Injured hamstring in RLE so was in a wheelchair at last visit April 2022. Otherwise ambulates unassisted at home. She falls rarely, tripped on a curb 2 months ago. NMSx include constipation, hyposmia, depression      PD meds:  Sinemet 25/100 - 1 tab TID      Last visit amantadine 100mg BID was added for dyskinesia.     Interval Hx:  Since starting amantadine her dyskinesias improved.      Awakens at 6:30 AM, takes first dose at 6:30 AM  Takes 1st dose at 1:30 PM  Takes last dose at 9:30 PM     Motor Sx:  Tremors fluctuate: OK in AM and afternoon but worse at night.  Stiffness and slowness are stable.  Balance is OK. Gets up slowly. Fell in Feb 2022 (fell on ice) and April 2022 (turned too quickly)     NMSx:  +OH sometimes  +Constipation  +Mood: discouraged because physical therapy isn't going well. Life changed with the shoulder  No Urinary changes   Sleeps 7 hours per night      Additionally, patient states last Thursday she had acute onset binocular horizontal diplopia. She was told this is 2/2 sinemet and optometrist gave her a patch for her glasses over the left eye. She has not had any work-up for this. She denies associated headache,  N/V, vision loss, weakness, numbness, tingling, dysphagia, dysarthria. This diplopia is stable throughout the day, does not fluctuate with sinemet doses, is not improving.      ROS: all systems reviewed and otherwise negative except per HPI.     PMHx/PSHx:  ulcerative colitis  HTN  bilateral rotator cuff repair  R HILTON  knee surgeries     Meds: Per EMR plus takes a baby aspirin daily      SHx: Retired, previously as a . Never smoker. Drinks 1 glass of wine a week. Uses MJ gummies     FHx: No history of neurologic or similar conditions                 Review of Systems  All other system have been reviewed and are negative for complaint.  Objective   Neurological Exam    Physical Exam  MDS UPDRS Examination   Is the patient on medication for treating the symptoms of Parkinson's disease? Yes.   If the patient is receiving medication for treating the symptoms of Parkinson's disease, fernie the patient's clinical state using the following definitions: ON: On is the typical functional state when patients are receiving medication and have a good response.   Is the patient on levodopa? Yes: 180 minutes.   Stimulator State: N/A   Speech: 0   Facial Expression: 0   Rigidity: Neck - 0. RUE - Unable to rate. RLE - Unable to rate. LUE - Unable to rate. LLE - Unable to rate.   Finger Taps: Right - 3. Left - 3.   Hand Movements: Right - 2. Left - 2.   Pronation-Supination: Right - 2. Left - 2.   Toe Tapping: Right - 2. Left - 3.   Leg Agility: Right - 2. Left - 3   Arising from chair: 1  Gait: 1   Freezing of Gait: 0   Postural Stability: 1  Posture: 1   Body Bradykinesia: 2   Postural Tremor: Right Hand - 2. Left Hand - 2.   Kinetic Tremor: Right Hand - 2. Left Hand - 2.   Rest Tremor: RUE - 1. RLE - 0. LUE - 0. LLE - 0.   Lip/Jaw: 2   Constancy of Rest Tremor: 2 Dyskinesias were not present during exam. They did not interfere with ratings.   Francisco and Yahr Stage:                                    Assessment/Plan Ms.  Yu is a 70 YO RH white woman here for PD FUV.  patient has significant tremors and bradykinesia.She did not tolerate increasing C/L to 2 tablets TID and became nauseous and decreased to one tablet TID.Therefore we plan add Carbidopa to C/L. Other options is adding Entocapone to C/L or start Rytary in the future.     .     Plan:  -  C/L  1 tabs TID  -Add carbidopa TID to prevent nausea  - Continue amantadine 100mg BID

## 2024-02-06 RX ORDER — MESALAMINE 800 MG/1
TABLET, DELAYED RELEASE ORAL
Qty: 120 TABLET | Status: CANCELLED | OUTPATIENT
Start: 2024-02-06

## 2024-02-06 NOTE — TELEPHONE ENCOUNTER
Patient stopped into the office to request a refill on Mesalamine 800 MG. Verbalized Rx for ASACOL (MESAMALINE) 800 MG with Pharmacist Anju at [facility].  Ph: [phone]

## 2024-02-07 RX ORDER — MESALAMINE 800 MG/1
TABLET, DELAYED RELEASE ORAL
Qty: 120 TABLET | Refills: 3 | Status: CANCELLED | OUTPATIENT
Start: 2024-02-07

## 2024-02-26 DIAGNOSIS — I10 ESSENTIAL HYPERTENSION: ICD-10-CM

## 2024-02-27 ENCOUNTER — EVALUATION (OUTPATIENT)
Dept: PHYSICAL THERAPY | Facility: CLINIC | Age: 74
End: 2024-02-27
Payer: MEDICARE

## 2024-02-27 DIAGNOSIS — G20.A1 PARKINSON'S DISEASE, UNSPECIFIED WHETHER DYSKINESIA PRESENT, UNSPECIFIED WHETHER MANIFESTATIONS FLUCTUATE (MULTI): ICD-10-CM

## 2024-02-27 PROCEDURE — 97162 PT EVAL MOD COMPLEX 30 MIN: CPT | Mod: GP | Performed by: PHYSICAL THERAPIST

## 2024-02-27 PROCEDURE — 97112 NEUROMUSCULAR REEDUCATION: CPT | Mod: GP | Performed by: PHYSICAL THERAPIST

## 2024-02-27 RX ORDER — SPIRONOLACTONE AND HYDROCHLOROTHIAZIDE 25; 25 MG/1; MG/1
2 TABLET ORAL DAILY
Qty: 180 TABLET | Refills: 0 | Status: SHIPPED | OUTPATIENT
Start: 2024-02-27

## 2024-02-27 ASSESSMENT — BALANCE ASSESSMENTS
LONG VERSION TOTAL SCORE (MAX 56): 39
REACHING FORWARD WITH OUTSTRETCHED ARM WHILE STANDING: CAN REACH FORWARD 12 CM (5 INCHES)
PLACE ALTERNATE FOOT ON STEP OR STOOL WHILE STANDING UNSUPPORTED: TURNS SIDEWAYS ONLY BUT MAINTAINS BALANCE
SITTING WITH BACK UNSUPPORTED BUT FEET SUPPORTED ON FLOOR OR ON A STOOL: ABLE TO SIT SAFELY AND SECURELY FOR 2 MINUTES
STANDING TO SITTING: SITS SAFELY WITH MINIMAL USE OF HANDS
STANDING TO SITTING: ABLE TO STAND WITHOUT USING HANDS AND STABILIZE INDEPENDENTLY
TURN 360 DEGREES: NEEDS CLOSE SUPERVISION OR VERBAL CUEING
STANDING UNSUPPORTED ONE FOOT IN FRONT: ABLE TO PLACE FOOT TANDEM INDEPENDENTLY AND HOLD 30 SECONDS
PLACE ALTERNATE FOOT ON STEP OR STOOL WHILE STANDING UNSUPPORTED: ABLE TO COMPLETE GREATER THAN 2 STEPS NEEDS MINIMAL ASSIST
TRANSFERS: ABLE TO TRANSFER SAFELY DEFINITE NEED OF HANDS
PICK UP OBJECT FROM THE FLOOR FROM A STANDING POSITION: ABLE TO PICK UP SLIPPER BUT NEEDS SUPERVISION
STANDING UNSUPPORTED WITH FEET TOGETHER: ABLE TO PLACE FEET TOGETHER INDEPENDENTLY AND STAND 1 MINUTE WITH SUPERVISION
STANDING UNSUPPORTED WITH EYES CLOSED: ABLE TO STAND 10 SECONDS WITH SUPERVISION
STANDING UNSUPPORTED: ABLE TO STAND 2 MINUTES WITH SUPERVISION
STANDING ON ONE LEG: TRIES TO LIFT LEG UNABLE TO HOLD 3 SECONDS BUT REMAINS STANDING INDEPENDENTLY

## 2024-02-27 ASSESSMENT — PATIENT HEALTH QUESTIONNAIRE - PHQ9
1. LITTLE INTEREST OR PLEASURE IN DOING THINGS: NOT AT ALL
2. FEELING DOWN, DEPRESSED OR HOPELESS: NOT AT ALL
SUM OF ALL RESPONSES TO PHQ9 QUESTIONS 1 AND 2: 0

## 2024-02-27 NOTE — TELEPHONE ENCOUNTER
Comments:     Last Office Visit (last PCP visit):   1/25/2024    Next Visit Date:  Future Appointments   Date Time Provider Department Center   6/19/2024 12:00 PM Annie Jenkins MD [facility]   7/25/2024  1:30 PM Eladio Pickens MD [facility]   12/10/2024  2:00 PM Michael Root MD [facility]       **If hasn't been seen in over a year OR hasn't followed up according to last diabetes/ADHD visit, make appointment for patient before sending refill to provider.    Rx requested:  Requested Prescriptions     Pending Prescriptions Disp Refills    spironolactone-hydroCHLOROthiazide (ALDACTAZIDE) 25-25 MG per tablet [Pharmacy Med Name: spironolactone 25 mg-hydrochlorothiazide 25 mg tablet] 180 tablet 0     Sig: TAKE 2 TABLETS BY MOUTH DAILY

## 2024-02-27 NOTE — PROGRESS NOTES
Physical Therapy Evaluation and Treatment      Patient Name: Gisella Nicholas  MRN: [id number]  Today's Date: 2/27/2024  Time Calculation  Start Time: 0105  Stop Time: 0148  Time Calculation (min): 43 min      PT Evaluation Time Entry  PT Evaluation (Moderate) Time Entry: 25  PT Therapeutic Procedures Time Entry  Neuromuscular Re-Education Time Entry: 15    INSURANCE:  Visit number: 1/10  [facility]    Referring Provider: Evans Villalta MD     ASSESSMENT:  PT Assessment Results: decreased knowledge of HEP, activity limitations, ADLs/IADLs/self care skills, flexibility, motor function/control/tone, pain, participation restrictions, range of motion/joint mobility, strength, posture, transfers, coordination, balance, fall risk, gait/locomotion, decreased knowledge of assisted device use, decreased knowledge of precautions.  Rehab Prognosis: Good  Evaluation/Treatment Tolerance: Patient tolerated treatment well    Gisella Nicholas is a 73 y.o. female presenting to the clinic with difficulty walking and balance issues due to Parkinson's. Pt demonstrates decreased ROM and strength of B LEs causing dysfunction with balance, walking, standing, STSs, and transfers. Pt was given and reviewed HEP including a static balance HEP. Pt will benefit from skilled PT in order to increase ROM and strength of B LEs so that the pt can perform ADLs with reduced fall risk and return to PLOF.     PLAN:  Treatments/Interventions: gait training, traction, dry needling, edema control, education/instruction, home program, self care/home management, manual therapy, neuromuscular re-education, therapeutic activities, therapeutic exercises, modalities, therapeutic elastic taping.   PT Plan: Skilled PT  PT Frequency: 2 times per week  Duration: 10 visits  Onset Date: 06/01/23  Certification Period Start Date: 02/27/24  Certification Period End Date: 05/27/24  Rehab Potential: Good  Plan of Care Agreement: Patient    Goals -    Pt will demonstrate an  improvement in Marmolejo Balance Score by 8 points in order to improve static balance and reduce fall risk.  Pt will demonstrated an improvement with Tinetti Gait and Balance score by 4 points to improve static/dynamic balance and reduce fall risk.  Pt will improve TUG test score to less than 12 sec with SC to improve static/dynamic balance and reduce fall risk.  Pt will improve Gait Speed by MDC 0.05 m/s with SC to improve static/dynamic balance and reduce fall risk.  Pt will have at least 4+/5 strength for the bilateral LEs.  Pt will be independent with HEP.    CURRENT PROBLEM:   1. Parkinson's disease, unspecified whether dyskinesia present, unspecified whether manifestations fluctuate  Referral to Physical Therapy          Subjective    General -    Pt states that she had a fall in June and patient reports that she has been using a walker and Rolator walker outside the house ever since. Pt reports that she was not using an AD previous to the fall, but would like to try to at least get to using a cane again.    Mechanism of Injury -    Insidious Onset    History of Current Episode -   Started a couple years ago   Parkinson's  Couple of falls   Last June last time she fell    Pain -    Arthritis in B hands/arms    Work -   Work Status: Retired    Home Living -    Lives in an apartment, one step from garage into the house.  Pt lives with her .  DME: [employer]    Patient Stated Goals -    Would like to not have to use the walker, maybe just using a cane    PRECAUTIONS -    Fall Risk, Breast CA active    Prior Level of Function -    I with ADLs/IADLs, walking without assistive device.    Objective     Hip MMT (/5) -   R hip Flexion: 4+  R hip Adduction: 4+  R hip Abduction: 4  R hip ER: 4-  L hip Flexion: 4+  L hip Adduction: 4+  L hip Abduction: 4-  L hip ER: 4-    Knee MMT (/5) -   R knee Extension: 4+  R knee Flexion: 4-  L knee Extension: 4+  L knee Flexion: 4-    Ankle MMT (/5) -   R Ankle Dorsiflexion:  4+  R Ankle Plantarflexion: 4+  R Ankle Inversion: 4-  R Ankle Eversion: 4  L Ankle Dorsiflexion: 4+  L Ankle Plantarflexion: 4  L Ankle Inversion: 4-  L Ankle Eversion: 4-    Gait -   Pt is ambulating with an assistive device (Rolator). Pattern: step through pattern.     JANKI -    Timed Up and Go (TUG) Test: with AD, without AD (13.5 for community dwelling adults, 12 PD)   Gait Speed: with AD, without AD (less than 0.4 household ambulator, less than 0.8 limited community, 1.2 community)    Posture -   Rounded Shoulders  Forward Head  Increased Thoracic Kyphosis   Lower Crossed Syndrome  Increased Lumbar Lordosis/APT  Forward Trunk Lean    Outcome Measures -   Marmolejo Balance Scale  1. Sitting to Standing: Able to stand without using hands and stabilize independently  2. Standing Unsupported: Able to stand 2 minutes with supervision  3. Sitting with Back Unsupported but Feet Supported on Floor or on a Stool: Able to sit safely and securely for 2 minutes  4. Standing to Sitting: Sits safely with minimal use of hands  5.  Transfers: Able to transfer safely definite need of hands  6. Standing Unsupported with Eyes Closed: Able to stand 10 seconds with supervision  7. Standing Unsupported with Feet Together: Able to place feet together independently and stand 1 minute with supervision  8. Reach Forward with Outstretched Arm While Standing: Can reach forward 12 cm (5 inches)  9.  Object from Floor from a Standing Position: Able to  slipper but needs supervision  10. Turning to Look Behind Over Left and Right Shoulders While Standing: Turns sideways only but maintains balance  11. Turn 360 Degrees: Needs close supervision or verbal cueing  12. Place Alternate Foot on Step or Stool While Standing Unsupported: Able to complete greater than 2 steps needs minimal assist  13. Standing Unsupported One Foot in Front: Able to place foot tandem independently and hold 30 seconds  14. Standing on One Leg: Tries to lift leg  unable to hold 3 seconds but remains standing independently  Marmolejo Balance Score: 39    Tinetti  Sitting Balance: Steady, safe  Arises: Able without using arms  Attempts to Arise: Able to arise, one attempt  Immediate Standing Balance (First 5 Seconds): Steady without walker or other support  Standing Balance: Steady but wide stance, uses cane or other support  Nudged: Staggers, grabs, catches self  Eyes Closed: Unsteady  Turned 360 Degrees: Steadiness: Steady  Turned 360 Degrees: Continuity of Steps: Discontinuous steps  Sitting Down: Safe, smooth motion  Balance Score: 12  Initiation of Gait: No hesitancy  Step Height: R Swing Foot: Right foot complete clears floor  Step Length: R Swing Foot: Passes left stance foot  Step Height: L Swing Foot: Left foot complete clears floor  Step Length: L Swing Foot: Passes right stance foot  Step Symmetry: Right and left step appear equal  Step Continuity: Stopping or discontinuity between steps  Path: Mild/moderate deviation or uses walking aid  Trunk: Marked sway or uses walking aid  Walking Time: Heels apart  Gait Score: 7  Total Score: 19    Timed Up and Go Test  How many seconds did it take to complete the 5 tasks?:  (19.57 sec with SC, 16.91 sec with Rolator)  Observe the patient’s postural stability, gait, stride length, and sway. Select All that Apply:: Short strides, Little or no arm swing, Shuffling, Not using assistive device properly    Other Measures  5x Sit to Stand: 18.07 sec  Other Outcome Measures: Gait Speed (5MWT): 0.5531 m/s with SC, 0.8850 m/s with Rolator     Treatment -   Evaluation -   Moderate (87717)  Neuro Reeducation (68962) -    Marmolejo  Tinetti  5xSTS  TUG  Gait Speed  Reviewed Balance HEP    OP Patient Education -   HEP Static Balance Handout:    corner with chair in front or at kitchen sink with chair behind. Track progress in seconds using hands as little as possible. Start with eyes open (focus on something with eyes if having trouble),  then progress to eyes closed. Exercises - Feet Together, SLS, Semi Tandem, Tandem

## 2024-03-05 ENCOUNTER — APPOINTMENT (OUTPATIENT)
Dept: PHYSICAL THERAPY | Facility: CLINIC | Age: 74
End: 2024-03-05
Payer: MEDICARE

## 2024-03-06 NOTE — PROGRESS NOTES
"Physical Therapy Treatment    Patient Name: Gisella Nicholas  MRN: [id number]  Today's Date: 3/7/2024  Time Calculation  Start Time: 1135  Stop Time: 1215  Time Calculation (min): 40 min     PT Therapeutic Procedures Time Entry  Neuromuscular Re-Education Time Entry: 23  Therapeutic Exercise Time Entry: 15                 Current Problem  1. Parkinson's disease, unspecified whether dyskinesia present, unspecified whether manifestations fluctuate            Insurance:  Visit number: 2/10  MDCR  Precautions   Fall Risk, Breast CA active       Subjective   Subjective:   Pt hasn't fallen since last June, however feels like she is going to lose her balance, like when she turns  Pain   0/10    Objective   Treatments:    Performed in //bars:  Side stepping 2 laps  Tandem walking 2 laps  Toe taps to box 4 in B 5x2  Backward walking 2 laps  Fwd walking with head turns s/s  Walking with marching 2 laps  Walking with reciprocal arm swing 2 laps  Sit to stands 5x2  NBOS EC 20\"x2  NBOS on airex 20\"x2    Not Today:  Marmolejo  Tinetti  5xSTS  TUG  Gait Speed  Reviewed Balance HEP     OP Patient Education -   OP EDUCATION:   Cont with corner balance ex      Assessment:   Pt displays good LE strength with sit to stands, as able to do without UE support. Pt tended to take little steps with backward walking. Had a couple lapses in balance with tandem walking. Pt found walking with head turns to be challenging. Was a little unsteady with toe taps. Pt fatigued with given ex    Plan:   Cont with balance and LE strengthening ex              "

## 2024-03-07 ENCOUNTER — TREATMENT (OUTPATIENT)
Dept: PHYSICAL THERAPY | Facility: CLINIC | Age: 74
End: 2024-03-07
Payer: MEDICARE

## 2024-03-07 DIAGNOSIS — G20.A1 PARKINSON'S DISEASE, UNSPECIFIED WHETHER DYSKINESIA PRESENT, UNSPECIFIED WHETHER MANIFESTATIONS FLUCTUATE (MULTI): Primary | ICD-10-CM

## 2024-03-07 PROCEDURE — 97110 THERAPEUTIC EXERCISES: CPT | Mod: GP,CQ

## 2024-03-07 PROCEDURE — 97112 NEUROMUSCULAR REEDUCATION: CPT | Mod: GP,CQ

## 2024-03-11 ENCOUNTER — APPOINTMENT (OUTPATIENT)
Dept: PHYSICAL THERAPY | Facility: CLINIC | Age: 74
End: 2024-03-11
Payer: MEDICARE

## 2024-03-13 ENCOUNTER — TREATMENT (OUTPATIENT)
Dept: PHYSICAL THERAPY | Facility: CLINIC | Age: 74
End: 2024-03-13
Payer: MEDICARE

## 2024-03-13 DIAGNOSIS — G20.A1 PARKINSON'S DISEASE, UNSPECIFIED WHETHER DYSKINESIA PRESENT, UNSPECIFIED WHETHER MANIFESTATIONS FLUCTUATE (MULTI): Primary | ICD-10-CM

## 2024-03-13 PROCEDURE — 97112 NEUROMUSCULAR REEDUCATION: CPT | Mod: GP | Performed by: PHYSICAL THERAPIST

## 2024-03-13 NOTE — PROGRESS NOTES
"Physical Therapy Treatment    Patient Name: Gisella Nicholas  MRN: [id number]  Today's Date: 3/13/2024  Time Calculation  Start Time: 238  Stop Time: 320  Time Calculation (min): 42 min       PT Therapeutic Procedures Time Entry  Neuromuscular Re-Education Time Entry: 40                   INSURANCE:  Visit number: 3/10  [facility]     Referring Provider: Evans Villalta MD     CURRENT PROBLEM:  1. Parkinson's disease, unspecified whether dyskinesia present, unspecified whether manifestations fluctuate          SUBJECTIVE:   General -   Pt is doing home exercises. Pt reports that she did well last visit with no residual soreness. Pt does not report any new falls.    Pain -    No pain    Precautions -    Fall Risk, Breast CA active     OBJECTIVE:     Treatment -   Neuro Reeducation (67201) -    Seated PWR exercises (Up, Rock, Twist, Step): x10 ea  Standing PWR Up and PWR Twist: x10 ea  Big   Performed in //bars:   Side stepping 3 laps  Tandem walking 2 laps  Toe taps to box 4 in B 5x4  Walking with marching 2 laps  Backward walking 2 laps  Fwd walking with head turns s/s: 3 laps  Walking with reciprocal arm swin laps  Standing big F Rock/Reach with 1 UE support: x20 ea   Sit to stands --  NBOS EC --  NBOS on airex 20\"x2    OP Patient Education -   Standing big F Rock/Reach with 1 UE support  Seated PWR exercises (Up, Rock, Twist, Step)    ASSESSMENT:     Pt was introduced to PWR! and big amplitude motions in sitting and standing for improved arm swing, weight shifting, and step length. Continued with exercises in //bars with cueing consistently for large steps and motion. The patient was educated on the importance of general therapeutic exercise, anatomy, function, & likely cause of impairments. Pt was given an updated HEP with seated PWR! exercises and standing rock and reach with one UE support. Pt tolerated session well and is progressing towards functional needs. Continue to progress pt within tolerance and " POC.    PLAN:    Continue to progress pt within tolerance.

## 2024-03-14 NOTE — PROGRESS NOTES
"Physical Therapy Treatment    Patient Name: Gisella Nicholas  MRN: [id number]  Today's Date: 3/18/2024  Time Calculation  Start Time: 0503  Stop Time: 0545  Time Calculation (min): 42 min     PT Therapeutic Procedures Time Entry  Neuromuscular Re-Education Time Entry: 30  Therapeutic Exercise Time Entry: 10                 Current Problem  1. Parkinson's disease, unspecified whether dyskinesia present, unspecified whether manifestations fluctuate            Insurance:  Visit number: 3/10  MDCR  Precautions   Fall Risk, Breast CA active      Subjective   Subjective:   Pt reports no falls since last visit. She says her R shoulder is bothering her, and her back hurts a little bit.   Pain   4-5/10 R shoulder pain,  7/10  back pain when standing.     Objective   Treatments:    Standing:(Fwd step w/ wide arms B 5x ea,Arms reached out and twist to touch,  (Reach across the body and up 5x ea, alt rock arms/legs)  Seated: big steps, bend/arms open/slap lap  Big ---  Performed in //bars:   Side stepping 3 laps  Tandem walking 2 laps----  Toe taps to box 6 in B 5x2  Walking with marching 3 laps  Walking with straight leg kicks 3 lap  Backward walking 2 laps  Fwd walking with head turns s/s: 3 laps  Step over 3 towels 3 laps  Walking with reciprocal arm swin laps--  Standing big F Rock/Reach with 1 UE support: x20 ea ---  Sit to stands --  NBOS EC --  NBOS on airex 20\"x2     OP EDUCATION:   Good posture      Assessment:   Pt displayed some unsteadiness with fwd stepping. Encouraged lap slapping with big ex. Pt did well with seated ex. Introduced amb with Straight leg, pt challenged to maintain balance with this. Also added stepping over towel rolls. Verbal cues for big steps. When reminded, pt did well with arm swing during walking. Normal fatigue following therapy session    Plan:     Cont with big movements and balance activities            "

## 2024-03-18 ENCOUNTER — TREATMENT (OUTPATIENT)
Dept: PHYSICAL THERAPY | Facility: CLINIC | Age: 74
End: 2024-03-18
Payer: MEDICARE

## 2024-03-18 DIAGNOSIS — G20.A1 PARKINSON'S DISEASE, UNSPECIFIED WHETHER DYSKINESIA PRESENT, UNSPECIFIED WHETHER MANIFESTATIONS FLUCTUATE (MULTI): Primary | ICD-10-CM

## 2024-03-18 PROCEDURE — 97110 THERAPEUTIC EXERCISES: CPT | Mod: GP,CQ

## 2024-03-18 PROCEDURE — 97112 NEUROMUSCULAR REEDUCATION: CPT | Mod: GP,CQ

## 2024-03-19 NOTE — PROGRESS NOTES
"Physical Therapy Treatment    Patient Name: Gisella Nicholas  MRN: [id number]  Today's Date: 3/20/2024  Time Calculation  Start Time: 250  Stop Time: 330  Time Calculation (min): 40 min     PT Therapeutic Procedures Time Entry  Neuromuscular Re-Education Time Entry: 25  Therapeutic Exercise Time Entry: 13                 Current Problem  1. Parkinson's disease, unspecified whether dyskinesia present, unspecified whether manifestations fluctuate            Insurance:  Visit number: 5/10  MDCR  Precautions    Fall Risk, Breast CA active      Subjective   Subjective:   Pt reports that she saw rheumatolgist today, so had a lot of blood taken. She doesn't feel too fatigued thus far. Doesn't really have pain today.   Pain   0/10    Objective   Treatments:  Standing:(Fwd step w/ wide arms B 5x ea,Arms reached out and twist to touch 10x,  (Reach across the body and up 5x ea, alt rock arms/legs)  Sticks hit fwd and back 10x  Seated: big steps, twist while reaching across body x10ea  Big ---  Performed in //bars:   Side stepping 3 laps  Tandem walking 2 laps----  Toe taps to box 6 in B 5x2---  Walking with marching 3 laps  Walking with straight leg kicks 3 lap  Backward walking 2 laps--  Fwd walking with head turns s/s: 3 laps---  Step over 3 towels 3 laps  Walking with reciprocal arm swin laps--  Standing big F Rock/Reach with 1 UE support: x20 ea ---  Sit to stands --  NBOS EC --  NBOS on airex 20\"x2     OP EDUCATION:     Big arm and leg movements    Assessment:   Pt did well with standing  power exercises. Tactile cues given to move the torso and not just the arms. Trialed stick hitting, which pt able to coordinate movement, both in front and behind the back. Cues with straight leg walking to pick the legs up. Had some stepping on towel rolls today. Didn't perform some ex, as pt gave a lot of blood, so didn't want her to over do it.     Plan:   Cont with balance and strengthening ex              "

## 2024-03-20 ENCOUNTER — TREATMENT (OUTPATIENT)
Dept: PHYSICAL THERAPY | Facility: CLINIC | Age: 74
End: 2024-03-20
Payer: MEDICARE

## 2024-03-20 DIAGNOSIS — G20.A1 PARKINSON'S DISEASE, UNSPECIFIED WHETHER DYSKINESIA PRESENT, UNSPECIFIED WHETHER MANIFESTATIONS FLUCTUATE (MULTI): Primary | ICD-10-CM

## 2024-03-20 PROCEDURE — 97110 THERAPEUTIC EXERCISES: CPT | Mod: GP,CQ

## 2024-03-20 PROCEDURE — 97112 NEUROMUSCULAR REEDUCATION: CPT | Mod: GP,CQ

## 2024-03-25 ENCOUNTER — TREATMENT (OUTPATIENT)
Dept: PHYSICAL THERAPY | Facility: CLINIC | Age: 74
End: 2024-03-25
Payer: MEDICARE

## 2024-03-25 DIAGNOSIS — G20.A1 PARKINSON'S DISEASE, UNSPECIFIED WHETHER DYSKINESIA PRESENT, UNSPECIFIED WHETHER MANIFESTATIONS FLUCTUATE (MULTI): Primary | ICD-10-CM

## 2024-03-25 PROCEDURE — 97112 NEUROMUSCULAR REEDUCATION: CPT | Mod: GP | Performed by: PHYSICAL THERAPIST

## 2024-03-25 NOTE — PROGRESS NOTES
Physical Therapy Treatment    Patient Name: Gisella Nicholas  MRN: [id number]  Today's Date: 3/25/2024  Time Calculation  Start Time: 0326  Stop Time: 0412  Time Calculation (min): 46 min       PT Therapeutic Procedures Time Entry  Neuromuscular Re-Education Time Entry: 43                   INSURANCE:  Visit number: 6/10  [facility]    Referring Provider: Evans Villalta MD     CURRENT PROBLEM:  1. Parkinson's disease, unspecified whether dyskinesia present, unspecified whether manifestations fluctuate (CMS/HCC)          SUBJECTIVE:   General -   Pt is doing home exercises. No falls since last visit.    Pain -    Sore shoulder and elbow R side   Taking ibuprofen  Pain: 5/10 today, 8/10 worst     Precautions -    Fall Risk, Breast CA active      OBJECTIVE:     Treatment -   Neuro Reeducation (46642) -      Seated:  Arms reached out and twist to touch (B): x10 ea  Big Stepping with wide arms (B): x10 ea    Standing:   Arms reached out and twist to touch (B): x10 with SBA  Fwd step w/ wide arms (B): x10 ea with CGA  Alt rock arms/legs: x20 ea (1 UE support)  Sticks hit fwd and back: Normal Kenia x10, Staggered Kenia (B) x10 ea  Walking with reciprocal arm swing and SC: CGA 2 laps  Sit to stands with low wide arms: x10  Reach across the body and up --    Performed in //bars:   Side stepping: 3 laps  Walking with marching: 3 laps, alternating UEs  Walking with straight leg kicks: 3 laps, alternating UEs  Tandem walking --  Toe taps to box 6 in B --  Backward walking --  Fwd walking with head turns s/s: --  Step over 3 towels: --  NBOS EC --  NBOS on airex --    OP Patient Education -   Access Code: [email or web address]  URL: [email or web address]  Date: 03/25/2024  Prepared by: Laquita Tovar    Exercises  - Seated Correct Posture   - Seated Upper Trapezius Stretch  - 1-2 x daily - 3 sets - 30 seconds hold  - Gentle Levator Scapulae Stretch  - 1-2 x daily - 3 sets - 30 seconds hold  - Seated Scapular Retraction  - 1-2 x  daily - 2 sets - 10 reps - 2 seconds hold    ASSESSMENT:     Continued with large amplitude motions this visit, but cued to keep motions slightly lower as to not irritate R shoulder. Noted some compensatory hiking of the R shoulder during balance exercises which could be a contributing factor to pain. Pt was given postural exercises for home and cued throughout visit for posture. Pt was able to perform GT with SC and CGA with cueing for large steps and slower venessa. Pt tolerated session well and is progressing towards functional needs. Continue to progress pt within tolerance and POC.    PLAN:    Continue to progress pt within tolerance.

## 2024-03-27 ENCOUNTER — TREATMENT (OUTPATIENT)
Dept: PHYSICAL THERAPY | Facility: CLINIC | Age: 74
End: 2024-03-27
Payer: MEDICARE

## 2024-03-27 DIAGNOSIS — G20.A1 PARKINSON'S DISEASE, UNSPECIFIED WHETHER DYSKINESIA PRESENT, UNSPECIFIED WHETHER MANIFESTATIONS FLUCTUATE (MULTI): Primary | ICD-10-CM

## 2024-03-27 PROCEDURE — 97110 THERAPEUTIC EXERCISES: CPT | Mod: GP | Performed by: PHYSICAL THERAPIST

## 2024-03-27 PROCEDURE — 97112 NEUROMUSCULAR REEDUCATION: CPT | Mod: GP | Performed by: PHYSICAL THERAPIST

## 2024-03-27 ASSESSMENT — ENCOUNTER SYMPTOMS
OCCASIONAL FEELINGS OF UNSTEADINESS: 1
DEPRESSION: 0
LOSS OF SENSATION IN FEET: 0

## 2024-03-27 NOTE — PROGRESS NOTES
Physical Therapy Treatment    Patient Name: Gisella Nicholas  MRN: [id number]  Today's Date: 3/27/2024  Time Calculation  Start Time: 251  Stop Time: 337  Time Calculation (min): 46 min       PT Therapeutic Procedures Time Entry  Neuromuscular Re-Education Time Entry: 8  Therapeutic Exercise Time Entry: 32                   INSURANCE:  Visit number: 7/10  [facility]     Referring Provider: Evans Villalta MD     CURRENT PROBLEM:  1. Parkinson's disease, unspecified whether dyskinesia present, unspecified whether manifestations fluctuate (CMS/HCC)          SUBJECTIVE:   General -   Pt is doing home exercises.  R shoulder really hurting so did not get a chance to do the exercises.    Pain -    Pain Location: R shoulder   Pain Today: 5/10    Precautions -    Fall Risk, Breast CA active    OBJECTIVE:     Treatment -   Therapeutic Exercise (40480) -  Cervical UT/levator stretches in sittin sec x2 ea B   Heel Bridges: 2x10  TA isometric Scapular Retraction push: 5 sec x 15  TA B hip AB: GTB 3x10  TA SLR flexion: 2x10   TA LAQ with ball ADD: 1# 2x10   TA B hip IR: 2x10  Neuro Reeducation (63362) -    Standing:  Walking with reciprocal arm swing and SC: CGA --  Sit to stands with low wide arms: x10  //bars:  Side stepping: 3 laps  Walking with marchin laps, alternating UEs  Walking with straight leg kicks: 2 laps, alternating UEs    OP Patient Education -   Access Code: [email or web address]  URL: [email or web address]  Date: 2024  Prepared by: Laquita Tovar    Exercises  - Supine Isometric Shoulder Extension with Towel  - 1-2 x daily - 2 sets - 10 reps - 5 sec  hold  - Hooklying Clamshell with Resistance  - 1-2 x daily - 2-3 sets - 10 reps  - Seated Hip Internal Rotation with Ball and Resistance  - 1-2 x daily - 2-3 sets - 10 reps - 2 sec hold  - Bridge on Heels  - 1-2 x daily - 2 sets - 10 reps - 2 sec hold    ASSESSMENT:     Reviewed correct scapular posture throughout session. Pt was introduced to a  supine TA/scapular retraction isometric, which pt reported felt good. Pt was introduced to more hip strengthening exercises due to R shoulder pain this visit. Pt was given a new HEP with green t-band. Pt tolerated session well and is progressing towards functional needs. Continue to progress pt within tolerance and POC.    PLAN:    Continue to progress pt within tolerance.

## 2024-03-28 ENCOUNTER — CONSULT (OUTPATIENT)
Dept: OPHTHALMOLOGY | Facility: CLINIC | Age: 74
End: 2024-03-28
Payer: MEDICARE

## 2024-03-28 ENCOUNTER — PREP FOR PROCEDURE (OUTPATIENT)
Dept: OPHTHALMOLOGY | Facility: CLINIC | Age: 74
End: 2024-03-28

## 2024-03-28 DIAGNOSIS — H50.00 ESOTROPIA: Primary | ICD-10-CM

## 2024-03-28 PROCEDURE — 99214 OFFICE O/P EST MOD 30 MIN: CPT | Performed by: OPHTHALMOLOGY

## 2024-03-28 PROCEDURE — 92060 SENSORIMOTOR EXAMINATION: CPT | Performed by: OPHTHALMOLOGY

## 2024-03-28 RX ORDER — HYDROXYCHLOROQUINE SULFATE 200 MG/1
200 TABLET, FILM COATED ORAL 2 TIMES DAILY
COMMUNITY
Start: 2024-03-20

## 2024-03-28 ASSESSMENT — REFRACTION_WEARINGRX
OS_SPHERE: +2.00
OD_ADD: +2.50
OS_CYLINDER: +1.25
OS_ADD: +2.50
OD_CYLINDER: +1.00
OD_AXIS: 180
OD_SPHERE: +2.00
OS_AXIS: 168

## 2024-03-28 ASSESSMENT — VISUAL ACUITY
METHOD: SNELLEN - LINEAR
OD_CC: 20/40-2
CORRECTION_TYPE: GLASSES

## 2024-03-28 ASSESSMENT — EXTERNAL EXAM - RIGHT EYE: OD_EXAM: NORMAL

## 2024-03-28 ASSESSMENT — EXTERNAL EXAM - LEFT EYE: OS_EXAM: NORMAL

## 2024-03-28 ASSESSMENT — SLIT LAMP EXAM - LIDS
COMMENTS: GOOD POSITION
COMMENTS: GOOD POSITION

## 2024-03-28 NOTE — PROGRESS NOTES
Assessment/Plan   Diagnoses and all orders for this visit:  Esotropia  Patient presenting with esotropia, previously evaluated by Dr. Phelps, with stable misalignment. Discussed options of strabismus surgery vs prisms vs observation. She would like to proceed with surgery.    I reviewed the risks and benefits of the proposed strabismus surgery including the possibility of over or undercorrection and the potential need for reoperation in the near or distant future.  I discussed the possible lack of binocular vision despite surgery and the possibility of postoperative diplopia.  There is a chance that glasses or prisms could be necessary in the postoperative period.  I also discussed the risks of infection, hemorrhage, loss of vision or complications from general anesthesia and other surgical imponderables.    All questions were reviewed and answered.  We will plan for [facility].

## 2024-04-01 ENCOUNTER — TREATMENT (OUTPATIENT)
Dept: PHYSICAL THERAPY | Facility: CLINIC | Age: 74
End: 2024-04-01
Payer: MEDICARE

## 2024-04-01 DIAGNOSIS — G20.A1 PARKINSON'S DISEASE, UNSPECIFIED WHETHER DYSKINESIA PRESENT, UNSPECIFIED WHETHER MANIFESTATIONS FLUCTUATE (MULTI): Primary | ICD-10-CM

## 2024-04-01 PROCEDURE — 97110 THERAPEUTIC EXERCISES: CPT | Mod: GP | Performed by: PHYSICAL THERAPIST

## 2024-04-01 PROCEDURE — 97112 NEUROMUSCULAR REEDUCATION: CPT | Mod: GP | Performed by: PHYSICAL THERAPIST

## 2024-04-01 NOTE — PROGRESS NOTES
Physical Therapy Treatment    Patient Name: Gisella Nicholas  MRN: [id number]  Today's Date: 2024  Time Calculation  Start Time: 326  Stop Time: 405  Time Calculation (min): 39 min       PT Therapeutic Procedures Time Entry  Neuromuscular Re-Education Time Entry: 15  Therapeutic Exercise Time Entry: 23                   INSURANCE:  Visit number: 8/10  [facility]    Referring Provider: Evans Villalta MD     CURRENT PROBLEM:  1. Parkinson's disease, unspecified whether dyskinesia present, unspecified whether manifestations fluctuate (CMS/HCC)          SUBJECTIVE:   General -   Pt is doing home exercises.  Pt does report that she misplaced the HEP sheet given last visit and would like it re-printed.     Pain -    Pain Location: R shoulder   Pain Today: 3/10    Precautions -    Fall Risk, Breast CA active     OBJECTIVE:     Treatment -   Therapeutic Exercise (47058) -  Heel Bridges: 2x10  TA isometric Scapular Retraction push: 5 sec 2x10  TA B hip AB: GTB 2x15  TA SLR flexion: 2x10   TA B hip IR: 2x10  TA LAQ with ball ADD: 1# 2x10   Neuro Reeducation (93981) -    Standing:  Walking with reciprocal arm swing and SC: CGA 2 laps  Sit to stands with low wide arms: 2x10  //bars:  Side steppin laps  Walking with marching: 3 laps, alternating UEs  Walking with straight leg kicks: 3 laps, alternating UEs    OP Patient Education -   Reprinted HEP from last visit    ASSESSMENT:     Reviewed and reprinted exercises given last visit. Pt was able to practice walking with CGA with SC with cueing for large steps. Pt demonstrated less UT hiking during //bar exercises. Pt was cued for posture throughout the session. Pt tolerated session well and is progressing towards functional needs. Continue to progress pt within tolerance and POC.    PLAN:    Continue to progress pt within tolerance.

## 2024-04-09 ENCOUNTER — TELEPHONE (OUTPATIENT)
Dept: NEUROLOGY | Facility: CLINIC | Age: 74
End: 2024-04-09
Payer: MEDICARE

## 2024-04-09 DIAGNOSIS — G20.B1 PARKINSON'S DISEASE WITH DYSKINESIA WITHOUT FLUCTUATING MANIFESTATIONS (MULTI): ICD-10-CM

## 2024-04-09 DIAGNOSIS — M54.2 NECK PAIN: Primary | ICD-10-CM

## 2024-04-09 RX ORDER — AMANTADINE HYDROCHLORIDE 100 MG/1
TABLET ORAL
Qty: 180 TABLET | Refills: 3 | Status: SHIPPED | OUTPATIENT
Start: 2024-04-09

## 2024-04-09 NOTE — TELEPHONE ENCOUNTER
called in about amantadine 100mg 2 capsules daily  /// but tablets are cheaper ... Would like to know if they can switch to tablets... If you say yes to tablets patient new script please 90 day supply please

## 2024-04-15 ENCOUNTER — TREATMENT (OUTPATIENT)
Dept: PHYSICAL THERAPY | Facility: CLINIC | Age: 74
End: 2024-04-15
Payer: MEDICARE

## 2024-04-15 DIAGNOSIS — G20.A1 PARKINSON'S DISEASE, UNSPECIFIED WHETHER DYSKINESIA PRESENT, UNSPECIFIED WHETHER MANIFESTATIONS FLUCTUATE (MULTI): ICD-10-CM

## 2024-04-15 DIAGNOSIS — G20.A1 PARKINSON'S DISEASE, UNSPECIFIED WHETHER DYSKINESIA PRESENT, UNSPECIFIED WHETHER MANIFESTATIONS FLUCTUATE (MULTI): Primary | ICD-10-CM

## 2024-04-15 PROCEDURE — 97112 NEUROMUSCULAR REEDUCATION: CPT | Mod: GP,CQ

## 2024-04-15 PROCEDURE — 97110 THERAPEUTIC EXERCISES: CPT | Mod: GP,CQ

## 2024-04-16 RX ORDER — CARBIDOPA AND LEVODOPA 25; 100 MG/1; MG/1
TABLET ORAL
Qty: 180 TABLET | Refills: 3 | Status: SHIPPED | OUTPATIENT
Start: 2024-04-16

## 2024-04-24 ENCOUNTER — APPOINTMENT (OUTPATIENT)
Dept: PHYSICAL THERAPY | Facility: CLINIC | Age: 74
End: 2024-04-24
Payer: MEDICARE

## 2024-04-26 ENCOUNTER — HOSPITAL ENCOUNTER (OUTPATIENT)
Facility: CLINIC | Age: 74
Setting detail: OUTPATIENT SURGERY
Discharge: HOME | End: 2024-04-26
Attending: OPHTHALMOLOGY | Admitting: OPHTHALMOLOGY
Payer: MEDICARE

## 2024-04-26 ENCOUNTER — ANESTHESIA (OUTPATIENT)
Dept: OPERATING ROOM | Facility: CLINIC | Age: 74
End: 2024-04-26
Payer: MEDICARE

## 2024-04-26 ENCOUNTER — ANESTHESIA EVENT (OUTPATIENT)
Dept: OPERATING ROOM | Facility: CLINIC | Age: 74
End: 2024-04-26
Payer: MEDICARE

## 2024-04-26 VITALS
OXYGEN SATURATION: 96 % | BODY MASS INDEX: 32.01 KG/M2 | RESPIRATION RATE: 16 BRPM | HEART RATE: 86 BPM | SYSTOLIC BLOOD PRESSURE: 113 MMHG | TEMPERATURE: 97.3 F | DIASTOLIC BLOOD PRESSURE: 56 MMHG | HEIGHT: 61 IN | WEIGHT: 169.53 LBS

## 2024-04-26 DIAGNOSIS — H50.00 ESOTROPIA: Primary | ICD-10-CM

## 2024-04-26 PROBLEM — I10 HTN (HYPERTENSION): Status: ACTIVE | Noted: 2024-04-26

## 2024-04-26 PROBLEM — M19.049 OSTEOARTHRITIS OF HAND: Status: ACTIVE | Noted: 2024-04-26

## 2024-04-26 PROCEDURE — 67311 REVISE EYE MUSCLE: CPT | Performed by: OPHTHALMOLOGY

## 2024-04-26 PROCEDURE — 2500000005 HC RX 250 GENERAL PHARMACY W/O HCPCS: Performed by: NURSE ANESTHETIST, CERTIFIED REGISTERED

## 2024-04-26 PROCEDURE — A4217 STERILE WATER/SALINE, 500 ML: HCPCS | Performed by: OPHTHALMOLOGY

## 2024-04-26 PROCEDURE — 2500000004 HC RX 250 GENERAL PHARMACY W/ HCPCS (ALT 636 FOR OP/ED): Performed by: OPHTHALMOLOGY

## 2024-04-26 PROCEDURE — 2500000001 HC RX 250 WO HCPCS SELF ADMINISTERED DRUGS (ALT 637 FOR MEDICARE OP): Performed by: OPHTHALMOLOGY

## 2024-04-26 PROCEDURE — A67311 PR STABISMUS SURG,ONE HORIZ MUSCLE: Performed by: NURSE ANESTHETIST, CERTIFIED REGISTERED

## 2024-04-26 PROCEDURE — 3700000001 HC GENERAL ANESTHESIA TIME - INITIAL BASE CHARGE: Performed by: OPHTHALMOLOGY

## 2024-04-26 PROCEDURE — 99100 ANES PT EXTEME AGE<1 YR&>70: CPT | Performed by: ANESTHESIOLOGY

## 2024-04-26 PROCEDURE — 7100000002 HC RECOVERY ROOM TIME - EACH INCREMENTAL 1 MINUTE: Performed by: OPHTHALMOLOGY

## 2024-04-26 PROCEDURE — 3600000008 HC OR TIME - EACH INCREMENTAL 1 MINUTE - PROCEDURE LEVEL THREE: Performed by: OPHTHALMOLOGY

## 2024-04-26 PROCEDURE — A67311 PR STABISMUS SURG,ONE HORIZ MUSCLE: Performed by: ANESTHESIOLOGY

## 2024-04-26 PROCEDURE — 7100000010 HC PHASE TWO TIME - EACH INCREMENTAL 1 MINUTE: Performed by: OPHTHALMOLOGY

## 2024-04-26 PROCEDURE — 3600000003 HC OR TIME - INITIAL BASE CHARGE - PROCEDURE LEVEL THREE: Performed by: OPHTHALMOLOGY

## 2024-04-26 PROCEDURE — 2500000004 HC RX 250 GENERAL PHARMACY W/ HCPCS (ALT 636 FOR OP/ED): Performed by: NURSE ANESTHETIST, CERTIFIED REGISTERED

## 2024-04-26 PROCEDURE — 7100000009 HC PHASE TWO TIME - INITIAL BASE CHARGE: Performed by: OPHTHALMOLOGY

## 2024-04-26 PROCEDURE — 3700000002 HC GENERAL ANESTHESIA TIME - EACH INCREMENTAL 1 MINUTE: Performed by: OPHTHALMOLOGY

## 2024-04-26 PROCEDURE — 7100000001 HC RECOVERY ROOM TIME - INITIAL BASE CHARGE: Performed by: OPHTHALMOLOGY

## 2024-04-26 PROCEDURE — 2500000005 HC RX 250 GENERAL PHARMACY W/O HCPCS: Performed by: OPHTHALMOLOGY

## 2024-04-26 RX ORDER — FENTANYL CITRATE 50 UG/ML
INJECTION, SOLUTION INTRAMUSCULAR; INTRAVENOUS AS NEEDED
Status: DISCONTINUED | OUTPATIENT
Start: 2024-04-26 | End: 2024-04-26

## 2024-04-26 RX ORDER — LABETALOL HYDROCHLORIDE 5 MG/ML
5 INJECTION, SOLUTION INTRAVENOUS ONCE AS NEEDED
Status: DISCONTINUED | OUTPATIENT
Start: 2024-04-26 | End: 2024-04-26 | Stop reason: HOSPADM

## 2024-04-26 RX ORDER — ONDANSETRON HYDROCHLORIDE 2 MG/ML
INJECTION, SOLUTION INTRAVENOUS AS NEEDED
Status: DISCONTINUED | OUTPATIENT
Start: 2024-04-26 | End: 2024-04-26

## 2024-04-26 RX ORDER — ONDANSETRON HYDROCHLORIDE 2 MG/ML
4 INJECTION, SOLUTION INTRAVENOUS ONCE AS NEEDED
Status: DISCONTINUED | OUTPATIENT
Start: 2024-04-26 | End: 2024-04-26 | Stop reason: HOSPADM

## 2024-04-26 RX ORDER — HYDROMORPHONE HYDROCHLORIDE 1 MG/ML
0.4 INJECTION, SOLUTION INTRAMUSCULAR; INTRAVENOUS; SUBCUTANEOUS EVERY 5 MIN PRN
Status: DISCONTINUED | OUTPATIENT
Start: 2024-04-26 | End: 2024-04-26 | Stop reason: HOSPADM

## 2024-04-26 RX ORDER — KETOROLAC TROMETHAMINE 30 MG/ML
INJECTION, SOLUTION INTRAMUSCULAR; INTRAVENOUS AS NEEDED
Status: DISCONTINUED | OUTPATIENT
Start: 2024-04-26 | End: 2024-04-26

## 2024-04-26 RX ORDER — PHENYLEPHRINE HYDROCHLORIDE 25 MG/ML
SOLUTION/ DROPS OPHTHALMIC AS NEEDED
Status: DISCONTINUED | OUTPATIENT
Start: 2024-04-26 | End: 2024-04-26 | Stop reason: HOSPADM

## 2024-04-26 RX ORDER — SODIUM CHLORIDE, SODIUM LACTATE, POTASSIUM CHLORIDE, CALCIUM CHLORIDE 600; 310; 30; 20 MG/100ML; MG/100ML; MG/100ML; MG/100ML
100 INJECTION, SOLUTION INTRAVENOUS CONTINUOUS
Status: DISCONTINUED | OUTPATIENT
Start: 2024-04-26 | End: 2024-04-26 | Stop reason: HOSPADM

## 2024-04-26 RX ORDER — PHENYLEPHRINE HCL IN 0.9% NACL 0.4MG/10ML
SYRINGE (ML) INTRAVENOUS AS NEEDED
Status: DISCONTINUED | OUTPATIENT
Start: 2024-04-26 | End: 2024-04-26

## 2024-04-26 RX ORDER — NEOMYCIN SULFATE, POLYMYXIN B SULFATE AND DEXAMETHASONE 3.5; 10000; 1 MG/ML; [USP'U]/ML; MG/ML
SUSPENSION/ DROPS OPHTHALMIC AS NEEDED
Status: DISCONTINUED | OUTPATIENT
Start: 2024-04-26 | End: 2024-04-26 | Stop reason: HOSPADM

## 2024-04-26 RX ORDER — LIDOCAINE IN NACL,ISO-OSMOT/PF 30 MG/3 ML
0.1 SYRINGE (ML) INJECTION ONCE
Status: DISCONTINUED | OUTPATIENT
Start: 2024-04-26 | End: 2024-04-26 | Stop reason: HOSPADM

## 2024-04-26 RX ORDER — LIDOCAINE HYDROCHLORIDE 20 MG/ML
INJECTION, SOLUTION INFILTRATION; PERINEURAL AS NEEDED
Status: DISCONTINUED | OUTPATIENT
Start: 2024-04-26 | End: 2024-04-26

## 2024-04-26 RX ORDER — POVIDONE-IODINE 5 %
SOLUTION, NON-ORAL OPHTHALMIC (EYE) AS NEEDED
Status: DISCONTINUED | OUTPATIENT
Start: 2024-04-26 | End: 2024-04-26 | Stop reason: HOSPADM

## 2024-04-26 RX ORDER — GLYCOPYRROLATE 0.2 MG/ML
INJECTION INTRAMUSCULAR; INTRAVENOUS AS NEEDED
Status: DISCONTINUED | OUTPATIENT
Start: 2024-04-26 | End: 2024-04-26

## 2024-04-26 RX ORDER — ALBUTEROL SULFATE 0.83 MG/ML
2.5 SOLUTION RESPIRATORY (INHALATION) ONCE AS NEEDED
Status: DISCONTINUED | OUTPATIENT
Start: 2024-04-26 | End: 2024-04-26 | Stop reason: HOSPADM

## 2024-04-26 RX ORDER — PROPOFOL 10 MG/ML
INJECTION, EMULSION INTRAVENOUS AS NEEDED
Status: DISCONTINUED | OUTPATIENT
Start: 2024-04-26 | End: 2024-04-26

## 2024-04-26 RX ORDER — WATER 1 ML/ML
IRRIGANT IRRIGATION AS NEEDED
Status: DISCONTINUED | OUTPATIENT
Start: 2024-04-26 | End: 2024-04-26 | Stop reason: HOSPADM

## 2024-04-26 RX ORDER — TETRACAINE HYDROCHLORIDE 5 MG/ML
SOLUTION OPHTHALMIC AS NEEDED
Status: DISCONTINUED | OUTPATIENT
Start: 2024-04-26 | End: 2024-04-26 | Stop reason: HOSPADM

## 2024-04-26 RX ADMIN — FENTANYL CITRATE 50 MCG: 50 INJECTION, SOLUTION INTRAMUSCULAR; INTRAVENOUS at 12:23

## 2024-04-26 RX ADMIN — ONDANSETRON 4 MG: 2 INJECTION INTRAMUSCULAR; INTRAVENOUS at 13:02

## 2024-04-26 RX ADMIN — LIDOCAINE HYDROCHLORIDE 80 MG: 20 INJECTION, SOLUTION INFILTRATION; PERINEURAL at 12:23

## 2024-04-26 RX ADMIN — Medication 150 MCG: at 13:04

## 2024-04-26 RX ADMIN — GLYCOPYRROLATE 0.2 MG: 0.2 INJECTION INTRAMUSCULAR; INTRAVENOUS at 12:36

## 2024-04-26 RX ADMIN — Medication 200 MCG: at 12:35

## 2024-04-26 RX ADMIN — PROPOFOL 50 MCG/KG/MIN: 10 INJECTION, EMULSION INTRAVENOUS at 12:30

## 2024-04-26 RX ADMIN — KETOROLAC TROMETHAMINE 30 MG: 30 INJECTION, SOLUTION INTRAMUSCULAR at 13:02

## 2024-04-26 RX ADMIN — PROPOFOL 150 MG: 10 INJECTION, EMULSION INTRAVENOUS at 12:23

## 2024-04-26 RX ADMIN — SODIUM CHLORIDE, SODIUM LACTATE, POTASSIUM CHLORIDE, AND CALCIUM CHLORIDE: .6; .31; .03; .02 INJECTION, SOLUTION INTRAVENOUS at 12:17

## 2024-04-26 RX ADMIN — Medication 150 MCG: at 12:57

## 2024-04-26 RX ADMIN — FENTANYL CITRATE 50 MCG: 50 INJECTION, SOLUTION INTRAMUSCULAR; INTRAVENOUS at 12:38

## 2024-04-26 RX ADMIN — PROPOFOL 50 MG: 10 INJECTION, EMULSION INTRAVENOUS at 12:31

## 2024-04-26 RX ADMIN — Medication 100 MCG: at 12:50

## 2024-04-26 ASSESSMENT — PAIN SCALES - GENERAL
PAINLEVEL_OUTOF10: 0 - NO PAIN

## 2024-04-26 ASSESSMENT — COLUMBIA-SUICIDE SEVERITY RATING SCALE - C-SSRS
6. HAVE YOU EVER DONE ANYTHING, STARTED TO DO ANYTHING, OR PREPARED TO DO ANYTHING TO END YOUR LIFE?: NO
1. IN THE PAST MONTH, HAVE YOU WISHED YOU WERE DEAD OR WISHED YOU COULD GO TO SLEEP AND NOT WAKE UP?: NO
2. HAVE YOU ACTUALLY HAD ANY THOUGHTS OF KILLING YOURSELF?: NO

## 2024-04-26 ASSESSMENT — PAIN - FUNCTIONAL ASSESSMENT
PAIN_FUNCTIONAL_ASSESSMENT: 0-10

## 2024-04-26 NOTE — DISCHARGE INSTRUCTIONS
1. No swimming or pooled water to eye for two weeks, ok to shower  2. Start maxitrol eye drop three times daily left eye   3. Tylenol/ibuprofen as needed for pain  4. Follow up with pediatric ophthalmology in 3-7 days

## 2024-04-26 NOTE — ANESTHESIA POSTPROCEDURE EVALUATION
Patient: Gisella Nicholas    Procedure Summary       Date: 04/26/24 Room / Location: [facility] OR 02 / [facility]    Anesthesia Start: 1217 Anesthesia Stop: 1313    Procedures:       Left medial rectus recession (Left)      Eye muscle surgery both eyes (Bilateral) Diagnosis:       Esotropia      (Esotropia [H50.00])    Surgeons: Montez Tovar MD Responsible Provider: Harvey Herrera DO    Anesthesia Type: general ASA Status: 3            Anesthesia Type: general    Vitals Value Taken Time   BP 96/48 04/26/24 1325   Temp 36.2 °C (97.2 °F) 04/26/24 1310   Pulse 80 04/26/24 1325   Resp 16 04/26/24 1325   SpO2 99 % 04/26/24 1325       Anesthesia Post Evaluation    Patient location during evaluation: PACU  Patient participation: complete - patient participated  Level of consciousness: awake  Pain management: satisfactory to patient  Multimodal analgesia pain management approach  Airway patency: patent  Cardiovascular status: acceptable  Respiratory status: acceptable  Hydration status: acceptable  Postoperative Nausea and Vomiting: none    No notable events documented.

## 2024-04-26 NOTE — H&P
"History Of Present Illness  Gisella Nicholas is a 73 y.o. female presenting with esotropia here for left eye muscle surgery.     Past Medical History  Past Medical History:   Diagnosis Date    Breast cancer (Multi)     left breast    Cataract     Colitis     Hypertension     Parkinson's disease (Multi)     Personal history of other diseases of the musculoskeletal system and connective tissue 2021    History of rotator cuff tear       Surgical History  Past Surgical History:   Procedure Laterality Date     SECTION, LOW TRANSVERSE      HIP SURGERY Right     KNEE SURGERY Left     SHOULDER SURGERY Bilateral     rotater cugg repair        Social History  She reports that she has never smoked. She has never used smokeless tobacco. No history on file for alcohol use and drug use.    Family History  No family history on file.     Allergies  Tramadol and Sulfa (sulfonamide antibiotics)    Review of Systems  Constitutional: Negative.    HENT: Negative.     Eyes: Negative.    Respiratory: Negative.     Cardiovascular: Negative.    Gastrointestinal: Negative.    Endocrine: Negative.  .    Neurological: Negative.    Psychiatric/Behavioral: Negative.      Physical Exam  Head: normocephalic  Eyes: see clinic note  Respiratory: per anesthesia  Cardiovascular: per anesthesia  Neuro: alert and interactive for age     Last Recorded Vitals  Blood pressure 151/72, pulse 90, temperature 36.5 °C (97.7 °F), temperature source Temporal, resp. rate 20, height 1.549 m (5' 1\"), weight 76.9 kg (169 lb 8.5 oz), SpO2 98%.       Assessment/Plan       Gisella Nicholas is a 73 y.o. female presenting with esotropia here for left eye muscle surgery.         Montez Tovar MD    "

## 2024-04-26 NOTE — OP NOTE
Operative Note     Date: 2024  OR Location: [facility] OR    Name: Gisella Nicholas : 1950, Age: 73 y.o., MRN: [id number], Sex: female    Diagnosis  Pre-op Diagnosis     * Esotropia [H50.00] Post-op Diagnosis     * Esotropia [H50.00]     Procedures  1- Left  medial rectus recession 6.0 mm    Surgeons      * Montez Tovar - Primary    Resident/Fellow/Other Assistant:  Mala Solomon MD    Procedure Summary  Anesthesia: General  ASA: III  Anesthesia Staff: Anesthesiologist: Harvey Herrera DO  CRNA: MAYLIN Flores-LUIS FERNANDO    Estimated Blood Loss: < 5 mL    Intra-op Medications:   Administrations occurring from 1145 to 1300 on 24:   Medication Name Total Dose   balanced salts (BSS) intraocular solution 1 mL   povidone-iodine 5 % ophthalmic solution 1 Application   neomycin-polymyxin-dexAMETHasone (Maxitrol) 3.5mg/mL-10,000 unit/mL-0.1 % ophthalmic suspension 1 drop   sterile water irrigation solution 200 mL   tetracaine (PF) 0.5 % ophthalmic solution 1 drop   phenylephrine (Mydfrin) 2.5 % ophthalmic solution 1 drop            Anesthesia Record               Intraprocedure I/O Totals          Intake    LR bolus 400.00 mL    Propofol Drip 0.00 mL    The total shown is the total volume documented since Anesthesia Start was filed.    Total Intake 400 mL          Specimen: No specimens collected     Staff:   Circulator: Mireya Fitzpatrick RN  Scrub Person: Rosanne Dalton    Drains and/or Catheters: * None in log *    Findings: Normal anatomy and ductions    Indications:   Gisella Nicholas is an 73 y.o. female who is having surgery for Esotropia [H50.00].   The patient was seen in the preoperative area.   The risks, benefits, complications, treatment options, non-operative alternatives, expected recovery and outcomes were discussed.  The patient concurred with the proposed plan, giving informed consent.    The site of surgery was properly noted/marked if necessary per policy.   The patient  has been actively warmed in preoperative area.   Preoperative antibiotics are not indicated.   Venous thrombosis prophylaxis are not indicated.    Procedure Details: The patient was brought to the operating room and was placed in a supine position.   After the patient was positively identified through a typical time-out procedure, the patient received anesthesia and an LMA.   Then left eye was prepped and draped in the usual sterile ophthalmic fashion.   Attention was then directed to the left eye in which an inferonasal fornix conjunctival incision was performed which was extended to the limbus. Then the medial rectus was identified and freed from the soft surrounding tissues. The muscle was secured with a locking bite at the center 1 mm away from the original insertion using a 6-0 polysorb suture. The suture was weaved superiorly and inferiorly with a locking bite at both borders. The muscle was disinserted from the globe and reinserted back 6.00 mm away from the original insertion. The conjunctiva was then closed with 2 interrupted 8-0 polysorb sutures in a buried fashion.  At the end, both eyes were cleaned. Tetracaine and 5% betadine eye solution were instilled in the eyes.  The patient was then awakened and the LMA was removed.   The patient was transferred to the recover room in good and stable condition.   The patient tolerated the procedure and the anesthesia well.    Complications:  None; patient tolerated the procedure well.      Disposition: PACU - hemodynamically stable.    Condition: stable     Attending Attestation: I was present and scrubbed for the entire procedure.    Montez Tovar  Phone Number: [phone]

## 2024-04-26 NOTE — ANESTHESIA PROCEDURE NOTES
Airway  Date/Time: 4/26/2024 12:24 PM  Urgency: elective    Airway not difficult    Staffing  Performed: CRNA and attending   Authorized by: Harvey Herrera DO    Performed by: MAYLIN Flores-LUIS FERNANDO  Patient location during procedure: OR    Indications and Patient Condition  Indications for airway management: anesthesia  Spontaneous Ventilation: absent  Sedation level: deep  Preoxygenated: yes  Patient position: sniffing  Mask difficulty assessment: 0 - not attempted    Final Airway Details  Final airway type: supraglottic airway      Successful airway: unique  Size 3     Number of attempts at approach: 1    Additional Comments  Teeth and lips in pre-anesthetic condition.

## 2024-04-26 NOTE — ANESTHESIA PREPROCEDURE EVALUATION
Patient: Gisella Nicholas    Procedure Information       Date/Time: 24 1145    Procedures:       Left medial rectus recession (Left)      Eye muscle surgery both eyes (Bilateral)    [facility]    Surgeons: Montez Tovar MD        ALLERGIES:  Allergies   Allergen Reactions    Tramadol Nausea And Vomiting    Sulfa (Sulfonamide Antibiotics) Hives     hives & chills        MEDICAL HISTORY:  Past Medical History:   Diagnosis Date    Breast cancer (Multi)     left breast    Cataract     Colitis     Hypertension     Parkinson's disease (Multi)     Personal history of other diseases of the musculoskeletal system and connective tissue 2021    History of rotator cuff tear        Relevant Problems   Anesthesia (within normal limits)      Cardiac   (+) HTN (hypertension)      Pulmonary (within normal limits)      Neuro  Parkinson's      GI (within normal limits)      /Renal (within normal limits)      Liver (within normal limits)      Endocrine (within normal limits)      Hematology (within normal limits)      Musculoskeletal   (+) Osteoarthritis of hand        SURGICAL HISTORY:  Past Surgical History:   Procedure Laterality Date     SECTION, LOW TRANSVERSE      HIP SURGERY Right     KNEE SURGERY Left     SHOULDER SURGERY Bilateral     rotater cugg repair        MEDICATIONS:  Current Outpatient Medications   Medication Instructions    amantadine (Symmetrel) 100 mg tablet ONE TABLET TWO TIMES A DAY    amantadine (SYMMETREL) 100 mg, oral, 2 times daily    ammonium lactate (Amlactin) 12 % cream     anastrozole (ARIMIDEX) 1 mg, oral, Daily    carbidopa (LODSYN) 25 mg, oral, 3 times daily    carbidopa-levodopa (Sinemet)  mg tablet Take 1.5 tablet three times a day after 2 weeks take 2 tablets 3 times  aday    diazePAM (Valium) 5 mg tablet oral    diphenhydrAMINE-acetaminophen (Tylenol PM Extra Strength)  mg per tablet 1 tablet, oral    HYDROcodone-acetaminophen  "(Norco) 5-325 mg tablet     hydroxychloroquine (PLAQUENIL) 200 mg, oral, 2 times daily    loperamide (IMODIUM) 2 mg, oral    mesalamine (Delzicol) 400 mg DR capsule 2 capsules, oral, 3 times daily    methylPREDNISolone (Medrol Dospak) 4 mg tablets TAKE BY MOUTH AS DIRECTED ON PACKAGE    predniSONE 10 mg tablets,dose pack oral    spironolacton-hydrochlorothiaz (Aldactazide) 25-25 mg tablet 2 tablets, oral, Daily    tiZANidine (ZANAFLEX) 2 mg, oral        VITALS:      4/26/2024    10:30 AM 1/30/2024     3:32 PM 1/30/2024     3:29 PM   Vitals   Systolic 151 138 143   Diastolic 72 85 84   Heart Rate 90 108 102   Temp 36.5 °C (97.7 °F)     Resp 20  18   Height (in) 1.549 m (5' 1\")     Weight (lb) 169.53  156   BMI 32.03 kg/m2  29.48 kg/m2   BSA (m2) 1.82 m2  1.75 m2   Visit Report  Report Report       LABS:   BMP   Lab Results   Component Value Date    GLUCOSE 101 (H) 08/25/2022    CALCIUM 10.1 08/25/2022     08/25/2022    K 3.4 (L) 08/25/2022    CO2 29 08/25/2022     08/25/2022    BUN 18 08/25/2022    CREATININE 0.89 08/25/2022   , A1C   Lab Results   Component Value Date    HGBA1C 5.7 (A) 08/25/2022       IMAGES:    , ECHO         ECHOCARDIOGRAM 12/01/2023    Narrative    Left Ventricle: Normal left ventricular systolic function with a  visually estimated EF of 55 - 60%. Left ventricle size is normal. Mildly  increased wall thickness. Normal wall motion. Diastolic dysfunction  present with normal LV EF.    Right Ventricle: Normal systolic function.    Aortic Valve: Trileaflet valve. Thickened cusp.    Tricuspid Valve: Normal RVSP. The estimated RVSP is 19 mmHg.    Left Ventricle  Normal left ventricular systolic function with a visually estimated EF of 55 - 60%. Left ventricle size is normal. Mildly increased wall thickness. Normal wall motion. Diastolic dysfunction present with normal LV EF.    Right Ventricle  Right ventricle size is normal. Normal systolic function.    Left Atrium  Left atrium size is " normal.    Right Atrium  Right atrium size is normal.    IVC/SVC  IVC diameter is less than or equal to 21 mm and decreases greater than 50% during inspiration; therefore the estimated right atrial pressure is normal (~3 mmHg). IVC size is normal.    Mitral Valve  Valve structure is normal. No regurgitation. No stenosis noted.    Tricuspid Valve  Valve structure is normal. Trace regurgitation. No stenosis noted. Normal RVSP. The estimated RVSP is 19 mmHg.    Aortic Valve  Trileaflet valve. Thickened cusp. No regurgitation. No stenosis.    Pulmonic Valve  Valve structure is normal. No regurgitation. No stenosis noted.    Ascending Aorta  Normal sized aortic root and ascending aorta.    Pericardium  No pericardial effusion.    Study Details  Image quality: adequate. No contrast was given. Frequent PAC's     , CXR       XR chest 2 views 10/06/2023    Narrative  EXAMINATION:  TWO XRAY VIEWS OF THE CHEST    10/6/2023 3:29 pm    COMPARISON:  January 12, 2022    HISTORY:  ORDERING SYSTEM PROVIDED HISTORY: Atypical chest pain  TECHNOLOGIST PROVIDED HISTORY:  What reading provider will be dictating this exam?->CRC  FINDINGS:  The heart is enlarged.  Linear atelectasis left lung base.  There is minimal  blunting of the costophrenic angles.  There is no pneumothorax.  No active  airspace consolidation.    Impression  Cardiomegaly and suspect interval development of minimal bilateral pleural  effusion.    , CT Chest        CT chest w IV contrast 10/19/2023    Narrative  EXAMINATION:  CT OF THE CHEST WITH CONTRAST 10/19/2023 10:22 am    TECHNIQUE:  CT of the chest was performed with the administration of intravenous  contrast. Multiplanar reformatted images are provided for review. Automated  exposure control, iterative reconstruction, and/or weight based adjustment of  the mA/kV was utilized to reduce the radiation dose to as low as reasonably  achievable.    COMPARISON:  Chest x-ray dated 10/06/2023    HISTORY:  ORDERING  SYSTEM PROVIDED HISTORY: Atypical chest pain, Bilateral pleural  effusion  TECHNOLOGIST PROVIDED HISTORY:  STAT Creatinine as needed:->Yes  What reading provider will be dictating this exam?->CRC    FINDINGS:  Mediastinum: Thyroid is homogeneous in attenuation. No bulky mediastinal  adenopathy. Central airways are patent. Esophagus with normal course and  caliber.  Cardiac size mildly enlarged with small volume pericardial  effusion.  Coronary calcifications in the circumflex and distal LAD.    Lungs/pleura: Lungs are clear without focal opacification or consolidation.  No dominant nodule or mass lesion with scarring and calcified granuloma in  the right upper lung anteriorly.  Subsegmental dependent atelectasis..  No  pleural effusion or pleural process.    Upper Abdomen: Visualized portions of the upper abdomen unremarkable.    Soft Tissues/Bones: No acute osseous or soft tissue findings. No aggressive  osseous lesion.    Impression  Cardiac size mildly enlarged with small volume pericardial effusion. Coronary  calcifications in the circumflex and distal LAD.    No acute airspace disease or pulmonary findings       SOCIAL:  Social History     Tobacco Use   Smoking Status Never   Smokeless Tobacco Never      Social History     Substance and Sexual Activity   Alcohol Use None    Comment: occasional      Social History     Substance and Sexual Activity   Drug Use Not on file    Comment: CBD gummy at night        NPO STATUS:  NPO/Void Status  Date of Last Liquid: 04/26/24  Time of Last Liquid: 0700  Date of Last Solid: 04/25/24  Time of Last Solid: 2030        Clinical Areas Reviewed:   Tobacco  Allergies  Meds   Med Hx  Surg Hx   Fam Hx  Soc Hx        Anesthesia Assessment:    Physical Exam    Airway  Mallampati: II  TM distance: >3 FB  Neck ROM: full     Cardiovascular - normal exam     Dental        Pulmonary - normal exam     Abdominal - normal exam             Anesthesia Plan    History of general  anesthesia?: yes  History of complications of general anesthesia?: no    ASA 3     general     intravenous induction   Postoperative administration of opioids is intended.  Anesthetic plan and risks discussed with patient.  Use of blood products discussed with patient who.    Plan discussed with CAA and attending.

## 2024-05-03 ENCOUNTER — LAB (OUTPATIENT)
Dept: LAB | Facility: LAB | Age: 74
End: 2024-05-03
Payer: MEDICARE

## 2024-05-03 ENCOUNTER — OFFICE VISIT (OUTPATIENT)
Dept: OPHTHALMOLOGY | Facility: CLINIC | Age: 74
End: 2024-05-03
Payer: MEDICARE

## 2024-05-03 DIAGNOSIS — R76.0 RAISED ANTIBODY TITER: Primary | ICD-10-CM

## 2024-05-03 DIAGNOSIS — Z98.890 HISTORY OF STRABISMUS SURGERY: ICD-10-CM

## 2024-05-03 DIAGNOSIS — H50.00 ESOTROPIA: Primary | ICD-10-CM

## 2024-05-03 PROCEDURE — 86235 NUCLEAR ANTIGEN ANTIBODY: CPT

## 2024-05-03 PROCEDURE — 86225 DNA ANTIBODY NATIVE: CPT

## 2024-05-03 PROCEDURE — 86431 RHEUMATOID FACTOR QUANT: CPT

## 2024-05-03 PROCEDURE — 99024 POSTOP FOLLOW-UP VISIT: CPT | Performed by: OPHTHALMOLOGY

## 2024-05-03 PROCEDURE — 36415 COLL VENOUS BLD VENIPUNCTURE: CPT

## 2024-05-03 ASSESSMENT — EXTERNAL EXAM - LEFT EYE: OS_EXAM: NORMAL

## 2024-05-03 ASSESSMENT — ENCOUNTER SYMPTOMS
CONSTITUTIONAL NEGATIVE: 0
RESPIRATORY NEGATIVE: 0
NEUROLOGICAL NEGATIVE: 0
ALLERGIC/IMMUNOLOGIC NEGATIVE: 0
EYES NEGATIVE: 1
MUSCULOSKELETAL NEGATIVE: 0
ENDOCRINE NEGATIVE: 0
GASTROINTESTINAL NEGATIVE: 0
HEMATOLOGIC/LYMPHATIC NEGATIVE: 0
CARDIOVASCULAR NEGATIVE: 0
PSYCHIATRIC NEGATIVE: 0

## 2024-05-03 ASSESSMENT — VISUAL ACUITY
METHOD: SNELLEN - LINEAR
OD_CC: 20/40
OS_CC+: -1
OS_CC: 20/40

## 2024-05-03 ASSESSMENT — EXTERNAL EXAM - RIGHT EYE: OD_EXAM: NORMAL

## 2024-05-03 ASSESSMENT — SLIT LAMP EXAM - LIDS
COMMENTS: NORMAL
COMMENTS: NORMAL

## 2024-05-03 NOTE — PROGRESS NOTES
1. Esotropia        2. History of strabismus surgery          Status post (s/p) [facility] 4/26/24    Patient healing well, without signs of active infection. Post-op precautions reviewed.     We will follow patient as planned in 2 months, sooner prn   They will also reschedule with their comprehensive eye doctor for visual acuity and refraction assessment

## 2024-05-04 LAB
CHROMATIN AB SERPL-ACNC: 2.3 AI
DSDNA AB SER-ACNC: <1 IU/ML
ENA RNP AB SER IA-ACNC: <0.2 AI
ENA SCL70 AB SER QL IA: <0.2 AI
ENA SM AB SER IA-ACNC: <0.2 AI
RHEUMATOID FACT SER NEPH-ACNC: 18 IU/ML (ref 0–15)

## 2024-05-08 LAB — HISTONE IGG SER IA-ACNC: 4.4 UNITS (ref 0–0.9)

## 2024-05-29 ENCOUNTER — OFFICE VISIT (OUTPATIENT)
Dept: FAMILY MEDICINE CLINIC | Age: 74
End: 2024-05-29
Payer: MEDICARE

## 2024-05-29 VITALS
SYSTOLIC BLOOD PRESSURE: 138 MMHG | TEMPERATURE: 96.8 F | HEIGHT: 61 IN | DIASTOLIC BLOOD PRESSURE: 76 MMHG | BODY MASS INDEX: 32.77 KG/M2 | WEIGHT: 173.6 LBS | OXYGEN SATURATION: 95 % | HEART RATE: 88 BPM

## 2024-05-29 DIAGNOSIS — I10 ESSENTIAL HYPERTENSION: ICD-10-CM

## 2024-05-29 DIAGNOSIS — R53.1 WEAKNESS: ICD-10-CM

## 2024-05-29 DIAGNOSIS — G20.A1 PARKINSON'S DISEASE, UNSPECIFIED WHETHER DYSKINESIA PRESENT, UNSPECIFIED WHETHER MANIFESTATIONS FLUCTUATE (HCC): ICD-10-CM

## 2024-05-29 DIAGNOSIS — J20.9 ACUTE BRONCHITIS, UNSPECIFIED ORGANISM: ICD-10-CM

## 2024-05-29 DIAGNOSIS — I50.30 DIASTOLIC CONGESTIVE HEART FAILURE, UNSPECIFIED HF CHRONICITY (HCC): ICD-10-CM

## 2024-05-29 DIAGNOSIS — M25.50 POLYARTHRALGIA: ICD-10-CM

## 2024-05-29 DIAGNOSIS — L98.9 FACIAL LESION: Primary | ICD-10-CM

## 2024-05-29 PROCEDURE — 1123F ACP DISCUSS/DSCN MKR DOCD: CPT | Performed by: FAMILY MEDICINE

## 2024-05-29 PROCEDURE — G8417 CALC BMI ABV UP PARAM F/U: HCPCS | Performed by: FAMILY MEDICINE

## 2024-05-29 PROCEDURE — 3017F COLORECTAL CA SCREEN DOC REV: CPT | Performed by: FAMILY MEDICINE

## 2024-05-29 PROCEDURE — G8427 DOCREV CUR MEDS BY ELIG CLIN: HCPCS | Performed by: FAMILY MEDICINE

## 2024-05-29 PROCEDURE — 1090F PRES/ABSN URINE INCON ASSESS: CPT | Performed by: FAMILY MEDICINE

## 2024-05-29 PROCEDURE — G8400 PT W/DXA NO RESULTS DOC: HCPCS | Performed by: FAMILY MEDICINE

## 2024-05-29 PROCEDURE — 3075F SYST BP GE 130 - 139MM HG: CPT | Performed by: FAMILY MEDICINE

## 2024-05-29 PROCEDURE — 99214 OFFICE O/P EST MOD 30 MIN: CPT | Performed by: FAMILY MEDICINE

## 2024-05-29 PROCEDURE — 3078F DIAST BP <80 MM HG: CPT | Performed by: FAMILY MEDICINE

## 2024-05-29 PROCEDURE — 1036F TOBACCO NON-USER: CPT | Performed by: FAMILY MEDICINE

## 2024-05-29 RX ORDER — AZITHROMYCIN 250 MG/1
TABLET, FILM COATED ORAL
Qty: 1 PACKET | Refills: 0 | Status: SHIPPED | OUTPATIENT
Start: 2024-05-29 | End: 2024-06-08

## 2024-05-29 RX ORDER — BENZONATATE 200 MG/1
200 CAPSULE ORAL 3 TIMES DAILY PRN
Qty: 30 CAPSULE | Refills: 0 | Status: SHIPPED | OUTPATIENT
Start: 2024-05-29 | End: 2024-06-05

## 2024-05-29 RX ORDER — HYDROXYCHLOROQUINE SULFATE 200 MG/1
200 TABLET, FILM COATED ORAL 2 TIMES DAILY WITH MEALS
COMMUNITY

## 2024-05-29 RX ORDER — ALBUTEROL SULFATE 90 UG/1
2 AEROSOL, METERED RESPIRATORY (INHALATION) 4 TIMES DAILY PRN
Qty: 18 G | Refills: 0 | Status: SHIPPED | OUTPATIENT
Start: 2024-05-29

## 2024-05-29 NOTE — PROGRESS NOTES
Chief Complaint   Patient presents with    Other     Spot on temple that was frozen is still there     Cough     Has cough since last visit, nasal congestion        HPI:  Annabel Ingram is a 73 y.o. female     Cough/congestion    Did cryo to lesion on temple, seemed better, then came back     On daily prednisone     Parkinsonism  Does see dr. dow    HTN  Taking medication without issue  Overdue for screens/routine labs, etc  Has order for mamm        Past Medical History:   Diagnosis Date    Carcinoma of upper-inner quadrant of left breast in female, estrogen receptor positive (HCC) 2023    Hypertension     meds > 10 yrs / denies TIA or stroke    Osteoarthritis     DJD right hip    Parkinson disease (HCC)     Parkinsonism (HCC)      Past Surgical History:   Procedure Laterality Date    BREAST BIOPSY Left     US bx. Patient states B9     SECTION  1988    COLONOSCOPY N/A 2020    COLONOSCOPY WITH POLYPECTOMY performed by Lyle Ruiz MD at [facility]    DILATION AND CURETTAGE OF UTERUS      AUB    KNEE SURGERY Left     arthrotomy    SHOULDER ARTHROSCOPY Left 2021    LEFT SHOULDER ARTHROSCOPY ROTATOR CUFF REPAIR, SUBACROMIAL DECOMPRESSION performed by [place] Alberto Morales [place] [facility]    SHOULDER ARTHROSCOPY Right 2022    RIGHT SHOULDER ARTHROSCOPY ROTATOR CUFF REPAIR WITH DERMAL ALLOGRAFT, BICEPS TENOTOMY OPEN REPAIR performed by [facility] Alberto [facility]ales [place] [facility]    TONSILLECTOMY      TOTAL HIP ARTHROPLASTY Right 2017    RIGHT HIP TOTAL HIP ARTHROPLASTY WILLIAM [facility] SPINAL  performed by Reynaldo Roy [place] at [facility]    WISDOM TOOTH EXTRACTION       Family History   Problem Relation Age of Onset    High Blood Pressure Mother     Stroke Mother     Emphysema Father     Other Sister         fibromyalgia    Stroke Brother     Heart Disease Brother     Stroke Sister         brain aneurysm at age 36    Other Brother         drowning accident at age 36     Social History

## 2024-06-03 DIAGNOSIS — I10 ESSENTIAL HYPERTENSION: ICD-10-CM

## 2024-06-03 RX ORDER — SPIRONOLACTONE AND HYDROCHLOROTHIAZIDE 25; 25 MG/1; MG/1
2 TABLET ORAL DAILY
Qty: 180 TABLET | Refills: 1 | Status: SHIPPED | OUTPATIENT
Start: 2024-06-03

## 2024-06-18 ENCOUNTER — TELEPHONE (OUTPATIENT)
Dept: SURGERY | Age: 74
End: 2024-06-18

## 2024-06-18 NOTE — TELEPHONE ENCOUNTER
Pt's  called and r/sd appt but is still waiting for a c/b to see if wife could get in sooner after mammogram

## 2024-06-18 NOTE — TELEPHONE ENCOUNTER
Patient  called to r/s f/u due to mammogram not being until 6/26. No follow up available soon after that,  is asking if she can be seen the week after the 26th? Her current f/u is on 6/19.

## 2024-06-26 ENCOUNTER — HOSPITAL ENCOUNTER (OUTPATIENT)
Dept: WOMENS IMAGING | Age: 74
Discharge: HOME | End: 2024-06-28
Payer: MEDICARE

## 2024-06-26 ENCOUNTER — HOSPITAL ENCOUNTER (OUTPATIENT)
Dept: ULTRASOUND IMAGING | Age: 74
Discharge: HOME | End: 2024-06-28
Payer: MEDICARE

## 2024-06-26 DIAGNOSIS — Z17.0 CARCINOMA OF UPPER-INNER QUADRANT OF LEFT BREAST IN FEMALE, ESTROGEN RECEPTOR POSITIVE (HCC): Primary | ICD-10-CM

## 2024-06-26 DIAGNOSIS — Z17.0 CARCINOMA OF UPPER-INNER QUADRANT OF LEFT BREAST IN FEMALE, ESTROGEN RECEPTOR POSITIVE (HCC): ICD-10-CM

## 2024-06-26 DIAGNOSIS — C50.212 CARCINOMA OF UPPER-INNER QUADRANT OF LEFT BREAST IN FEMALE, ESTROGEN RECEPTOR POSITIVE (HCC): ICD-10-CM

## 2024-06-26 DIAGNOSIS — C50.212 CARCINOMA OF UPPER-INNER QUADRANT OF LEFT BREAST IN FEMALE, ESTROGEN RECEPTOR POSITIVE (HCC): Primary | ICD-10-CM

## 2024-06-26 PROCEDURE — 76642 ULTRASOUND BREAST LIMITED: CPT

## 2024-06-26 PROCEDURE — 77065 DX MAMMO INCL CAD UNI: CPT

## 2024-06-26 PROCEDURE — G0279 TOMOSYNTHESIS, MAMMO: HCPCS

## 2024-07-03 ENCOUNTER — OFFICE VISIT (OUTPATIENT)
Dept: FAMILY MEDICINE CLINIC | Age: 74
End: 2024-07-03

## 2024-07-03 VITALS
OXYGEN SATURATION: 96 % | WEIGHT: 171.4 LBS | TEMPERATURE: 98.3 F | BODY MASS INDEX: 32.36 KG/M2 | HEIGHT: 61 IN | DIASTOLIC BLOOD PRESSURE: 78 MMHG | SYSTOLIC BLOOD PRESSURE: 144 MMHG | HEART RATE: 81 BPM

## 2024-07-03 DIAGNOSIS — R10.84 GENERALIZED ABDOMINAL PAIN: Primary | ICD-10-CM

## 2024-07-03 PROBLEM — R73.9 ACUTE HYPERGLYCEMIA: Status: ACTIVE | Noted: 2024-07-03

## 2024-07-03 PROBLEM — E78.5 BORDERLINE HYPERLIPIDEMIA: Status: ACTIVE | Noted: 2024-07-03

## 2024-07-03 PROBLEM — K62.5 RECTAL BLEEDING: Status: ACTIVE | Noted: 2020-11-12

## 2024-07-03 PROBLEM — I82.402 ACUTE DEEP VEIN THROMBOSIS (DVT) OF LEFT LOWER EXTREMITY (HCC): Status: ACTIVE | Noted: 2022-01-13

## 2024-07-03 PROBLEM — G62.9 CRANIAL NEUROPATHY: Status: ACTIVE | Noted: 2024-07-03

## 2024-07-03 PROBLEM — M19.049 OSTEOARTHRITIS OF HAND: Status: ACTIVE | Noted: 2024-04-26

## 2024-07-03 PROBLEM — H25.099 SENILE INCIPIENT CATARACT: Status: ACTIVE | Noted: 2024-07-03

## 2024-07-03 PROBLEM — H50.00 ESOTROPIA: Status: ACTIVE | Noted: 2024-03-28

## 2024-07-03 PROBLEM — H25.10 NUCLEAR SCLEROSIS: Status: ACTIVE | Noted: 2024-07-03

## 2024-07-03 RX ORDER — PREDNISONE 5 MG/1
5 TABLET ORAL DAILY
COMMUNITY
Start: 2024-06-08

## 2024-07-03 ASSESSMENT — ENCOUNTER SYMPTOMS
BACK PAIN: 1
VOMITING: 0
ANAL BLEEDING: 0
ABDOMINAL DISTENTION: 1
DIARRHEA: 1
SHORTNESS OF BREATH: 0
CONSTIPATION: 1
ABDOMINAL PAIN: 1
BLOOD IN STOOL: 0
NAUSEA: 1

## 2024-07-03 NOTE — PROGRESS NOTES
sounds: Normal heart sounds.   Pulmonary:      Effort: Pulmonary effort is normal.      Breath sounds: Normal breath sounds.   Abdominal:      General: Bowel sounds are normal.      Palpations: Abdomen is soft.      Tenderness: There is generalized abdominal tenderness. There is no guarding or rebound.   Skin:     General: Skin is warm and dry.   Neurological:      Mental Status: She is alert.   Psychiatric:         Mood and Affect: Mood normal.         Behavior: Behavior normal.         ASSESSMENT/PLAN:  1. Generalized abdominal pain  -     XR ABDOMEN (KUB) (SINGLE AP VIEW); Future    - Patient with generalized abdominal tenderness on exam. No signs of acute abdomen. Discussed with patient - consistent with constipation secondary to Imodium use. Recommend starting Miralax. Patient will buy OTC. Drink plenty of fluids. Will obtain KUB and call will results. Advised to seek emergent care with any worsening symptoms. Follow up if symptoms do not improve.    Electronically signed by RUDY Rolon CNP on 7/3/24 at 11:09 AM EDT

## 2024-07-05 ENCOUNTER — APPOINTMENT (OUTPATIENT)
Dept: OPHTHALMOLOGY | Facility: CLINIC | Age: 74
End: 2024-07-05
Payer: MEDICARE

## 2024-07-05 ENCOUNTER — TELEPHONE (OUTPATIENT)
Dept: FAMILY MEDICINE CLINIC | Age: 74
End: 2024-07-05

## 2024-07-05 NOTE — TELEPHONE ENCOUNTER
Spoke with patient's , Zach re: xray results. Patient's abdominal pain has remained the same. She has been taking Miralax and had a few small BM's over the last few days. Unsure if she has been passing a lot of gas. Recommend follow up in walk in or ED.

## 2024-07-05 NOTE — TELEPHONE ENCOUNTER
Pt's  came in asking for pt's Xray results, he would like to get results STAT.  He stated pt is still having abdominal pain.  Denies fever, chills, or worsening Sx.     As per Mario Alberto, RT he had called Rad office and let them know that results has not signed yet.

## 2024-07-05 NOTE — TELEPHONE ENCOUNTER
No acute or abnormal findings noted on abdominal x-ray.  If symptoms are worsening would likely benefit from ED visit for further imaging and workup including labs.

## 2024-07-25 ENCOUNTER — APPOINTMENT (OUTPATIENT)
Dept: OPHTHALMOLOGY | Facility: CLINIC | Age: 74
End: 2024-07-25
Payer: MEDICARE

## 2024-07-25 DIAGNOSIS — H50.00 ESOTROPIA: Primary | ICD-10-CM

## 2024-07-25 DIAGNOSIS — Z98.890 HISTORY OF STRABISMUS SURGERY: ICD-10-CM

## 2024-07-25 PROCEDURE — 92060 SENSORIMOTOR EXAMINATION: CPT

## 2024-07-25 PROCEDURE — 1159F MED LIST DOCD IN RCRD: CPT | Performed by: OPHTHALMOLOGY

## 2024-07-25 PROCEDURE — 99024 POSTOP FOLLOW-UP VISIT: CPT | Performed by: OPHTHALMOLOGY

## 2024-07-25 PROCEDURE — 1036F TOBACCO NON-USER: CPT | Performed by: OPHTHALMOLOGY

## 2024-07-25 ASSESSMENT — EXTERNAL EXAM - RIGHT EYE: OD_EXAM: NORMAL

## 2024-07-25 ASSESSMENT — SLIT LAMP EXAM - LIDS
COMMENTS: NORMAL
COMMENTS: NORMAL

## 2024-07-25 ASSESSMENT — ENCOUNTER SYMPTOMS
ALLERGIC/IMMUNOLOGIC NEGATIVE: 0
RESPIRATORY NEGATIVE: 0
EYES NEGATIVE: 1
MUSCULOSKELETAL NEGATIVE: 0
HEMATOLOGIC/LYMPHATIC NEGATIVE: 0
ENDOCRINE NEGATIVE: 0
CARDIOVASCULAR NEGATIVE: 0
PSYCHIATRIC NEGATIVE: 0
CONSTITUTIONAL NEGATIVE: 0
GASTROINTESTINAL NEGATIVE: 0
NEUROLOGICAL NEGATIVE: 0

## 2024-07-25 ASSESSMENT — VISUAL ACUITY
METHOD: SNELLEN - LINEAR
OD_CC: 20/30
CORRECTION_TYPE: GLASSES
OS_CC: 20/40

## 2024-07-25 ASSESSMENT — EXTERNAL EXAM - LEFT EYE: OS_EXAM: NORMAL

## 2024-07-25 NOTE — PROGRESS NOTES
74 yo EP with hx of Left medial rectus recession 6.0 mm. 4/26/24      Symptomatically improved with resolution of double vision.   Excellent alignment at distance and near.    Follow up with us PRN.   Recommend routine eye exams with comprehensive ophthalmology/optometrist for cataract assessment and management.

## 2024-08-07 ENCOUNTER — OFFICE VISIT (OUTPATIENT)
Dept: SURGERY | Age: 74
End: 2024-08-07
Payer: MEDICARE

## 2024-08-07 VITALS
HEART RATE: 76 BPM | SYSTOLIC BLOOD PRESSURE: 122 MMHG | OXYGEN SATURATION: 97 % | TEMPERATURE: 97.6 F | RESPIRATION RATE: 12 BRPM | WEIGHT: 165.8 LBS | BODY MASS INDEX: 31.3 KG/M2 | DIASTOLIC BLOOD PRESSURE: 68 MMHG | HEIGHT: 61 IN

## 2024-08-07 DIAGNOSIS — C50.212 CARCINOMA OF UPPER-INNER QUADRANT OF LEFT BREAST IN FEMALE, ESTROGEN RECEPTOR POSITIVE (HCC): Primary | ICD-10-CM

## 2024-08-07 DIAGNOSIS — E66.9 OBESITY (BMI 30-39.9): ICD-10-CM

## 2024-08-07 DIAGNOSIS — Z17.0 CARCINOMA OF UPPER-INNER QUADRANT OF LEFT BREAST IN FEMALE, ESTROGEN RECEPTOR POSITIVE (HCC): Primary | ICD-10-CM

## 2024-08-07 PROCEDURE — 3074F SYST BP LT 130 MM HG: CPT | Performed by: SURGERY

## 2024-08-07 PROCEDURE — G8400 PT W/DXA NO RESULTS DOC: HCPCS | Performed by: SURGERY

## 2024-08-07 PROCEDURE — 1123F ACP DISCUSS/DSCN MKR DOCD: CPT | Performed by: SURGERY

## 2024-08-07 PROCEDURE — 1036F TOBACCO NON-USER: CPT | Performed by: SURGERY

## 2024-08-07 PROCEDURE — 1090F PRES/ABSN URINE INCON ASSESS: CPT | Performed by: SURGERY

## 2024-08-07 PROCEDURE — 3078F DIAST BP <80 MM HG: CPT | Performed by: SURGERY

## 2024-08-07 PROCEDURE — 3017F COLORECTAL CA SCREEN DOC REV: CPT | Performed by: SURGERY

## 2024-08-07 PROCEDURE — G8427 DOCREV CUR MEDS BY ELIG CLIN: HCPCS | Performed by: SURGERY

## 2024-08-07 PROCEDURE — 99213 OFFICE O/P EST LOW 20 MIN: CPT | Performed by: SURGERY

## 2024-08-07 PROCEDURE — G8417 CALC BMI ABV UP PARAM F/U: HCPCS | Performed by: SURGERY

## 2024-08-07 NOTE — PROGRESS NOTES
FOLLOW UP NOTE    PATIENT:   Annabel Ingram    DATE:      8/7/24    HISTORY AND CHIEF COMPLAINT:    Annabel Ingram  is a 73 y.o.  year old  female who presents for a follow up of left breast cancer. She does not want surgery. She wants to continue hormonal therapy neoadjuvantly.        PHYSICAL EXAMINATION:    Vitals:    08/07/24 1254   BP: 122/68   Pulse: 76   Resp: 12   Temp: 97.6 °F (36.4 °C)   SpO2: 97%   Weight: 75.2 kg (165 lb 12.8 oz)   Height: 1.549 m (5' 0.98\")        Annabel Ingram is a 73 y.o.  year old pleasant   female in no apparent distress.    There are no masses visualized in the neck.  There is no cervical, supraclavicular or axillary lymphadenopathy noted bilaterally.  Bilateral breasts are symmetrical. Bilateral nipples are everted.  No dimpling, indentation, nipple discharge or nipple retraction noted bilaterally.  After detailed breast examination no palpable abnormalities or concerning thickening are noted bilaterally.  She is alert and oriented    IMAGING:    The imaging was reviewed.        Assessment     IMPRESSION:      ICD-10-CM    1. 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 Obesity (BMI 30-39.9)  E66.9            PLAN:    Will continue to monitor clinically.   Cbe in 6 months    Total face to face time was 20 minutes today with greater than 50% spent on counseling the patient or coordinating her care, reviewing records prior to visit, history, physical exam, reviewing imaging. Discussion regarding natural history and potential progression of breast changes, timing of surveillance visits, timing and type of surveillance imaging, life-style modification, exercise, and limiting alcohol intake were performed today.     Dictated by Annie Jenkins MD  8/7/24     This note was partially generated using Dragon voice recognition system, and there may be some incorrect words, spellings,

## 2024-08-12 DIAGNOSIS — G20.A1 PARKINSON'S DISEASE, UNSPECIFIED WHETHER DYSKINESIA PRESENT, UNSPECIFIED WHETHER MANIFESTATIONS FLUCTUATE (MULTI): ICD-10-CM

## 2024-08-12 RX ORDER — CARBIDOPA AND LEVODOPA 25; 100 MG/1; MG/1
TABLET ORAL
Qty: 180 TABLET | Refills: 1 | Status: SHIPPED | OUTPATIENT
Start: 2024-08-12

## 2024-09-10 DIAGNOSIS — G20.A1 PARKINSON'S DISEASE, UNSPECIFIED WHETHER DYSKINESIA PRESENT, UNSPECIFIED WHETHER MANIFESTATIONS FLUCTUATE (MULTI): ICD-10-CM

## 2024-09-11 RX ORDER — CARBIDOPA AND LEVODOPA 25; 100 MG/1; MG/1
2 TABLET ORAL 3 TIMES DAILY
Qty: 540 TABLET | Refills: 1 | Status: SHIPPED | OUTPATIENT
Start: 2024-09-11

## 2024-12-09 DIAGNOSIS — I10 ESSENTIAL HYPERTENSION: ICD-10-CM

## 2024-12-09 RX ORDER — SPIRONOLACTONE AND HYDROCHLOROTHIAZIDE 25; 25 MG/1; MG/1
2 TABLET ORAL DAILY
Qty: 180 TABLET | Refills: 1 | Status: SHIPPED | OUTPATIENT
Start: 2024-12-09

## 2024-12-09 NOTE — TELEPHONE ENCOUNTER
Comments: NV 12/12    Last Office Visit (last PCP visit):   5/29/2024    Next Visit Date:  Future Appointments   Date Time Provider Department Center   12/10/2024  2:00 PM Michael Root MD [facility]   12/12/2024 10:30 AM Eladio Pickens MD [facility]   2/12/2025  1:15 PM Annie Jenkins MD [facility]       **If hasn't been seen in over a year OR hasn't followed up according to last diabetes/ADHD visit, make appointment for patient before sending refill to provider.    Rx requested:  Requested Prescriptions     Pending Prescriptions Disp Refills    spironolactone-hydroCHLOROthiazide (ALDACTAZIDE) 25-25 MG per tablet 180 tablet 1     Sig: Take 2 tablets by mouth daily

## 2024-12-10 ENCOUNTER — OFFICE VISIT (OUTPATIENT)
Dept: CARDIOLOGY CLINIC | Age: 74
End: 2024-12-10
Payer: MEDICARE

## 2024-12-10 VITALS
SYSTOLIC BLOOD PRESSURE: 120 MMHG | DIASTOLIC BLOOD PRESSURE: 60 MMHG | HEART RATE: 96 BPM | OXYGEN SATURATION: 96 % | BODY MASS INDEX: 29.68 KG/M2 | WEIGHT: 157 LBS

## 2024-12-10 DIAGNOSIS — R94.31 ABNORMAL ECG: Primary | ICD-10-CM

## 2024-12-10 DIAGNOSIS — Z00.00 PE (PHYSICAL EXAM), ANNUAL: ICD-10-CM

## 2024-12-10 PROCEDURE — 1036F TOBACCO NON-USER: CPT | Performed by: INTERNAL MEDICINE

## 2024-12-10 PROCEDURE — 1159F MED LIST DOCD IN RCRD: CPT | Performed by: INTERNAL MEDICINE

## 2024-12-10 PROCEDURE — G8417 CALC BMI ABV UP PARAM F/U: HCPCS | Performed by: INTERNAL MEDICINE

## 2024-12-10 PROCEDURE — 99214 OFFICE O/P EST MOD 30 MIN: CPT | Performed by: INTERNAL MEDICINE

## 2024-12-10 PROCEDURE — 93000 ELECTROCARDIOGRAM COMPLETE: CPT | Performed by: INTERNAL MEDICINE

## 2024-12-10 PROCEDURE — 3074F SYST BP LT 130 MM HG: CPT | Performed by: INTERNAL MEDICINE

## 2024-12-10 PROCEDURE — G8400 PT W/DXA NO RESULTS DOC: HCPCS | Performed by: INTERNAL MEDICINE

## 2024-12-10 PROCEDURE — 1123F ACP DISCUSS/DSCN MKR DOCD: CPT | Performed by: INTERNAL MEDICINE

## 2024-12-10 PROCEDURE — 3078F DIAST BP <80 MM HG: CPT | Performed by: INTERNAL MEDICINE

## 2024-12-10 PROCEDURE — G8484 FLU IMMUNIZE NO ADMIN: HCPCS | Performed by: INTERNAL MEDICINE

## 2024-12-10 PROCEDURE — G8427 DOCREV CUR MEDS BY ELIG CLIN: HCPCS | Performed by: INTERNAL MEDICINE

## 2024-12-10 PROCEDURE — 1090F PRES/ABSN URINE INCON ASSESS: CPT | Performed by: INTERNAL MEDICINE

## 2024-12-10 ASSESSMENT — ENCOUNTER SYMPTOMS
STRIDOR: 0
COUGH: 0
WHEEZING: 0
CHEST TIGHTNESS: 0
SHORTNESS OF BREATH: 0
NAUSEA: 0
EYES NEGATIVE: 1
BLOOD IN STOOL: 0
RESPIRATORY NEGATIVE: 1
GASTROINTESTINAL NEGATIVE: 1

## 2024-12-10 NOTE — PROGRESS NOTES
OFFICE VISIT        Patient: Annabel Ingram  YOB: 1950  MRN: [id number]    Chief Complaint: PD  No chief complaint on file.      CV Data:    CT chest - Cor Calcification and small Pericardial effusion.  Mild cardiomegaly   spect NEGATIVE ef 80%    ECHO EF 55-60 No Pericardial effusion.    Subjective/HPI  she has PD and off balance. Has fallen on couple occasions. She has no CP no sob. Her heart was enlarged on CT.  Pt has pleuritic CP.     21 doing well no cp no sob no furhter falls no bleed takes meds. No palps.     12/10/24 no falls no bleed takes meds. No falls no bleed.   Taking meds for Breast ca. Did not have Surgery.      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 Patient states B9     SECTION  1988    COLONOSCOPY N/A 2020    COLONOSCOPY WITH POLYPECTOMY performed by Lyle Ruiz MD at [facility]    DILATION AND CURETTAGE OF UTERUS      AUB    KNEE SURGERY Left     arthrotomy    SHOULDER ARTHROSCOPY Left 2021    LEFT SHOULDER ARTHROSCOPY ROTATOR CUFF REPAIR, SUBACROMIAL DECOMPRESSION performed by Mario Alberto Morales MD at [facility] [place] OR    SHOULDER ARTHROSCOPY Right 2022    RIGHT SHOULDER ARTHROSCOPY ROTATOR CUFF REPAIR WITH DERMAL ALLOGRAFT, BICEPS TENOTOMY OPEN REPAIR performed by Mario Alberto Morales MD at [facility] [place] OR    TONSILLECTOMY      TOTAL HIP ARTHROPLASTY Right 2017    RIGHT HIP TOTAL HIP ARTHROPLASTY WILLIAM MDM SPINAL  performed by Reynaldo Roy MD at [facility] [place] OR    WISDOM TOOTH EXTRACTION

## 2024-12-12 ENCOUNTER — OFFICE VISIT (OUTPATIENT)
Dept: FAMILY MEDICINE CLINIC | Age: 74
End: 2024-12-12

## 2024-12-12 VITALS
OXYGEN SATURATION: 96 % | HEART RATE: 84 BPM | TEMPERATURE: 98 F | HEIGHT: 61 IN | SYSTOLIC BLOOD PRESSURE: 130 MMHG | BODY MASS INDEX: 29.45 KG/M2 | DIASTOLIC BLOOD PRESSURE: 74 MMHG | WEIGHT: 156 LBS

## 2024-12-12 DIAGNOSIS — R53.1 GENERALIZED WEAKNESS: ICD-10-CM

## 2024-12-12 DIAGNOSIS — Z23 NEEDS FLU SHOT: ICD-10-CM

## 2024-12-12 DIAGNOSIS — I10 ESSENTIAL HYPERTENSION: ICD-10-CM

## 2024-12-12 DIAGNOSIS — Z23 NEED FOR PROPHYLACTIC VACCINATION AGAINST STREPTOCOCCUS PNEUMONIAE (PNEUMOCOCCUS): ICD-10-CM

## 2024-12-12 DIAGNOSIS — Z74.09 IMPAIRED MOBILITY AND ACTIVITIES OF DAILY LIVING: ICD-10-CM

## 2024-12-12 DIAGNOSIS — G20.A1 PARKINSON'S DISEASE, UNSPECIFIED WHETHER DYSKINESIA PRESENT, UNSPECIFIED WHETHER MANIFESTATIONS FLUCTUATE (HCC): ICD-10-CM

## 2024-12-12 DIAGNOSIS — Z78.9 IMPAIRED MOBILITY AND ACTIVITIES OF DAILY LIVING: ICD-10-CM

## 2024-12-12 DIAGNOSIS — I10 ESSENTIAL HYPERTENSION: Primary | ICD-10-CM

## 2024-12-12 DIAGNOSIS — M25.50 POLYARTHRALGIA: ICD-10-CM

## 2024-12-12 LAB
ALBUMIN SERPL-MCNC: 4 G/DL (ref 3.5–4.6)
ALP SERPL-CCNC: 78 U/L (ref 40–130)
ALT SERPL-CCNC: <5 U/L (ref 0–33)
ANION GAP SERPL CALCULATED.3IONS-SCNC: 12 MEQ/L (ref 9–15)
AST SERPL-CCNC: 20 U/L (ref 0–35)
BILIRUB SERPL-MCNC: 0.4 MG/DL (ref 0.2–0.7)
BUN SERPL-MCNC: 23 MG/DL (ref 8–23)
CALCIUM SERPL-MCNC: 10.1 MG/DL (ref 8.5–9.9)
CHLORIDE SERPL-SCNC: 100 MEQ/L (ref 95–107)
CHOLEST SERPL-MCNC: 116 MG/DL (ref 0–199)
CO2 SERPL-SCNC: 27 MEQ/L (ref 20–31)
CREAT SERPL-MCNC: 1.03 MG/DL (ref 0.5–0.9)
ERYTHROCYTE [DISTWIDTH] IN BLOOD BY AUTOMATED COUNT: 13.3 % (ref 11.5–14.5)
GLOBULIN SER CALC-MCNC: 3.5 G/DL (ref 2.3–3.5)
GLUCOSE SERPL-MCNC: 89 MG/DL (ref 70–99)
HCT VFR BLD AUTO: 44.8 % (ref 37–47)
HDLC SERPL-MCNC: 41 MG/DL (ref 40–59)
HGB BLD-MCNC: 15.1 G/DL (ref 12–16)
LDLC SERPL CALC-MCNC: 59 MG/DL (ref 0–129)
MCH RBC QN AUTO: 31.8 PG (ref 27–31.3)
MCHC RBC AUTO-ENTMCNC: 33.7 % (ref 33–37)
MCV RBC AUTO: 94.3 FL (ref 79.4–94.8)
PLATELET # BLD AUTO: 416 K/UL (ref 130–400)
POTASSIUM SERPL-SCNC: 4.2 MEQ/L (ref 3.4–4.9)
PROT SERPL-MCNC: 7.5 G/DL (ref 6.3–8)
RBC # BLD AUTO: 4.75 M/UL (ref 4.2–5.4)
SODIUM SERPL-SCNC: 139 MEQ/L (ref 135–144)
TRIGL SERPL-MCNC: 81 MG/DL (ref 0–150)
TSH SERPL-MCNC: 2.61 UIU/ML (ref 0.44–3.86)
WBC # BLD AUTO: 13.6 K/UL (ref 4.8–10.8)

## 2024-12-12 NOTE — PROGRESS NOTES
Chief Complaint   Patient presents with    Annual Exam     Check up       HPI:  Annabel Ingram is a 74 y.o. female     Following with rheumatology     Needed checkup/refills     Parkinsonism  Does see dr. 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 Patient states B9     SECTION  1988    COLONOSCOPY N/A 2020    COLONOSCOPY WITH POLYPECTOMY performed by Lyle Ruiz MD at [facility]    DILATION AND CURETTAGE OF UTERUS      AUB    KNEE SURGERY Left     arthrotomy    SHOULDER ARTHROSCOPY Left 2021    LEFT SHOULDER ARTHROSCOPY ROTATOR CUFF REPAIR, SUBACROMIAL DECOMPRESSION performed by Mario Alberto Morales MD at [facility] [place] OR    SHOULDER ARTHROSCOPY Right 2022    RIGHT SHOULDER ARTHROSCOPY ROTATOR CUFF REPAIR WITH DERMAL ALLOGRAFT, BICEPS TENOTOMY OPEN REPAIR performed by Mario Alberto Morales MD at [facility] [place] OR    TONSILLECTOMY      TOTAL HIP ARTHROPLASTY Right 2017    RIGHT HIP TOTAL HIP ARTHROPLASTY WILLIAM MDM SPINAL  performed by Reynaldo Roy MD at [facility] [place] OR    WISDOM TOOTH EXTRACTION       Family History   Problem Relation Age of Onset    High Blood Pressure Mother     Stroke Mother     Emphysema Father     Other Sister         fibromyalgia    Stroke Brother     Heart Disease Brother     Stroke Sister         brain aneurysm at age 36    Other Brother         drowning accident at age 36     Social History     Socioeconomic History    Marital status:      Spouse name: None    Number of children: None    Years of

## 2024-12-12 NOTE — PROGRESS NOTES
After obtaining consent, and per orders of Dr. Pickens, injection of flu and aovxdt25 given in Left and right deltoid by Елена Nathan CMA ([facility]). Patient instructed to remain in clinic for 20 minutes afterwards, and to report any adverse reaction to me immediately.

## 2025-01-15 ENCOUNTER — APPOINTMENT (OUTPATIENT)
Dept: NEUROLOGY | Facility: CLINIC | Age: 75
End: 2025-01-15
Payer: MEDICARE

## 2025-01-15 VITALS
BODY MASS INDEX: 27.96 KG/M2 | WEIGHT: 148 LBS | HEART RATE: 93 BPM | RESPIRATION RATE: 18 BRPM | SYSTOLIC BLOOD PRESSURE: 138 MMHG | DIASTOLIC BLOOD PRESSURE: 76 MMHG

## 2025-01-15 DIAGNOSIS — G20.A1 PARKINSON'S DISEASE, UNSPECIFIED WHETHER DYSKINESIA PRESENT, UNSPECIFIED WHETHER MANIFESTATIONS FLUCTUATE: ICD-10-CM

## 2025-01-15 DIAGNOSIS — G20.A1 PARKINSON'S DISEASE, UNSPECIFIED WHETHER DYSKINESIA PRESENT, UNSPECIFIED WHETHER MANIFESTATIONS FLUCTUATE: Primary | ICD-10-CM

## 2025-01-15 PROCEDURE — 1159F MED LIST DOCD IN RCRD: CPT | Performed by: PSYCHIATRY & NEUROLOGY

## 2025-01-15 PROCEDURE — G2211 COMPLEX E/M VISIT ADD ON: HCPCS | Performed by: PSYCHIATRY & NEUROLOGY

## 2025-01-15 PROCEDURE — 99214 OFFICE O/P EST MOD 30 MIN: CPT | Performed by: PSYCHIATRY & NEUROLOGY

## 2025-01-15 PROCEDURE — 1160F RVW MEDS BY RX/DR IN RCRD: CPT | Performed by: PSYCHIATRY & NEUROLOGY

## 2025-01-15 PROCEDURE — 1036F TOBACCO NON-USER: CPT | Performed by: PSYCHIATRY & NEUROLOGY

## 2025-01-15 PROCEDURE — 3075F SYST BP GE 130 - 139MM HG: CPT | Performed by: PSYCHIATRY & NEUROLOGY

## 2025-01-15 PROCEDURE — 3078F DIAST BP <80 MM HG: CPT | Performed by: PSYCHIATRY & NEUROLOGY

## 2025-01-15 RX ORDER — ENTACAPONE 200 MG/1
200 TABLET ORAL 3 TIMES DAILY
Qty: 90 TABLET | Refills: 11 | Status: SHIPPED | OUTPATIENT
Start: 2025-01-15 | End: 2026-01-15

## 2025-01-15 RX ORDER — CARBIDOPA AND LEVODOPA 25; 100 MG/1; MG/1
2 TABLET ORAL 3 TIMES DAILY
Qty: 540 TABLET | Refills: 1 | Status: CANCELLED | OUTPATIENT
Start: 2025-01-15

## 2025-01-15 RX ORDER — CARBIDOPA AND LEVODOPA 25; 100 MG/1; MG/1
2 TABLET ORAL 3 TIMES DAILY
Qty: 180 TABLET | Refills: 11 | Status: SHIPPED | OUTPATIENT
Start: 2025-01-15 | End: 2026-01-15

## 2025-01-15 RX ORDER — AMANTADINE HYDROCHLORIDE 100 MG/1
100 CAPSULE, GELATIN COATED ORAL 2 TIMES DAILY
Qty: 60 CAPSULE | Refills: 6 | Status: SHIPPED | OUTPATIENT
Start: 2025-01-15 | End: 2026-01-15

## 2025-01-15 RX ORDER — AMANTADINE HYDROCHLORIDE 100 MG/1
100 CAPSULE, GELATIN COATED ORAL 2 TIMES DAILY
Qty: 60 CAPSULE | Refills: 6 | Status: CANCELLED | OUTPATIENT
Start: 2025-01-15 | End: 2026-01-15

## 2025-01-15 ASSESSMENT — PATIENT HEALTH QUESTIONNAIRE - PHQ9
2. FEELING DOWN, DEPRESSED OR HOPELESS: NOT AT ALL
SUM OF ALL RESPONSES TO PHQ9 QUESTIONS 1 AND 2: 0
1. LITTLE INTEREST OR PLEASURE IN DOING THINGS: NOT AT ALL

## 2025-01-15 ASSESSMENT — UNIFIED PARKINSONS DISEASE RATING SCALE (UPDRS)
CONSTANCY_TREMOR_ATREST: 2
RIGIDITY_LUE: 2
PRONATION_SUPINATION_RIGHT: 1
SPONTANEITY_OF_MOVEMENT: 2
RIGIDITY_LLE: 2
HANDMOVEMENTS_RIGHT: 2
AMPLITUDE_RUE: 0
LEG_AGILITY_LEFT: 1
AMPLITUDE_LIP_JAW: 1
POSTURAL_STABILITY: 3
PRONATION_SUPINATION_LEFT: 1
LEG_AGILITY_RIGHT: 1
LEVODOPA: YES
POSTURAL_TREMOR_RIGHTHAND: 1
KINETIC_TREMOR_RIGHTHAND: 1
KINETIC_TREMOR_LEFTHAND: 1
POSTURAL_TREMOR_LEFTHAND: 1
DYSKINESIAS_PRESENT: NO
GAIT: 3
TOTAL_SCORE: 50
SPEECH: 1
RIGIDITY_RLE: 2
FINGER_TAPPING_LEFT: 3
POSTURE: 1
CHAIR_RISING_SCALE: 1
AMPLITUDE_LUE: 1
PARKINSONS_MEDS: YES
TOETAPPING_RIGHT: 2
RIGIDITY_NECK: 3
RIGIDITY_RUE: 2
FINGER_TAPPING_RIGHT: 2
AMPLITUDE_RLE: 0
MINUTES_SINCE_LEVODOPA: 345
TOETAPPING_LEFT: 2
CLINICAL_STATE: OFF
FREEZING_GAIT: 0
AMPLITUDE_LLE: 0
FACIAL_EXPRESSION: 2
HOEHN_YAHR: 3

## 2025-01-15 ASSESSMENT — ENCOUNTER SYMPTOMS
DEPRESSION: 0
OCCASIONAL FEELINGS OF UNSTEADINESS: 1
LOSS OF SENSATION IN FEET: 0

## 2025-01-15 NOTE — PROGRESS NOTES
Subjective     Gisella Nicholas is a right handed  74 y.o. year old female who presents with FUV of PD.      HPI      Interval HX:    Last visit added additional carbidopa due to nausea on higher doses of Sinemet. Did not start due to prohibitive cost. Does have mild problems with nausea, especially when not taken with food. Occurs 1x/week or less. Was able to tolerate increase 2 tablets TID. Denies any latency or wearing off. Notes some new jaw tremor.    Tremor pretty good and slowness are ok. Continues to have imbalance. Does not feel physical therapy to be beneficial. Continues to do exercises at home  Memory is okay  No VH  No dyskenesia    mentions RBD symptoms. Not falling out of bed.    Brain MRI on 9/2022:CSF Spaces: The ventricles, sulci and basal cisterns are appropriate  for the degree of volume loss. No abnormal extra-axial collection.  Prior Hx:  Diagnosed with PD by Dr. Scott in 2017. Onset of LUE tremor was 2017, gradual in onset. It is worse at rest. It bothers her a fair amount currently. Levodopa helps tremor by about 75%. Her walking is poor, left leg feels like it won't move. The medicine helps with this as well. Injured hamstring in RLE so was in a wheelchair at last visit April 2022. Otherwise ambulates unassisted at home. She falls rarely, tripped on a curb 2 months ago. NMSx include constipation, hyposmia, depression      PD meds:  Sinemet 25/100 - 2 tab TID   Amantadine 100mg BID  Carbidopa 25mg - 1 tab TID - not taking due to cost     Awakens at 6:30 AM, takes first dose at 6:30 AM  Takes 2nd dose at 1:30 PM  Takes last dose at 9:30 PM     Motor Sx:  Tremors fluctuate: Denies  Stiffness and slowness are stable.  Balance is OK. Gets up slowly. Fell in Feb 2022 (fell on ice) and April 2022 (turned too quickly), fell last August (spontaneously fell backwards). Feels unsteady with narrow base. Reports some freezing when turning to left to go to  "bathroom    NMSx:  -OH  +Constipation  +Mood: \"okay\", occasionally endorses some mild depressed mood and anhedonia, but still finds pleasure in some tasks. Stable anxiety.  No Urinary changes   Sleeps 6-7 hours per night. Feels refreshed. Takes THC/CBD gummy before bed.     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 Had strabismus surgery 4/26/24 with reported improvement and subsequent discharge from ophthalmology per documentation. Doing well.     Needs help with showering 2/2 shoulder injuries. Some impact on IADLs (cooking) due to mobility limitations.    ROS: all systems reviewed and otherwise negative except per HPI.     PMHx/PSHx:  ulcerative colitis  HTN  bilateral rotator cuff repair  R HILTON  knee surgeries     Meds: Per EMR plus takes a baby aspirin daily      SHx: Retired, previously as a . Never smoker. Drinks 1 glass of wine a week. Uses MJ gummies     FHx: No history of neurologic or similar conditions                 Review of Systems  All other system have been reviewed and are negative for complaint.  Objective   Neurological Exam    Physical Exam    GENERAL: Resting comfortably, no acute distress    MENTAL STATE: Orientation was normal to person, place, situation, date. Recent and remote memory was intact. Attention span and concentration were normal.    CRANIAL NERVES:   CN 2 Visual fields full to confrontation.   CN 3, 4, 6 Pupils round, 4 mm in diameter, equally reactive to light. Lids symmetric; no ptosis. Eyes aligned in primary position. No nystagmus or other fixation instability in primary positoin. EOMs full range. No gaze-evoked nystagmus. Smooth pursuit is smooth (horizontal).   CN 5 Facial sensation intact bilaterally to light touch  CN 7 Normal and symmetric facial strength. Nasolabial folds symmetric.   CN 8 Hearing intact to conversation.  CN 9 Palate elevates symmetrically.   CN 11 Normal " strength of shoulder shrug.  CN 12 Tongue midline, with normal bulk and strength; no fasciculations. Tremor present    MOTOR: Muscle bulk was normal in both upper and lower extremities. Cogwheel rigidity in both upper extremities. Increased tone in both lower extremities. Mild left hand rest tremor. Mild bilateral postural and action tremor of upper extremities. Mild jaw tremor.                     R L  Delt           5 5  Bicep         5 5  Tricep        5 5     Hip Flex      5 5  Leg Ext      5 5  Leg Flex     5 5  DF             5 5  PF             5 5     SENSORY: Sensory exam was normal. In both upper and lower extremities, sensation was intact to light touch.    COORDINATION: Coordination exam was normal. In both upper extremities, finger-nose-finger was intact without dysmetria or overshoot. Decrement present on repetitive movements, L>R.    GAIT: Able to stand without use of arms, requiring multiple attempts. Wide based gait, decreased stride length, requiring support for stability while walking. En-bloc turn. Unable to catch herself with gentle pull test.        MDS UPDRS Score: Motor Examination  Is the patient on medication for treating the symptoms of Parkinson's Disease?: Yes  Patients receiving medication for treating the symptoms of Parkinson's Disease, fernie the patient's clinical state.: Off  Is the patient on Levodopa?: Yes  Minutes since last Levodopa dose: 345  Speech: 1  Facial Expression: 2  Rigidty Neck: 3  Rigidty RUE: 2  Rigidity - LUE: 2  Rigidity RLE: 2  Rigidity LLE: 2  Finger Tapping Right Hand: 2  Finger Tapping Left Hand: 3  Hand Movements- Right Hand: 2  Hand Movements- Left Hand: 3  Pronatiaon-Supination Movments - Right Hand: 1  Pronatiaon-Supination Movments Left Hand: 1  Toe Tapping Right Foot: 2  Toe Tapping - Left Foot: 2  Leg Agility - Right Le  Leg Agility - Left le  Arising from Chair: 1  Gait: 3  Freezing of Gait: 0  Postural Stability: 3  Posture: 1  Global  Spontanteity of Movment ( Body Bradykinesia): 2  Postural Tremor - Right Hand: 1  Postural Tremor - Left hand: 1  Kinetic Tremor - Right hand: 1  Kinetic Tremor - Left hand: 1  Rest Tremor Amplitude - RUE: 0  Rest Tremor Amplitude - LUE: 1  Rest Tremor Amplitude - RLE: 0  Rest Tremor Amplitude - LLE: 0  Rest Tremor Amplitude - Lip/Jaw: 1  Constancy of Rest Tremor: 2  MDS UPDRS Total Score: 50  Were dyskinesias (chorea or dystonia) present during examination?: No  Hoen and Yahr Stage: 3                             Assessment/Plan Ms. Nicholas is a 73 YO RH white woman here for PD FUV.  Patient continues to have significant tremors, rigidity, and bradykinesia. Was able to increase Sinemet dose to 2 tablets TID with minimal side effects. Additional carbidopa not started due to cost. While examined in the off state today, she states she feels like she is at her normal level of symptom control, raising the question of adequate symptom control on her current dose of Sinemet. Given prior poor tolerance to increased doses of Sinemet, will start entacapone instead. Would also benefit from examination in on state to better appreciate effect of medication  .     Plan:  -  C/L  2 tabs TID  -Add entacapone 200mg TID   - Continue amantadine 100mg BID  - Return when able for examination in on-state.    Joshua Briggs MD  Neurology PGY-3       Patient seen and discussed with attending physician Dr. Villalta.         _____________________      I saw and evaluated the patient. I personally obtained the key and critical portions of the history and physical exam or was physically present for key and critical portions performed by the resident/fellow. I reviewed the resident/fellow's documentation and discussed the patient with the resident/fellow. I agree with the resident/fellow's medical decision making as documented in the note.  Levodopa is limited by nausea, but she is managing to tolerate 2 tabs TID.  We will try adding entacapone,  to boost ldopa without worsening peak dose nausea.  Other options include Rytary and Crexont.    Evans Villalta MD, FAAN  [facility]  [facility]  , Neurology  [facility] [employer]     CODING and DOCUMENTATION DETERMINATION  For the Evaluation and Management of this patient, the level of Medical Decision Making for this visit was determined based on the following:  The level of COMPLEXITY AND NUMBER OF PROBLEMS ADDRESSED was [HIGH, MODERATE, LOW] as determined by:   MODERATE:  one chronic illnesses with exacerbation, progression or side effect of Rx.  The level of RISK OF COMPLICATIONS was MODERATE as determined by: MODERATE: prescription drug management.  Thus, the level of medical decision making (based on the lower of the two highest elements) was determined to be MODERATE

## 2025-01-15 NOTE — PATIENT INSTRUCTIONS
Mrs. Nicholas,    You were seen for follow up of Parkinson's disease. Given the ongoing concerns for difficulty walking and impairments seen on exam, we would like to see you again soon, either in-person or virtual, while you've more recently taken the medication to get a better idea of how the medication is working for you. We will also start a medication called entacapone to improve the effect of the carbidopa-levodopa.    Please see our nurse practitioner Kane Zeng when able for the exam on medication.    It was a pleasure seeing you,  Dr. Briggs on behalf of Dr. Villalta with [facility]

## 2025-01-27 ENCOUNTER — PREP FOR PROCEDURE (OUTPATIENT)
Dept: GASTROENTEROLOGY | Age: 75
End: 2025-01-27

## 2025-01-27 ENCOUNTER — OFFICE VISIT (OUTPATIENT)
Dept: GASTROENTEROLOGY | Age: 75
End: 2025-01-27
Payer: MEDICARE

## 2025-01-27 VITALS
DIASTOLIC BLOOD PRESSURE: 68 MMHG | WEIGHT: 149.2 LBS | HEART RATE: 91 BPM | OXYGEN SATURATION: 98 % | BODY MASS INDEX: 28.19 KG/M2 | SYSTOLIC BLOOD PRESSURE: 118 MMHG

## 2025-01-27 DIAGNOSIS — K12.1 ORAL ULCERATION: ICD-10-CM

## 2025-01-27 DIAGNOSIS — R13.10 ODYNOPHAGIA: Primary | ICD-10-CM

## 2025-01-27 DIAGNOSIS — R13.10 ODYNOPHAGIA: ICD-10-CM

## 2025-01-27 PROCEDURE — 1036F TOBACCO NON-USER: CPT | Performed by: SPECIALIST

## 2025-01-27 PROCEDURE — G8417 CALC BMI ABV UP PARAM F/U: HCPCS | Performed by: SPECIALIST

## 2025-01-27 PROCEDURE — 1159F MED LIST DOCD IN RCRD: CPT | Performed by: SPECIALIST

## 2025-01-27 PROCEDURE — 3074F SYST BP LT 130 MM HG: CPT | Performed by: SPECIALIST

## 2025-01-27 PROCEDURE — 99213 OFFICE O/P EST LOW 20 MIN: CPT | Performed by: SPECIALIST

## 2025-01-27 PROCEDURE — 1090F PRES/ABSN URINE INCON ASSESS: CPT | Performed by: SPECIALIST

## 2025-01-27 PROCEDURE — 1123F ACP DISCUSS/DSCN MKR DOCD: CPT | Performed by: SPECIALIST

## 2025-01-27 PROCEDURE — G8400 PT W/DXA NO RESULTS DOC: HCPCS | Performed by: SPECIALIST

## 2025-01-27 PROCEDURE — 1125F AMNT PAIN NOTED PAIN PRSNT: CPT | Performed by: SPECIALIST

## 2025-01-27 PROCEDURE — 3078F DIAST BP <80 MM HG: CPT | Performed by: SPECIALIST

## 2025-01-27 PROCEDURE — G8427 DOCREV CUR MEDS BY ELIG CLIN: HCPCS | Performed by: SPECIALIST

## 2025-01-27 RX ORDER — SODIUM CHLORIDE 9 MG/ML
INJECTION, SOLUTION INTRAVENOUS CONTINUOUS
Status: CANCELLED | OUTPATIENT
Start: 2025-01-27

## 2025-01-27 RX ORDER — SODIUM CHLORIDE 0.9 % (FLUSH) 0.9 %
5-40 SYRINGE (ML) INJECTION PRN
Status: CANCELLED | OUTPATIENT
Start: 2025-01-27

## 2025-01-27 RX ORDER — SODIUM CHLORIDE 0.9 % (FLUSH) 0.9 %
5-40 SYRINGE (ML) INJECTION EVERY 12 HOURS SCHEDULED
Status: CANCELLED | OUTPATIENT
Start: 2025-01-27

## 2025-01-27 RX ORDER — SODIUM CHLORIDE 9 MG/ML
INJECTION, SOLUTION INTRAVENOUS PRN
Status: CANCELLED | OUTPATIENT
Start: 2025-01-27

## 2025-01-27 ASSESSMENT — ENCOUNTER SYMPTOMS
ANAL BLEEDING: 0
RECTAL PAIN: 0
BLOOD IN STOOL: 0
GASTROINTESTINAL NEGATIVE: 1
RESPIRATORY NEGATIVE: 1
ABDOMINAL DISTENTION: 0
ABDOMINAL PAIN: 0
EYES NEGATIVE: 1
DIARRHEA: 0
NAUSEA: 0
CONSTIPATION: 0
VOMITING: 0

## 2025-01-27 NOTE — PROGRESS NOTES
Gastroenterology Clinic Follow up Visit    Annabel Ingram  [id number]  Chief Complaint   Patient presents with    Follow-up     Flare up colitis, sores in mouth, started about 1 week ago       HPI and A/P at last visit summarized below:  Patient is here for follow-up, she has a history of proctosigmoiditis and has been on oral mesalamine 800 mg 3 times a day,  patient is getting the prescription from [facility].  It seems symptoms are controlled with current dose of mesalamine and has 1 bowel movement a day, no blood or mucus in the stool, patient was also on prednisone for arthritis and is being tapered off.  Possibly patient may have some benefit from using prednisone for arthritis.  CBC from December 2024 showed hemoglobin of 15.1, normal platelets, CMP was unremarkable creatinine is 1.03.  Patient has been experiencing sores in her mouth and tongue and has pain in the throat during swallowing.  No emesis or GI bleeding.  History of occasional GERD symptoms.  Reports having occasional abdominal discomfort and emesis after taking the medication.  Reports no ulceration on the skin. does not smoke, patient had used Orajel with some improvement in symptoms with oral ulceration however still has pain in the throat during swallowing.    Review of Systems   Constitutional: Negative.    HENT: Negative.          Sores in the mouth   Eyes: Negative.    Respiratory: Negative.     Cardiovascular: Negative.    Gastrointestinal: Negative.  Negative for abdominal distention, abdominal pain, anal bleeding, blood in stool, constipation, diarrhea, nausea, rectal pain and vomiting.        Colitis under remission on mesalamine 800 mg 3 times daily.   Endocrine: Negative.    Genitourinary: Negative.    Musculoskeletal:  Positive for arthralgias.   Skin: Negative.    Allergic/Immunologic: Negative for food allergies.   Neurological: Negative.         History of Parkinson's   Hematological: Negative.    Psychiatric/Behavioral:

## 2025-01-31 ENCOUNTER — ANESTHESIA EVENT (OUTPATIENT)
Dept: ENDOSCOPY | Age: 75
End: 2025-01-31
Payer: MEDICARE

## 2025-01-31 ENCOUNTER — ANESTHESIA (OUTPATIENT)
Dept: ENDOSCOPY | Age: 75
End: 2025-01-31
Payer: MEDICARE

## 2025-01-31 ENCOUNTER — HOSPITAL ENCOUNTER (OUTPATIENT)
Age: 75
Setting detail: OUTPATIENT SURGERY
Discharge: HOME | End: 2025-01-31
Attending: SPECIALIST | Admitting: SPECIALIST
Payer: MEDICARE

## 2025-01-31 VITALS
HEART RATE: 79 BPM | SYSTOLIC BLOOD PRESSURE: 116 MMHG | WEIGHT: 144 LBS | HEIGHT: 61 IN | TEMPERATURE: 97.9 F | DIASTOLIC BLOOD PRESSURE: 57 MMHG | OXYGEN SATURATION: 98 % | BODY MASS INDEX: 27.19 KG/M2 | RESPIRATION RATE: 20 BRPM

## 2025-01-31 DIAGNOSIS — R13.10 ODYNOPHAGIA: ICD-10-CM

## 2025-01-31 DIAGNOSIS — K12.1 ORAL ULCERATION: ICD-10-CM

## 2025-01-31 PROCEDURE — 3700000000 HC ANESTHESIA ATTENDED CARE: Performed by: SPECIALIST

## 2025-01-31 PROCEDURE — 3609017100 HC EGD: Performed by: SPECIALIST

## 2025-01-31 PROCEDURE — 88342 IMHCHEM/IMCYTCHM 1ST ANTB: CPT

## 2025-01-31 PROCEDURE — 3700000001 HC ADD 15 MINUTES (ANESTHESIA): Performed by: SPECIALIST

## 2025-01-31 PROCEDURE — 2500000003 HC RX 250 WO HCPCS: Performed by: SPECIALIST

## 2025-01-31 PROCEDURE — 88305 TISSUE EXAM BY PATHOLOGIST: CPT

## 2025-01-31 PROCEDURE — 7100000010 HC PHASE II RECOVERY - FIRST 15 MIN: Performed by: SPECIALIST

## 2025-01-31 PROCEDURE — 7100000011 HC PHASE II RECOVERY - ADDTL 15 MIN: Performed by: SPECIALIST

## 2025-01-31 PROCEDURE — 6360000002 HC RX W HCPCS

## 2025-01-31 PROCEDURE — 2709999900 HC NON-CHARGEABLE SUPPLY: Performed by: SPECIALIST

## 2025-01-31 RX ORDER — PROPOFOL 10 MG/ML
INJECTION, EMULSION INTRAVENOUS
Status: DISCONTINUED | OUTPATIENT
Start: 2025-01-31 | End: 2025-01-31 | Stop reason: SDUPTHER

## 2025-01-31 RX ORDER — SODIUM CHLORIDE 0.9 % (FLUSH) 0.9 %
5-40 SYRINGE (ML) INJECTION EVERY 12 HOURS SCHEDULED
Status: DISCONTINUED | OUTPATIENT
Start: 2025-01-31 | End: 2025-01-31 | Stop reason: HOSPADM

## 2025-01-31 RX ORDER — LIDOCAINE HYDROCHLORIDE 20 MG/ML
INJECTION, SOLUTION INFILTRATION; PERINEURAL
Status: DISCONTINUED | OUTPATIENT
Start: 2025-01-31 | End: 2025-01-31 | Stop reason: SDUPTHER

## 2025-01-31 RX ORDER — SODIUM CHLORIDE 0.9 % (FLUSH) 0.9 %
5-40 SYRINGE (ML) INJECTION PRN
Status: DISCONTINUED | OUTPATIENT
Start: 2025-01-31 | End: 2025-01-31 | Stop reason: HOSPADM

## 2025-01-31 RX ORDER — SODIUM CHLORIDE 9 MG/ML
INJECTION, SOLUTION INTRAVENOUS CONTINUOUS
Status: DISCONTINUED | OUTPATIENT
Start: 2025-01-31 | End: 2025-01-31 | Stop reason: HOSPADM

## 2025-01-31 RX ORDER — SODIUM CHLORIDE 9 MG/ML
INJECTION, SOLUTION INTRAVENOUS PRN
Status: DISCONTINUED | OUTPATIENT
Start: 2025-01-31 | End: 2025-01-31 | Stop reason: HOSPADM

## 2025-01-31 RX ADMIN — PROPOFOL 20 MG: 10 INJECTION, EMULSION INTRAVENOUS at 10:38

## 2025-01-31 RX ADMIN — LIDOCAINE HYDROCHLORIDE 3 ML: 20 INJECTION, SOLUTION INFILTRATION; PERINEURAL at 10:37

## 2025-01-31 RX ADMIN — PROPOFOL 50 MG: 10 INJECTION, EMULSION INTRAVENOUS at 10:42

## 2025-01-31 RX ADMIN — PROPOFOL 50 MG: 10 INJECTION, EMULSION INTRAVENOUS at 10:45

## 2025-01-31 RX ADMIN — PROPOFOL 50 MG: 10 INJECTION, EMULSION INTRAVENOUS at 10:39

## 2025-01-31 RX ADMIN — PROPOFOL 80 MG: 10 INJECTION, EMULSION INTRAVENOUS at 10:37

## 2025-01-31 ASSESSMENT — PAIN DESCRIPTION - DESCRIPTORS: DESCRIPTORS: DISCOMFORT

## 2025-01-31 ASSESSMENT — PAIN - FUNCTIONAL ASSESSMENT
PAIN_FUNCTIONAL_ASSESSMENT: ACTIVITIES ARE NOT PREVENTED
PAIN_FUNCTIONAL_ASSESSMENT: 0-10

## 2025-01-31 NOTE — ANESTHESIA PRE PROCEDURE
Department of Anesthesiology  Preprocedure Note       Name:  Annabel Ingram   Age:  74 y.o.  :  1950                                          MRN:  [id number]         Date:  2025      Surgeon: Surgeon(s):  Lyel Ruiz MD    Procedure: Procedure(s):  ESOPHAGOGASTRODUODENOSCOPY    Medications prior to admission:   Prior to Admission medications    Medication Sig Start Date End Date Taking? Authorizing Provider   spironolactone-hydroCHLOROthiazide (ALDACTAZIDE) 25-25 MG per tablet Take 2 tablets by mouth daily 24   Eladio Pickens MD   predniSONE (DELTASONE) 5 MG tablet Take 1 tablet by mouth daily 24   Ulises Kang MD   hydroxychloroquine (PLAQUENIL) 200 MG tablet Take 1 tablet by mouth 2 times daily (with meals)    Ulises aKng MD   vitamin D (CHOLECALCIFEROL) 25 MCG (1000 UT) TABS tablet Take 1 tablet by mouth daily    Ulises Kang MD   anastrozole (ARIMIDEX) 1 MG tablet Take 1 tablet by mouth daily 23   Ulises Kang MD   magnesium (MAGNESIUM-OXIDE) 250 MG TABS tablet Take 1 tablet by mouth daily    Ulises Kang MD   loperamide (IMODIUM) 2 MG capsule Take 1 capsule by mouth 4 times daily as needed for Diarrhea    Ulises Kang MD   Handicap Placard MISC by Does not apply route Exp 5 years 23   Eladio Pickens MD   amantadine (SYMMETREL) 100 MG capsule Take 1 capsule by mouth 2 times daily 2/10/23   Ulises Kang MD   mesalamine (DELZICOL) 800 MG TBEC TBEC tablet take 2 tablet by mouth three times a day 22   Ulises Kang MD   carbidopa-levodopa (SINEMET)  MG per tablet Take 1 tablet by mouth 3 times daily TAKE 1 TABLET BY MOUTH TWICE A DAY 22   Joesph Jenkins MD   Multiple Vitamin (MULTI VITAMIN DAILY PO) Take 1 tablet by mouth daily    Ulises Kang MD       Current medications:    No current facility-administered medications for this encounter.       Allergies:    Allergies   Allergen

## 2025-01-31 NOTE — H&P
Patient Name: Annabel Ingram  : 1950  MRN: [id number]  DATE: 25      ENDOSCOPY  History and Physical    Procedure:    [] Diagnostic Colonoscopy       [] Screening Colonoscopy  [x] EGD      [] ERCP      [] EUS       [] Other    [x] Previous office notes/History and Physical reviewed from the patients chart. Please see EMR for further details of HPI. I have examined the patient's status immediately prior to the procedure and:      Indications/HPI:    []Abdominal Pain  []Cancer- GI/Lung  []Fhx of colon CA/polyps  []History of Polyps  []Maxwell’s   []Melena  []Abnormal Imaging  []Dysphagia    []Persistent Pneumonia  []Anemia  []Food Impaction  []History of Polyps  []GI Bleed  []Pulmonary nodule/Mass  []Change in bowel habits []Heartburn/Reflux  []Rectal Bleed (BRBPR)  []Chest Pain - Non Cardiac []Heme (+) Stoo  l[]Ulcers  []Constipation  []Hemoptysis   []Varices  []Diarrhea  []Hypoxemia  []Nausea/Vomiting  []Screening   []Crohns/Colitis  []Other: Odynophagia and history of oral ulceration    Anesthesia:   [x] MAC [] Moderate Sedation   [] General   [] None     ROS: 12 pt Review of Symptoms was negative unless mentioned above    Medications:   Prior to Admission medications    Medication Sig Start Date End Date Taking? Authorizing Provider   spironolactone-hydroCHLOROthiazide (ALDACTAZIDE) 25-25 MG per tablet Take 2 tablets by mouth daily 24  Yes Eladio Pickens MD   predniSONE (DELTASONE) 5 MG tablet Take 1 tablet by mouth daily 24  Yes ProviderUlises MD   hydroxychloroquine (PLAQUENIL) 200 MG tablet Take 1 tablet by mouth 2 times daily (with meals)   Yes ProviderUlises MD   vitamin D (CHOLECALCIFEROL) 25 MCG (1000 UT) TABS tablet Take 1 tablet by mouth daily   Yes Ulises Kang MD   anastrozole (ARIMIDEX) 1 MG tablet Take 1 tablet by mouth daily 23  Yes Ulises Kang MD   magnesium (MAGNESIUM-OXIDE) 250 MG TABS tablet Take 1 tablet by mouth daily   Yes Provider

## 2025-01-31 NOTE — ANESTHESIA POSTPROCEDURE EVALUATION
Department of Anesthesiology  Postprocedure Note    Patient: Annabel Ingram  MRN: [id number]  YOB: 1950  Date of evaluation: 1/31/2025    Procedure Summary       Date: 01/31/25 Room / Location: [facility] OR 01 / [facility]    Anesthesia Start: 1031 Anesthesia Stop: 1051    Procedure: ESOPHAGOGASTRODUODENOSCOPY with biopsy Diagnosis:       Odynophagia      Oral ulceration      (Odynophagia [R13.10])      (Oral ulceration [K12.1])    Surgeons: Lyle Ruiz MD Responsible Provider: Tanisha Ngo APRN - CRNA    Anesthesia Type: MAC ASA Status: 3            Anesthesia Type: No value filed.    Flavio Phase I: Flavio Score: 10    Flavio Phase II:      Anesthesia Post Evaluation    Patient location during evaluation: bedside  Patient participation: complete - patient participated  Level of consciousness: awake and awake and alert  Airway patency: patent  Nausea & Vomiting: no nausea and no vomiting  Cardiovascular status: blood pressure returned to baseline and hemodynamically stable  Respiratory status: acceptable  Hydration status: euvolemic  Pain management: adequate        No notable events documented.

## 2025-02-03 ENCOUNTER — OFFICE VISIT (OUTPATIENT)
Dept: FAMILY MEDICINE CLINIC | Age: 75
End: 2025-02-03

## 2025-02-03 VITALS
DIASTOLIC BLOOD PRESSURE: 78 MMHG | HEIGHT: 61 IN | OXYGEN SATURATION: 95 % | SYSTOLIC BLOOD PRESSURE: 126 MMHG | WEIGHT: 144.2 LBS | BODY MASS INDEX: 27.23 KG/M2 | TEMPERATURE: 97.8 F | HEART RATE: 104 BPM

## 2025-02-03 DIAGNOSIS — R13.10 ODYNOPHAGIA: Primary | ICD-10-CM

## 2025-02-03 DIAGNOSIS — K12.1 ORAL ULCERATION: ICD-10-CM

## 2025-02-03 RX ORDER — ENTACAPONE 200 MG/1
TABLET ORAL 3 TIMES DAILY
COMMUNITY
Start: 2025-01-15 | End: 2026-01-15

## 2025-02-03 RX ORDER — AMMONIUM LACTATE 12 G/100G
CREAM TOPICAL 2 TIMES DAILY
COMMUNITY
Start: 2024-12-19

## 2025-02-03 SDOH — ECONOMIC STABILITY: FOOD INSECURITY: WITHIN THE PAST 12 MONTHS, THE FOOD YOU BOUGHT JUST DIDN'T LAST AND YOU DIDN'T HAVE MONEY TO GET MORE.: NEVER TRUE

## 2025-02-03 SDOH — ECONOMIC STABILITY: FOOD INSECURITY: WITHIN THE PAST 12 MONTHS, YOU WORRIED THAT YOUR FOOD WOULD RUN OUT BEFORE YOU GOT MONEY TO BUY MORE.: NEVER TRUE

## 2025-02-03 ASSESSMENT — ENCOUNTER SYMPTOMS
DIARRHEA: 0
SORE THROAT: 1
COUGH: 0
CONSTIPATION: 0
VOMITING: 0
ABDOMINAL PAIN: 0
VOICE CHANGE: 0
SHORTNESS OF BREATH: 0
NAUSEA: 0
RHINORRHEA: 0
WHEEZING: 0
SINUS PRESSURE: 0
TROUBLE SWALLOWING: 0
FACIAL SWELLING: 0
CHEST TIGHTNESS: 0

## 2025-02-03 ASSESSMENT — PATIENT HEALTH QUESTIONNAIRE - PHQ9
SUM OF ALL RESPONSES TO PHQ QUESTIONS 1-9: 1
SUM OF ALL RESPONSES TO PHQ9 QUESTIONS 1 & 2: 1
SUM OF ALL RESPONSES TO PHQ QUESTIONS 1-9: 1
1. LITTLE INTEREST OR PLEASURE IN DOING THINGS: NOT AT ALL
2. FEELING DOWN, DEPRESSED OR HOPELESS: SEVERAL DAYS

## 2025-02-03 NOTE — PROGRESS NOTES
Date of Visit:  2/3/2025  Patient Name: Annabel Ingram   Patient :  1950     CHIEF COMPLAINT:     Annabel Ingram is a 74 y.o. female who presents today for an general visit to be evaluated for the following condition(s):  Chief Complaint   Patient presents with    Sores in mouth       Pt states this has been going on for weeks. C/o sores in mouth and togue, hurts to chew, sore throat. Denies any fever, HA, body aches. She states just got over a cold.        HISTORY OF PRESENT ILLNESS     I reviewed staff HPI/chief complaint and do agree with above    Subjective:  - Presents with sore throat and oral ulcers for a few weeks  - Ulcers noted on tongue, roof of mouth, and lower lip  - Denies any ulcers or lesions in the back of the throat  - Recently started a new medication for Parkinson's disease about 2 weeks ago, Entacapone and is used in conjunction with carbidopa/levodopa  - Had a similar episode of oral ulcers once before years ago but never to this severity  - Does report some burning stinging type pains with oral consumption with ulcers  - No recent upper respiratory symptoms, fever/chills, shortness of breath/troubles breathing, drooling/inability to handle oral secretions, chest pain/discomfort, nausea/vomit/diarrhea.  - Denies any use of inhalers  - Does have a history of UC and did recently have EGD completed as patient thought that oral ulcers may have been related, however EGD without any abnormal or concerning findings.  Patient has been          REVIEW OF SYSTEM      Review of Systems   Constitutional:  Negative for appetite change, chills, fatigue and fever.   HENT:  Positive for mouth sores and sore throat (Pain with swallowing). Negative for congestion, drooling, ear pain, facial swelling, rhinorrhea, sinus pressure, trouble swallowing and voice change.    Respiratory:  Negative for cough, chest tightness, shortness of breath and wheezing.    Cardiovascular:  Negative for chest pain

## 2025-02-04 ENCOUNTER — TELEPHONE (OUTPATIENT)
Dept: FAMILY MEDICINE CLINIC | Age: 75
End: 2025-02-04

## 2025-02-04 ENCOUNTER — APPOINTMENT (OUTPATIENT)
Dept: NEUROLOGY | Facility: CLINIC | Age: 75
End: 2025-02-04
Payer: MEDICARE

## 2025-02-04 DIAGNOSIS — R13.10 ODYNOPHAGIA: ICD-10-CM

## 2025-02-04 DIAGNOSIS — K12.1 ORAL ULCERATION: ICD-10-CM

## 2025-02-04 NOTE — TELEPHONE ENCOUNTER
Pt's spouse came to window stating that the mouthwash that was prescribed yesterday needs to go to [facility] in [place], [facility] does not do compounding medications. Please send to [facility].

## 2025-02-11 ENCOUNTER — OFFICE VISIT (OUTPATIENT)
Dept: FAMILY MEDICINE CLINIC | Age: 75
End: 2025-02-11
Payer: MEDICARE

## 2025-02-11 VITALS
HEART RATE: 92 BPM | TEMPERATURE: 97.9 F | SYSTOLIC BLOOD PRESSURE: 128 MMHG | OXYGEN SATURATION: 97 % | HEIGHT: 61 IN | DIASTOLIC BLOOD PRESSURE: 86 MMHG | BODY MASS INDEX: 26.36 KG/M2 | WEIGHT: 139.6 LBS

## 2025-02-11 DIAGNOSIS — I50.30 DIASTOLIC CONGESTIVE HEART FAILURE, UNSPECIFIED HF CHRONICITY (HCC): ICD-10-CM

## 2025-02-11 DIAGNOSIS — G20.A1 PARKINSON'S DISEASE, UNSPECIFIED WHETHER DYSKINESIA PRESENT, UNSPECIFIED WHETHER MANIFESTATIONS FLUCTUATE (HCC): ICD-10-CM

## 2025-02-11 DIAGNOSIS — Z17.0 CARCINOMA OF UPPER-INNER QUADRANT OF LEFT BREAST IN FEMALE, ESTROGEN RECEPTOR POSITIVE (HCC): ICD-10-CM

## 2025-02-11 DIAGNOSIS — C50.212 CARCINOMA OF UPPER-INNER QUADRANT OF LEFT BREAST IN FEMALE, ESTROGEN RECEPTOR POSITIVE (HCC): ICD-10-CM

## 2025-02-11 DIAGNOSIS — B37.0 THRUSH: Primary | ICD-10-CM

## 2025-02-11 DIAGNOSIS — K51.311 ULCERATIVE RECTOSIGMOIDITIS WITH RECTAL BLEEDING (HCC): ICD-10-CM

## 2025-02-11 PROCEDURE — G8417 CALC BMI ABV UP PARAM F/U: HCPCS | Performed by: FAMILY MEDICINE

## 2025-02-11 PROCEDURE — 3079F DIAST BP 80-89 MM HG: CPT | Performed by: FAMILY MEDICINE

## 2025-02-11 PROCEDURE — G8400 PT W/DXA NO RESULTS DOC: HCPCS | Performed by: FAMILY MEDICINE

## 2025-02-11 PROCEDURE — 1123F ACP DISCUSS/DSCN MKR DOCD: CPT | Performed by: FAMILY MEDICINE

## 2025-02-11 PROCEDURE — 1125F AMNT PAIN NOTED PAIN PRSNT: CPT | Performed by: FAMILY MEDICINE

## 2025-02-11 PROCEDURE — 1159F MED LIST DOCD IN RCRD: CPT | Performed by: FAMILY MEDICINE

## 2025-02-11 PROCEDURE — 3074F SYST BP LT 130 MM HG: CPT | Performed by: FAMILY MEDICINE

## 2025-02-11 PROCEDURE — 99213 OFFICE O/P EST LOW 20 MIN: CPT | Performed by: FAMILY MEDICINE

## 2025-02-11 PROCEDURE — G8427 DOCREV CUR MEDS BY ELIG CLIN: HCPCS | Performed by: FAMILY MEDICINE

## 2025-02-11 PROCEDURE — 1090F PRES/ABSN URINE INCON ASSESS: CPT | Performed by: FAMILY MEDICINE

## 2025-02-11 PROCEDURE — 1160F RVW MEDS BY RX/DR IN RCRD: CPT | Performed by: FAMILY MEDICINE

## 2025-02-11 PROCEDURE — 1036F TOBACCO NON-USER: CPT | Performed by: FAMILY MEDICINE

## 2025-02-11 RX ORDER — NYSTATIN 100000 [USP'U]/ML
500000 SUSPENSION ORAL 4 TIMES DAILY
Qty: 200 ML | Refills: 0 | Status: SHIPPED | OUTPATIENT
Start: 2025-02-11 | End: 2025-02-21

## 2025-02-11 RX ORDER — FLUCONAZOLE 100 MG/1
100 TABLET ORAL DAILY
Qty: 7 TABLET | Refills: 0 | Status: SHIPPED | OUTPATIENT
Start: 2025-02-11 | End: 2025-02-18

## 2025-02-11 NOTE — PROGRESS NOTES
Chief Complaint   Patient presents with    Mouth Lesions     Had mouth sores for 3 weeks, still have sore on top of mouth and back of mouth, tongue still sore, can't chew hurts,  gabriel Pan        HPI:  Annabel Ingram is a 74 y.o. female     RC f/u mouth sores     Following with rheumatology         Parkinsonism  Does see dr. 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 Patient states B9     SECTION  1988    COLONOSCOPY N/A 2020    COLONOSCOPY WITH POLYPECTOMY performed by Lyle Ruiz MD at [facility]    DILATION AND CURETTAGE OF UTERUS      AUB    KNEE SURGERY Left     arthrotomy    SHOULDER ARTHROSCOPY Left 2021    LEFT SHOULDER ARTHROSCOPY ROTATOR CUFF REPAIR, SUBACROMIAL DECOMPRESSION performed by Mario Alberto Morales MD at [facility] OR    SHOULDER ARTHROSCOPY Right 2022    RIGHT SHOULDER ARTHROSCOPY ROTATOR CUFF REPAIR WITH DERMAL ALLOGRAFT, BICEPS TENOTOMY OPEN REPAIR performed by Mario Alberto Morales MD at [facility] OR    TONSILLECTOMY      TOTAL HIP ARTHROPLASTY Right 2017    RIGHT HIP TOTAL HIP ARTHROPLASTY WILLIAM MDM SPINAL  performed by Reynaldo Roy MD at [facility] OR    UPPER GASTROINTESTINAL ENDOSCOPY N/A 2025    ESOPHAGOGASTRODUODENOSCOPY with biopsy performed by Lyle Ruiz MD at [facility]    WISDOM TOOTH EXTRACTION       Family History   Problem Relation Age of Onset    High Blood Pressure Mother     Stroke Mother     Emphysema Father     Other Sister         fibromyalgia    Stroke

## 2025-02-12 ENCOUNTER — OFFICE VISIT (OUTPATIENT)
Dept: SURGERY | Age: 75
End: 2025-02-12
Payer: MEDICARE

## 2025-02-12 ENCOUNTER — APPOINTMENT (OUTPATIENT)
Dept: NEUROLOGY | Facility: CLINIC | Age: 75
End: 2025-02-12
Payer: MEDICARE

## 2025-02-12 VITALS
WEIGHT: 138.8 LBS | DIASTOLIC BLOOD PRESSURE: 62 MMHG | HEART RATE: 67 BPM | RESPIRATION RATE: 14 BRPM | OXYGEN SATURATION: 98 % | SYSTOLIC BLOOD PRESSURE: 118 MMHG | BODY MASS INDEX: 26.21 KG/M2 | HEIGHT: 61 IN

## 2025-02-12 DIAGNOSIS — Z17.0 CARCINOMA OF UPPER-INNER QUADRANT OF LEFT BREAST IN FEMALE, ESTROGEN RECEPTOR POSITIVE (HCC): Primary | ICD-10-CM

## 2025-02-12 DIAGNOSIS — N64.9 BREAST DISORDER: ICD-10-CM

## 2025-02-12 DIAGNOSIS — R92.8 ABNORMAL MAMMOGRAM OF LEFT BREAST: ICD-10-CM

## 2025-02-12 DIAGNOSIS — C50.212 CARCINOMA OF UPPER-INNER QUADRANT OF LEFT BREAST IN FEMALE, ESTROGEN RECEPTOR POSITIVE (HCC): Primary | ICD-10-CM

## 2025-02-12 DIAGNOSIS — R26.89 BALANCE PROBLEM: ICD-10-CM

## 2025-02-12 PROCEDURE — 1036F TOBACCO NON-USER: CPT | Performed by: SURGERY

## 2025-02-12 PROCEDURE — 1126F AMNT PAIN NOTED NONE PRSNT: CPT | Performed by: SURGERY

## 2025-02-12 PROCEDURE — G8427 DOCREV CUR MEDS BY ELIG CLIN: HCPCS | Performed by: SURGERY

## 2025-02-12 PROCEDURE — 3078F DIAST BP <80 MM HG: CPT | Performed by: SURGERY

## 2025-02-12 PROCEDURE — G8400 PT W/DXA NO RESULTS DOC: HCPCS | Performed by: SURGERY

## 2025-02-12 PROCEDURE — 1090F PRES/ABSN URINE INCON ASSESS: CPT | Performed by: SURGERY

## 2025-02-12 PROCEDURE — 99213 OFFICE O/P EST LOW 20 MIN: CPT | Performed by: SURGERY

## 2025-02-12 PROCEDURE — 3074F SYST BP LT 130 MM HG: CPT | Performed by: SURGERY

## 2025-02-12 PROCEDURE — 1123F ACP DISCUSS/DSCN MKR DOCD: CPT | Performed by: SURGERY

## 2025-02-12 PROCEDURE — 1159F MED LIST DOCD IN RCRD: CPT | Performed by: SURGERY

## 2025-02-12 PROCEDURE — G8417 CALC BMI ABV UP PARAM F/U: HCPCS | Performed by: SURGERY

## 2025-02-12 NOTE — PROGRESS NOTES
imaging, life-style modification, exercise, and limiting alcohol intake were performed today.     I personally and independently saw and examined patient and reviewed all pertinent laboratory data and imaging studies. I have reviewed and agree with the history and review of systems as documented in the above note.     This document is generated, in part, by voice recognition software and thus syntax and grammatical errors are possible.     Dictated by CARL DUTTON MD 2/13/2025 9:58 AM       This note was partially generated using Dragon voice recognition system, and there may be some incorrect words, spellings, punctuation that were not noticed in checking the note before saving.

## 2025-02-24 DIAGNOSIS — F41.9 ANXIETY: ICD-10-CM

## 2025-02-24 RX ORDER — DIAZEPAM 10 MG/1
10 TABLET ORAL ONCE
Qty: 1 TABLET | Refills: 0 | Status: SHIPPED | OUTPATIENT
Start: 2025-02-24 | End: 2025-02-24

## 2025-02-27 DIAGNOSIS — F41.9 ANXIETY: Primary | ICD-10-CM

## 2025-02-27 RX ORDER — DIAZEPAM 5 MG/1
5 TABLET ORAL EVERY 8 HOURS PRN
Qty: 3 TABLET | Refills: 0 | Status: SHIPPED | OUTPATIENT
Start: 2025-02-27 | End: 2025-02-28

## 2025-03-11 ENCOUNTER — OFFICE VISIT (OUTPATIENT)
Dept: FAMILY MEDICINE CLINIC | Age: 75
End: 2025-03-11

## 2025-03-11 ENCOUNTER — RESULTS FOLLOW-UP (OUTPATIENT)
Dept: FAMILY MEDICINE CLINIC | Age: 75
End: 2025-03-11

## 2025-03-11 VITALS
OXYGEN SATURATION: 97 % | HEIGHT: 60 IN | TEMPERATURE: 97.5 F | HEART RATE: 93 BPM | SYSTOLIC BLOOD PRESSURE: 114 MMHG | DIASTOLIC BLOOD PRESSURE: 76 MMHG | BODY MASS INDEX: 27.09 KG/M2 | WEIGHT: 138 LBS

## 2025-03-11 DIAGNOSIS — N39.0 ACUTE UTI: ICD-10-CM

## 2025-03-11 DIAGNOSIS — R10.9 ABDOMINAL PAIN, UNSPECIFIED ABDOMINAL LOCATION: ICD-10-CM

## 2025-03-11 DIAGNOSIS — K59.00 CONSTIPATION, UNSPECIFIED CONSTIPATION TYPE: Primary | ICD-10-CM

## 2025-03-11 LAB
BILIRUBIN, POC: NORMAL
BLOOD URINE, POC: NORMAL
CLARITY, POC: NORMAL
COLOR, POC: YELLOW
GLUCOSE URINE, POC: NORMAL MG/DL
KETONES, POC: NORMAL MG/DL
LEUKOCYTE EST, POC: NORMAL
NITRITE, POC: NORMAL
PH, POC: 5.5
PROTEIN, POC: NORMAL MG/DL
SPECIFIC GRAVITY, POC: 1.03
UROBILINOGEN, POC: NORMAL MG/DL

## 2025-03-11 RX ORDER — LACTULOSE 10 G/15ML
20 SOLUTION ORAL 2 TIMES DAILY
Qty: 237 ML | Refills: 0 | Status: SHIPPED | OUTPATIENT
Start: 2025-03-11

## 2025-03-11 RX ORDER — CEPHALEXIN 500 MG/1
500 CAPSULE ORAL 4 TIMES DAILY
Qty: 28 CAPSULE | Refills: 0 | Status: SHIPPED | OUTPATIENT
Start: 2025-03-11 | End: 2025-03-18

## 2025-03-11 NOTE — PROGRESS NOTES
in no acute distress, well developed, well nourished, ambulating without difficulty, normal communication ability
    Types: 1 Glasses of wine per week     Comment: occasional    Drug use: Yes     Comment: thc gummy nightly   Social History Narrative    Lives With: Spouse Gene    Type of Home: [address] in [place]    Home Layout: Two level,1/2 bath on main level,Able to Live on Main level with bedroom/bathroom (will be sleeping in recliner upon discharge)    Home Access: Stairs to enter without rails- Number of Steps: 2    Bathroom Shower/Tub: Tub/Shower unit, Shower chair,Grab bars in shower,Hand-held shower    Home Equipment: Cane    ADL Assistance: Needs assistance    Homemaking Assistance: Needs assistance    Homemaking Responsibilities: No    Ambulation Assistance: Independent    Transfer Assistance: Independent    Active : No    Occupation: Retired     Leisure & Hobbies: reading, watching TV         Social Drivers of Health     Financial Resource Strain: Low Risk  (3/14/2025)    Received from [facility]    Overall Financial Resource Strain (CARDIA)     Difficulty of Paying Living Expenses: Not hard at all   Food Insecurity: No Food Insecurity (3/14/2025)    Received from [facility]    Hunger Vital Sign     Worried About Running Out of Food in the Last Year: Never true     Ran Out of Food in the Last Year: Never true   Transportation Needs: No Transportation Needs (3/14/2025)    Received from [facility]    PRAPARE - Transportation     Lack of Transportation (Medical): No     Lack of Transportation (Non-Medical): No   Physical Activity: Sufficiently Active (8/21/2023)    Exercise Vital Sign     Days of Exercise per Week: 5 days     Minutes of Exercise per Session: 30 min   Intimate Partner Violence: Not At Risk (3/14/2025)    Received from [facility]    Humiliation, Afraid, Rape, and Kick questionnaire     Fear of Current or Ex-Partner: No     Emotionally Abused: No     Physically Abused: No

## 2025-03-12 LAB — BACTERIA UR CULT: NORMAL

## 2025-03-13 ENCOUNTER — HOSPITAL ENCOUNTER (EMERGENCY)
Age: 75
Discharge: ANOTHER HOSPITAL | End: 2025-03-14
Attending: EMERGENCY MEDICINE
Payer: MEDICARE

## 2025-03-13 ENCOUNTER — APPOINTMENT (OUTPATIENT)
Dept: CT IMAGING | Age: 75
End: 2025-03-13
Payer: MEDICARE

## 2025-03-13 DIAGNOSIS — N83.8 OVARIAN MASS, LEFT: Primary | ICD-10-CM

## 2025-03-13 LAB
ALBUMIN SERPL-MCNC: 3.9 G/DL (ref 3.5–4.6)
ALP SERPL-CCNC: 103 U/L (ref 40–130)
ALT SERPL-CCNC: <5 U/L (ref 0–33)
ANION GAP SERPL CALCULATED.3IONS-SCNC: 14 MEQ/L (ref 9–15)
AST SERPL-CCNC: 18 U/L (ref 0–35)
BACTERIA URNS QL MICRO: ABNORMAL /HPF
BASOPHILS # BLD: 0 K/UL (ref 0–0.1)
BASOPHILS NFR BLD: 0.2 % (ref 0.1–1.2)
BILIRUB SERPL-MCNC: 0.5 MG/DL (ref 0.2–0.7)
BILIRUB UR QL STRIP: NEGATIVE
BUN SERPL-MCNC: 22 MG/DL (ref 8–23)
CALCIUM SERPL-MCNC: 9.5 MG/DL (ref 8.5–9.9)
CHLORIDE SERPL-SCNC: 98 MEQ/L (ref 95–107)
CLARITY UR: CLEAR
CO2 SERPL-SCNC: 24 MEQ/L (ref 20–31)
COLOR UR: YELLOW
CREAT SERPL-MCNC: 1 MG/DL (ref 0.5–0.9)
EOSINOPHIL # BLD: 2.5 K/UL (ref 0–0.4)
EOSINOPHIL NFR BLD: 15 % (ref 0.7–5.8)
EPI CELLS #/AREA URNS HPF: ABNORMAL /HPF
ERYTHROCYTE [DISTWIDTH] IN BLOOD BY AUTOMATED COUNT: 13 % (ref 11.7–14.4)
GLOBULIN SER CALC-MCNC: 3.9 G/DL (ref 2.3–3.5)
GLUCOSE SERPL-MCNC: 114 MG/DL (ref 70–99)
GLUCOSE UR STRIP-MCNC: NEGATIVE MG/DL
HCT VFR BLD AUTO: 39.4 % (ref 37–47)
HGB BLD-MCNC: 13.4 G/DL (ref 11.2–15.7)
HGB UR QL STRIP: NEGATIVE
HYALINE CASTS #/AREA URNS LPF: ABNORMAL /LPF (ref 0–5)
IMM GRANULOCYTES # BLD: 0.1 K/UL
IMM GRANULOCYTES NFR BLD: 0.5 %
KETONES UR STRIP-MCNC: 15 MG/DL
LEUKOCYTE ESTERASE UR QL STRIP: NEGATIVE
LYMPHOCYTES # BLD: 1.5 K/UL (ref 1.2–3.7)
LYMPHOCYTES NFR BLD: 9 %
MCH RBC QN AUTO: 31 PG (ref 25.6–32.2)
MCHC RBC AUTO-ENTMCNC: 34 % (ref 32.2–35.5)
MCV RBC AUTO: 91.2 FL (ref 79.4–94.8)
MONOCYTES # BLD: 0.5 K/UL (ref 0.2–0.9)
MONOCYTES NFR BLD: 3 % (ref 4.7–12.5)
NEUTROPHILS # BLD: 12.3 K/UL (ref 1.6–6.1)
NEUTS SEG NFR BLD: 73 % (ref 34–71.1)
NITRITE UR QL STRIP: NEGATIVE
PH UR STRIP: 6.5 [PH] (ref 5–9)
PLATELET # BLD AUTO: 338 K/UL (ref 182–369)
POTASSIUM SERPL-SCNC: 3.8 MEQ/L (ref 3.4–4.9)
PROT SERPL-MCNC: 7.8 G/DL (ref 6.3–8)
PROT UR STRIP-MCNC: 30 MG/DL
RBC # BLD AUTO: 4.32 M/UL (ref 3.93–5.22)
RBC #/AREA URNS HPF: ABNORMAL /HPF (ref 0–2)
SLIDE REVIEW: ABNORMAL
SODIUM SERPL-SCNC: 136 MEQ/L (ref 135–144)
SP GR UR STRIP: 1.02 (ref 1–1.03)
URINE REFLEX TO CULTURE: ABNORMAL
UROBILINOGEN UR STRIP-ACNC: 0.2 E.U./DL
WBC # BLD AUTO: 16.9 K/UL (ref 4–10)
WBC #/AREA URNS HPF: ABNORMAL /HPF (ref 0–5)

## 2025-03-13 PROCEDURE — 2500000003 HC RX 250 WO HCPCS: Performed by: EMERGENCY MEDICINE

## 2025-03-13 PROCEDURE — 6360000002 HC RX W HCPCS: Performed by: EMERGENCY MEDICINE

## 2025-03-13 PROCEDURE — 80053 COMPREHEN METABOLIC PANEL: CPT

## 2025-03-13 PROCEDURE — 85025 COMPLETE CBC W/AUTO DIFF WBC: CPT

## 2025-03-13 PROCEDURE — 6360000004 HC RX CONTRAST MEDICATION: Performed by: EMERGENCY MEDICINE

## 2025-03-13 PROCEDURE — 96375 TX/PRO/DX INJ NEW DRUG ADDON: CPT

## 2025-03-13 PROCEDURE — 74177 CT ABD & PELVIS W/CONTRAST: CPT

## 2025-03-13 PROCEDURE — 96374 THER/PROPH/DIAG INJ IV PUSH: CPT

## 2025-03-13 PROCEDURE — 36415 COLL VENOUS BLD VENIPUNCTURE: CPT

## 2025-03-13 PROCEDURE — 99285 EMERGENCY DEPT VISIT HI MDM: CPT

## 2025-03-13 PROCEDURE — 81001 URINALYSIS AUTO W/SCOPE: CPT

## 2025-03-13 PROCEDURE — 6370000000 HC RX 637 (ALT 250 FOR IP): Performed by: EMERGENCY MEDICINE

## 2025-03-13 RX ORDER — IOPAMIDOL 755 MG/ML
75 INJECTION, SOLUTION INTRAVASCULAR
Status: COMPLETED | OUTPATIENT
Start: 2025-03-13 | End: 2025-03-13

## 2025-03-13 RX ORDER — DIPHENHYDRAMINE HCL 25 MG
25 CAPSULE ORAL
Status: COMPLETED | OUTPATIENT
Start: 2025-03-13 | End: 2025-03-13

## 2025-03-13 RX ORDER — ZOLPIDEM TARTRATE 5 MG/1
5 TABLET ORAL NIGHTLY PRN
Status: DISCONTINUED | OUTPATIENT
Start: 2025-03-13 | End: 2025-03-14 | Stop reason: HOSPADM

## 2025-03-13 RX ORDER — ONDANSETRON 2 MG/ML
4 INJECTION INTRAMUSCULAR; INTRAVENOUS ONCE
Status: COMPLETED | OUTPATIENT
Start: 2025-03-13 | End: 2025-03-13

## 2025-03-13 RX ORDER — MORPHINE SULFATE 4 MG/ML
4 INJECTION INTRAVENOUS
Refills: 0 | Status: COMPLETED | OUTPATIENT
Start: 2025-03-13 | End: 2025-03-13

## 2025-03-13 RX ORDER — HYDROMORPHONE HYDROCHLORIDE 1 MG/ML
1 INJECTION, SOLUTION INTRAMUSCULAR; INTRAVENOUS; SUBCUTANEOUS ONCE
Refills: 0 | Status: COMPLETED | OUTPATIENT
Start: 2025-03-13 | End: 2025-03-13

## 2025-03-13 RX ADMIN — IOPAMIDOL 75 ML: 755 INJECTION, SOLUTION INTRAVENOUS at 14:34

## 2025-03-13 RX ADMIN — HYDROMORPHONE HYDROCHLORIDE 1 MG: 1 INJECTION, SOLUTION INTRAMUSCULAR; INTRAVENOUS; SUBCUTANEOUS at 14:07

## 2025-03-13 RX ADMIN — MORPHINE SULFATE 4 MG: 4 INJECTION INTRAVENOUS at 23:58

## 2025-03-13 RX ADMIN — DIPHENHYDRAMINE HYDROCHLORIDE 25 MG: 25 CAPSULE ORAL at 22:10

## 2025-03-13 RX ADMIN — ONDANSETRON 4 MG: 2 INJECTION, SOLUTION INTRAMUSCULAR; INTRAVENOUS at 14:06

## 2025-03-13 ASSESSMENT — ENCOUNTER SYMPTOMS
SHORTNESS OF BREATH: 0
SORE THROAT: 0
VOMITING: 0
EYE DISCHARGE: 0
PHOTOPHOBIA: 0
ABDOMINAL PAIN: 1
COUGH: 0
WHEEZING: 0
CHEST TIGHTNESS: 0
CONSTIPATION: 1
ABDOMINAL DISTENTION: 0

## 2025-03-13 ASSESSMENT — PAIN SCALES - GENERAL
PAINLEVEL_OUTOF10: 10
PAINLEVEL_OUTOF10: 10

## 2025-03-13 ASSESSMENT — LIFESTYLE VARIABLES
HOW OFTEN DO YOU HAVE A DRINK CONTAINING ALCOHOL: NEVER
HOW MANY STANDARD DRINKS CONTAINING ALCOHOL DO YOU HAVE ON A TYPICAL DAY: PATIENT DOES NOT DRINK

## 2025-03-13 ASSESSMENT — PAIN - FUNCTIONAL ASSESSMENT: PAIN_FUNCTIONAL_ASSESSMENT: 0-10

## 2025-03-13 ASSESSMENT — PAIN DESCRIPTION - DESCRIPTORS: DESCRIPTORS: SHARP

## 2025-03-13 ASSESSMENT — PAIN DESCRIPTION - FREQUENCY: FREQUENCY: CONTINUOUS

## 2025-03-13 ASSESSMENT — PAIN DESCRIPTION - LOCATION: LOCATION: ABDOMEN

## 2025-03-13 ASSESSMENT — PAIN DESCRIPTION - ONSET: ONSET: SUDDEN

## 2025-03-13 ASSESSMENT — PAIN DESCRIPTION - ORIENTATION: ORIENTATION: LOWER

## 2025-03-13 ASSESSMENT — PAIN DESCRIPTION - PAIN TYPE: TYPE: ACUTE PAIN

## 2025-03-13 NOTE — ED TRIAGE NOTES
Patient to ER with abdominal pain and constipation. Axox4. Electronically signed by Marychuy Concepcion RN on 3/13/2025 at 1:43 PM

## 2025-03-13 NOTE — ED PROVIDER NOTES
[facility] EMERGENCY DEPARTMENT  eMERGENCY dEPARTMENT eNCOUnter      Pt Name: Annabel Ingram  MRN: [id number]  Birthdate 1950  Date of evaluation: 3/13/2025  Provider: Isac Wayne MD    CHIEF COMPLAINT       Chief Complaint   Patient presents with    Abdominal Pain         HISTORY OF PRESENT ILLNESS   (Location/Symptom, Timing/Onset,Context/Setting, Quality, Duration, Modifying Factors, Severity)  Note limiting factors.   Annabel Ingram is a 74 y.o. female who presents to the emergency department with past history of breast cancer, hypertension, osteoarthritis, and Parkinson's disease.  Presents with left-sided abdominal pains been going on for the past 3 days ago progressively worse.  She also reports constipation over the last 3 days.  No associated vomiting.  No back pain.  No urinary complaints.  Patient has a past history of diverticulitis.    HPI    NursingNotes were reviewed.    REVIEW OF SYSTEMS    (2-9 systems for level 4, 10 or more for level 5)     Review of Systems   Constitutional:  Negative for chills and diaphoresis.   HENT:  Negative for congestion, ear pain, mouth sores and sore throat.    Eyes:  Negative for photophobia and discharge.   Respiratory:  Negative for cough, chest tightness, shortness of breath and wheezing.    Cardiovascular:  Negative for chest pain and palpitations.   Gastrointestinal:  Positive for abdominal pain and constipation. Negative for abdominal distention and vomiting.   Endocrine: Negative for cold intolerance.   Genitourinary:  Negative for difficulty urinating.   Musculoskeletal:  Negative for arthralgias.   Skin:  Negative for pallor and rash.   Allergic/Immunologic: Negative for immunocompromised state.   Neurological:  Negative for dizziness and syncope.   Hematological:  Negative for adenopathy.   Psychiatric/Behavioral:  Negative for agitation and hallucinations.    All other systems reviewed and are negative.      Except as noted above the remainder of  standard drink of alcohol     Types: 1 Glasses of wine per week     Comment: occasional    Drug use: Yes     Comment: thc gummy nightly   Social History Narrative    Lives With: Spouse Gene    Type of Home: [address] in [place]    Home Layout: Two level,1/2 bath on main level,Able to Live on Main level with bedroom/bathroom (will be sleeping in recliner upon discharge)    Home Access: Stairs to enter without rails- Number of Steps: 2    Bathroom Shower/Tub: Tub/Shower unit, Shower chair,Grab bars in shower,Hand-held shower    Home Equipment: Cane    ADL Assistance: Needs assistance    Homemaking Assistance: Needs assistance    Homemaking Responsibilities: No    Ambulation Assistance: Independent    Transfer Assistance: Independent    Active : No    Occupation: Retired     Leisure & Hobbies: reading, watching TV         Social Drivers of Health     Financial Resource Strain: Low Risk  (8/1/2023)    Overall Financial Resource Strain (CARDIA)     Difficulty of Paying Living Expenses: Not hard at all   Food Insecurity: No Food Insecurity (2/3/2025)    Hunger Vital Sign     Worried About Running Out of Food in the Last Year: Never true     Ran Out of Food in the Last Year: Never true   Transportation Needs: No Transportation Needs (2/3/2025)    PRAPARE - Transportation     Lack of Transportation (Medical): No     Lack of Transportation (Non-Medical): No   Physical Activity: Sufficiently Active (8/21/2023)    Exercise Vital Sign     Days of Exercise per Week: 5 days     Minutes of Exercise per Session: 30 min   Housing Stability: Low Risk  (2/3/2025)    Housing Stability Vital Sign     Unable to Pay for Housing in the Last Year: No     Number of Times Moved in the Last Year: 0     Homeless in the Last Year: No       SCREENINGS    Francis Coma Scale  Eye Opening: Spontaneous  Best Verbal Response: Oriented  Best Motor Response: Obeys commands  Carole Coma Scale Score: 15

## 2025-03-14 ENCOUNTER — HOSPITAL ENCOUNTER (INPATIENT)
Facility: HOSPITAL | Age: 75
LOS: 2 days | Discharge: HOME | End: 2025-03-16
Attending: STUDENT IN AN ORGANIZED HEALTH CARE EDUCATION/TRAINING PROGRAM | Admitting: STUDENT IN AN ORGANIZED HEALTH CARE EDUCATION/TRAINING PROGRAM
Payer: MEDICARE

## 2025-03-14 VITALS
RESPIRATION RATE: 19 BRPM | HEIGHT: 61 IN | DIASTOLIC BLOOD PRESSURE: 64 MMHG | HEART RATE: 85 BPM | BODY MASS INDEX: 26.06 KG/M2 | WEIGHT: 138 LBS | TEMPERATURE: 98.8 F | SYSTOLIC BLOOD PRESSURE: 122 MMHG | OXYGEN SATURATION: 95 %

## 2025-03-14 DIAGNOSIS — K59.00 CONSTIPATION, UNSPECIFIED CONSTIPATION TYPE: ICD-10-CM

## 2025-03-14 DIAGNOSIS — R19.00 PELVIC MASS: Primary | ICD-10-CM

## 2025-03-14 PROCEDURE — 6360000002 HC RX W HCPCS: Performed by: EMERGENCY MEDICINE

## 2025-03-14 PROCEDURE — 2500000004 HC RX 250 GENERAL PHARMACY W/ HCPCS (ALT 636 FOR OP/ED)

## 2025-03-14 PROCEDURE — 96376 TX/PRO/DX INJ SAME DRUG ADON: CPT

## 2025-03-14 PROCEDURE — 1170000001 HC PRIVATE ONCOLOGY ROOM DAILY

## 2025-03-14 PROCEDURE — 2500000001 HC RX 250 WO HCPCS SELF ADMINISTERED DRUGS (ALT 637 FOR MEDICARE OP)

## 2025-03-14 PROCEDURE — 99222 1ST HOSP IP/OBS MODERATE 55: CPT

## 2025-03-14 RX ORDER — POLYETHYLENE GLYCOL 3350 17 G/17G
17 POWDER, FOR SOLUTION ORAL 2 TIMES DAILY
Status: DISCONTINUED | OUTPATIENT
Start: 2025-03-14 | End: 2025-03-16 | Stop reason: HOSPADM

## 2025-03-14 RX ORDER — ANASTROZOLE 1 MG/1
1 TABLET ORAL DAILY
Status: DISCONTINUED | OUTPATIENT
Start: 2025-03-15 | End: 2025-03-16 | Stop reason: HOSPADM

## 2025-03-14 RX ORDER — ONDANSETRON HYDROCHLORIDE 2 MG/ML
4 INJECTION, SOLUTION INTRAVENOUS EVERY 6 HOURS PRN
Status: DISCONTINUED | OUTPATIENT
Start: 2025-03-14 | End: 2025-03-16 | Stop reason: HOSPADM

## 2025-03-14 RX ORDER — AMANTADINE HYDROCHLORIDE 100 MG/1
100 CAPSULE, GELATIN COATED ORAL 2 TIMES DAILY
Status: DISCONTINUED | OUTPATIENT
Start: 2025-03-15 | End: 2025-03-16 | Stop reason: HOSPADM

## 2025-03-14 RX ORDER — OXYCODONE HYDROCHLORIDE 5 MG/1
5 TABLET ORAL EVERY 6 HOURS PRN
Status: DISCONTINUED | OUTPATIENT
Start: 2025-03-14 | End: 2025-03-16 | Stop reason: HOSPADM

## 2025-03-14 RX ORDER — ENOXAPARIN SODIUM 100 MG/ML
40 INJECTION SUBCUTANEOUS EVERY 24 HOURS
Status: DISCONTINUED | OUTPATIENT
Start: 2025-03-14 | End: 2025-03-16 | Stop reason: HOSPADM

## 2025-03-14 RX ORDER — ONDANSETRON 4 MG/1
4 TABLET, FILM COATED ORAL EVERY 6 HOURS PRN
Status: DISCONTINUED | OUTPATIENT
Start: 2025-03-14 | End: 2025-03-16 | Stop reason: HOSPADM

## 2025-03-14 RX ORDER — IBUPROFEN 600 MG/1
600 TABLET ORAL EVERY 6 HOURS
Status: DISCONTINUED | OUTPATIENT
Start: 2025-03-14 | End: 2025-03-16 | Stop reason: HOSPADM

## 2025-03-14 RX ORDER — OXYCODONE HYDROCHLORIDE 10 MG/1
10 TABLET ORAL EVERY 6 HOURS PRN
Status: DISCONTINUED | OUTPATIENT
Start: 2025-03-14 | End: 2025-03-16 | Stop reason: HOSPADM

## 2025-03-14 RX ORDER — ACETAMINOPHEN 325 MG/1
975 TABLET ORAL EVERY 6 HOURS
Status: DISCONTINUED | OUTPATIENT
Start: 2025-03-14 | End: 2025-03-16 | Stop reason: HOSPADM

## 2025-03-14 RX ORDER — HYDROXYCHLOROQUINE SULFATE 200 MG/1
200 TABLET, FILM COATED ORAL 2 TIMES DAILY
Status: DISCONTINUED | OUTPATIENT
Start: 2025-03-15 | End: 2025-03-16 | Stop reason: HOSPADM

## 2025-03-14 RX ORDER — DOCUSATE SODIUM 100 MG/1
100 CAPSULE, LIQUID FILLED ORAL 2 TIMES DAILY
Status: DISCONTINUED | OUTPATIENT
Start: 2025-03-14 | End: 2025-03-14

## 2025-03-14 RX ORDER — ONDANSETRON 2 MG/ML
4 INJECTION INTRAMUSCULAR; INTRAVENOUS ONCE
Status: COMPLETED | OUTPATIENT
Start: 2025-03-14 | End: 2025-03-14

## 2025-03-14 RX ORDER — PREDNISONE 10 MG/1
5 TABLET ORAL DAILY
Status: DISCONTINUED | OUTPATIENT
Start: 2025-03-15 | End: 2025-03-16 | Stop reason: HOSPADM

## 2025-03-14 RX ORDER — CARBIDOPA AND LEVODOPA 25; 100 MG/1; MG/1
2 TABLET ORAL 3 TIMES DAILY
Status: DISCONTINUED | OUTPATIENT
Start: 2025-03-15 | End: 2025-03-16 | Stop reason: HOSPADM

## 2025-03-14 RX ORDER — ADHESIVE BANDAGE
30 BANDAGE TOPICAL NIGHTLY
Status: DISCONTINUED | OUTPATIENT
Start: 2025-03-14 | End: 2025-03-16

## 2025-03-14 RX ORDER — POLYETHYLENE GLYCOL 3350 17 G/17G
17 POWDER, FOR SOLUTION ORAL DAILY
Status: DISCONTINUED | OUTPATIENT
Start: 2025-03-15 | End: 2025-03-14

## 2025-03-14 RX ORDER — MORPHINE SULFATE 4 MG/ML
4 INJECTION INTRAVENOUS
Refills: 0 | Status: COMPLETED | OUTPATIENT
Start: 2025-03-14 | End: 2025-03-14

## 2025-03-14 RX ORDER — SENNOSIDES 8.6 MG/1
1 TABLET ORAL 2 TIMES DAILY
Status: DISCONTINUED | OUTPATIENT
Start: 2025-03-14 | End: 2025-03-16 | Stop reason: HOSPADM

## 2025-03-14 RX ORDER — MORPHINE SULFATE 2 MG/ML
2 INJECTION, SOLUTION INTRAMUSCULAR; INTRAVENOUS ONCE
Refills: 0 | Status: COMPLETED | OUTPATIENT
Start: 2025-03-14 | End: 2025-03-14

## 2025-03-14 RX ORDER — ENTACAPONE 200 MG/1
200 TABLET ORAL 3 TIMES DAILY
Status: DISCONTINUED | OUTPATIENT
Start: 2025-03-14 | End: 2025-03-14 | Stop reason: WASHOUT

## 2025-03-14 RX ORDER — MESALAMINE 400 MG/1
800 CAPSULE, DELAYED RELEASE ORAL 3 TIMES DAILY
Status: DISCONTINUED | OUTPATIENT
Start: 2025-03-15 | End: 2025-03-15

## 2025-03-14 RX ADMIN — MORPHINE SULFATE 4 MG: 4 INJECTION INTRAVENOUS at 05:57

## 2025-03-14 RX ADMIN — POLYETHYLENE GLYCOL 3350 17 G: 17 POWDER, FOR SOLUTION ORAL at 22:35

## 2025-03-14 RX ADMIN — ONDANSETRON 4 MG: 2 INJECTION, SOLUTION INTRAMUSCULAR; INTRAVENOUS at 05:56

## 2025-03-14 RX ADMIN — STANDARDIZED SENNA CONCENTRATE 8.6 MG: 8.6 TABLET ORAL at 22:35

## 2025-03-14 RX ADMIN — ONDANSETRON 4 MG: 2 INJECTION, SOLUTION INTRAMUSCULAR; INTRAVENOUS at 19:54

## 2025-03-14 RX ADMIN — MORPHINE SULFATE 2 MG: 2 INJECTION, SOLUTION INTRAMUSCULAR; INTRAVENOUS at 19:54

## 2025-03-14 SDOH — SOCIAL STABILITY: SOCIAL INSECURITY: ABUSE: ADULT

## 2025-03-14 SDOH — ECONOMIC STABILITY: FOOD INSECURITY: WITHIN THE PAST 12 MONTHS, THE FOOD YOU BOUGHT JUST DIDN'T LAST AND YOU DIDN'T HAVE MONEY TO GET MORE.: NEVER TRUE

## 2025-03-14 SDOH — SOCIAL STABILITY: SOCIAL INSECURITY: WITHIN THE LAST YEAR, HAVE YOU BEEN AFRAID OF YOUR PARTNER OR EX-PARTNER?: NO

## 2025-03-14 SDOH — SOCIAL STABILITY: SOCIAL INSECURITY: WITHIN THE LAST YEAR, HAVE YOU BEEN HUMILIATED OR EMOTIONALLY ABUSED IN OTHER WAYS BY YOUR PARTNER OR EX-PARTNER?: NO

## 2025-03-14 SDOH — SOCIAL STABILITY: SOCIAL INSECURITY: DOES ANYONE TRY TO KEEP YOU FROM HAVING/CONTACTING OTHER FRIENDS OR DOING THINGS OUTSIDE YOUR HOME?: NO

## 2025-03-14 SDOH — ECONOMIC STABILITY: HOUSING INSECURITY: AT ANY TIME IN THE PAST 12 MONTHS, WERE YOU HOMELESS OR LIVING IN A SHELTER (INCLUDING NOW)?: NO

## 2025-03-14 SDOH — ECONOMIC STABILITY: HOUSING INSECURITY: IN THE LAST 12 MONTHS, WAS THERE A TIME WHEN YOU WERE NOT ABLE TO PAY THE MORTGAGE OR RENT ON TIME?: NO

## 2025-03-14 SDOH — ECONOMIC STABILITY: FOOD INSECURITY: WITHIN THE PAST 12 MONTHS, YOU WORRIED THAT YOUR FOOD WOULD RUN OUT BEFORE YOU GOT THE MONEY TO BUY MORE.: NEVER TRUE

## 2025-03-14 SDOH — ECONOMIC STABILITY: HOUSING INSECURITY: IN THE PAST 12 MONTHS, HOW MANY TIMES HAVE YOU MOVED WHERE YOU WERE LIVING?: 0

## 2025-03-14 SDOH — SOCIAL STABILITY: SOCIAL INSECURITY: WERE YOU ABLE TO COMPLETE ALL THE BEHAVIORAL HEALTH SCREENINGS?: YES

## 2025-03-14 SDOH — ECONOMIC STABILITY: FOOD INSECURITY: HOW HARD IS IT FOR YOU TO PAY FOR THE VERY BASICS LIKE FOOD, HOUSING, MEDICAL CARE, AND HEATING?: NOT HARD AT ALL

## 2025-03-14 SDOH — SOCIAL STABILITY: SOCIAL INSECURITY: DO YOU FEEL UNSAFE GOING BACK TO THE PLACE WHERE YOU ARE LIVING?: NO

## 2025-03-14 SDOH — SOCIAL STABILITY: SOCIAL INSECURITY: HAVE YOU HAD ANY THOUGHTS OF HARMING ANYONE ELSE?: NO

## 2025-03-14 SDOH — SOCIAL STABILITY: SOCIAL INSECURITY
WITHIN THE LAST YEAR, HAVE YOU BEEN RAPED OR FORCED TO HAVE ANY KIND OF SEXUAL ACTIVITY BY YOUR PARTNER OR EX-PARTNER?: NO

## 2025-03-14 SDOH — SOCIAL STABILITY: SOCIAL INSECURITY: ARE YOU OR HAVE YOU BEEN THREATENED OR ABUSED PHYSICALLY, EMOTIONALLY, OR SEXUALLY BY ANYONE?: NO

## 2025-03-14 SDOH — SOCIAL STABILITY: SOCIAL INSECURITY
WITHIN THE LAST YEAR, HAVE YOU BEEN KICKED, HIT, SLAPPED, OR OTHERWISE PHYSICALLY HURT BY YOUR PARTNER OR EX-PARTNER?: NO

## 2025-03-14 SDOH — SOCIAL STABILITY: SOCIAL INSECURITY: ARE THERE ANY APPARENT SIGNS OF INJURIES/BEHAVIORS THAT COULD BE RELATED TO ABUSE/NEGLECT?: NO

## 2025-03-14 SDOH — ECONOMIC STABILITY: TRANSPORTATION INSECURITY: IN THE PAST 12 MONTHS, HAS LACK OF TRANSPORTATION KEPT YOU FROM MEDICAL APPOINTMENTS OR FROM GETTING MEDICATIONS?: NO

## 2025-03-14 SDOH — SOCIAL STABILITY: SOCIAL INSECURITY: HAS ANYONE EVER THREATENED TO HURT YOUR FAMILY OR YOUR PETS?: NO

## 2025-03-14 SDOH — SOCIAL STABILITY: SOCIAL INSECURITY: HAVE YOU HAD THOUGHTS OF HARMING ANYONE ELSE?: NO

## 2025-03-14 SDOH — ECONOMIC STABILITY: INCOME INSECURITY: IN THE PAST 12 MONTHS HAS THE ELECTRIC, GAS, OIL, OR WATER COMPANY THREATENED TO SHUT OFF SERVICES IN YOUR HOME?: NO

## 2025-03-14 SDOH — SOCIAL STABILITY: SOCIAL INSECURITY: DO YOU FEEL ANYONE HAS EXPLOITED OR TAKEN ADVANTAGE OF YOU FINANCIALLY OR OF YOUR PERSONAL PROPERTY?: NO

## 2025-03-14 ASSESSMENT — ACTIVITIES OF DAILY LIVING (ADL)
JUDGMENT_ADEQUATE_SAFELY_COMPLETE_DAILY_ACTIVITIES: YES
WALKS IN HOME: NEEDS ASSISTANCE
FEEDING YOURSELF: INDEPENDENT
ADEQUATE_TO_COMPLETE_ADL: YES
GROOMING: INDEPENDENT
TOILETING: NEEDS ASSISTANCE
LACK_OF_TRANSPORTATION: NO
BATHING: NEEDS ASSISTANCE
ASSISTIVE_DEVICE: WALKER
DRESSING YOURSELF: INDEPENDENT
HEARING - LEFT EAR: FUNCTIONAL
HEARING - RIGHT EAR: FUNCTIONAL
LACK_OF_TRANSPORTATION: NO
PATIENT'S MEMORY ADEQUATE TO SAFELY COMPLETE DAILY ACTIVITIES?: YES

## 2025-03-14 ASSESSMENT — COGNITIVE AND FUNCTIONAL STATUS - GENERAL
TURNING FROM BACK TO SIDE WHILE IN FLAT BAD: A LITTLE
EATING MEALS: A LITTLE
WALKING IN HOSPITAL ROOM: A LOT
DRESSING REGULAR LOWER BODY CLOTHING: A LITTLE
STANDING UP FROM CHAIR USING ARMS: A LOT
PATIENT BASELINE BEDBOUND: NO
MOBILITY SCORE: 15
HELP NEEDED FOR BATHING: A LITTLE
MOVING FROM LYING ON BACK TO SITTING ON SIDE OF FLAT BED WITH BEDRAILS: A LITTLE
TOILETING: A LOT
DAILY ACTIVITIY SCORE: 17
PERSONAL GROOMING: A LITTLE
MOVING TO AND FROM BED TO CHAIR: A LITTLE
DRESSING REGULAR UPPER BODY CLOTHING: A LITTLE
CLIMB 3 TO 5 STEPS WITH RAILING: A LOT

## 2025-03-14 ASSESSMENT — LIFESTYLE VARIABLES
SKIP TO QUESTIONS 9-10: 1
HOW OFTEN DO YOU HAVE 6 OR MORE DRINKS ON ONE OCCASION: NEVER
HOW MANY STANDARD DRINKS CONTAINING ALCOHOL DO YOU HAVE ON A TYPICAL DAY: 1 OR 2
AUDIT-C TOTAL SCORE: 1
HOW OFTEN DO YOU HAVE A DRINK CONTAINING ALCOHOL: MONTHLY OR LESS
AUDIT-C TOTAL SCORE: 1

## 2025-03-14 ASSESSMENT — PAIN SCALES - GENERAL
PAINLEVEL_OUTOF10: 6
PAINLEVEL_OUTOF10: 0 - NO PAIN
PAINLEVEL_OUTOF10: 0

## 2025-03-14 ASSESSMENT — PATIENT HEALTH QUESTIONNAIRE - PHQ9
1. LITTLE INTEREST OR PLEASURE IN DOING THINGS: NOT AT ALL
2. FEELING DOWN, DEPRESSED OR HOPELESS: NOT AT ALL
SUM OF ALL RESPONSES TO PHQ9 QUESTIONS 1 & 2: 0

## 2025-03-14 ASSESSMENT — COLUMBIA-SUICIDE SEVERITY RATING SCALE - C-SSRS
1. IN THE PAST MONTH, HAVE YOU WISHED YOU WERE DEAD OR WISHED YOU COULD GO TO SLEEP AND NOT WAKE UP?: NO
2. HAVE YOU ACTUALLY HAD ANY THOUGHTS OF KILLING YOURSELF?: NO
6. HAVE YOU EVER DONE ANYTHING, STARTED TO DO ANYTHING, OR PREPARED TO DO ANYTHING TO END YOUR LIFE?: NO

## 2025-03-14 ASSESSMENT — PAIN - FUNCTIONAL ASSESSMENT
PAIN_FUNCTIONAL_ASSESSMENT: 0-10
PAIN_FUNCTIONAL_ASSESSMENT: NONE - DENIES PAIN
PAIN_FUNCTIONAL_ASSESSMENT: NONE - DENIES PAIN

## 2025-03-14 NOTE — ED NOTES
Called back and spoke to Rhonda. She states the nurse is still in with her new admission. I provided our number to her to call back when she is ready for report.

## 2025-03-14 NOTE — ED NOTES
Contacted transfer center for update on bed status, per  transfer center patient will probably get a bed tomorrow after discharges.

## 2025-03-14 NOTE — ED NOTES
Pt assigned to bed 6001 on the sixth floor of the [facility]. Electronically signed by Marychuy Concepcion RN on 3/14/2025 at 6:39 PM     No

## 2025-03-14 NOTE — ED NOTES
Per Transfer Center, there are two discharges awaiting a ride home from family. Once these patients leave, a bed will be available for this patient. Patient and family updated. Dinner tray requested.

## 2025-03-14 NOTE — ED NOTES
Attempted to call report and spoke to Rhonda. She stated the nurse was in a patient's room and requested I call back.

## 2025-03-14 NOTE — ED NOTES
Pt awake and given lunch tray, per diet order.  at bedside. Electronically signed by Marychuy Concepcion RN on 3/14/2025 at 11:30 AM

## 2025-03-14 NOTE — ED NOTES
[facility] will transport and states they are on the way. Electronically signed by Marychuy Concepcion RN on 3/14/2025 at 6:57 PM

## 2025-03-15 ENCOUNTER — APPOINTMENT (OUTPATIENT)
Dept: RADIOLOGY | Facility: HOSPITAL | Age: 75
DRG: 760 | End: 2025-03-15
Payer: MEDICARE

## 2025-03-15 LAB
ANION GAP SERPL CALC-SCNC: 17 MMOL/L (ref 10–20)
BUN SERPL-MCNC: 24 MG/DL (ref 6–23)
CALCIUM SERPL-MCNC: 9.7 MG/DL (ref 8.6–10.6)
CANCER AG125 SERPL-ACNC: 13.7 U/ML (ref 0–30.2)
CANCER AG19-9 SERPL-ACNC: 29.72 U/ML
CEA SERPL-MCNC: 1.9 UG/L
CHLORIDE SERPL-SCNC: 100 MMOL/L (ref 98–107)
CO2 SERPL-SCNC: 25 MMOL/L (ref 21–32)
CREAT SERPL-MCNC: 0.95 MG/DL (ref 0.5–1.05)
EGFRCR SERPLBLD CKD-EPI 2021: 62 ML/MIN/1.73M*2
ERYTHROCYTE [DISTWIDTH] IN BLOOD BY AUTOMATED COUNT: 13.2 % (ref 11.5–14.5)
GLUCOSE SERPL-MCNC: 76 MG/DL (ref 74–99)
HCT VFR BLD AUTO: 37.9 % (ref 36–46)
HGB BLD-MCNC: 12.7 G/DL (ref 12–16)
MAGNESIUM SERPL-MCNC: 1.81 MG/DL (ref 1.6–2.4)
MCH RBC QN AUTO: 30.7 PG (ref 26–34)
MCHC RBC AUTO-ENTMCNC: 33.5 G/DL (ref 32–36)
MCV RBC AUTO: 92 FL (ref 80–100)
NRBC BLD-RTO: 0 /100 WBCS (ref 0–0)
PLATELET # BLD AUTO: 414 X10*3/UL (ref 150–450)
POTASSIUM SERPL-SCNC: 3.7 MMOL/L (ref 3.5–5.3)
RBC # BLD AUTO: 4.14 X10*6/UL (ref 4–5.2)
SODIUM SERPL-SCNC: 138 MMOL/L (ref 136–145)
WBC # BLD AUTO: 14.7 X10*3/UL (ref 4.4–11.3)

## 2025-03-15 PROCEDURE — 85027 COMPLETE CBC AUTOMATED: CPT

## 2025-03-15 PROCEDURE — 2500000004 HC RX 250 GENERAL PHARMACY W/ HCPCS (ALT 636 FOR OP/ED)

## 2025-03-15 PROCEDURE — 1170000001 HC PRIVATE ONCOLOGY ROOM DAILY

## 2025-03-15 PROCEDURE — 36415 COLL VENOUS BLD VENIPUNCTURE: CPT

## 2025-03-15 PROCEDURE — 2500000002 HC RX 250 W HCPCS SELF ADMINISTERED DRUGS (ALT 637 FOR MEDICARE OP, ALT 636 FOR OP/ED)

## 2025-03-15 PROCEDURE — 83735 ASSAY OF MAGNESIUM: CPT

## 2025-03-15 PROCEDURE — 99232 SBSQ HOSP IP/OBS MODERATE 35: CPT

## 2025-03-15 PROCEDURE — 2500000001 HC RX 250 WO HCPCS SELF ADMINISTERED DRUGS (ALT 637 FOR MEDICARE OP)

## 2025-03-15 PROCEDURE — 86304 IMMUNOASSAY TUMOR CA 125: CPT

## 2025-03-15 PROCEDURE — 76830 TRANSVAGINAL US NON-OB: CPT

## 2025-03-15 PROCEDURE — 76830 TRANSVAGINAL US NON-OB: CPT | Performed by: RADIOLOGY

## 2025-03-15 PROCEDURE — 82378 CARCINOEMBRYONIC ANTIGEN: CPT

## 2025-03-15 PROCEDURE — 86301 IMMUNOASSAY TUMOR CA 19-9: CPT

## 2025-03-15 PROCEDURE — 80048 BASIC METABOLIC PNL TOTAL CA: CPT

## 2025-03-15 RX ORDER — BISACODYL 10 MG/1
10 SUPPOSITORY RECTAL DAILY
Status: DISCONTINUED | OUTPATIENT
Start: 2025-03-15 | End: 2025-03-16 | Stop reason: HOSPADM

## 2025-03-15 RX ORDER — DIPHENHYDRAMINE HCL 12.5MG/5ML
12.5 LIQUID (ML) ORAL NIGHTLY PRN
Status: DISCONTINUED | OUTPATIENT
Start: 2025-03-15 | End: 2025-03-16 | Stop reason: HOSPADM

## 2025-03-15 RX ORDER — ACETAMINOPHEN 500 MG
5 TABLET ORAL DAILY
Status: DISCONTINUED | OUTPATIENT
Start: 2025-03-16 | End: 2025-03-15

## 2025-03-15 RX ORDER — MESALAMINE 400 MG/1
800 CAPSULE, DELAYED RELEASE ORAL 3 TIMES DAILY
Status: DISCONTINUED | OUTPATIENT
Start: 2025-03-15 | End: 2025-03-16 | Stop reason: HOSPADM

## 2025-03-15 RX ADMIN — HYDROCHLOROTHIAZIDE: 25 TABLET ORAL at 08:22

## 2025-03-15 RX ADMIN — ACETAMINOPHEN 975 MG: 325 TABLET, FILM COATED ORAL at 08:23

## 2025-03-15 RX ADMIN — CARBIDOPA AND LEVODOPA 2 TABLET: 25; 100 TABLET ORAL at 08:23

## 2025-03-15 RX ADMIN — ANASTROZOLE 1 MG: 1 TABLET, COATED ORAL at 08:25

## 2025-03-15 RX ADMIN — HYDROXYCHLOROQUINE SULFATE 200 MG: 200 TABLET, FILM COATED ORAL at 20:29

## 2025-03-15 RX ADMIN — MESALAMINE 800 MG: 400 CAPSULE, DELAYED RELEASE ORAL at 15:57

## 2025-03-15 RX ADMIN — ACETAMINOPHEN 975 MG: 325 TABLET, FILM COATED ORAL at 15:56

## 2025-03-15 RX ADMIN — ENOXAPARIN SODIUM 40 MG: 100 INJECTION SUBCUTANEOUS at 08:21

## 2025-03-15 RX ADMIN — IBUPROFEN 600 MG: 600 TABLET, FILM COATED ORAL at 15:56

## 2025-03-15 RX ADMIN — CARBIDOPA AND LEVODOPA 2 TABLET: 25; 100 TABLET ORAL at 15:57

## 2025-03-15 RX ADMIN — POLYETHYLENE GLYCOL 3350 17 G: 17 POWDER, FOR SOLUTION ORAL at 08:26

## 2025-03-15 RX ADMIN — MESALAMINE 800 MG: 400 CAPSULE, DELAYED RELEASE ORAL at 20:34

## 2025-03-15 RX ADMIN — AMANTADINE HYDROCHLORIDE 100 MG: 100 CAPSULE ORAL at 08:25

## 2025-03-15 RX ADMIN — STANDARDIZED SENNA CONCENTRATE 8.6 MG: 8.6 TABLET ORAL at 08:21

## 2025-03-15 RX ADMIN — HYDROXYCHLOROQUINE SULFATE 200 MG: 200 TABLET, FILM COATED ORAL at 08:24

## 2025-03-15 RX ADMIN — CARBIDOPA AND LEVODOPA 2 TABLET: 25; 100 TABLET ORAL at 20:29

## 2025-03-15 RX ADMIN — AMANTADINE HYDROCHLORIDE 100 MG: 100 CAPSULE ORAL at 20:30

## 2025-03-15 RX ADMIN — POLYETHYLENE GLYCOL 3350 17 G: 17 POWDER, FOR SOLUTION ORAL at 20:33

## 2025-03-15 RX ADMIN — STANDARDIZED SENNA CONCENTRATE 8.6 MG: 8.6 TABLET ORAL at 20:32

## 2025-03-15 RX ADMIN — PREDNISONE 5 MG: 10 TABLET ORAL at 08:25

## 2025-03-15 ASSESSMENT — COGNITIVE AND FUNCTIONAL STATUS - GENERAL
STANDING UP FROM CHAIR USING ARMS: A LOT
TURNING FROM BACK TO SIDE WHILE IN FLAT BAD: A LITTLE
HELP NEEDED FOR BATHING: A LITTLE
WALKING IN HOSPITAL ROOM: A LOT
HELP NEEDED FOR BATHING: A LITTLE
PERSONAL GROOMING: A LITTLE
PERSONAL GROOMING: A LITTLE
TOILETING: A LOT
DRESSING REGULAR LOWER BODY CLOTHING: A LITTLE
STANDING UP FROM CHAIR USING ARMS: A LOT
DAILY ACTIVITIY SCORE: 17
DRESSING REGULAR LOWER BODY CLOTHING: A LITTLE
CLIMB 3 TO 5 STEPS WITH RAILING: A LOT
WALKING IN HOSPITAL ROOM: A LOT
TURNING FROM BACK TO SIDE WHILE IN FLAT BAD: A LITTLE
DRESSING REGULAR UPPER BODY CLOTHING: A LITTLE
MOBILITY SCORE: 15
MOBILITY SCORE: 15
EATING MEALS: A LITTLE
MOVING TO AND FROM BED TO CHAIR: A LITTLE
MOVING FROM LYING ON BACK TO SITTING ON SIDE OF FLAT BED WITH BEDRAILS: A LITTLE
TOILETING: A LOT
MOVING FROM LYING ON BACK TO SITTING ON SIDE OF FLAT BED WITH BEDRAILS: A LITTLE
EATING MEALS: A LITTLE
CLIMB 3 TO 5 STEPS WITH RAILING: A LOT
MOVING TO AND FROM BED TO CHAIR: A LITTLE
DRESSING REGULAR UPPER BODY CLOTHING: A LITTLE
DAILY ACTIVITIY SCORE: 17

## 2025-03-15 ASSESSMENT — PAIN - FUNCTIONAL ASSESSMENT
PAIN_FUNCTIONAL_ASSESSMENT: 0-10

## 2025-03-15 ASSESSMENT — PAIN SCALES - GENERAL
PAINLEVEL_OUTOF10: 3
PAINLEVEL_OUTOF10: 2
PAINLEVEL_OUTOF10: 0 - NO PAIN
PAINLEVEL_OUTOF10: 0 - NO PAIN

## 2025-03-15 NOTE — CARE PLAN
The clinical goals for the shift include Patient will remain safe; free of injuries and falls throughout shift    Problem: Fall/Injury  Goal: Not fall by end of shift  Outcome: Progressing  Goal: Be free from injury by end of the shift  Outcome: Progressing  Goal: Verbalize understanding of personal risk factors for fall in the hospital  Outcome: Progressing  Goal: Verbalize understanding of risk factor reduction measures to prevent injury from fall in the home  Outcome: Progressing  Goal: Use assistive devices by end of the shift  Outcome: Progressing  Goal: Pace activities to prevent fatigue by end of the shift  Outcome: Progressing

## 2025-03-15 NOTE — CARE PLAN
Problem: Fall/Injury  Goal: Not fall by end of shift  Outcome: Progressing  Goal: Be free from injury by end of the shift  Outcome: Progressing  Goal: Verbalize understanding of personal risk factors for fall in the hospital  Outcome: Progressing  Goal: Verbalize understanding of risk factor reduction measures to prevent injury from fall in the home  Outcome: Progressing  Goal: Use assistive devices by end of the shift  Outcome: Progressing  Goal: Pace activities to prevent fatigue by end of the shift  Outcome: Progressing       The clinical goals for the shift include Patient will remain safe; free of injuries and falls throughout shift

## 2025-03-15 NOTE — PROGRESS NOTES
"Gisella Nicholas is a 74 y.o. female on day 1 of admission presenting with Pelvic mass.    Subjective   Pt feeling well, denies abdominal pain. No n/v. Passing gas. No BM since Tuesday.        Objective     Physical Exam  General: no acute distress  HEENT: normocephalic, atraumatic  Heart: warm and well perfused, RRR  Lungs: no increased WOB, CTAB  Abd: soft, nontender, diminished BM  Extremities: moving all extremities  Neuro: awake and conversant  Psych: appropriate mood and affect     Last Recorded Vitals  Blood pressure 124/74, pulse 82, temperature 37 °C (98.6 °F), temperature source Temporal, resp. rate 18, height 1.549 m (5' 1\"), weight 62.6 kg (138 lb), SpO2 96%.  Intake/Output last 3 Shifts:  I/O last 3 completed shifts:  In: 195 (3.1 mL/kg) [P.O.:195]  Out: - (0 mL/kg)   Weight: 62.6 kg     Relevant Results  Lab Results   Component Value Date    WBC 14.7 (H) 03/15/2025    HGB 12.7 03/15/2025    HCT 37.9 03/15/2025    MCV 92 03/15/2025     03/15/2025      Lab Results   Component Value Date    GLUCOSE 76 03/15/2025    CALCIUM 9.7 03/15/2025     03/15/2025    K 3.7 03/15/2025    CO2 25 03/15/2025     03/15/2025    BUN 24 (H) 03/15/2025    CREATININE 0.95 03/15/2025          Assessment/Plan   Assessment & Plan  Pelvic mass    73 yo with hx of ER pos breast cancer admitted as transfer from OSH for new pelvic mass and constipation      Pelvic mass  - CT A/P with 10.5 x9.5x 6.5cm complex cystic mass concerning for neoplasm. Imaging reviewed, unclear source of mass, bladder vs bowel vs GYN. Will review with [facility] radiology.   - discussed the possible etiologies of a pelvic mass including benign, borderline, and malignant  - Tumor markers wnl (CEA, , CA 19-9)  - Discussed eventual plan for surgical removal of mass and possible staging, will need to determine most likely source for surgical planning and preop optimization.      Leukocytosis  - WBC downtrending, 16 >14. UA unremarkable, no focal " signs of infection.   - low suspicion for active infection. hx of UC, rheumatologic disease on prednisone may be contributing      Constipation  - chronic constipation for several months, requiring bowel regimen at home. Last BM 5 days ago  - low suspicion for bowel obstruction given neg CT at OSH, pt passing flatus, no n/v.   - bowel regimen: miralax BID, senna BID, milk of mag daily     Comorbidities  - HTN: spironolactone-hydrochlorothiazide cont  - ER pos breast cancer: anastrozole cont  - parkinson's: amantadine, carbidopa-levodopa cont  - Ulcerative colitis: mesalamine, prednisone cont  - polyarthralgia vs SLE- plaquenil cont        Dispo: likely dc today following constipation resolution     Seen and d/w Dr. Kristen Grayson MD  PGY3, Gynecologic Oncology  Pager [phone]

## 2025-03-15 NOTE — H&P
"History Of Present Illness  Gisella Nicholas is a 74 y.o. female presenting as transfer from OSH for new pelvic mass. Pt presented to OSH for L sided abdominal pain and constipation. Started 3 days prior to presentation and got progressively worse. Has also had constipation over same time period. No nausea/vomiting. No shortness of breath/chest pain. Reports low appetite over last several months. No bloating. Some dysuria while at OSH. No vaginal bleeding. Currently pain well controlled currently - received dose of morphine prior to transfer     Past Medical History  L breast cancer (ER pos, on anastrazole), HTN, Parkinson's Disease, Osteoarthritis, Ulcerative Colitis     Surgical History  C/S x1, breast biopsy, knee surgery, hip surgery, shoulder surgery, strabismus surgery     OB/GYN History  . C-sectionx1,  x2  Menopause in late 50s. No PMB. No HRT  No hx of abnormal paps     Social History  Occ EtOH use, marijuana gummies. Never smoker. Retired     Family History  No family history of ovarian, uterine, or colon cancer     Allergies  Tramadol and Sulfa (sulfonamide antibiotics)    Review of Systems   All other systems reviewed and are negative.       Physical Exam  Vitals reviewed.     General: no acute distress  HEENT: normocephalic, atraumatic  Heart: warm and well perfused  Lungs: no excessive respiratory effort  Abdomen: soft non distended. Tenderness to palpation in lower abdomen at midline and LLQ. Voluntary guarding. No peritoneal signs  Extremities: moving all extremities. Tremor in bilateral upper extremities consistent with parkinsons  Neuro: awake and conversant  Psych: appropriate mood and affect       Last Recorded Vitals  Blood pressure 126/72, pulse 84, temperature 36.7 °C (98 °F), temperature source Temporal, resp. rate 18, height 1.549 m (5' 1\"), weight 62.6 kg (138 lb), SpO2 94%.    Relevant Results  CT A/P 3/13/25  FINDINGS:   The liver, gallbladder, adrenals, kidneys, and " pancreas are unremarkable.   Calcified splenic granulomas are noted.   There is no abdominal aortic aneurysm.  No enlarged lymph nodes are  identified.   There is a complex cystic mass in the pelvis anterior to the uterus measuring  10.5 x 6.5 x 9.5 cm.  There is prominent stooling gas throughout the majority  of the colon most pronounced in the ascending colon.  There is no evidence of   high-grade bowel obstruction although there is tapering of the sigmoid colon  where it courses along the mass which could represent partial obstruction.  There is no free air.  There is trace free fluid in the pelvis.   There is no acute bony abnormality.  There are no suspicious osseous lesions.   The lung bases are clear.     Urinalysis with Reflex to Culture  Order: [id number]  Component  Ref Range & Units 1 d ago   Color, UA  Straw/Yellow Yellow   Clarity, UA  Clear Clear   Glucose, Ur  Negative mg/dL Negative   Bilirubin, Urine  Negative Negative   Ketones, Urine  Negative mg/dL 15 Abnormal    Specific Gravity, UA  1.005 - 1.030 1.025   Blood, Urine  Negative Negative   pH, Urine  5.0 - 9.0 6.5   Protein, UA  Negative mg/dL 30 Abnormal    Urobilinogen, Urine  <2.0 E.U./dL 0.2   Nitrite, Urine  Negative Negative   Leukocyte Esterase, Urine  Negative Negative   Urine Reflex to Culture Not Indicated      ntains abnormal data CBC with Auto Differential  Order: [id number]  Component  Ref Range & Units 1 d ago   WBC  4.0 - 10.0 K/uL 16.9 High    RBC  3.93 - 5.22 M/uL 4.32   Hemoglobin  11.2 - 15.7 g/dL 13.4   Hematocrit  37.0 - 47.0 % 39.4   MCV  79.4 - 94.8 fL 91.2   MCH  25.6 - 32.2 pg 31   MCHC  32.2 - 35.5 % 34   RDW  11.7 - 14.4 % 13   Platelets  182 - 369 K/uL 338   SLIDE REVIEW see below   Comment: Slide review agrees with reported results   Neutrophils %  34.0 - 71.1 % 73 High    Immature Granulocytes %  % 0.5   Lymphocytes %  % 9   Monocytes %  4.7 - 12.5 % 3 Low    Eosinophils %  0.7 - 5.8 % 15 High    Basophils %  0.1 - 1.2  % 0.2   Neutrophils Absolute  1.6 - 6.1 K/uL 12.3 High    Immature Granulocytes #  K/uL 0.1   Lymphocytes Absolute  1.2 - 3.7 K/uL 1.5   Monocytes Absolute  0.2 - 0.9 K/uL 0.5   Eosinophils Absolute  0.0 - 0.4 K/uL 2.5 High    Basophils Absolute  0.0 - 0.1 K/uL 0     CMP  Order: [id number]  Component  Ref Range & Units 1 d ago   Sodium  135 - 144 mEq/L 136   Potassium  3.4 - 4.9 mEq/L 3.8   Chloride  95 - 107 mEq/L 98   CO2  20 - 31 mEq/L 24   Anion Gap  9 - 15 mEq/L 14   Glucose  70 - 99 mg/dL 114 High    BUN  8 - 23 mg/dL 22   Creatinine  0.50 - 0.90 mg/dL 1.00 High    Est, Glom Filt Rate  >60 58.3 Low       Calcium  8.5 - 9.9 mg/dL 9.5   Total Protein  6.3 - 8.0 g/dL 7.8   Albumin  3.5 - 4.6 g/dL 3.9   Total Bilirubin  0.2 - 0.7 mg/dL 0.5   Alkaline Phosphatase  40 - 130 U/L 103   ALT  0 - 33 U/L <5   AST  0 - 35 U/L 18   Globulin  2.3 - 3.5 g/dL 3.9 High        Assessment/Plan     75 yo with hx of ER pos breast cancer admitted as transfer from OSH for new pelvic mass and pain control    Pelvic mass  - CT A/P with 10.5 x9.5x 6.5cm complex cystic mass concerning for neoplasm  - discussed the possible etiologies of a pelvic mass including benign, borderline, and malignant  - Tumor markers to be drawn in the AM (Ca 125, CA 19-9, CEA)  - will get imaging pushed to PACS from OSH  - Discussed eventual plan for surgical removal of mass and possible staging  - discussed outpatient follow up for surgical planning and preoperative optimization    Leukocytosis  - WBC 16.9 at OSH, UA unremarkable at OSH  - afebrile, nontachycardic. no current signs/symptoms of infection  - possibly reactive in nature vs 2/2 steroids as below  - continue to trend and monitor for signs of infection    Constipation  - patient with persistent constipation over last several months, requiring several stool softeners  - last BM 5 days ago  - CT with no high grade bowel obstruction at OSH  - passing flatus  - low concern for bowel obstruction at this  time, will continue to monitor closely  - bowel regimen: miralax BID, senna BID, milk of mag daily    Comorbidities  - HTN: spironolactone-hydrochlorothiazide cont  - ER pos breast cancer: anastrozole cont  - parkinson's: amantadine, carbidopa-levodopa cont  - Ulcerative colitis: mesalamine, prednisone cont  - polyarthralgia vs SLE- plaquenil cont      Dispo: admit for pain control with plan for outpatient follow up for surgical removal of pelvic mass    Discussed with Gyn Onc Fellow, Dr. Fair. To be seen and discussed with Gyn Onc Attending, Dr. Peterson, in the AM    Yolis Scott MD PGY-2  Gynecology Oncology [phone]

## 2025-03-16 VITALS
HEART RATE: 72 BPM | OXYGEN SATURATION: 98 % | TEMPERATURE: 97.7 F | BODY MASS INDEX: 26.06 KG/M2 | RESPIRATION RATE: 16 BRPM | HEIGHT: 61 IN | SYSTOLIC BLOOD PRESSURE: 109 MMHG | DIASTOLIC BLOOD PRESSURE: 64 MMHG | WEIGHT: 138 LBS

## 2025-03-16 LAB
ANION GAP SERPL CALC-SCNC: 14 MMOL/L (ref 10–20)
BASOPHILS # BLD AUTO: 0.05 X10*3/UL (ref 0–0.1)
BASOPHILS NFR BLD AUTO: 0.3 %
BUN SERPL-MCNC: 31 MG/DL (ref 6–23)
CALCIUM SERPL-MCNC: 9.6 MG/DL (ref 8.6–10.6)
CHLORIDE SERPL-SCNC: 100 MMOL/L (ref 98–107)
CO2 SERPL-SCNC: 26 MMOL/L (ref 21–32)
CREAT SERPL-MCNC: 1.08 MG/DL (ref 0.5–1.05)
EGFRCR SERPLBLD CKD-EPI 2021: 53 ML/MIN/1.73M*2
EOSINOPHIL # BLD AUTO: 4.34 X10*3/UL (ref 0–0.4)
EOSINOPHIL NFR BLD AUTO: 28.1 %
ERYTHROCYTE [DISTWIDTH] IN BLOOD BY AUTOMATED COUNT: 12.9 % (ref 11.5–14.5)
ERYTHROCYTE [DISTWIDTH] IN BLOOD BY AUTOMATED COUNT: 12.9 % (ref 11.5–14.5)
GLUCOSE SERPL-MCNC: 83 MG/DL (ref 74–99)
HCT VFR BLD AUTO: 36.1 % (ref 36–46)
HCT VFR BLD AUTO: 36.1 % (ref 36–46)
HGB BLD-MCNC: 12.4 G/DL (ref 12–16)
HGB BLD-MCNC: 12.4 G/DL (ref 12–16)
IMM GRANULOCYTES # BLD AUTO: 0.09 X10*3/UL (ref 0–0.5)
IMM GRANULOCYTES NFR BLD AUTO: 0.6 % (ref 0–0.9)
LYMPHOCYTES # BLD AUTO: 1.4 X10*3/UL (ref 0.8–3)
LYMPHOCYTES NFR BLD AUTO: 9.1 %
MAGNESIUM SERPL-MCNC: 1.89 MG/DL (ref 1.6–2.4)
MCH RBC QN AUTO: 31.5 PG (ref 26–34)
MCH RBC QN AUTO: 31.5 PG (ref 26–34)
MCHC RBC AUTO-ENTMCNC: 34.3 G/DL (ref 32–36)
MCHC RBC AUTO-ENTMCNC: 34.3 G/DL (ref 32–36)
MCV RBC AUTO: 92 FL (ref 80–100)
MCV RBC AUTO: 92 FL (ref 80–100)
MONOCYTES # BLD AUTO: 1.02 X10*3/UL (ref 0.05–0.8)
MONOCYTES NFR BLD AUTO: 6.6 %
NEUTROPHILS # BLD AUTO: 8.56 X10*3/UL (ref 1.6–5.5)
NEUTROPHILS NFR BLD AUTO: 55.3 %
NRBC BLD-RTO: 0 /100 WBCS (ref 0–0)
NRBC BLD-RTO: 0 /100 WBCS (ref 0–0)
PLATELET # BLD AUTO: 410 X10*3/UL (ref 150–450)
PLATELET # BLD AUTO: 410 X10*3/UL (ref 150–450)
POTASSIUM SERPL-SCNC: 3.4 MMOL/L (ref 3.5–5.3)
RBC # BLD AUTO: 3.94 X10*6/UL (ref 4–5.2)
RBC # BLD AUTO: 3.94 X10*6/UL (ref 4–5.2)
SODIUM SERPL-SCNC: 137 MMOL/L (ref 136–145)
WBC # BLD AUTO: 15.6 X10*3/UL (ref 4.4–11.3)
WBC # BLD AUTO: 15.6 X10*3/UL (ref 4.4–11.3)

## 2025-03-16 PROCEDURE — 82374 ASSAY BLOOD CARBON DIOXIDE: CPT

## 2025-03-16 PROCEDURE — 2500000002 HC RX 250 W HCPCS SELF ADMINISTERED DRUGS (ALT 637 FOR MEDICARE OP, ALT 636 FOR OP/ED)

## 2025-03-16 PROCEDURE — 83735 ASSAY OF MAGNESIUM: CPT

## 2025-03-16 PROCEDURE — 85025 COMPLETE CBC W/AUTO DIFF WBC: CPT

## 2025-03-16 PROCEDURE — 2500000001 HC RX 250 WO HCPCS SELF ADMINISTERED DRUGS (ALT 637 FOR MEDICARE OP)

## 2025-03-16 PROCEDURE — 2500000004 HC RX 250 GENERAL PHARMACY W/ HCPCS (ALT 636 FOR OP/ED)

## 2025-03-16 PROCEDURE — 36415 COLL VENOUS BLD VENIPUNCTURE: CPT

## 2025-03-16 PROCEDURE — 99232 SBSQ HOSP IP/OBS MODERATE 35: CPT

## 2025-03-16 RX ORDER — POLYETHYLENE GLYCOL 3350 17 G/17G
17 POWDER, FOR SOLUTION ORAL 2 TIMES DAILY
Qty: 60 PACKET | Refills: 0 | Status: SHIPPED | OUTPATIENT
Start: 2025-03-16 | End: 2025-04-15

## 2025-03-16 RX ORDER — SENNOSIDES 8.6 MG/1
1 TABLET ORAL 2 TIMES DAILY
Qty: 60 TABLET | Refills: 0 | Status: SHIPPED | OUTPATIENT
Start: 2025-03-16 | End: 2025-04-15

## 2025-03-16 RX ORDER — ADHESIVE BANDAGE
30 BANDAGE TOPICAL 2 TIMES DAILY
Status: DISCONTINUED | OUTPATIENT
Start: 2025-03-16 | End: 2025-03-16 | Stop reason: HOSPADM

## 2025-03-16 RX ADMIN — HYDROXYCHLOROQUINE SULFATE 200 MG: 200 TABLET, FILM COATED ORAL at 10:39

## 2025-03-16 RX ADMIN — IBUPROFEN 600 MG: 600 TABLET, FILM COATED ORAL at 04:24

## 2025-03-16 RX ADMIN — ENOXAPARIN SODIUM 40 MG: 100 INJECTION SUBCUTANEOUS at 10:36

## 2025-03-16 RX ADMIN — ANASTROZOLE 1 MG: 1 TABLET, COATED ORAL at 10:38

## 2025-03-16 RX ADMIN — IBUPROFEN 600 MG: 600 TABLET, FILM COATED ORAL at 10:40

## 2025-03-16 RX ADMIN — AMANTADINE HYDROCHLORIDE 100 MG: 100 CAPSULE ORAL at 10:39

## 2025-03-16 RX ADMIN — STANDARDIZED SENNA CONCENTRATE 8.6 MG: 8.6 TABLET ORAL at 10:39

## 2025-03-16 RX ADMIN — BISACODYL 10 MG: 10 SUPPOSITORY RECTAL at 07:15

## 2025-03-16 RX ADMIN — HYDROCHLOROTHIAZIDE: 25 TABLET ORAL at 10:38

## 2025-03-16 RX ADMIN — CARBIDOPA AND LEVODOPA 2 TABLET: 25; 100 TABLET ORAL at 10:40

## 2025-03-16 RX ADMIN — ACETAMINOPHEN 975 MG: 325 TABLET, FILM COATED ORAL at 04:23

## 2025-03-16 RX ADMIN — PREDNISONE 5 MG: 10 TABLET ORAL at 10:45

## 2025-03-16 RX ADMIN — MESALAMINE 800 MG: 400 CAPSULE, DELAYED RELEASE ORAL at 10:36

## 2025-03-16 ASSESSMENT — COGNITIVE AND FUNCTIONAL STATUS - GENERAL
DRESSING REGULAR UPPER BODY CLOTHING: A LITTLE
MOBILITY SCORE: 17
TURNING FROM BACK TO SIDE WHILE IN FLAT BAD: A LITTLE
STANDING UP FROM CHAIR USING ARMS: A LITTLE
DAILY ACTIVITIY SCORE: 18
MOVING TO AND FROM BED TO CHAIR: A LITTLE
HELP NEEDED FOR BATHING: A LITTLE
TOILETING: A LITTLE
WALKING IN HOSPITAL ROOM: A LOT
CLIMB 3 TO 5 STEPS WITH RAILING: A LOT
EATING MEALS: A LITTLE
PERSONAL GROOMING: A LITTLE
DRESSING REGULAR LOWER BODY CLOTHING: A LITTLE

## 2025-03-16 ASSESSMENT — PAIN SCALES - GENERAL
PAINLEVEL_OUTOF10: 0 - NO PAIN

## 2025-03-16 ASSESSMENT — PAIN - FUNCTIONAL ASSESSMENT
PAIN_FUNCTIONAL_ASSESSMENT: 0-10

## 2025-03-16 NOTE — CARE PLAN
The patient's goals for the shift include      The clinical goals for the shift include Pt will remain HDS and have BM 7a-7p    Problem: Fall/Injury  Goal: Not fall by end of shift  Outcome: Progressing  Goal: Be free from injury by end of the shift  Outcome: Progressing  Goal: Verbalize understanding of personal risk factors for fall in the hospital  Outcome: Progressing  Goal: Verbalize understanding of risk factor reduction measures to prevent injury from fall in the home  Outcome: Progressing  Goal: Use assistive devices by end of the shift  Outcome: Progressing  Goal: Pace activities to prevent fatigue by end of the shift  Outcome: Progressing

## 2025-03-16 NOTE — NURSING NOTE
Gisella Nicholas discharged at 1:03 PM  and 03/16/25   Patient discharged home via private ride .  Discharge education and teaching completed with Gisella MIR Nicholas and _Husband at bedside___.  RN signature: MB, RN

## 2025-03-16 NOTE — CARE PLAN
The clinical goals for the shift include Pt will remain HDS and have BM 7a-7p    Problem: Fall/Injury  Goal: Not fall by end of shift  Outcome: Met  Goal: Be free from injury by end of the shift  Outcome: Met  Goal: Verbalize understanding of personal risk factors for fall in the hospital  Outcome: Met  Goal: Verbalize understanding of risk factor reduction measures to prevent injury from fall in the home  Outcome: Met  Goal: Use assistive devices by end of the shift  Outcome: Met  Goal: Pace activities to prevent fatigue by end of the shift  Outcome: Met

## 2025-03-16 NOTE — DISCHARGE SUMMARY
Discharge Diagnosis  Pelvic mass    Issues Requiring Follow-Up  Pelvic mass    Test Results Pending At Discharge  Pending Labs       No current pending labs.            Hospital Course  75yo F with ER pos breast cancer admitted as transfer from OSH for new pelvic mass, pain, and constipation. Pt presented for 3 day history of worsening pain and constipation, workup notable for 10.5 x 9.5 x 6.5cm complex cystic pelvic mass. On arrival to [facility], vitals wnl, no infectious signs or symptoms, pain well controlled. She underwent TVUS that was suggestive of ovarian etiology. Tumor markers were within normal limits. She was started on a bowel regimen with resolution of her constipation. She was discharged with plans for outpatient follow-up with Gyn Onc and review at tumor board.     Pertinent Physical Exam At Time of Discharge  Physical Exam  Constitutional:       General: She is not in acute distress.  HENT:      Head: Normocephalic and atraumatic.      Mouth/Throat:      Mouth: Mucous membranes are moist.   Eyes:      Extraocular Movements: Extraocular movements intact.      Pupils: Pupils are equal, round, and reactive to light.   Cardiovascular:      Rate and Rhythm: Normal rate and regular rhythm.   Pulmonary:      Effort: Pulmonary effort is normal.   Abdominal:      General: Abdomen is flat.      Palpations: Abdomen is soft.      Tenderness: There is no abdominal tenderness.   Musculoskeletal:         General: Normal range of motion.   Skin:     General: Skin is warm and dry.   Neurological:      General: No focal deficit present.      Mental Status: She is alert.   Psychiatric:         Mood and Affect: Mood normal.         Home Medications     Medication List      START taking these medications     polyethylene glycol 17 gram packet; Commonly known as: Glycolax,   Miralax; Take 17 g by mouth 2 times a day.   sennosides 8.6 mg tablet; Commonly known as: Senokot; Take 1 tablet (8.6   mg) by mouth 2 times a day.      CONTINUE taking these medications     amantadine 100 mg capsule; Commonly known as: Symmetrel; Take 1 capsule   (100 mg) by mouth 2 times a day.   ammonium lactate 12 % cream; Commonly known as: Amlactin   anastrozole 1 mg tablet; Commonly known as: Arimidex   carbidopa-levodopa  mg tablet; Commonly known as: Sinemet; Take 2   tablets by mouth 3 times a day.   entacapone 200 mg tablet; Commonly known as: Comtan; Take 1 tablet (200   mg) by mouth 3 times a day.   HYDROcodone-acetaminophen 5-325 mg tablet; Commonly known as: Norco   hydroxychloroquine 200 mg tablet; Commonly known as: Plaquenil   loperamide 2 mg capsule; Commonly known as: Imodium   mesalamine 400 mg DR capsule; Commonly known as: Delzicol   predniSONE 10 mg tablets,dose pack   spironolacton-hydrochlorothiaz 25-25 mg tablet; Commonly known as:   Aldactazide   tiZANidine 2 mg tablet; Commonly known as: Zanaflex   Tylenol PM Extra Strength  mg per tablet; Generic drug:   diphenhydrAMINE-acetaminophen     STOP taking these medications     methylPREDNISolone 4 mg tablets; Commonly known as: Medrol Dospak     ASK your doctor about these medications     diazePAM 5 mg tablet; Commonly known as: Valium       Outpatient Follow-Up  Future Appointments   Date Time Provider Department Center   6/18/2025  2:30 PM Evans Villalta MD [id number] [facility]       Carmen Rodrigez MD

## 2025-03-16 NOTE — DISCHARGE INSTRUCTIONS
Call the Gynecologic Oncology office at [phone] for any problems and/or concerns    Call doctor right away for:  *Severe pain or bloating in your abdomen  *Persistent nausea/vomiting  *Chest pain/shortness of breath-call [phone].

## 2025-03-17 ASSESSMENT — ENCOUNTER SYMPTOMS
ABDOMINAL PAIN: 1
BACK PAIN: 0
SHORTNESS OF BREATH: 0
NAUSEA: 0
DIARRHEA: 0
ABDOMINAL DISTENTION: 0
VOMITING: 0
CONSTIPATION: 1
CHEST TIGHTNESS: 0

## 2025-03-18 NOTE — TUMOR BOARD NOTE
Gynecologic Oncology Tumor Board 2025    Specialties Present: Gyn Onc, Radiology, Genetics, Radiation oncology, Pathology, Nurse Navigator, Research    Gisella Nicholas  74 y.o.    1950  MRN [id number]    Provider: Abdi Sweet MD  Disease Site/Stage: Unknown  Pathology: N/A  Prior Therapy:  N/A  Impression: 74 y.o. with complex medical history including breast cancer, ulcerative colitis, and Parkinson disease now with pelvic mass and normal tumor markers.  Cannot definitively rule out epithelial ovarian neoplasm on R adenxa, L adnexa more c/w chronic TOA    We reviewed previous medical and familial history, history of present illness, and recent lab results along with all available histopathologic and imaging studies. The tumor board considered available treatment options and made the following recommendations.    Recommendations:     Interval imaging CT 2mo, consider course TOA antibiotics.      Clinical Trial Consideration: None Available    National site-specific guidelines were discussed with respect to the case. It is recognized that there may be additional factors not discussed in the Review Board discussion that can influence management decisions, and alternative management options which fall within the standard of care. Accordingly the final treatment disposition will be determined by the patient, in the context of an informed discussion with their providers. The actual prescribed management or treatment plan may or may not be consistent with the Review Board recommendations.

## 2025-03-21 ENCOUNTER — TUMOR BOARD CONFERENCE (OUTPATIENT)
Dept: HEMATOLOGY/ONCOLOGY | Facility: HOSPITAL | Age: 75
End: 2025-03-21
Payer: MEDICARE

## 2025-03-21 ENCOUNTER — TELEMEDICINE (OUTPATIENT)
Dept: GYNECOLOGIC ONCOLOGY | Facility: HOSPITAL | Age: 75
End: 2025-03-21
Payer: MEDICARE

## 2025-03-21 DIAGNOSIS — R19.00 PELVIC MASS: Primary | ICD-10-CM

## 2025-03-21 DIAGNOSIS — N70.93 TOA (TUBO-OVARIAN ABSCESS): ICD-10-CM

## 2025-03-21 RX ORDER — AMOXICILLIN AND CLAVULANATE POTASSIUM 875; 125 MG/1; MG/1
1 TABLET, FILM COATED ORAL 2 TIMES DAILY
Qty: 28 TABLET | Refills: 0 | Status: SHIPPED | OUTPATIENT
Start: 2025-03-21 | End: 2025-04-04

## 2025-03-21 NOTE — PROGRESS NOTES
Patient ID: Gisella Nicholas is a 74 y.o. female.  Referring Physician: No referring provider defined for this encounter.  Primary Care Provider: Joel Maxwell MD    Virtual or Telephone Consent    An interactive audio and video telecommunication system which permits real time communications between the patient (at the originating site) and provider (at the distant site) was utilized to provide this telehealth service.   Verbal consent was requested and obtained from Gisella Nicholas on this date, 25 for a telehealth visit and the patient's location was confirmed at the time of the visit.      Subjective    HPI  74 y.o. female presenting with new pelvic mass.  Was admitted with pain, constipation.  These symptoms have resolved.   WNL.      Reviewed at tumor board today with plan for close interval imaging, treatment for TOA.    Imaging:  CTAP  FINDINGS:   The liver, gallbladder, adrenals, kidneys, and pancreas are unremarkable.   Calcified splenic granulomas are noted.     There is no abdominal aortic aneurysm.  No enlarged lymph nodes are   identified.     There is a complex cystic mass in the pelvis anterior to the uterus measuring   10.5 x 6.5 x 9.5 cm.  There is prominent stooling gas throughout the majority   of the colon most pronounced in the ascending colon.  There is no evidence of   high-grade bowel obstruction although there is tapering of the sigmoid colon   where it courses along the mass which could represent partial obstruction.   There is no free air.  There is trace free fluid in the pelvis.     There is no acute bony abnormality.  There are no suspicious osseous lesions.           Past Medical History  L breast cancer (ER pos, on anastrazole), HTN, Parkinson's Disease, Osteoarthritis, Ulcerative Colitis      Surgical History  C/S x1, breast biopsy, knee surgery, hip surgery, shoulder surgery, strabismus surgery      OB/GYN History  . C-sectionx1,  x2  Menopause in late 50s. No  PMB. No HRT  No hx of abnormal paps      Social History  Occ EtOH use, marijuana gummies. Never smoker. Retired      Family History  No family history of ovarian, uterine, or colon cancer     Allergies  Tramadol and Sulfa (sulfonamide antibiotics)    Objective    BSA: There is no height or weight on file to calculate BSA.  There were no vitals taken for this visit.     Physical Exam    Performance Status:  Asymptomatic    Assessment/Plan   73yo pt with pelvic mass, concern for TOA, h/o ulcerative colitis    Oncology History    No history exists.       # Pelvic mass, possible TOA  - We discussed the possible etiologies of a pelvic mass including benign, borderline, and malignant.   - Imaging was reviewed and discussed with the patient  - Tumor markers WNL  - Discussed tumor board recommendations of treatment for TOA and close interval imaging  - Plan CT in 6wks with follow up after  - Augmentin x 2 weeks for TOA      Abdi Sweet MD  D/w Dr. Marcano    I performed this visit using real-time telehealth tools, including an audio/video connection between the patient and myself. I spent 20 minutes virtually with this patient and/or family. More than 50% of the time was spent in counseling and/or coordination of care.

## 2025-04-03 DIAGNOSIS — G20.A1 PARKINSON'S DISEASE, UNSPECIFIED WHETHER DYSKINESIA PRESENT, UNSPECIFIED WHETHER MANIFESTATIONS FLUCTUATE: ICD-10-CM

## 2025-04-03 RX ORDER — CARBIDOPA AND LEVODOPA 25; 100 MG/1; MG/1
2 TABLET ORAL 3 TIMES DAILY
Qty: 180 TABLET | Refills: 11 | Status: SHIPPED | OUTPATIENT
Start: 2025-04-03 | End: 2026-04-03

## 2025-05-02 DIAGNOSIS — R19.00 INTRA-ABDOMINAL AND PELVIC SWELLING, MASS AND LUMP, UNSPECIFIED SITE: Primary | ICD-10-CM

## 2025-05-03 DIAGNOSIS — R19.00 INTRA-ABDOMINAL AND PELVIC SWELLING, MASS AND LUMP, UNSPECIFIED SITE: ICD-10-CM

## 2025-05-05 ENCOUNTER — LAB (OUTPATIENT)
Dept: LAB | Facility: HOSPITAL | Age: 75
End: 2025-05-05
Payer: MEDICARE

## 2025-05-05 ENCOUNTER — HOSPITAL ENCOUNTER (OUTPATIENT)
Dept: RADIOLOGY | Facility: HOSPITAL | Age: 75
Discharge: HOME | End: 2025-05-05
Payer: MEDICARE

## 2025-05-05 DIAGNOSIS — R69 ILLNESS, UNSPECIFIED: Primary | ICD-10-CM

## 2025-05-05 DIAGNOSIS — R19.00 PELVIC MASS: ICD-10-CM

## 2025-05-05 LAB
CREAT SERPL-MCNC: 0.93 MG/DL (ref 0.5–1.05)
EGFRCR SERPLBLD CKD-EPI 2021: 64 ML/MIN/1.73M*2
HOLD SPECIMEN: 293

## 2025-05-05 PROCEDURE — 36415 COLL VENOUS BLD VENIPUNCTURE: CPT

## 2025-05-05 PROCEDURE — 2550000001 HC RX 255 CONTRASTS: Performed by: STUDENT IN AN ORGANIZED HEALTH CARE EDUCATION/TRAINING PROGRAM

## 2025-05-05 PROCEDURE — 74177 CT ABD & PELVIS W/CONTRAST: CPT

## 2025-05-05 PROCEDURE — 82565 ASSAY OF CREATININE: CPT | Performed by: STUDENT IN AN ORGANIZED HEALTH CARE EDUCATION/TRAINING PROGRAM

## 2025-05-05 RX ADMIN — IOHEXOL 75 ML: 350 INJECTION, SOLUTION INTRAVENOUS at 14:02

## 2025-05-09 ENCOUNTER — OFFICE VISIT (OUTPATIENT)
Dept: GYNECOLOGIC ONCOLOGY | Facility: HOSPITAL | Age: 75
End: 2025-05-09
Payer: MEDICARE

## 2025-05-09 ENCOUNTER — HOSPITAL ENCOUNTER (OUTPATIENT)
Facility: HOSPITAL | Age: 75
Setting detail: OUTPATIENT SURGERY
End: 2025-05-09
Attending: STUDENT IN AN ORGANIZED HEALTH CARE EDUCATION/TRAINING PROGRAM | Admitting: STUDENT IN AN ORGANIZED HEALTH CARE EDUCATION/TRAINING PROGRAM
Payer: MEDICARE

## 2025-05-09 VITALS
RESPIRATION RATE: 17 BRPM | DIASTOLIC BLOOD PRESSURE: 68 MMHG | SYSTOLIC BLOOD PRESSURE: 120 MMHG | BODY MASS INDEX: 26.64 KG/M2 | HEART RATE: 88 BPM | TEMPERATURE: 97.7 F | OXYGEN SATURATION: 95 % | WEIGHT: 141 LBS

## 2025-05-09 DIAGNOSIS — K51.919 ULCERATIVE COLITIS WITH COMPLICATION, UNSPECIFIED LOCATION (MULTI): ICD-10-CM

## 2025-05-09 DIAGNOSIS — G20.A1 PARKINSON'S DISEASE, UNSPECIFIED WHETHER DYSKINESIA PRESENT, UNSPECIFIED WHETHER MANIFESTATIONS FLUCTUATE: ICD-10-CM

## 2025-05-09 DIAGNOSIS — R19.00 PELVIC MASS IN FEMALE: Primary | ICD-10-CM

## 2025-05-09 PROCEDURE — 99214 OFFICE O/P EST MOD 30 MIN: CPT | Performed by: STUDENT IN AN ORGANIZED HEALTH CARE EDUCATION/TRAINING PROGRAM

## 2025-05-09 ASSESSMENT — PAIN SCALES - GENERAL: PAINLEVEL_OUTOF10: 0-NO PAIN

## 2025-05-09 NOTE — PROGRESS NOTES
Patient ID: Gisella Nicholas is a 74 y.o. female.  Referring Physician: No referring provider defined for this encounter.  Primary Care Provider: Joel Maxwell MD      Subjective    HPI  74 y.o. female presenting with pelvic mass.  Was admitted with pain, constipation.  Tumor board suspected TOA.   WNL.  Tried 2 week course augmentin.   CT without interval change, new liver finding.        Past Medical History  L breast cancer (ER pos, on anastrazole), HTN, Parkinson's Disease, Osteoarthritis, Ulcerative Colitis      Surgical History  C/S x1, breast biopsy, knee surgery, hip surgery, shoulder surgery, strabismus surgery      OB/GYN History  . C-sectionx1,  x2  Menopause in late 50s. No PMB. No HRT  No hx of abnormal paps      Social History  Occ EtOH use, marijuana gummies. Never smoker. Retired      Family History  No family history of ovarian, uterine, or colon cancer     Allergies  Tramadol and Sulfa (sulfonamide antibiotics)    Objective    BSA: 1.66 meters squared  /68 (BP Location: Left arm, Patient Position: Sitting, BP Cuff Size: Adult)   Pulse 88   Temp 36.5 °C (97.7 °F)   Resp 17   Wt 64 kg (141 lb)   SpO2 95%   BMI 26.64 kg/m²      Physical Exam  Constitutional:       Appearance: Normal appearance. She is normal weight.   HENT:      Head: Normocephalic and atraumatic.   Cardiovascular:      Rate and Rhythm: Normal rate and regular rhythm.   Pulmonary:      Effort: Pulmonary effort is normal.      Breath sounds: Normal breath sounds.   Abdominal:      General: Abdomen is flat. Bowel sounds are normal.   Musculoskeletal:         General: Normal range of motion.      Cervical back: Normal range of motion.   Skin:     General: Skin is warm and dry.   Neurological:      General: No focal deficit present.      Mental Status: She is alert and oriented to person, place, and time. Mental status is at baseline.   Psychiatric:         Mood and Affect: Mood normal.          Behavior: Behavior normal.         Thought Content: Thought content normal.         Judgment: Judgment normal.         Performance Status:  Asymptomatic    Assessment/Plan   73yo pt with pelvic mass, concern for TOA, h/o ulcerative colitis    Oncology History    No history exists.       # Pelvic mass  - We discussed the possible etiologies of a pelvic mass including benign, borderline, and malignant.   - Imaging was reviewed and discussed with the patient  - Tumor markers WNL  - No improvement s/p treatment for TOA, cannot rule out cancer  - Discussed liver finding on CT - will review at  and determine if she may need MRI (has orthopedic hardward, unsure if she can have MRI)  - Plan for robotic excision of mass, BSO, poss hyst/staging.  Still deciding on hyst.  Consent signed  - Will need PAT  - Plan overtnight stay, uses walker secondary to parkinsons      Abdi Sweet MD  Seen with Dr. Marcano

## 2025-05-14 ENCOUNTER — OFFICE VISIT (OUTPATIENT)
Dept: FAMILY MEDICINE CLINIC | Age: 75
End: 2025-05-14

## 2025-05-14 VITALS
OXYGEN SATURATION: 96 % | WEIGHT: 138 LBS | HEIGHT: 61 IN | HEART RATE: 87 BPM | BODY MASS INDEX: 26.06 KG/M2 | SYSTOLIC BLOOD PRESSURE: 142 MMHG | TEMPERATURE: 97.7 F | DIASTOLIC BLOOD PRESSURE: 72 MMHG

## 2025-05-14 DIAGNOSIS — B37.0 THRUSH: ICD-10-CM

## 2025-05-14 DIAGNOSIS — K14.0 TONGUE ULCERATION: Primary | ICD-10-CM

## 2025-05-14 DIAGNOSIS — J02.9 SORE THROAT: ICD-10-CM

## 2025-05-14 LAB — S PYO AG THROAT QL: NORMAL

## 2025-05-14 RX ORDER — NYSTATIN 100000 [USP'U]/ML
500000 SUSPENSION ORAL 4 TIMES DAILY
Qty: 200 ML | Refills: 0 | Status: SHIPPED | OUTPATIENT
Start: 2025-05-14 | End: 2025-05-24

## 2025-05-14 ASSESSMENT — ENCOUNTER SYMPTOMS
ABDOMINAL PAIN: 0
WHEEZING: 0
NAUSEA: 0
RHINORRHEA: 1
SHORTNESS OF BREATH: 0
SORE THROAT: 1
COUGH: 0
VOMITING: 0
DIARRHEA: 0

## 2025-05-14 NOTE — PATIENT INSTRUCTIONS
If symptoms do not improve return for re-evaluation or see primary care provider. Go to the ER for worsening symptoms      Call ENT for appointment

## 2025-05-14 NOTE — PROGRESS NOTES
Subjective  Annabel Ingram, 74 y.o. female presents today with:  Chief Complaint   Patient presents with    Pharyngitis     With plugged ears both sides x 1 week taking motrin and Claritin otc         HPI  Patient reports mouth sores on tongue too   Seems worse and gets to ears  Throat pain  Ear pain   Has had this treated before and did not work - magic mouthwash did not help    Sores in mouth about 2 wks   Prednisone helps them go away   Can only eat smooth things  No diet change     Sore throat about 2 wks  Using motrin and tylenol with some relief     Just got over a cold     Hx of UC recent EGD was normal     Does think the nystatin helped last time    Hx of breast cancer   Needs surgery for new pelvic mass concerning for neoplasm,  also lesion of liver that is new   Review of Systems   Constitutional:  Negative for chills and fever.   HENT:  Positive for congestion, ear pain, rhinorrhea and sore throat.    Respiratory:  Negative for cough, shortness of breath and wheezing.    Gastrointestinal:  Negative for abdominal pain, diarrhea, nausea and vomiting.   Musculoskeletal:  Negative for myalgias.   Allergic/Immunologic: Positive for environmental allergies.   Neurological:  Negative for headaches.       Past Medical History:   Diagnosis Date    Breast cancer (HCC)     Carcinoma of upper-inner quadrant of left breast in female, estrogen receptor positive (HCC) 2023    Hypertension     meds > 10 yrs / denies TIA or stroke    Osteoarthritis     DJD right hip    Parkinson disease (HCC)     Parkinsonism (HCC)      Past Surgical History:   Procedure Laterality Date    BREAST BIOPSY Left     US bx. Patient states B9     SECTION  1988    COLONOSCOPY N/A 2020    COLONOSCOPY WITH POLYPECTOMY performed by Lyle Ruiz MD at [facility]    DILATION AND CURETTAGE OF UTERUS      AUB    KNEE SURGERY Left     arthrotomy    SHOULDER ARTHROSCOPY Left 2021    LEFT SHOULDER

## 2025-05-14 NOTE — TUMOR BOARD NOTE
Gynecologic Oncology Tumor Board 2025         Gisella Nicholas  74 y.o.    1950  MRN [id number]    Provider: Abdi Sweet MD  Disease Site/Stage: Unknown  Pathology: N/A  Prior Therapy:  14 day Augmentin course   Impression: 74y medically complex female with a pelvic mass, inability to rule out malignancy, liver nodule    We reviewed previous medical and familial history, history of present illness, and recent lab results along with all available histopathologic and imaging studies. The tumor board considered available treatment options and made the following recommendations.    Recommendations:   liver MRI          Clinical Trial Consideration: None Available    National site-specific guidelines were discussed with respect to the case. It is recognized that there may be additional factors not discussed in the Review Board discussion that can influence management decisions, and alternative management options which fall within the standard of care. Accordingly the final treatment disposition will be determined by the patient, in the context of an informed discussion with their providers. The actual prescribed management or treatment plan may or may not be consistent with the Review Board recommendations.

## 2025-05-16 ENCOUNTER — HOSPITAL ENCOUNTER (OUTPATIENT)
Dept: RADIOLOGY | Facility: HOSPITAL | Age: 75
Discharge: HOME | End: 2025-05-16
Payer: MEDICARE

## 2025-05-16 ENCOUNTER — TUMOR BOARD CONFERENCE (OUTPATIENT)
Dept: HEMATOLOGY/ONCOLOGY | Facility: HOSPITAL | Age: 75
End: 2025-05-16
Payer: MEDICARE

## 2025-05-16 DIAGNOSIS — K76.89 LIVER NODULE: ICD-10-CM

## 2025-05-16 DIAGNOSIS — K76.89 LIVER NODULE: Primary | ICD-10-CM

## 2025-05-16 PROCEDURE — 2550000001 HC RX 255 CONTRASTS: Performed by: STUDENT IN AN ORGANIZED HEALTH CARE EDUCATION/TRAINING PROGRAM

## 2025-05-16 PROCEDURE — A9575 INJ GADOTERATE MEGLUMI 0.1ML: HCPCS | Performed by: STUDENT IN AN ORGANIZED HEALTH CARE EDUCATION/TRAINING PROGRAM

## 2025-05-16 PROCEDURE — 74183 MRI ABD W/O CNTR FLWD CNTR: CPT

## 2025-05-16 RX ORDER — GADOTERATE MEGLUMINE 376.9 MG/ML
0.2 INJECTION INTRAVENOUS
Status: COMPLETED | OUTPATIENT
Start: 2025-05-16 | End: 2025-05-16

## 2025-05-16 RX ADMIN — GADOTERATE MEGLUMINE 13 ML: 376.9 INJECTION INTRAVENOUS at 14:18

## 2025-05-28 ENCOUNTER — PRE-ADMISSION TESTING (OUTPATIENT)
Dept: PREADMISSION TESTING | Facility: HOSPITAL | Age: 75
End: 2025-05-28
Payer: MEDICARE

## 2025-05-28 VITALS
OXYGEN SATURATION: 97 % | DIASTOLIC BLOOD PRESSURE: 67 MMHG | WEIGHT: 133 LBS | SYSTOLIC BLOOD PRESSURE: 113 MMHG | BODY MASS INDEX: 25.11 KG/M2 | HEART RATE: 87 BPM | HEIGHT: 61 IN | TEMPERATURE: 97.4 F

## 2025-05-28 DIAGNOSIS — K76.89 LIVER NODULE: ICD-10-CM

## 2025-05-28 DIAGNOSIS — R19.00 PELVIC MASS IN FEMALE: Primary | ICD-10-CM

## 2025-05-28 DIAGNOSIS — Z01.818 PREOPERATIVE TESTING: ICD-10-CM

## 2025-05-28 LAB
ABO GROUP (TYPE) IN BLOOD: NORMAL
ALBUMIN SERPL BCP-MCNC: 4 G/DL (ref 3.4–5)
ALP SERPL-CCNC: 73 U/L (ref 33–136)
ALT SERPL W P-5'-P-CCNC: 4 U/L (ref 7–45)
ANION GAP SERPL CALC-SCNC: 12 MMOL/L (ref 10–20)
ANTIBODY SCREEN: NORMAL
APTT PPP: 28 SECONDS (ref 26–36)
AST SERPL W P-5'-P-CCNC: 17 U/L (ref 9–39)
BILIRUB SERPL-MCNC: 0.4 MG/DL (ref 0–1.2)
BUN SERPL-MCNC: 18 MG/DL (ref 6–23)
CALCIUM SERPL-MCNC: 11.2 MG/DL (ref 8.6–10.6)
CHLORIDE SERPL-SCNC: 95 MMOL/L (ref 98–107)
CO2 SERPL-SCNC: 32 MMOL/L (ref 21–32)
CREAT SERPL-MCNC: 1.03 MG/DL (ref 0.5–1.05)
EGFRCR SERPLBLD CKD-EPI 2021: 56 ML/MIN/1.73M*2
ERYTHROCYTE [DISTWIDTH] IN BLOOD BY AUTOMATED COUNT: 14.4 % (ref 11.5–14.5)
GLUCOSE SERPL-MCNC: 93 MG/DL (ref 74–99)
HCT VFR BLD AUTO: 44.3 % (ref 36–46)
HGB BLD-MCNC: 14.5 G/DL (ref 12–16)
INR PPP: 1.1 (ref 0.9–1.1)
MCH RBC QN AUTO: 31.8 PG (ref 26–34)
MCHC RBC AUTO-ENTMCNC: 32.7 G/DL (ref 32–36)
MCV RBC AUTO: 97 FL (ref 80–100)
NRBC BLD-RTO: 0 /100 WBCS (ref 0–0)
PLATELET # BLD AUTO: 457 X10*3/UL (ref 150–450)
POTASSIUM SERPL-SCNC: 4.2 MMOL/L (ref 3.5–5.3)
PROT SERPL-MCNC: 7.9 G/DL (ref 6.4–8.2)
PROTHROMBIN TIME: 12.2 SECONDS (ref 9.8–12.4)
RBC # BLD AUTO: 4.56 X10*6/UL (ref 4–5.2)
RH FACTOR (ANTIGEN D): NORMAL
SODIUM SERPL-SCNC: 135 MMOL/L (ref 136–145)
WBC # BLD AUTO: 8.6 X10*3/UL (ref 4.4–11.3)

## 2025-05-28 PROCEDURE — 85027 COMPLETE CBC AUTOMATED: CPT

## 2025-05-28 PROCEDURE — 86901 BLOOD TYPING SEROLOGIC RH(D): CPT

## 2025-05-28 PROCEDURE — 85730 THROMBOPLASTIN TIME PARTIAL: CPT

## 2025-05-28 PROCEDURE — 87081 CULTURE SCREEN ONLY: CPT

## 2025-05-28 PROCEDURE — 80053 COMPREHEN METABOLIC PANEL: CPT

## 2025-05-28 PROCEDURE — 99204 OFFICE O/P NEW MOD 45 MIN: CPT

## 2025-05-28 PROCEDURE — 36415 COLL VENOUS BLD VENIPUNCTURE: CPT

## 2025-05-28 RX ORDER — CHOLECALCIFEROL (VITAMIN D3) 25 MCG
1 TABLET ORAL DAILY
COMMUNITY

## 2025-05-28 RX ORDER — NYSTATIN 100000 [USP'U]/ML
5 SUSPENSION ORAL 2 TIMES DAILY
COMMUNITY
Start: 2025-05-14

## 2025-05-28 RX ORDER — CHLORHEXIDINE GLUCONATE 40 MG/ML
SOLUTION TOPICAL
Qty: 473 ML | Refills: 0 | Status: SHIPPED | OUTPATIENT
Start: 2025-05-28

## 2025-05-28 RX ORDER — CHLORHEXIDINE GLUCONATE ORAL RINSE 1.2 MG/ML
SOLUTION DENTAL
Qty: 120 ML | Refills: 0 | Status: SHIPPED | OUTPATIENT
Start: 2025-05-28

## 2025-05-28 ASSESSMENT — DUKE ACTIVITY SCORE INDEX (DASI)
CAN YOU DO HEAVY WORK AROUND THE HOUSE LIKE SCRUBBING FLOORS OR LIFTING AND MOVING HEAVY FURNITURE: NO
DASI METS SCORE: 3.9
CAN YOU CLIMB A FLIGHT OF STAIRS OR WALK UP A HILL: NO
CAN YOU PARTICIPATE IN MODERATE RECREATIONAL ACTIVITIES LIKE GOLF, BOWLING, DANCING, DOUBLES TENNIS OR THROWING A BASEBALL OR FOOTBALL: NO
CAN YOU HAVE SEXUAL RELATIONS: YES
CAN YOU DO YARD WORK LIKE RAKING LEAVES, WEEDING OR PUSHING A MOWER: NO
CAN YOU DO LIGHT WORK AROUND THE HOUSE LIKE DUSTING OR WASHING DISHES: NO
CAN YOU TAKE CARE OF YOURSELF (EAT, DRESS, BATHE, OR USE TOILET): YES
TOTAL_SCORE: 9.75
CAN YOU WALK INDOORS, SUCH AS AROUND YOUR HOUSE: YES
CAN YOU PARTICIPATE IN STRENOUS SPORTS LIKE SWIMMING, SINGLES TENNIS, FOOTBALL, BASKETBALL, OR SKIING: NO
CAN YOU DO MODERATE WORK AROUND THE HOUSE LIKE VACUUMING, SWEEPING FLOORS OR CARRYING GROCERIES: NO
CAN YOU WALK A BLOCK OR TWO ON LEVEL GROUND: NO
CAN YOU RUN A SHORT DISTANCE: NO

## 2025-05-28 ASSESSMENT — ENCOUNTER SYMPTOMS
BRUISES/BLEEDS EASILY: 1
NECK NEGATIVE: 1
ENDOCRINE NEGATIVE: 1
MUSCULOSKELETAL NEGATIVE: 1
CARDIOVASCULAR NEGATIVE: 1
NEUROLOGICAL NEGATIVE: 1
CONSTIPATION: 1
RESPIRATORY NEGATIVE: 1
CONSTITUTIONAL NEGATIVE: 1

## 2025-05-28 ASSESSMENT — LIFESTYLE VARIABLES: SMOKING_STATUS: NONSMOKER

## 2025-05-28 NOTE — PREPROCEDURE INSTRUCTIONS
Thank you for visiting [facility] ([facility]) today for your pre-procedure evaluation, you were seen by     ALDA Lau  Department of Anesthesiology and Perioperative Medicine  Main phone [phone]  Fax [phone]    This summary includes instructions and information to aid you during your perioperative period.  Please read carefully. If you have any questions about your visit today, please call the number listed above.  If you become ill or have any changes to your health before your surgery, please contact your primary care provider and alert your surgeon.    General Medications Instructions (see back for further medication instructions)  Hold Vit D supplementation day of surgery  Hold all other vitamins and supplements 7 days prior to surgery  Tylenol okay to continue, please hold Aleve/naproxen/ibuprofen (NSAIDs) for 7 days prior to surgery  No lotion/moisturizers or Deoderant after last shower prior to surgery    You will be called business day prior to surgery to confirm arrival time.     Preparing for Surgery       Preoperative Fasting Guidelines  Why must I stop eating and drinking near surgery time?  With sedation, food or liquid in your stomach can enter your lungs causing serious complications  Food can increase nausea and vomiting  When do I need to stop eating and drinking before my surgery?  Do not eat any food after midnight the night before your surgery/procedure.  You may have up to 13.5 ounces of clear liquid until TWO hours before your instructed arrival time to the hospital.  This includes water, black tea/coffee, (no milk or cream) apple juice, and electrolyte drinks (Gatorade)  You may chew gum until TWO hours before your surgery/procedure    Tobacco and Alcohol;  Do not drink alcohol or smoke within 24 hours of surgery.  It is best to quit smoking for as long as possible before any surgery or procedure.       Other Instructions  Why did I have my  "nose, under my arms, and groin swabbed? The purpose of the swab is to identify Staphylococcus aureus inside your nose or on your skin.  The swab was sent to the laboratory for culture.  A positive swab/culture for Staphylococcus aureus is called colonization or carriage.   What is Staphylococcus aureus? Staphylococcus aureus, also known as \"staph\", is a germ found on the skin or in the nose of healthy people.  Sometimes Staphylococcus aureus can get into the body and cause an infection.  This can be minor (such as pimples, boils, or other skin problems).  It might also be serious (such as a blood infection, pneumonia, or a surgical site infection). What is Staphylococcus aureus colonization or carriage? Colonization or carriage means that a person has the germ but is not sick from it.  These bacteria can be spread on the hands or when breathing or sneezing. How is Staphylococcus aureus spread? It is most often spread by close contact with a person or item that carries it. What happens if my culture is positive for Staphylococcus aureus? Your doctor/medical team will use this information to guide any antibiotic treatment which may be necessary.  Regardless of the culture results, we will clean the inside of your nose with a betadine swab just before you have your surgery. Will I get an infection if I have Staphylococcus aureus in my nose or on my skin? Anyone can get an infection with Staphylococcus aureus.  However, the best way to reduce your risk of infection is to follow the instructions provided to you for the use of your CHG soap and dental rinse.  , Body Wash; What is a home preoperative antibacterial shower? This shower is a way of cleaning the skin with a germ-killing solution before surgery.  The solution contains chlorhexidine, commonly known as CHG.  CHG is a skin cleanser with germ-killing ability.  Let your doctor know if you are allergic to chlorhexidine. Why do I need to take a preoperative " antibacterial shower? Skin is not sterile.  It is best to try to make your skin as free of germs as possible before surgery.  Proper cleansing with a germ-killing soap before surgery can lower the number of germs on your skin.  This helps to reduce the risk of infection at the surgical site.  Following the instructions listed below will help you prepare your skin for surgery.   How do I use the solution? Steps:  Begin using your CHG soap 5 days before your scheduled surgery on ___________.   First, wash and rinse your hair using the CHG soap. Keep CHG soap away from ear canals and eyes.  Rinse completely, do not condition.  Hair extensions should be removed. , Oral/Dental Rinse: What is oral/dental rinse?  It is a mouthwash. It is a way of cleaning the mouth with a germ-killing solution before your surgery.  The solution contains chlorhexidine, commonly known as CHG. It is used inside the mouth to kill a bacteria known as Staphylococcus aureus.  Let your doctor know if you are allergic to Chlorhexidine. Why do I need to use CHG oral/dental rinse? The CHG oral/dental rinse helps to kill a bacteria in your mouth known as Staphylococcus aureus.  This reduces the risk of infection at the surgical site.  Using your CHG oral/dental rinse STEPS: Use your CHG oral/dental rinse after you brush your teeth the night before (at bedtime) and the morning of your surgery.  Follow all directions on your prescription label.  Use the cap on the container to measure 15 ml.  Swish (gargle if you can) the mouthwash in your mouth for at least 30 seconds, (do not swallow) and spit out.  After you use your CHG rinse, do not rinse your mouth with water, drink or eat.  Please refer to the prescription label for the appropriate time to resume oral intake What side effects might I have using the CHG oral/dental rinse? CHG rinse will stick to plaque on the teeth.  Brush and floss just before use.  Teeth brushing will help avoid staining of  plaque during use.     Ensure Surgery Immuno-Nutrition Shake; This shake provides specialized nutrients to help prepare your body for surgery. Your surgeon's office may send it to your home, or you may receive it from [facility] if you are scheduled for an appointment.  If your surgeon has instructed you to begin the shakes and you have not received them at least 7 days before your scheduled surgery, please contact your surgeon's office. You should begin drinking your shakes 6 days before your surgery date, start on _______.  You should drink 1 carton three times a day, you can have one carton with breakfast, lunch, and dinner.  If your surgeon has given you instructions to drink clear liquids the day before surgery, you can continue to drink your Ensure Surgery immune-nutrition shakes.     The Week before Surgery        Seven days before Surgery  Check your [facility] medication instructions  Do the exercises provided to you by [facility]   Arrange for a responsible, adult licensed  to take you home after surgery and stay with you for 24 hours.  You will not be permitted to drive yourself home if you have received any anesthetic/sedation  Five days before surgery  Check your [facility] medication instructions  Do the exercises provided to you by [facility]   Start using Chlorhexidene (CHG) body wash if prescribed (Continue till day of surgery)      The Day before Surgery       Check your [facility] medication and all other [facility] instructions including when to stop eating and drinking  You will be called with your arrival time for surgery in the late afternoon.  If you do not receive a call please reach out to your surgeon's office.  Do not smoke or drink 24 hours before surgery  Prepare items to bring with you to the hospital  Shower with your chlorhexidine wash if prescribed  Brush your teeth and use your chlorhexidine dental rinse if prescribed    The Day of Surgery       Check your [facility] medication  instructions  Shower, if prescribed use CHG.  Do not apply any lotions, creams, moisturizers, perfume or deodorant  Brush your teeth and use your CHG dental rinse if prescribed  Wear loose comfortable clothing  Avoid make-up  Remove  jewelry and piercings, consider professional piercing removal with a plastic spacer if needed  Bring photo ID and Insurance card  Bring an accurate medication list that includes medication dose, frequency and allergies  Bring a copy of your advanced directives (will, health care power of )  Bring any devices and controllers as well as medical devices you have been provided with for surgery (CPAP, slings, braces, etc.)  Dentures, eyeglasses, and contacts will be removed before surgery, please bring cases for contacts or glasses    Preoperative Deep Breathing Exercises    Why it is important to do deep breathing exercises before my surgery?  Deep breathing exercises strengthen your breathing muscles.  This helps you to recover after your surgery and decreases the chance of breathing complications.    How are the deep breathing exercises done?  Sit straight with your back supported.  Breathe in deeply and slowly through your nose. Your lower rib cage should expand and your abdomen may move forward.  Hold that breath for 3 to 5 seconds.  Breathe out through pursed lips, slowly and completely.  Rest and repeat 10 times every hour while awake.  Rest longer if you become dizzy or lightheaded.    Incentive Spirometry Incentive Spirometer   You were provided with an incentive spirometer in [facility], please follow the below instructions.  What is an incentive spirometer?  An incentive spirometer is a device used before and after surgery to “exercise” your lungs.  It helps you to take deeper breaths to expand your lungs.  Below is an example of a basic incentive spirometer.  The device you receive may differ slightly but they all function the same.    Why do I need to use an incentive  spirometer?  Using your incentive spirometer prepares your lungs for surgery and helps prevent lung problems after surgery.  How do I use my incentive spirometer?  When you're using your incentive spirometer, make sure to breathe through your mouth. If you breathe through your nose, the incentive spirometer won't work properly. You can hold your nose if you have trouble.  If you feel dizzy at any time, stop and rest. Try again at a later time.  Follow the steps below:  Set up your incentive spirometer, expand the flexible tubing and connect to the outlet.  Sit upright in a chair or bed. Hold the incentive spirometer at eye level.   Put the mouthpiece in your mouth and close your lips tightly around it. Slowly breathe out (exhale) completely.  Breathe in (inhale) slowly through your mouth as deeply as you can. As you take a breath, you will see the piston rise inside the large column. While the piston rises, the indicator should move upwards. It should stay in between the 2 arrows (see Figure).  Try to get the piston as high as you can, while keeping the indicator between the arrows.   If the indicator doesn't stay between the arrows, you're breathing either too fast or too slow.  When you get it as high as you can, hold your breath for 10 seconds, or as long as possible. While you're holding your breath, the piston will slowly fall to the base of the spirometer.  Once the piston reaches the bottom of the spirometer, breathe out slowly through your mouth. Rest for a few seconds.  Repeat 10 times. Try to get the piston to the same level with each breath.  Repeat every hour while awake  You can carefully clean the outside of the mouthpiece with an alcohol wipe or soap and water.       Preoperative Brain Exercises    What are brain exercises?  A brain exercise is any activity that engages your thinking (cognitive) skills.    What types of activities are considered brain exercises?  Jigsaw puzzles, crossword puzzles,  word iApp4Me, memory games, word search, and many more.  Many can be found free online or on your phone via a mobile diana.    Why should I do brain exercises before my surgery?  More recent research has shown brain exercise before surgery can lower the risk of postoperative delirium (confusion) which can be especially important for older adults.  Patients who did brain exercises for 5 to 10 hours the days before surgery, cut their risk of postoperative delirium in half up to 1 week after surgery.    Sit-to-Stand Exercise    What is the sit-to-stand exercise?  The sit-to-stand exercise strengthens the muscles of your lower body and muscles in the center of your body (core muscles for stability) helping to maintain and improve your strength and mobility.  How do I do the sit-to-stand exercise?  The goal is to do this exercise without using your arms or hands.  If this is too difficult, use your arms and hands or a chair with armrests to help slowly push yourself to the standing position and lower yourself back to the sitting position. As the movement becomes easier use your arms and hands less.    Steps to the sit-to-stand exercise  Sit up tall in a sturdy chair, knees bent, feet flat on the floor shoulder-width apart.  Shift your hips/pelvis forward in the chair to correctly position yourself for the next movement.  Lean forward at your hips.  Stand up straight putting equal weight on both feet.  Check to be sure you are properly aligned with the chair, in a slow controlled movement sit back down.  Repeat this exercise 10-15 times.  If needed you can do it fewer times until your strength improves.  Rest for 1 minute.  Do another 10-15 sit-to-stand exercises.  Try to do this in the morning and evening.       Simple things you can do to help prevent blood clots     Blood clots are blockages that can form in the body's veins. When a blood clot forms in your deep veins, it may be called a deep vein thrombosis, or DVT for  short. Blood clots can happen in any part of the body where blood flows, but they are most common in the arms and legs. If a piece of a blood clot breaks free and travels to the lungs, it is called a pulmonary embolus (PE). A PE can be a very serious problem.      Being in the hospital or having surgery can raise your chances of getting a blood clot because you may not be well enough to move around as much as you normally do.         Ways you can help prevent blood clots in the hospital       Wearing SCDs  SCDs stands for Sequential Compression Devices.   SCDs are special sleeves that wrap around your legs. They attach to a pump that fills them with air to gently squeeze your legs every few minutes.  This helps return the blood in your legs to your heart.   SCDs should only be taken off when walking or bathing. SCDs may not be comfortable, but they can help save your life.              Pump SCD leg sleeves  Wearing compression stockings - if your doctor orders them. These special snug-fitting stockings gently squeeze your legs to help blood flow.       Walking. Walking helps move the blood in your legs.   If your doctor says it is ok, try walking the halls at least   5 times a day. Ask us to help you get up, so you don't fall.      Taking any blood-thinning medicines your doctor orders.              Ways you can help prevent blood clots at home         Wearing compression stockings - if your doctor orders them.   Walking - to help move the blood in your legs.    Taking any blood-thinning medicines your doctor orders.      Signs of a blood clot or PE    Tell your doctor or nurse right away if you have any of the problems listed below.         If you are at home, seek medical care right away. Call 911 for chest pain or problems breathing.            Signs of a blood clot (DVT) - such as pain, swelling, redness, or warmth in your arm or legs.  Signs of a pulmonary embolism (PE) - such as chest pain or feeling short of  breath

## 2025-05-28 NOTE — CPM/PAT H&P
CPM/PAT Evaluation       Name: Gisella Nicholas (Gisella Nicholas)  /Age: 1950/74 y.o.     Visit Type:   In-Person       Chief Complaint: Perioperative visit    HPI 75 y/o female scheduled for Robotic removal of pelvic mass, bilateral salpingo oophorectomy, possible hysterectomy, staging, any indicated procedures and SALPINGO-OOPHORECTOMY, ROBOT-ASSISTED on 6/10/25 secondary to pelvis mass in female with Dr. Daylin Peterson who referred to [facility].  Presents to [facility] today for perioperative risk stratification and optimization. PMHX includes Left breast cancer, cataracts, colitis, HTN, parkinson's disease and pelvis mass.    PCP: Dr. Joel Maxwell with [facility]  Specialists: Abdi Sweet MD (Gynecologic oncology)     Medical History[1]    Surgical History[2]    Patient Sexual activity questions deferred to the physician.    Family History[3]    Allergies[4]    Prior to Admission medications    Medication Sig Start Date End Date Taking? Authorizing Provider   amantadine (Symmetrel) 100 mg capsule Take 1 capsule (100 mg) by mouth 2 times a day. 1/15/25 1/15/26  Joshua Briggs MD   ammonium lactate (Amlactin) 12 % cream  8/15/21   Historical Provider, MD   anastrozole (Arimidex) 1 mg tablet Take 1 tablet (1 mg total) by mouth once daily. 23   Historical Provider, MD   carbidopa-levodopa (Sinemet)  mg tablet Take 2 tablets by mouth 3 times a day. 4/3/25 4/3/26  Severine L Kako, MD   diphenhydrAMINE-acetaminophen (Tylenol PM Extra Strength)  mg per tablet Take 1 tablet by mouth.    Historical Provider, MD   mesalamine (Delzicol) 400 mg DR capsule Take 2 capsules (800 mg) by mouth 3 times a day. 11/15/21   Historical Provider, MD   predniSONE 10 mg tablets,dose pack Take by mouth.    Historical Provider, MD   spironolacton-hydrochlorothiaz (Aldactazide) 25-25 mg tablet Take 2 tablets (50 mg) by mouth once daily. 21   Historical Provider, MD YIP ROS:   Constitutional:   neg    Neuro/Psych:  "  neg    Eyes:    use of corrective lenses  Ears:   Nose:   neg    Mouth:    dental issues (dull tooth pain)  Throat:    throat pain (mouth lesions - has Nystatin)  Neck:    Limited neck ROM  neg    Cardio:   neg    Respiratory:   neg    Endocrine:   neg    GI:    constipation  :   neg    Musculoskeletal:   neg    Hematologic:    bruises/bleeds easily  Skin:  neg        Physical Exam  Vitals reviewed.   Constitutional:       Appearance: Normal appearance. She is normal weight.   HENT:      Head: Normocephalic.      Nose: Nose normal.      Mouth/Throat:      Pharynx: Oropharynx is clear.   Eyes:      Pupils: Pupils are equal, round, and reactive to light.      Comments: Corrective lenses on   Neck:      Comments: Limited neck ROM  Cardiovascular:      Rate and Rhythm: Normal rate.      Pulses: Normal pulses.      Heart sounds: Normal heart sounds.   Pulmonary:      Effort: Pulmonary effort is normal.      Breath sounds: Normal breath sounds.   Abdominal:      Palpations: There is mass.   Genitourinary:     Comments: Not examined   Musculoskeletal:         General: Normal range of motion.      Comments: PD, tremors    Skin:     General: Skin is warm and dry.   Neurological:      Mental Status: She is alert and oriented to person, place, and time.      Gait: Gait abnormal (uses walker).   Psychiatric:         Mood and Affect: Mood normal.         Behavior: Behavior normal.         Thought Content: Thought content normal.         Judgment: Judgment normal.         PAT AIRWAY:   Airway:     Mallampati::  III    TM distance::  >3 FB    Neck ROM::  Limited   Permanent bridge on the lower  normal        Visit Vitals  /67   Pulse 87   Temp 36.3 °C (97.4 °F)   Ht 1.549 m (5' 1\")   Wt 60.3 kg (133 lb)   SpO2 97%   BMI 25.13 kg/m²   Smoking Status Never   BSA 1.61 m²       DASI Risk Score      Flowsheet Row Pre-Admission Testing from 5/28/2025 in [facility]   Can you take care of yourself (eat, dress, " bathe, or use toilet)?  2.75 filed at 05/28/2025 1427   Can you walk indoors, such as around your house? 1.75 filed at 05/28/2025 1427   Can you walk a block or two on level ground?  0 filed at 05/28/2025 1427   Can you climb a flight of stairs or walk up a hill? 0 filed at 05/28/2025 1427   Can you run a short distance? 0 filed at 05/28/2025 1427   Can you do light work around the house like dusting or washing dishes? 0 filed at 05/28/2025 1427   Can you do moderate work around the house like vacuuming, sweeping floors or carrying groceries? 0 filed at 05/28/2025 1427   Can you do heavy work around the house like scrubbing floors or lifting and moving heavy furniture?  0 filed at 05/28/2025 1427   Can you do yard work like raking leaves, weeding or pushing a mower? 0 filed at 05/28/2025 1427   Can you have sexual relations? 5.25 filed at 05/28/2025 1427   Can you participate in moderate recreational activities like golf, bowling, dancing, doubles tennis or throwing a baseball or football? 0 filed at 05/28/2025 1427   Can you participate in strenous sports like swimming, singles tennis, football, basketball, or skiing? 0 filed at 05/28/2025 1427   DASI SCORE 9.75 filed at 05/28/2025 1427   METS Score (Will be calculated only when all the questions are answered) 3.9 filed at 05/28/2025 1427          Caprini DVT Assessment      Flowsheet Row Pre-Admission Testing from 5/28/2025 in [facility] Admission (Discharged) from 3/14/2025 in [facility] 6 Surgical Oncology with Daylin Peterson MD MPH   DVT Score (IF A SCORE IS NOT CALCULATING, MUST SELECT A BMI TO COMPLETE) 12 filed at 05/28/2025 1428 6 filed at 03/14/2025 2115   Medical Factors Present cancer, chemotherapy, or previous malignancy, Inflammatory bowel disease filed at 05/28/2025 1428 Present cancer, chemotherapy, or previous malignancy filed at 03/14/2025 2115   Surgical Factors Major surgery planned, lasting over 3 hours filed at  05/28/2025 1428 --   BMI (BMI MUST BE CHOSEN) 30 or less filed at 05/28/2025 1428 30 or less filed at 03/14/2025 2115          Modified Frailty Index      Flowsheet Row Pre-Admission Testing from 5/28/2025 in [facility]   Non-independent functional status (problems with dressing, bathing, personal grooming, or cooking) 0.0909 filed at 05/28/2025 1533   History of diabetes mellitus  0 filed at 05/28/2025 1533   History of COPD 0 filed at 05/28/2025 1533   History of CHF No filed at 05/28/2025 1533   History of MI 0 filed at 05/28/2025 1533   History of Percutaneous Coronary Intervention, Cardiac Surgery, or Angina No filed at 05/28/2025 1533   Hypertension requiring the use of medication  0.0909 filed at 05/28/2025 1533   Peripheral vascular disease 0 filed at 05/28/2025 1533   Impaired sensorium (cognitive impairement or loss, clouding, or delirium) 0 filed at 05/28/2025 1533   TIA or CVA withouy residual deficit 0 filed at 05/28/2025 1533   Cerebrovascular accident with deficit 0 filed at 05/28/2025 1533   Modified Frailty Index Calculator .1818 filed at 05/28/2025 1533          [id number] Stroke Risk Points  Current as of just now        N/A 0 to 9 Points:      Last Change: N/A          The [id number] risk score (Lip ANA ROSA, et al. 2009. © 2010 [employer]) quantifies the risk of stroke for a patient with atrial fibrillation. For patients without atrial fibrillation or under the age of 18 this score appears as N/A. Higher score values generally indicate higher risk of stroke.        This score is not applicable to this patient. Components are not calculated.          Revised Cardiac Risk Index      Flowsheet Row Pre-Admission Testing from 5/28/2025 in [facility]   High-Risk Surgery (Intraperitoneal, Intrathoracic,Suprainguinal vascular) 0 filed at 05/28/2025 1429   History of ischemic heart disease (History of MI, History of positive exercuse test, Current  chest paint considered due to myocardial ischemia, Use of nitrate therapy, ECG with pathological Q Waves) 0 filed at 05/28/2025 1429   History of congestive heart failure (pulmonary edemia, bilateral rales or S3 gallop, Paroxysmal nocturnal dyspnea, CXR showing pulmonary vascular redistribution) 0 filed at 05/28/2025 1429   History of cerebrovascular disease (Prior TIA or stroke) 0 filed at 05/28/2025 1429   Pre-operative insulin treatment 0 filed at 05/28/2025 1429   Pre-operative creatinine>2 mg/dl 0 filed at 05/28/2025 1429   Revised Cardiac Risk Calculator 0 filed at 05/28/2025 1429          Apfel Simplified Score      Flowsheet Row Pre-Admission Testing from 5/28/2025 in [facility]   Smoking status 1 filed at 05/28/2025 1542   History of motion sickness or PONV  1 filed at 05/28/2025 1542   Use of postoperative opioids 1 filed at 05/28/2025 1542   Gender - Female 1=Yes filed at 05/28/2025 1542   Apfel Simplified Score Calculator 4 filed at 05/28/2025 1542          Risk Analysis Index Results This Encounter    No data found in the last 10 encounters.       Stop Bang Score      Flowsheet Row Pre-Admission Testing from 5/28/2025 in [facility]   Do you snore loudly? 1 filed at 05/28/2025 1441   Do you often feel tired or fatigued after your sleep? 1 filed at 05/28/2025 1441   Has anyone ever observed you stop breathing in your sleep? 1 filed at 05/28/2025 1441   Do you have or are you being treated for high blood pressure? 1 filed at 05/28/2025 1441   Recent BMI (Calculated) 25.1 filed at 05/28/2025 1441   Is BMI greater than 35 kg/m2? 0=No filed at 05/28/2025 1441   Age older than 50 years old? 1=Yes filed at 05/28/2025 1441   Is your neck circumference greater than 17 inches (Male) or 16 inches (Female)? 1 filed at 05/28/2025 1441   Gender - Male 0=No filed at 05/28/2025 1441   STOP-BANG Total Score 6 filed at 05/28/2025 1441          Prodigy: High Risk  Total Score: 12           "    Prodigy Age Score           ARISCAT Score for Postoperative Pulmonary Complications      Flowsheet Row Pre-Admission Testing from 5/28/2025 in [facility]   Age Calculated Score 3 filed at 05/28/2025 1533   Preoperative SpO2 0 filed at 05/28/2025 1533   Respiratory infection in the last month Either upper or lower (i.e., URI, bronchitis, pneumonia), with fever and antibiotic treatment 0 filed at 05/28/2025 1533   Preoperative anemia (Hgb less than 10 g/dl) 0 filed at 05/28/2025 1533   Surgical incision  15 filed at 05/28/2025 1533   Duration of surgery  23 filed at 05/28/2025 1533   Emergency Procedure  0 filed at 05/28/2025 1533   ARISCAT Total Score  41 filed at 05/28/2025 1533          Harpal Perioperative Risk for Myocardial Infarction or Cardiac Arrest (ANASTACIO)    No data to display         Assessment and Plan:     Anesthesia  The patient denies problems with anesthesia in the past such as PONV, prolonged sedation, awareness, dental damage, aspiration, cardiac arrest, difficult intubation, or unexpected hospital admissions.     Airway  No documented or reported history of airway difficulty.     Neurology  #Parkinson's disease   - Managed on amantadine (continue) and sinemet (continue), uses walker   - Sees neurology Dr. Villalta, PD has been stable    The patient is at increased risk for postoperative delirium secondary to age 65 or older. The patient is at increased risk for perioperative stroke secondary to hypertension , increased age, female gender, general anesthesia, operative time >2.5 hours. Handouts for preoperative brain exercises given to patient.    HEENT  #Cataracts bilateral eyes    Cardiovascular  #HTN  - Managed on Aldactazide (continue)   - See cardiology Dr. Isaacs at [facility] 12/2024  - Per last note, \"- Abnormal ECG, Stress negative. - Cardiomegaly. - Pericardial effusion. Echo - no effusion noted. - CP - Pleuritic.\" Follow up 1 year.   - Per Dr. Isaacs patient may proceed " with surgery, clearance scanned into media    METS  The patient's functional capacity is less than 4 METS.  RCRI  The patient meets 0 RCRI criteria and therefor has a 3.9% risk of major adverse cardiac complications.  ANASTACIO score which indicates a 2.0% risk of intraoperative or 30-day postoperative MACE (major adverse cardiac event).    She is scheduled for non-cardiac surgery associated with elevated risk. The patient has no major cardiac contraindications to non- cardiac surgery.    Pulmonary  The patient is at increased risk of perioperative pulmonary complications secondary to advanced age greater than 60. Recommend prioritizing non opioid analgesic techniques (regional and local anesthesia, nonsteroidal medications, etc) before the administration of opioids and close monitoring for hypoventilation after surgery due to STEVEN/supsected STEVEN. If intravenous narcotics are needed beyond the immediate neal-operative period, the patient may benefit from continuous pulse oximetry to monitor for hypoxic events till baseline Sp02 is normal on room air and  a respiratory therapy evaluation. ERAS patient with incentive spirometry given preop. Patient instruction sheet for incentive spirometry given.      STOP BANG 6, which places patient at high risk for having STEVEN.  ARISCAT 41, Intermediate, 13.3% risk of in-hospital postoperative pulmonary complications  PRODIGY 20, high risk of respiratory depression episode.     Patient given PI sheet for preoperative deep breathing exercises.  Encourage  incentive spirometry in the postoperative period as deemed necessary.    Endocrine  No diagnoses or significant findings on chart review or clinical presentation and evaluation.    Gastrointestinal  #Liver nodule  - Tumor Board Note; suggested liver MRI    MR liver 5/16/25:  1. Few hepatic simple cysts and hemangiomas. No suspicious liver lesions.  2. Partially visualized known pelvic cystic lesion.    #IBD   - Managed on mesalamine  (continue)   - No active issues     Eat 10- 0,  self-perceived oropharyngeal dysphagia scale (0-40)     Genitourinary  #Pelvic mass  - Tumor board suspected TOA.  WNL   - Follows with gynecologic oncology Abdi Sweet MD    CT abdomen pelvis 5/15/25:  1. Complex predominantly cystic mass of the right adnexa with  internal septations as well as a solid component which is not  significantly changed in size compared to the prior ultrasound dated  03/15/2025 given the differences in image acquisition technique.  Further evaluation of this ovarian cystic neoplasm with  contrast-enhanced pelvic MRI is recommended.  2. A 0.9 cm hypodense lesion in the hepatic dome, which is too small  to characterize.  3. Additional chronic findings as described above.    Renal  No renal diagnoses or significant findings on chart review or clinical presentation and evaluation. The patient has specific risk factors associated with increased risk of perioperative renal complications related to age greater than 55, hypertension. Preventative measures include preoperative hydration. Recommendations to avoid nephrotoxic drugs and carefully monitor fluid status to maintain euvolemia. Use dose adjusted medications as needed for the underlying level of renal function.    Hematology  #H/o left breast cancer 2023  - Takes anastrozole (continue)    Caprini score 12, high risk of perioperative VTE.     Patient instructed to ambulate as soon as possible postoperatively to decrease thromboembolic risk. Initiate mechanical DVT prophylaxis as soon as possible and initiate chemical prophylaxis when deemed safe from a bleeding standpoint post surgery.     Transfusion Evaluation  A type and screen was obtained given the likelihood for perioperative transfusion of blood or blood products.    Musculoskeletal  #Arthritis bilateral hands, takes oral analgesics as needed    ID  MRSA screening obtained. Prescriptions and instructions given for Hibiclens  and Peridex.    Diagnostic Results      US Pelvis transvaginal 3/15/25:  1. Large mainly anechoic cystic mass with a few thin internal  septations arises from the right ovary measuring 9.0 x 8.9 x 5.7 cm.  No without appearing peripheral nodularity. Findings favored to  reflect serous versus mucinous cystadenoma. No evidence of torsion  within the right ovary. Nonemergent pelvic MRI with and without  contrast could be obtained for further evaluation if clinically  desired.  2. Left adnexa not well visualized due to overlying bowel gas.  3. Trace simple free fluid within the pelvic cul-de-sac.    EKG 12/10/24: - Scanned into media tab from her cardiologists office  Sinus rhythm  Incomplete right bundle branch block  Old inferior infarct    Stress Test 12/2023:    Stress Combined Conclusion: Normal pharmacological myocardial perfusion study.    Stress Function: Left ventricular function post-stress is normal. Post-stress ejection fraction is 80%.     Perfusion Comments: LV perfusion is normal.     Perfusion Conclusion: There is no evidence of transient ischemic   dilation (TID). TID ratio is 1.16.     ECG: Resting ECG demonstrates normal sinus rhythm.     ECG: The ECG was negative for ischemia.     Echo 12/2023:   Left Ventricle: Normal left ventricular systolic function with a   visually estimated EF of 55 - 60%. Left ventricle size is normal. Mildly   increased wall thickness. Normal wall motion. Diastolic dysfunction   present with normal LV EF.    Right Ventricle: Normal systolic function.    Aortic Valve: Trileaflet valve. Thickened cusp.    Tricuspid Valve: Normal RVSP. The estimated RVSP is 19 mmHg.     Recent Results (from the past 3 weeks)   Type And Screen Is this order related to pregnancy or an upcoming surgery? Yes; Where will this surgery/delivery be performed? [facility]; What is the date of the surgery? 6/10/2025; Has this patient ever had a transfusion? No; Has t...    Collection Time:  05/28/25  3:16 PM   Result Value Ref Range    ABO TYPE B     Rh TYPE POS     ANTIBODY SCREEN NEG    Coagulation Screen    Collection Time: 05/28/25  3:16 PM   Result Value Ref Range    Protime 12.2 9.8 - 12.4 seconds    INR 1.1 0.9 - 1.1    aPTT 28 26 - 36 seconds   CBC    Collection Time: 05/28/25  3:16 PM   Result Value Ref Range    WBC 8.6 4.4 - 11.3 x10*3/uL    nRBC 0.0 0.0 - 0.0 /100 WBCs    RBC 4.56 4.00 - 5.20 x10*6/uL    Hemoglobin 14.5 12.0 - 16.0 g/dL    Hematocrit 44.3 36.0 - 46.0 %    MCV 97 80 - 100 fL    MCH 31.8 26.0 - 34.0 pg    MCHC 32.7 32.0 - 36.0 g/dL    RDW 14.4 11.5 - 14.5 %    Platelets 457 (H) 150 - 450 x10*3/uL   Staphylococcus aureus/MRSA colonization, Culture    Collection Time: 05/28/25  3:16 PM    Specimen: Nares/Axilla/Groin; Swab   Result Value Ref Range    Staph/MRSA Screen Culture No Staphylococcus aureus isolated    Comprehensive Metabolic Panel    Collection Time: 05/28/25  3:16 PM   Result Value Ref Range    Glucose 93 74 - 99 mg/dL    Sodium 135 (L) 136 - 145 mmol/L    Potassium 4.2 3.5 - 5.3 mmol/L    Chloride 95 (L) 98 - 107 mmol/L    Bicarbonate 32 21 - 32 mmol/L    Anion Gap 12 10 - 20 mmol/L    Urea Nitrogen 18 6 - 23 mg/dL    Creatinine 1.03 0.50 - 1.05 mg/dL    eGFR 56 (L) >60 mL/min/1.73m*2    Calcium 11.2 (H) 8.6 - 10.6 mg/dL    Albumin 4.0 3.4 - 5.0 g/dL    Alkaline Phosphatase 73 33 - 136 U/L    Total Protein 7.9 6.4 - 8.2 g/dL    AST 17 9 - 39 U/L    Bilirubin, Total 0.4 0.0 - 1.2 mg/dL    ALT 4 (L) 7 - 45 U/L      Labs collected today: CBC, CMP, Coag, T/s and MRSA   -Labs and diagnostic testing reviewed on 5/28/25    -Preoperative medication instructions were provided and reviewed with the patient.  Any additional testing or evaluation was explained to the patient.  NPO Instructions were discussed, and the patient's questions were answered prior to conclusion of this encounter. Patient verbalized understanding of preoperative instructions. After Visit Summary given.                [1]   Past Medical History:  Diagnosis Date    Arthritis     Breast cancer     left breast    Cataract     Colitis     History of recent steroid use     Hypertension     Irritable bowel syndrome     Liver disease     Parkinson's disease     Pelvic mass     Personal history of other diseases of the musculoskeletal system and connective tissue 2021    History of rotator cuff tear   [2]   Past Surgical History:  Procedure Laterality Date     SECTION, LOW TRANSVERSE      HIP SURGERY Right     KNEE SURGERY Left     SHOULDER SURGERY Bilateral     rotater cugg repair   [3]   Family History  Problem Relation Name Age of Onset    Hypertension Mother      Stroke Mother     [4]   Allergies  Allergen Reactions    Tramadol Nausea And Vomiting    Sulfa (Sulfonamide Antibiotics) Hives     hives & chills

## 2025-05-29 LAB — STAPHYLOCOCCUS SPEC CULT: NORMAL

## 2025-05-30 DIAGNOSIS — I10 ESSENTIAL HYPERTENSION: ICD-10-CM

## 2025-05-30 RX ORDER — SPIRONOLACTONE AND HYDROCHLOROTHIAZIDE 25; 25 MG/1; MG/1
2 TABLET ORAL DAILY
Qty: 180 TABLET | Refills: 1 | Status: SHIPPED | OUTPATIENT
Start: 2025-05-30

## 2025-05-30 NOTE — TELEPHONE ENCOUNTER
Comments: pt will be out of this medication this weekend.    Last Office Visit (last PCP visit):   2/11/2025    Next Visit Date:  Future Appointments   Date Time Provider Department Center   6/12/2025  1:00 PM Eladio Pickens MD [facility] DEP   6/30/2025 11:00 AM [facility] ROOM 1 [facility] Fac RAD   6/30/2025 12:00 PM [facility] Fac RAD   8/13/2025 12:45 PM Annie Jenkins MD [facility]   12/11/2025 12:00 PM Michael Root MD [facility]       **If hasn't been seen in over a year OR hasn't followed up according to last diabetes/ADHD visit, make appointment for patient before sending refill to provider.    Rx requested:  Requested Prescriptions     Pending Prescriptions Disp Refills    spironolactone-hydroCHLOROthiazide (ALDACTAZIDE) 25-25 MG per tablet 180 tablet 1     Sig: Take 2 tablets by mouth daily

## 2025-06-03 ENCOUNTER — OFFICE VISIT (OUTPATIENT)
Dept: OTOLARYNGOLOGY | Facility: CLINIC | Age: 75
End: 2025-06-03
Payer: MEDICARE

## 2025-06-03 ENCOUNTER — APPOINTMENT (OUTPATIENT)
Dept: OTOLARYNGOLOGY | Facility: CLINIC | Age: 75
End: 2025-06-03
Payer: MEDICARE

## 2025-06-03 DIAGNOSIS — K12.30 STOMATITIS AND MUCOSITIS: Primary | ICD-10-CM

## 2025-06-03 DIAGNOSIS — K14.0 GLOSSITIS: ICD-10-CM

## 2025-06-03 DIAGNOSIS — K12.1 STOMATITIS AND MUCOSITIS: Primary | ICD-10-CM

## 2025-06-03 PROCEDURE — 99204 OFFICE O/P NEW MOD 45 MIN: CPT | Performed by: OTOLARYNGOLOGY

## 2025-06-03 PROCEDURE — 1160F RVW MEDS BY RX/DR IN RCRD: CPT | Performed by: OTOLARYNGOLOGY

## 2025-06-03 PROCEDURE — 1159F MED LIST DOCD IN RCRD: CPT | Performed by: OTOLARYNGOLOGY

## 2025-06-03 ASSESSMENT — ENCOUNTER SYMPTOMS
CONSTITUTIONAL NEGATIVE: 1
NEUROLOGICAL NEGATIVE: 1
RESPIRATORY NEGATIVE: 1
CARDIOVASCULAR NEGATIVE: 1

## 2025-06-03 NOTE — PROGRESS NOTES
Subjective   Patient ID: Gisella Nicholas is a 74 y.o. female who presents for MOUTH SORES / SORE THROAT.    HPI  Patient presents today for evaluation of a history of recurring mouth sores which has been occurring over at least the last year if not greater.  She does have a history of ulcerative colitis as well as rheumatoid arthritis type picture.  Therefore she has at least 2 autoimmune type issues.  She has been undergoing extensive stress recently with the new diagnosis of an ovarian mass and will be undergoing surgery for that in the near future.  Obviously there are a lot of concerning issues for her and it does create a lot of emotional turmoil.  She does not necessarily note dramatic fluctuations with this increase stress level.  She denies any other worrisome ENT symptoms or signs.      Review of Systems   Constitutional: Negative.    HENT: Negative.     Respiratory: Negative.     Cardiovascular: Negative.    Neurological: Negative.          Physical Exam    General appearance: No acute distress. Normal facies. Symmetric facial movement. No gross lesions of the face are noted.  The external ear structures appear normal. The ear canals patent and the tympanic membranes are intact without evidence of air-fluid levels, retraction, or congenital defects.  Anterior rhinoscopy notes essentially a midline nasal septum. Examination is noted for normal healthy mucosal membranes without any evidence of lesions, polyps, or exudate.  The neck is negative for mass lymphadenopathy. The trachea and parotid are clear. The thyroid bed is grossly unremarkable. The salivary gland structures are grossly unremarkable.  Oral cavity is noted for low-grade diffuse mucositis in particular with lateral ulceration left tongue likely related to her pushing the tongue into the dentition.  There is no obvious malignancy thankfully.      Assessment/Plan   74-year-old female with significant longstanding history for greater than a year  fluctuating mucositis/stomatitis.  She also has now evident glossitis with the lesion on the left lateral tongue.  We will start her on the Powells solution and when she starts out I will see her back in a month.  Detailed discussion today with the patient and her  in this regard all questions answered.

## 2025-06-04 ENCOUNTER — TELEPHONE (OUTPATIENT)
Dept: GYNECOLOGIC ONCOLOGY | Facility: HOSPITAL | Age: 75
End: 2025-06-04
Payer: MEDICARE

## 2025-06-04 NOTE — TELEPHONE ENCOUNTER
Patients  called to update that patient fell 4 days ago and is having difficulty walking and experiencing increased weakness.     states patient has Parkinsons and is scheduled to see neurologist on 6/18 in follow up to increased weakness, and fall.   Patient is scheduled for Robotic removal of pelvic mass, BSO on 6/10 and  states due to increased weakness patient is not in condition for surgery on 6/10 and requesting to reschedule for a later date.    1254  Phoned and spoke with patients  to notify that 6/10 surgery has been cancelled.    will office back after patient is evaluated by neurologist to discuss rescheduling surgery.

## 2025-06-11 ENCOUNTER — APPOINTMENT (OUTPATIENT)
Dept: NEUROLOGY | Facility: CLINIC | Age: 75
End: 2025-06-11
Payer: MEDICARE

## 2025-06-11 VITALS
SYSTOLIC BLOOD PRESSURE: 174 MMHG | WEIGHT: 128 LBS | RESPIRATION RATE: 18 BRPM | BODY MASS INDEX: 24.19 KG/M2 | HEART RATE: 98 BPM | DIASTOLIC BLOOD PRESSURE: 77 MMHG

## 2025-06-11 DIAGNOSIS — G20.B1 PARKINSON'S DISEASE WITH DYSKINESIA WITHOUT FLUCTUATING MANIFESTATIONS: ICD-10-CM

## 2025-06-11 DIAGNOSIS — G20.B1 PARKINSON'S DISEASE WITH DYSKINESIA WITHOUT FLUCTUATING MANIFESTATIONS: Primary | ICD-10-CM

## 2025-06-11 PROCEDURE — 1159F MED LIST DOCD IN RCRD: CPT | Performed by: NURSE PRACTITIONER

## 2025-06-11 PROCEDURE — 3078F DIAST BP <80 MM HG: CPT | Performed by: NURSE PRACTITIONER

## 2025-06-11 PROCEDURE — 3077F SYST BP >= 140 MM HG: CPT | Performed by: NURSE PRACTITIONER

## 2025-06-11 PROCEDURE — 1160F RVW MEDS BY RX/DR IN RCRD: CPT | Performed by: NURSE PRACTITIONER

## 2025-06-11 PROCEDURE — 99215 OFFICE O/P EST HI 40 MIN: CPT | Performed by: NURSE PRACTITIONER

## 2025-06-11 PROCEDURE — 1036F TOBACCO NON-USER: CPT | Performed by: NURSE PRACTITIONER

## 2025-06-11 RX ORDER — CARBIDOPA AND LEVODOPA 70; 280 MG/1; MG/1
2 CAPSULE, EXTENDED RELEASE ORAL 3 TIMES DAILY
Qty: 180 EACH | Refills: 3 | Status: SHIPPED | OUTPATIENT
Start: 2025-06-11 | End: 2025-06-12

## 2025-06-11 ASSESSMENT — UNIFIED PARKINSONS DISEASE RATING SCALE (UPDRS)
GAIT: 3
TOETAPPING_RIGHT: 1
LEG_AGILITY_RIGHT: 2
RIGIDITY_NECK: 2
PRONATION_SUPINATION_LEFT: 3
KINETIC_TREMOR_LEFTHAND: 1
AMPLITUDE_LUE: 1
CHAIR_RISING_SCALE: 2
POSTURAL_STABILITY: 0
DYSKINESIAS_PRESENT: YES
POSTURAL_TREMOR_RIGHTHAND: 1
AMPLITUDE_RUE: 1
FINGER_TAPPING_LEFT: 3
KINETIC_TREMOR_RIGHTHAND: 1
MINUTES_SINCE_LEVODOPA: 60MINS
FACIAL_EXPRESSION: 2
TOTAL_SCORE: 58
PARKINSONS_MEDS: YES
HANDMOVEMENTS_RIGHT: 2
RIGIDITY_LLE: 2
CONSTANCY_TREMOR_ATREST: 4
SPONTANEITY_OF_MOVEMENT: 2
AMPLITUDE_LLE: 0
POSTURAL_TREMOR_LEFTHAND: 1
AMPLITUDE_RLE: 0
CLINICAL_STATE: ON
RIGIDITY_RLE: 2
RIGIDITY_RUE: 3
SPEECH: 2
RIGIDITY_LUE: 2
FREEZING_GAIT: 0
LEVODOPA: YES
AMPLITUDE_LIP_JAW: 0
PRONATION_SUPINATION_RIGHT: 3
FINGER_TAPPING_RIGHT: 3
POSTURE: 2
LEG_AGILITY_LEFT: 2
TOETAPPING_LEFT: 3

## 2025-06-11 ASSESSMENT — ENCOUNTER SYMPTOMS
OCCASIONAL FEELINGS OF UNSTEADINESS: 1
DEPRESSION: 0
LOSS OF SENSATION IN FEET: 0

## 2025-06-11 ASSESSMENT — PATIENT HEALTH QUESTIONNAIRE - PHQ9
2. FEELING DOWN, DEPRESSED OR HOPELESS: NOT AT ALL
1. LITTLE INTEREST OR PLEASURE IN DOING THINGS: NOT AT ALL
SUM OF ALL RESPONSES TO PHQ9 QUESTIONS 1 AND 2: 0

## 2025-06-11 NOTE — PROGRESS NOTES
"Subjective     Gisella Nicholas is a 74 y.o. year old female who presents with Parkinson's Disease, here for follow up visit.    HPI  Last visit 1/25/25 with Dr. Villalta:  Plan:  -  C/L  2 tabs TID  -Add entacapone 200mg TID   - Continue amantadine 100mg BID  - Return when able [place].    She comes w/ .     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   Sharp pain in L side and cramping/pain so went to ER 3/14/25 and mass found on CT. She was also constipated.   Blood work negative, still needs biopsied to be sure.      concerned she will go to rehab and does not want to.     Steadiness and strength have worsened for the last 2 weeks. Delayed surgery since concerned she would go to rehab. She cannot take care of herself---he just tries not to leave her alone because she is not steady which is the biggest difference.   She is anxious about the surgery.     Shortly after starting entacapone she had mouth ulcers, stopped entacapone, had a mouth wash from PCP.   Did not help motor sx improvement, took for 2 weeks.      MOTOR SYMPTOMS      +/-                            Comments  Motor sx overall     Tremor + \"Stronger\"--Sinemet helps   Rigidity -    Bradykinesia -    Balance/gait + Main issue  Rollator   FOG + Hesitates/stutter steps  Worse when fatigued   Falls + Once in May and once June 1st--denies head injury  Prior to that maybe once a yr   PT -    Exercise -      NON MOTOR SYMPTOMS       +/-                               Comments  Orthostatic lightheadedness  -    Cognitive + Mild forgetfulness/names   Insomnia -    RBD + Speaks a lot in her sleep  Screams occasionally   Feels well rested in the am   Depression + Slight  She feels r/t current health issues   Anxiety +    Fatigue -    Sialorrhea -    Hypophonia -    Dysphagia -    Constipation + Stool softener  BM qother day    Urinary dysfunction -         Social  Lives with:   Help with " ADLs: Needs help with showering 2/2 shoulder injuries. Some impact on IADLs (cooking) due to mobility limitations.  Dressing she can do.  does medicines.           [facility] Meds  Med Dose Time   Carbidopa-levodopa 25/100mg 2 tabs 3 times a day [id number]    Bed-10-aapm   Amantadine 100mg  1 tab 2 times a day 730a-130pm        Latency unaware    Wearing off 3-4hrs    Side effects     Dyskinesia Mouth movements/grimacing--all day    Hallucinations Occasionally sees children in the evening 4 nights a week--brief, thinks she sees something and goes away when she looks again  Started a yr ago  Not bothersome    Other None      Prior medications:  Carbidopa-cost    Pertinent testing:  Previously reported diplopia evaluated by neuroophthalmology, determined to be 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 Had strabismus surgery 4/26/24 with reported improvement and subsequent discharge from ophthalmology per documentation.           Patient Health Questionnaire-2 Score: 0          Current Medications[1]       Objective   Vitals:    06/11/25 1311   BP: 174/77   BP Location: Right arm   Patient Position: Sitting   BP Cuff Size: Adult   Pulse: 98   Resp: 18   Weight: 58.1 kg (128 lb)                 Physical Exam  General:  Well developed well-nourished female in no acute distress with good grooming.  Mental Status:  The patient is awake, alert and oriented to time, place and person.  Grossly normal fund of knowledge noted.  Recent and remote memory intact.  Attention span and concentration is within normal limits.   Language:  Speech is clear, language is intact.   Motor Exam:  Shows symmetric strength in the upper and lower extremities bilaterally both proximally and distally with normal tone and bulk. Muscle strength 5/5 throughout.   Deep Tendon Reflexes:  Are intact throughout BUE-1+ and BLE-r patellar, L patellar absent, ankle jerks absent  Cerebellar Exam:  Shows no dysmetria or  truncal ataxia.  Gait and Station: narrow base, short, shuffling steps    MDS UPDRS 1st Score: Motor Examination  Is the patient on medication for treating the symptoms of Parkinson's Disease?: Yes  Patients receiving medication for treating the symptoms of Parkinson's Disease, fernie the patient's clinical state.: On  Is the patient on Levodopa?: Yes  Minutes since last Levodopa dose: 60mins  Speech: 2  Facial Expression: 2  Rigidty Neck: 2  Rigidty RUE: 3 (paratonia in every limb)  Rigidity - LUE: 2  Rigidity RLE: 2  Rigidity LLE: 2  Finger Tapping Right Hand: 3  Finger Tapping Left Hand: 3  Hand Movements- Right Hand: 2  Hand Movements- Left Hand: 2  Pronatiaon-Supination Movments - Right Hand: 3  Pronatiaon-Supination Movments Left Hand: 3  Toe Tapping Right Foot: 1  Toe Tapping - Left Foot: 3  Leg Agility - Right Le  Leg Agility - Left le  Arising from Chair: 2  Gait: 3  Freezing of Gait: 0  Postural Stability: 0 (not tested)  Posture: 2  Global Spontanteity of Movment ( Body Bradykinesia): 2  Postural Tremor - Right Hand: 1  Postural Tremor - Left hand: 1  Kinetic Tremor - Right hand: 1  Kinetic Tremor - Left hand: 1  Rest Tremor Amplitude - RUE: 1  Rest Tremor Amplitude - LUE: 1  Rest Tremor Amplitude - RLE: 0  Rest Tremor Amplitude - LLE: 0  Rest Tremor Amplitude - Lip/Jaw: 0  Constancy of Rest Tremor: 4  MDS UPDRS Total Score: 58  Were dyskinesias (chorea or dystonia) present during examination?: Yes (mild mouth grimicing)        Assessment/Plan   Ms. Gisella Nicholas is a 74 y.o. F seen in follow up for PD (sx/dx 2017).     PD: worsening balance. Has had a few falls when would fall yearly. Today she is seen on meds and still appears under-treated. Also has dyskinesia (facial).   Try Crexont  1 bottle #120 280mg caps provided---waiting to try until insurance approves  Also gave 2 week free coupon card in case dose needs changed/adjusted  If Crexont not approved go to QID dosing Sinemet  Entacapone not  previously helpful and thought caused mouth ulcers  Continue amantadine BID  Discussed PT may be most helpful w/ balance when able after surgery (for ovarian tumor removal) and even acute rehab if indicated but that she does appear undertreated so can adjust to see if helps  Hallucinations: not bothersome but cautioned against worsening with med changes and pending surgery  Discussed possibility of worsening PD sx, hallucinations when she has surgery  Info on [facility] provided per request since some meds she gets from [facility] (aware Crexont would not be available here)      Diagnoses and all orders for this visit:  Parkinson's disease with dyskinesia without fluctuating manifestations  -     carbidopa-levodopa (Crexont)  mg capsule,IR -extend rel,biphase; Take 2 capsules by mouth 3 times a day.      #If Crexont is not covered or too costly, will change Carbidopa-levodopa 25/100mg to 2 tabs 4 times a day 3.5-4hrs apart.     #Continue amantadine 100mg 1 cap 2 times a day    #Continue Carbidopa-levodopa 25/100mg until Crexont is available/we know it is covered, then stop Carbidopa-levodopa 25/100mg and start:    Crexont  280mg, take 2 caps 3 times per day--about 6 hrs apart    Dose may need adjusted.  If not enough Crexont (slow, tremors, stiff, worsening of balance/walking), let me know and we can increase  If too much Crexont (hallucinations, dyskinesia, sedation, dizziness), let me know and we can decrease it    Same side effects possible as with carbidopa-levodopa---Some side effects you may experience include: sleepiness, nausea, constipation, dizziness, hallucinations, or involuntary wiggling movements. If you experience any side effects please call our clinic for further instructions.    IF you do not like this new med, you can always go back to previous med regimen.     #Once able would like to do LSVT PT after surgery and OK from the surgeon    #Discussed [facility] ([facility]    #  Follow up in 3-M with me      IMARICHUY, personally performed  a medically appropriate exam, discussion of care/treatment options taking a total time of 42 minutes for today's visit.    Kane Zeng NP-C  Adult/Gerontological Nurse Practitioner   [facility], Department of Neurology  [facility]  [address] [place] [address]  Phone: [phone]  Fax: [phone]         [1]   Current Outpatient Medications:     amantadine (Symmetrel) 100 mg capsule, Take 1 capsule (100 mg) by mouth 2 times a day., Disp: 60 capsule, Rfl: 6    ammonium lactate (Amlactin) 12 % cream, , Disp: , Rfl:     anastrozole (Arimidex) 1 mg tablet, Take 1 tablet (1 mg total) by mouth once daily., Disp: , Rfl:     carbidopa-levodopa (Crexont)  mg capsule,IR -extend rel,biphase, Take 2 capsules by mouth 3 times a day., Disp: 180 each, Rfl: 3    carbidopa-levodopa (Sinemet)  mg tablet, Take 2 tablets by mouth 3 times a day., Disp: 180 tablet, Rfl: 11    chlorhexidine (Hibiclens) 4 % external liquid, Use as directed daily perioperatively, Disp: 473 mL, Rfl: 0    chlorhexidine (Peridex) 0.12 % solution, Swish and spit with 15ml of solution the night before and morning of surgery. Do not swallow., Disp: 120 mL, Rfl: 0    cholecalciferol (Vitamin D-3) 25 mcg (1,000 units) tablet, Take 1 tablet (25 mcg) by mouth once daily., Disp: , Rfl:     diphenhydrAMINE-acetaminophen (Tylenol PM Extra Strength)  mg per tablet, Take 1 tablet by mouth., Disp: , Rfl:     medical cannabis, Take 1 each by mouth once daily., Disp: , Rfl:     mesalamine (Delzicol) 400 mg DR capsule, Take 2 capsules (800 mg) by mouth 3 times a day., Disp: , Rfl:     nystatin (Mycostatin) 100,000 unit/mL suspension, Take 5 mL (500,000 Units) by mouth 2 times a day., Disp: , Rfl:     predniSONE 10 mg tablets,dose pack, Take by mouth., Disp: , Rfl:      spironolacton-hydrochlorothiaz (Aldactazide) 25-25 mg tablet, Take 2 tablets (50 mg) by mouth once daily., Disp: , Rfl:

## 2025-06-12 RX ORDER — CARBIDOPA AND LEVODOPA 70; 280 MG/1; MG/1
2 CAPSULE, EXTENDED RELEASE ORAL 3 TIMES DAILY
Qty: 180 EACH | Refills: 3 | OUTPATIENT
Start: 2025-06-12

## 2025-06-12 RX ORDER — CARBIDOPA AND LEVODOPA 70; 280 MG/1; MG/1
2 CAPSULE, EXTENDED RELEASE ORAL 3 TIMES DAILY
Qty: 180 EACH | Refills: 3 | Status: SHIPPED | OUTPATIENT
Start: 2025-06-12

## 2025-06-18 ENCOUNTER — APPOINTMENT (OUTPATIENT)
Dept: NEUROLOGY | Facility: CLINIC | Age: 75
End: 2025-06-18
Payer: MEDICARE

## 2025-06-19 ENCOUNTER — TELEPHONE (OUTPATIENT)
Dept: GYNECOLOGIC ONCOLOGY | Facility: HOSPITAL | Age: 75
End: 2025-06-19
Payer: MEDICARE

## 2025-06-19 NOTE — TELEPHONE ENCOUNTER
Patients  Tra called to update that patient saw neurologist on 6/11 and is ok to proceed with surgery.    Message routed to Dr. Sweet and Anjali Boo.

## 2025-06-23 ENCOUNTER — TELEPHONE (OUTPATIENT)
Dept: NEUROLOGY | Facility: CLINIC | Age: 75
End: 2025-06-23
Payer: MEDICARE

## 2025-06-23 NOTE — TELEPHONE ENCOUNTER
Noah called this morning. On Saturday Gisella was having hallucinations. He changed her medication back to Carbidopa/ Levodopa 25/100 2 QID. She will no longer take Crexont due to same. No call back requested.

## 2025-06-25 ENCOUNTER — PREP FOR PROCEDURE (OUTPATIENT)
Dept: OPERATING ROOM | Facility: HOSPITAL | Age: 75
End: 2025-06-25
Payer: MEDICARE

## 2025-06-25 DIAGNOSIS — R19.00 PELVIC MASS: Primary | ICD-10-CM

## 2025-06-25 RX ORDER — HEPARIN SODIUM 5000 [USP'U]/ML
5000 INJECTION, SOLUTION INTRAVENOUS; SUBCUTANEOUS ONCE
Status: CANCELLED | OUTPATIENT
Start: 2025-06-25 | End: 2025-06-25

## 2025-06-25 RX ORDER — CELECOXIB 200 MG/1
400 CAPSULE ORAL ONCE
Status: CANCELLED | OUTPATIENT
Start: 2025-06-25 | End: 2025-06-25

## 2025-06-25 RX ORDER — ACETAMINOPHEN 325 MG/1
975 TABLET ORAL ONCE
Status: CANCELLED | OUTPATIENT
Start: 2025-06-25 | End: 2025-06-25

## 2025-06-25 RX ORDER — GABAPENTIN 600 MG/1
600 TABLET ORAL ONCE
Status: CANCELLED | OUTPATIENT
Start: 2025-06-25 | End: 2025-06-25

## 2025-06-28 NOTE — HOSPITAL COURSE
Hospital Course    Gisella Nicholas is a 74 y.o. female with pelvic mass and inability to rule out malignancy with liver nodule who is s/p total laparoscopic hysterectomy, removal of pelvic mass, bilateral salpingo-oophorectomy, pelvic washings, over sewing rectum, cystoscopy, cystotomy repair and bilateral tap blocks.    There was concern for possible walled-off abscess vs endometrioma intraoperatively, so she received 24 hours of Ancef and Flagyl. Since cystotomy was repaired, cadet will remain in place at discharge. Cystogram scheduled for 7/7/25 to assess for removal.    Tumor History:  Imaging/pathology:  5/16 liver MRI  IMPRESSION:  1. Few hepatic simple cysts and hemangiomas. No suspicious liver  lesions.  2. Partially visualized known pelvic cystic lesion.    5/6 CT A/P:   REPRODUCTIVE ORGANS: The uterus is present. There is a large  predominantly cystic lesion of the right adnexa which measures 8.6 x  7.2 x 8.8 cm (series 2, image 110 and series 900, image 80)  demonstrating multiple internal septations some of which demonstrates  calcifications. There is a soft tissue lesion within this mass  measuring 2.6 x 1.2 cm (series 2, image 107).    IMPRESSION:  1. Complex predominantly cystic mass of the right adnexa with  internal septations as well as a solid component which is not  significantly changed in size compared to the prior ultrasound dated  03/15/2025 given the differences in image acquisition technique.  Further evaluation of this ovarian cystic neoplasm with  contrast-enhanced pelvic MRI is recommended.  2. A 0.9 cm hypodense lesion in the hepatic dome, which is too small  to characterize.  3. Additional chronic findings as described above.    3/15/25 pelvic US  IMPRESSION:  1. Large mainly anechoic cystic mass with a few thin internal  septations arises from the right ovary measuring 9.0 x 8.9 x 5.7 cm.  No without appearing peripheral nodularity. 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 No evidence of torsion  within the right ovary. Nonemergent pelvic MRI with and without  contrast could be obtained for further evaluation if clinically  desired.  2. Left adnexa not well visualized due to overlying bowel gas.  3. Trace simple free fluid within the pelvic cul-de-sac.    Tumor Markers:  Lab Results   Component Value Date     13.7 03/15/2025     29.72 03/15/2025    CEA 1.9 03/15/2025       Obstetrical History   OB History   No obstetric history on file.   . C-sectionx1,  x2  Menopause in late 50s. No PMB. No HRT  No hx of abnormal paps     Past Medical History  Past Medical History:   Diagnosis Date    Arthritis     Breast cancer     left breast    Cataract     Colitis     History of recent steroid use     Hypertension     Irritable bowel syndrome     Liver disease     Parkinson's disease     Pelvic mass     Personal history of other diseases of the musculoskeletal system and connective tissue 2021    History of rotator cuff tear        Past Surgical History   Past Surgical History:   Procedure Laterality Date     SECTION, LOW TRANSVERSE      HIP SURGERY Right     KNEE SURGERY Left     SHOULDER SURGERY Bilateral     rotater cugg repair       Family History:  Family History   Problem Relation Name Age of Onset    Hypertension Mother      Stroke Mother         Social History  Social History     Tobacco Use    Smoking status: Never    Smokeless tobacco: Never   Substance Use Topics    Alcohol use: Not on file     Comment: occasional     Substance and Sexual Activity   Drug Use Not on file    Comment: CBD gummy at night       Allergies  Tramadol and Sulfa (sulfonamide antibiotics)

## 2025-06-29 ENCOUNTER — ANESTHESIA EVENT (OUTPATIENT)
Dept: OPERATING ROOM | Facility: HOSPITAL | Age: 75
End: 2025-06-29
Payer: MEDICARE

## 2025-06-30 ENCOUNTER — HOSPITAL ENCOUNTER (OUTPATIENT)
Facility: HOSPITAL | Age: 75
Discharge: HOME | End: 2025-07-01
Attending: STUDENT IN AN ORGANIZED HEALTH CARE EDUCATION/TRAINING PROGRAM | Admitting: STUDENT IN AN ORGANIZED HEALTH CARE EDUCATION/TRAINING PROGRAM
Payer: MEDICARE

## 2025-06-30 ENCOUNTER — ANESTHESIA (OUTPATIENT)
Dept: OPERATING ROOM | Facility: HOSPITAL | Age: 75
End: 2025-06-30
Payer: MEDICARE

## 2025-06-30 DIAGNOSIS — G20.A1 PARKINSON'S DISEASE, UNSPECIFIED WHETHER DYSKINESIA PRESENT, UNSPECIFIED WHETHER MANIFESTATIONS FLUCTUATE: ICD-10-CM

## 2025-06-30 DIAGNOSIS — Z43.5: Primary | ICD-10-CM

## 2025-06-30 DIAGNOSIS — Z98.890 POSTOPERATIVE STATE: Primary | ICD-10-CM

## 2025-06-30 DIAGNOSIS — G89.18 POSTOPERATIVE PAIN: ICD-10-CM

## 2025-06-30 DIAGNOSIS — R19.00 PELVIC MASS: ICD-10-CM

## 2025-06-30 DIAGNOSIS — Z74.09 IMPAIRED MOBILITY: ICD-10-CM

## 2025-06-30 PROCEDURE — 2500000004 HC RX 250 GENERAL PHARMACY W/ HCPCS (ALT 636 FOR OP/ED)

## 2025-06-30 PROCEDURE — 2500000001 HC RX 250 WO HCPCS SELF ADMINISTERED DRUGS (ALT 637 FOR MEDICARE OP): Performed by: STUDENT IN AN ORGANIZED HEALTH CARE EDUCATION/TRAINING PROGRAM

## 2025-06-30 PROCEDURE — 3600000004 HC OR TIME - INITIAL BASE CHARGE - PROCEDURE LEVEL FOUR: Performed by: STUDENT IN AN ORGANIZED HEALTH CARE EDUCATION/TRAINING PROGRAM

## 2025-06-30 PROCEDURE — 88307 TISSUE EXAM BY PATHOLOGIST: CPT | Performed by: STUDENT IN AN ORGANIZED HEALTH CARE EDUCATION/TRAINING PROGRAM

## 2025-06-30 PROCEDURE — 2500000005 HC RX 250 GENERAL PHARMACY W/O HCPCS

## 2025-06-30 PROCEDURE — 3600000009 HC OR TIME - EACH INCREMENTAL 1 MINUTE - PROCEDURE LEVEL FOUR: Performed by: STUDENT IN AN ORGANIZED HEALTH CARE EDUCATION/TRAINING PROGRAM

## 2025-06-30 PROCEDURE — 3700000001 HC GENERAL ANESTHESIA TIME - INITIAL BASE CHARGE: Performed by: STUDENT IN AN ORGANIZED HEALTH CARE EDUCATION/TRAINING PROGRAM

## 2025-06-30 PROCEDURE — 96372 THER/PROPH/DIAG INJ SC/IM: CPT | Performed by: STUDENT IN AN ORGANIZED HEALTH CARE EDUCATION/TRAINING PROGRAM

## 2025-06-30 PROCEDURE — 2500000004 HC RX 250 GENERAL PHARMACY W/ HCPCS (ALT 636 FOR OP/ED): Performed by: STUDENT IN AN ORGANIZED HEALTH CARE EDUCATION/TRAINING PROGRAM

## 2025-06-30 PROCEDURE — 88332 PATH CONSLTJ SURG EA ADD BLK: CPT | Mod: TC,SUR,WESLAB | Performed by: STUDENT IN AN ORGANIZED HEALTH CARE EDUCATION/TRAINING PROGRAM

## 2025-06-30 PROCEDURE — 2500000002 HC RX 250 W HCPCS SELF ADMINISTERED DRUGS (ALT 637 FOR MEDICARE OP, ALT 636 FOR OP/ED)

## 2025-06-30 PROCEDURE — 88331 PATH CONSLTJ SURG 1 BLK 1SPC: CPT | Performed by: STUDENT IN AN ORGANIZED HEALTH CARE EDUCATION/TRAINING PROGRAM

## 2025-06-30 PROCEDURE — 36415 COLL VENOUS BLD VENIPUNCTURE: CPT | Performed by: ANESTHESIOLOGY

## 2025-06-30 PROCEDURE — 7100000001 HC RECOVERY ROOM TIME - INITIAL BASE CHARGE: Performed by: STUDENT IN AN ORGANIZED HEALTH CARE EDUCATION/TRAINING PROGRAM

## 2025-06-30 PROCEDURE — 2500000005 HC RX 250 GENERAL PHARMACY W/O HCPCS: Performed by: STUDENT IN AN ORGANIZED HEALTH CARE EDUCATION/TRAINING PROGRAM

## 2025-06-30 PROCEDURE — 3700000002 HC GENERAL ANESTHESIA TIME - EACH INCREMENTAL 1 MINUTE: Performed by: STUDENT IN AN ORGANIZED HEALTH CARE EDUCATION/TRAINING PROGRAM

## 2025-06-30 PROCEDURE — 99100 ANES PT EXTEME AGE<1 YR&>70: CPT | Performed by: ANESTHESIOLOGY

## 2025-06-30 PROCEDURE — 88112 CYTOPATH CELL ENHANCE TECH: CPT | Performed by: STUDENT IN AN ORGANIZED HEALTH CARE EDUCATION/TRAINING PROGRAM

## 2025-06-30 PROCEDURE — 88307 TISSUE EXAM BY PATHOLOGIST: CPT | Mod: TC,SUR,WESLAB | Performed by: STUDENT IN AN ORGANIZED HEALTH CARE EDUCATION/TRAINING PROGRAM

## 2025-06-30 PROCEDURE — A58571 PR LAPAROSCOPY W TOT HYSTERECTUTERUS <=250 GRAM  W TUBE/OVARY: Performed by: ANESTHESIOLOGY

## 2025-06-30 PROCEDURE — 96372 THER/PROPH/DIAG INJ SC/IM: CPT

## 2025-06-30 PROCEDURE — 2720000007 HC OR 272 NO HCPCS: Performed by: STUDENT IN AN ORGANIZED HEALTH CARE EDUCATION/TRAINING PROGRAM

## 2025-06-30 PROCEDURE — 2780000003 HC OR 278 NO HCPCS: Performed by: STUDENT IN AN ORGANIZED HEALTH CARE EDUCATION/TRAINING PROGRAM

## 2025-06-30 PROCEDURE — 7100000011 HC EXTENDED STAY RECOVERY HOURLY - NURSING UNIT

## 2025-06-30 PROCEDURE — 88112 CYTOPATH CELL ENHANCE TECH: CPT | Mod: TC,MCY | Performed by: STUDENT IN AN ORGANIZED HEALTH CARE EDUCATION/TRAINING PROGRAM

## 2025-06-30 PROCEDURE — 7100000002 HC RECOVERY ROOM TIME - EACH INCREMENTAL 1 MINUTE: Performed by: STUDENT IN AN ORGANIZED HEALTH CARE EDUCATION/TRAINING PROGRAM

## 2025-06-30 RX ORDER — OXYCODONE HYDROCHLORIDE 5 MG/1
5 TABLET ORAL EVERY 4 HOURS PRN
Status: DISCONTINUED | OUTPATIENT
Start: 2025-06-30 | End: 2025-06-30 | Stop reason: HOSPADM

## 2025-06-30 RX ORDER — ONDANSETRON HYDROCHLORIDE 2 MG/ML
INJECTION, SOLUTION INTRAVENOUS AS NEEDED
Status: DISCONTINUED | OUTPATIENT
Start: 2025-06-30 | End: 2025-06-30

## 2025-06-30 RX ORDER — HEPARIN SODIUM 5000 [USP'U]/ML
5000 INJECTION, SOLUTION INTRAVENOUS; SUBCUTANEOUS EVERY 8 HOURS
Status: DISCONTINUED | OUTPATIENT
Start: 2025-06-30 | End: 2025-07-01 | Stop reason: HOSPADM

## 2025-06-30 RX ORDER — CEFAZOLIN SODIUM 2 G/100ML
2 INJECTION, SOLUTION INTRAVENOUS EVERY 8 HOURS
Status: COMPLETED | OUTPATIENT
Start: 2025-06-30 | End: 2025-07-01

## 2025-06-30 RX ORDER — ACETAMINOPHEN 325 MG/1
975 TABLET ORAL ONCE
Status: COMPLETED | OUTPATIENT
Start: 2025-06-30 | End: 2025-06-30

## 2025-06-30 RX ORDER — CARBIDOPA AND LEVODOPA 25; 100 MG/1; MG/1
2 TABLET ORAL 3 TIMES DAILY
Status: DISCONTINUED | OUTPATIENT
Start: 2025-06-30 | End: 2025-07-01

## 2025-06-30 RX ORDER — ONDANSETRON HYDROCHLORIDE 2 MG/ML
4 INJECTION, SOLUTION INTRAVENOUS ONCE AS NEEDED
Status: DISCONTINUED | OUTPATIENT
Start: 2025-06-30 | End: 2025-06-30 | Stop reason: HOSPADM

## 2025-06-30 RX ORDER — AMMONIUM LACTATE 12 G/100G
CREAM TOPICAL AS NEEDED
Status: DISCONTINUED | OUTPATIENT
Start: 2025-06-30 | End: 2025-07-01 | Stop reason: HOSPADM

## 2025-06-30 RX ORDER — METRONIDAZOLE 500 MG/100ML
INJECTION, SOLUTION INTRAVENOUS AS NEEDED
Status: DISCONTINUED | OUTPATIENT
Start: 2025-06-30 | End: 2025-06-30

## 2025-06-30 RX ORDER — HEPARIN SODIUM 5000 [USP'U]/ML
5000 INJECTION, SOLUTION INTRAVENOUS; SUBCUTANEOUS ONCE
Status: COMPLETED | OUTPATIENT
Start: 2025-06-30 | End: 2025-06-30

## 2025-06-30 RX ORDER — ALBUTEROL SULFATE 0.83 MG/ML
2.5 SOLUTION RESPIRATORY (INHALATION) ONCE AS NEEDED
Status: DISCONTINUED | OUTPATIENT
Start: 2025-06-30 | End: 2025-06-30 | Stop reason: HOSPADM

## 2025-06-30 RX ORDER — NALOXONE HYDROCHLORIDE 0.4 MG/ML
0.1 INJECTION, SOLUTION INTRAMUSCULAR; INTRAVENOUS; SUBCUTANEOUS EVERY 5 MIN PRN
Status: DISCONTINUED | OUTPATIENT
Start: 2025-06-30 | End: 2025-07-01 | Stop reason: HOSPADM

## 2025-06-30 RX ORDER — POLYETHYLENE GLYCOL 3350 17 G/17G
17 POWDER, FOR SOLUTION ORAL DAILY PRN
Status: DISCONTINUED | OUTPATIENT
Start: 2025-06-30 | End: 2025-07-01 | Stop reason: HOSPADM

## 2025-06-30 RX ORDER — CHOLECALCIFEROL (VITAMIN D3) 25 MCG
25 TABLET ORAL DAILY
Status: DISCONTINUED | OUTPATIENT
Start: 2025-06-30 | End: 2025-07-01 | Stop reason: HOSPADM

## 2025-06-30 RX ORDER — WATER 1 ML/ML
INJECTION IRRIGATION AS NEEDED
Status: DISCONTINUED | OUTPATIENT
Start: 2025-06-30 | End: 2025-06-30 | Stop reason: HOSPADM

## 2025-06-30 RX ORDER — LIDOCAINE HYDROCHLORIDE 20 MG/ML
INJECTION, SOLUTION INFILTRATION; PERINEURAL AS NEEDED
Status: DISCONTINUED | OUTPATIENT
Start: 2025-06-30 | End: 2025-06-30

## 2025-06-30 RX ORDER — SIMETHICONE 80 MG
80 TABLET,CHEWABLE ORAL 4 TIMES DAILY PRN
Status: DISCONTINUED | OUTPATIENT
Start: 2025-06-30 | End: 2025-07-01 | Stop reason: HOSPADM

## 2025-06-30 RX ORDER — METOCLOPRAMIDE 10 MG/1
10 TABLET ORAL EVERY 6 HOURS PRN
Status: DISCONTINUED | OUTPATIENT
Start: 2025-06-30 | End: 2025-07-01

## 2025-06-30 RX ORDER — SODIUM CHLORIDE, SODIUM LACTATE, POTASSIUM CHLORIDE, CALCIUM CHLORIDE 600; 310; 30; 20 MG/100ML; MG/100ML; MG/100ML; MG/100ML
100 INJECTION, SOLUTION INTRAVENOUS CONTINUOUS
Status: DISCONTINUED | OUTPATIENT
Start: 2025-06-30 | End: 2025-06-30 | Stop reason: HOSPADM

## 2025-06-30 RX ORDER — CEFAZOLIN 1 G/1
INJECTION, POWDER, FOR SOLUTION INTRAVENOUS AS NEEDED
Status: DISCONTINUED | OUTPATIENT
Start: 2025-06-30 | End: 2025-06-30

## 2025-06-30 RX ORDER — ACETAMINOPHEN 325 MG/1
975 TABLET ORAL EVERY 6 HOURS
Status: DISCONTINUED | OUTPATIENT
Start: 2025-06-30 | End: 2025-07-01 | Stop reason: HOSPADM

## 2025-06-30 RX ORDER — ANASTROZOLE 1 MG/1
1 TABLET ORAL DAILY
Status: DISCONTINUED | OUTPATIENT
Start: 2025-06-30 | End: 2025-07-01 | Stop reason: HOSPADM

## 2025-06-30 RX ORDER — MESALAMINE 1.2 G/1
2.4 TABLET, DELAYED RELEASE ORAL
Status: DISCONTINUED | OUTPATIENT
Start: 2025-07-01 | End: 2025-07-01 | Stop reason: HOSPADM

## 2025-06-30 RX ORDER — PROPOFOL 10 MG/ML
INJECTION, EMULSION INTRAVENOUS AS NEEDED
Status: DISCONTINUED | OUTPATIENT
Start: 2025-06-30 | End: 2025-06-30

## 2025-06-30 RX ORDER — IBUPROFEN 600 MG/1
600 TABLET, FILM COATED ORAL EVERY 6 HOURS
Status: DISCONTINUED | OUTPATIENT
Start: 2025-07-01 | End: 2025-07-01 | Stop reason: HOSPADM

## 2025-06-30 RX ORDER — APREPITANT 40 MG/1
CAPSULE ORAL AS NEEDED
Status: DISCONTINUED | OUTPATIENT
Start: 2025-06-30 | End: 2025-06-30

## 2025-06-30 RX ORDER — PHENYLEPHRINE HCL IN 0.9% NACL 0.4MG/10ML
SYRINGE (ML) INTRAVENOUS AS NEEDED
Status: DISCONTINUED | OUTPATIENT
Start: 2025-06-30 | End: 2025-06-30

## 2025-06-30 RX ORDER — SODIUM CHLORIDE, SODIUM LACTATE, POTASSIUM CHLORIDE, CALCIUM CHLORIDE 600; 310; 30; 20 MG/100ML; MG/100ML; MG/100ML; MG/100ML
40 INJECTION, SOLUTION INTRAVENOUS CONTINUOUS
Status: DISCONTINUED | OUTPATIENT
Start: 2025-06-30 | End: 2025-07-01

## 2025-06-30 RX ORDER — MAGNESIUM SULFATE HEPTAHYDRATE 500 MG/ML
INJECTION, SOLUTION INTRAMUSCULAR; INTRAVENOUS AS NEEDED
Status: DISCONTINUED | OUTPATIENT
Start: 2025-06-30 | End: 2025-06-30

## 2025-06-30 RX ORDER — OXYCODONE HYDROCHLORIDE 5 MG/1
5 TABLET ORAL EVERY 4 HOURS PRN
Status: DISCONTINUED | OUTPATIENT
Start: 2025-06-30 | End: 2025-07-01 | Stop reason: HOSPADM

## 2025-06-30 RX ORDER — GABAPENTIN 600 MG/1
600 TABLET ORAL ONCE
Status: COMPLETED | OUTPATIENT
Start: 2025-06-30 | End: 2025-06-30

## 2025-06-30 RX ORDER — SODIUM CHLORIDE 0.9 G/100ML
INJECTION, SOLUTION IRRIGATION AS NEEDED
Status: DISCONTINUED | OUTPATIENT
Start: 2025-06-30 | End: 2025-06-30 | Stop reason: HOSPADM

## 2025-06-30 RX ORDER — FENTANYL CITRATE 50 UG/ML
INJECTION, SOLUTION INTRAMUSCULAR; INTRAVENOUS AS NEEDED
Status: DISCONTINUED | OUTPATIENT
Start: 2025-06-30 | End: 2025-06-30

## 2025-06-30 RX ORDER — OXYCODONE HYDROCHLORIDE 5 MG/1
10 TABLET ORAL EVERY 4 HOURS PRN
Status: DISCONTINUED | OUTPATIENT
Start: 2025-06-30 | End: 2025-06-30 | Stop reason: HOSPADM

## 2025-06-30 RX ORDER — KETOROLAC TROMETHAMINE 30 MG/ML
15 INJECTION, SOLUTION INTRAMUSCULAR; INTRAVENOUS EVERY 6 HOURS
Status: DISPENSED | OUTPATIENT
Start: 2025-06-30 | End: 2025-07-01

## 2025-06-30 RX ORDER — ACETAMINOPHEN 10 MG/ML
INJECTION, SOLUTION INTRAVENOUS AS NEEDED
Status: DISCONTINUED | OUTPATIENT
Start: 2025-06-30 | End: 2025-06-30

## 2025-06-30 RX ORDER — AMOXICILLIN 250 MG
2 CAPSULE ORAL 2 TIMES DAILY
Status: DISCONTINUED | OUTPATIENT
Start: 2025-06-30 | End: 2025-07-01 | Stop reason: HOSPADM

## 2025-06-30 RX ORDER — OXYCODONE HYDROCHLORIDE 5 MG/1
10 TABLET ORAL EVERY 4 HOURS PRN
Status: DISCONTINUED | OUTPATIENT
Start: 2025-06-30 | End: 2025-07-01 | Stop reason: HOSPADM

## 2025-06-30 RX ORDER — METOCLOPRAMIDE HYDROCHLORIDE 5 MG/ML
10 INJECTION INTRAMUSCULAR; INTRAVENOUS EVERY 6 HOURS PRN
Status: DISCONTINUED | OUTPATIENT
Start: 2025-06-30 | End: 2025-07-01

## 2025-06-30 RX ORDER — ROCURONIUM BROMIDE 10 MG/ML
INJECTION, SOLUTION INTRAVENOUS AS NEEDED
Status: DISCONTINUED | OUTPATIENT
Start: 2025-06-30 | End: 2025-06-30

## 2025-06-30 RX ORDER — HYDROMORPHONE HYDROCHLORIDE 0.2 MG/ML
0.2 INJECTION INTRAMUSCULAR; INTRAVENOUS; SUBCUTANEOUS EVERY 5 MIN PRN
Status: DISCONTINUED | OUTPATIENT
Start: 2025-06-30 | End: 2025-06-30 | Stop reason: HOSPADM

## 2025-06-30 RX ORDER — METRONIDAZOLE 500 MG/100ML
500 INJECTION, SOLUTION INTRAVENOUS EVERY 8 HOURS
Status: COMPLETED | OUTPATIENT
Start: 2025-06-30 | End: 2025-07-01

## 2025-06-30 RX ORDER — PREDNISONE 2.5 MG/1
2.5 TABLET ORAL DAILY
Status: DISCONTINUED | OUTPATIENT
Start: 2025-06-30 | End: 2025-07-01 | Stop reason: HOSPADM

## 2025-06-30 RX ORDER — AMANTADINE HYDROCHLORIDE 100 MG/1
100 CAPSULE, GELATIN COATED ORAL 2 TIMES DAILY
Status: DISCONTINUED | OUTPATIENT
Start: 2025-06-30 | End: 2025-07-01 | Stop reason: HOSPADM

## 2025-06-30 RX ORDER — CELECOXIB 200 MG/1
400 CAPSULE ORAL ONCE
Status: COMPLETED | OUTPATIENT
Start: 2025-06-30 | End: 2025-06-30

## 2025-06-30 RX ORDER — KETAMINE HYDROCHLORIDE 10 MG/ML
INJECTION, SOLUTION INTRAMUSCULAR; INTRAVENOUS AS NEEDED
Status: DISCONTINUED | OUTPATIENT
Start: 2025-06-30 | End: 2025-06-30

## 2025-06-30 RX ORDER — MIDAZOLAM HYDROCHLORIDE 1 MG/ML
INJECTION INTRAMUSCULAR; INTRAVENOUS AS NEEDED
Status: DISCONTINUED | OUTPATIENT
Start: 2025-06-30 | End: 2025-06-30

## 2025-06-30 RX ADMIN — CEFAZOLIN SODIUM 2 G: 2 INJECTION, SOLUTION INTRAVENOUS at 22:21

## 2025-06-30 RX ADMIN — KETOROLAC TROMETHAMINE 15 MG: 30 INJECTION, SOLUTION INTRAMUSCULAR; INTRAVENOUS at 20:48

## 2025-06-30 RX ADMIN — MIDAZOLAM HYDROCHLORIDE 1 MG: 2 INJECTION, SOLUTION INTRAMUSCULAR; INTRAVENOUS at 07:25

## 2025-06-30 RX ADMIN — CARBIDOPA AND LEVODOPA 2 TABLET: 25; 100 TABLET ORAL at 20:49

## 2025-06-30 RX ADMIN — Medication 10 MG: at 08:14

## 2025-06-30 RX ADMIN — Medication 40 MCG: at 10:30

## 2025-06-30 RX ADMIN — ROCURONIUM BROMIDE 10 MG: 10 INJECTION, SOLUTION INTRAVENOUS at 08:14

## 2025-06-30 RX ADMIN — ACETAMINOPHEN 1000 MG: 10 INJECTION, SOLUTION INTRAVENOUS at 10:17

## 2025-06-30 RX ADMIN — Medication 6 L/MIN: at 11:35

## 2025-06-30 RX ADMIN — KETOROLAC TROMETHAMINE 15 MG: 30 INJECTION, SOLUTION INTRAMUSCULAR; INTRAVENOUS at 13:58

## 2025-06-30 RX ADMIN — Medication 30 MG: at 07:27

## 2025-06-30 RX ADMIN — SODIUM CHLORIDE, SODIUM LACTATE, POTASSIUM CHLORIDE, AND CALCIUM CHLORIDE 40 ML/HR: .6; .31; .03; .02 INJECTION, SOLUTION INTRAVENOUS at 13:58

## 2025-06-30 RX ADMIN — CEFAZOLIN 2 G: 330 INJECTION, POWDER, FOR SOLUTION INTRAMUSCULAR; INTRAVENOUS at 07:29

## 2025-06-30 RX ADMIN — METRONIDAZOLE 500 MG: 500 INJECTION, SOLUTION INTRAVENOUS at 21:02

## 2025-06-30 RX ADMIN — APREPITANT 40 MG: 40 CAPSULE ORAL at 07:00

## 2025-06-30 RX ADMIN — Medication 80 MCG: at 07:50

## 2025-06-30 RX ADMIN — CEFAZOLIN SODIUM 2 G: 2 INJECTION, SOLUTION INTRAVENOUS at 15:36

## 2025-06-30 RX ADMIN — SUGAMMADEX 200 MG: 100 INJECTION, SOLUTION INTRAVENOUS at 11:14

## 2025-06-30 RX ADMIN — HEPARIN SODIUM 5000 UNITS: 5000 INJECTION, SOLUTION INTRAVENOUS; SUBCUTANEOUS at 20:49

## 2025-06-30 RX ADMIN — SODIUM CHLORIDE, SODIUM LACTATE, POTASSIUM CHLORIDE, AND CALCIUM CHLORIDE: 600; 310; 30; 20 INJECTION, SOLUTION INTRAVENOUS at 07:27

## 2025-06-30 RX ADMIN — CARBIDOPA AND LEVODOPA 2 TABLET: 25; 100 TABLET ORAL at 15:36

## 2025-06-30 RX ADMIN — PROPOFOL 150 MG: 10 INJECTION, EMULSION INTRAVENOUS at 07:27

## 2025-06-30 RX ADMIN — ROCURONIUM BROMIDE 20 MG: 10 INJECTION, SOLUTION INTRAVENOUS at 08:22

## 2025-06-30 RX ADMIN — CELECOXIB 400 MG: 200 CAPSULE ORAL at 06:17

## 2025-06-30 RX ADMIN — Medication 10 MG: at 09:15

## 2025-06-30 RX ADMIN — ACETAMINOPHEN 975 MG: 325 TABLET ORAL at 15:36

## 2025-06-30 RX ADMIN — METRONIDAZOLE 500 MG: 5 INJECTION, SOLUTION INTRAVENOUS at 07:29

## 2025-06-30 RX ADMIN — HEPARIN SODIUM 5000 UNITS: 5000 INJECTION, SOLUTION INTRAVENOUS; SUBCUTANEOUS at 13:58

## 2025-06-30 RX ADMIN — DOCUSATE SODIUM 50 MG AND SENNOSIDES 8.6 MG 2 TABLET: 8.6; 5 TABLET, FILM COATED ORAL at 13:58

## 2025-06-30 RX ADMIN — HEPARIN SODIUM 5000 UNITS: 5000 INJECTION, SOLUTION INTRAVENOUS; SUBCUTANEOUS at 06:17

## 2025-06-30 RX ADMIN — Medication 25 MCG: at 13:58

## 2025-06-30 RX ADMIN — ROCURONIUM BROMIDE 10 MG: 10 INJECTION, SOLUTION INTRAVENOUS at 09:15

## 2025-06-30 RX ADMIN — ACETAMINOPHEN 975 MG: 325 TABLET ORAL at 06:17

## 2025-06-30 RX ADMIN — ACETAMINOPHEN 975 MG: 325 TABLET ORAL at 20:49

## 2025-06-30 RX ADMIN — ONDANSETRON 4 MG: 2 INJECTION INTRAMUSCULAR; INTRAVENOUS at 10:52

## 2025-06-30 RX ADMIN — METRONIDAZOLE 500 MG: 500 INJECTION, SOLUTION INTRAVENOUS at 13:59

## 2025-06-30 RX ADMIN — GABAPENTIN 600 MG: 600 TABLET, FILM COATED ORAL at 06:17

## 2025-06-30 RX ADMIN — ANASTROZOLE 1 MG: 1 TABLET, COATED ORAL at 15:36

## 2025-06-30 RX ADMIN — FENTANYL CITRATE 50 MCG: 50 INJECTION, SOLUTION INTRAMUSCULAR; INTRAVENOUS at 08:12

## 2025-06-30 RX ADMIN — PREDNISONE 2.5 MG: 2.5 TABLET ORAL at 15:36

## 2025-06-30 RX ADMIN — DEXAMETHASONE SODIUM PHOSPHATE 8 MG: 4 INJECTION INTRA-ARTICULAR; INTRALESIONAL; INTRAMUSCULAR; INTRAVENOUS; SOFT TISSUE at 07:29

## 2025-06-30 RX ADMIN — LIDOCAINE HYDROCHLORIDE 50 MG: 20 INJECTION, SOLUTION INFILTRATION; PERINEURAL at 07:27

## 2025-06-30 RX ADMIN — FENTANYL CITRATE 50 MCG: 50 INJECTION, SOLUTION INTRAMUSCULAR; INTRAVENOUS at 08:14

## 2025-06-30 RX ADMIN — DOCUSATE SODIUM 50 MG AND SENNOSIDES 8.6 MG 2 TABLET: 8.6; 5 TABLET, FILM COATED ORAL at 20:49

## 2025-06-30 RX ADMIN — MAGNESIUM SULFATE HEPTAHYDRATE 2 G: 500 INJECTION, SOLUTION INTRAMUSCULAR; INTRAVENOUS at 09:39

## 2025-06-30 RX ADMIN — ROCURONIUM BROMIDE 50 MG: 10 INJECTION, SOLUTION INTRAVENOUS at 07:28

## 2025-06-30 SDOH — ECONOMIC STABILITY: INCOME INSECURITY: IN THE PAST 12 MONTHS HAS THE ELECTRIC, GAS, OIL, OR WATER COMPANY THREATENED TO SHUT OFF SERVICES IN YOUR HOME?: NO

## 2025-06-30 SDOH — HEALTH STABILITY: MENTAL HEALTH: CURRENT SMOKER: 0

## 2025-06-30 SDOH — SOCIAL STABILITY: SOCIAL INSECURITY: WITHIN THE LAST YEAR, HAVE YOU BEEN AFRAID OF YOUR PARTNER OR EX-PARTNER?: NO

## 2025-06-30 SDOH — SOCIAL STABILITY: SOCIAL INSECURITY: WITHIN THE LAST YEAR, HAVE YOU BEEN HUMILIATED OR EMOTIONALLY ABUSED IN OTHER WAYS BY YOUR PARTNER OR EX-PARTNER?: NO

## 2025-06-30 SDOH — SOCIAL STABILITY: SOCIAL INSECURITY: HAVE YOU HAD ANY THOUGHTS OF HARMING ANYONE ELSE?: NO

## 2025-06-30 SDOH — ECONOMIC STABILITY: FOOD INSECURITY: WITHIN THE PAST 12 MONTHS, THE FOOD YOU BOUGHT JUST DIDN'T LAST AND YOU DIDN'T HAVE MONEY TO GET MORE.: NEVER TRUE

## 2025-06-30 SDOH — ECONOMIC STABILITY: HOUSING INSECURITY: IN THE PAST 12 MONTHS, HOW MANY TIMES HAVE YOU MOVED WHERE YOU WERE LIVING?: 0

## 2025-06-30 SDOH — ECONOMIC STABILITY: FOOD INSECURITY: WITHIN THE PAST 12 MONTHS, YOU WORRIED THAT YOUR FOOD WOULD RUN OUT BEFORE YOU GOT THE MONEY TO BUY MORE.: NEVER TRUE

## 2025-06-30 SDOH — SOCIAL STABILITY: SOCIAL INSECURITY: ARE YOU OR HAVE YOU BEEN THREATENED OR ABUSED PHYSICALLY, EMOTIONALLY, OR SEXUALLY BY ANYONE?: NO

## 2025-06-30 SDOH — SOCIAL STABILITY: SOCIAL INSECURITY: HAVE YOU HAD THOUGHTS OF HARMING ANYONE ELSE?: NO

## 2025-06-30 SDOH — ECONOMIC STABILITY: FOOD INSECURITY: HOW HARD IS IT FOR YOU TO PAY FOR THE VERY BASICS LIKE FOOD, HOUSING, MEDICAL CARE, AND HEATING?: NOT HARD AT ALL

## 2025-06-30 SDOH — ECONOMIC STABILITY: HOUSING INSECURITY: IN THE LAST 12 MONTHS, WAS THERE A TIME WHEN YOU WERE NOT ABLE TO PAY THE MORTGAGE OR RENT ON TIME?: NO

## 2025-06-30 SDOH — ECONOMIC STABILITY: HOUSING INSECURITY: AT ANY TIME IN THE PAST 12 MONTHS, WERE YOU HOMELESS OR LIVING IN A SHELTER (INCLUDING NOW)?: NO

## 2025-06-30 SDOH — SOCIAL STABILITY: SOCIAL INSECURITY: HAS ANYONE EVER THREATENED TO HURT YOUR FAMILY OR YOUR PETS?: NO

## 2025-06-30 SDOH — SOCIAL STABILITY: SOCIAL INSECURITY: DO YOU FEEL UNSAFE GOING BACK TO THE PLACE WHERE YOU ARE LIVING?: NO

## 2025-06-30 SDOH — SOCIAL STABILITY: SOCIAL INSECURITY: DOES ANYONE TRY TO KEEP YOU FROM HAVING/CONTACTING OTHER FRIENDS OR DOING THINGS OUTSIDE YOUR HOME?: NO

## 2025-06-30 SDOH — ECONOMIC STABILITY: TRANSPORTATION INSECURITY: IN THE PAST 12 MONTHS, HAS LACK OF TRANSPORTATION KEPT YOU FROM MEDICAL APPOINTMENTS OR FROM GETTING MEDICATIONS?: NO

## 2025-06-30 SDOH — SOCIAL STABILITY: SOCIAL INSECURITY: ARE THERE ANY APPARENT SIGNS OF INJURIES/BEHAVIORS THAT COULD BE RELATED TO ABUSE/NEGLECT?: NO

## 2025-06-30 SDOH — SOCIAL STABILITY: SOCIAL INSECURITY: WERE YOU ABLE TO COMPLETE ALL THE BEHAVIORAL HEALTH SCREENINGS?: YES

## 2025-06-30 SDOH — SOCIAL STABILITY: SOCIAL INSECURITY: DO YOU FEEL ANYONE HAS EXPLOITED OR TAKEN ADVANTAGE OF YOU FINANCIALLY OR OF YOUR PERSONAL PROPERTY?: NO

## 2025-06-30 SDOH — SOCIAL STABILITY: SOCIAL INSECURITY: ABUSE: ADULT

## 2025-06-30 ASSESSMENT — PAIN - FUNCTIONAL ASSESSMENT
PAIN_FUNCTIONAL_ASSESSMENT: 0-10
PAIN_FUNCTIONAL_ASSESSMENT: UNABLE TO SELF-REPORT
PAIN_FUNCTIONAL_ASSESSMENT: 0-10

## 2025-06-30 ASSESSMENT — ACTIVITIES OF DAILY LIVING (ADL)
WALKS IN HOME: NEEDS ASSISTANCE
DRESSING YOURSELF: NEEDS ASSISTANCE
BATHING: NEEDS ASSISTANCE
JUDGMENT_ADEQUATE_SAFELY_COMPLETE_DAILY_ACTIVITIES: YES
ADEQUATE_TO_COMPLETE_ADL: YES
TOILETING: NEEDS ASSISTANCE
HEARING - RIGHT EAR: FUNCTIONAL
FEEDING YOURSELF: NEEDS ASSISTANCE
GROOMING: NEEDS ASSISTANCE
PATIENT'S MEMORY ADEQUATE TO SAFELY COMPLETE DAILY ACTIVITIES?: YES
LACK_OF_TRANSPORTATION: NO
ASSISTIVE_DEVICE: WALKER
HEARING - LEFT EAR: FUNCTIONAL

## 2025-06-30 ASSESSMENT — COLUMBIA-SUICIDE SEVERITY RATING SCALE - C-SSRS
6. HAVE YOU EVER DONE ANYTHING, STARTED TO DO ANYTHING, OR PREPARED TO DO ANYTHING TO END YOUR LIFE?: NO
2. HAVE YOU ACTUALLY HAD ANY THOUGHTS OF KILLING YOURSELF?: NO
1. IN THE PAST MONTH, HAVE YOU WISHED YOU WERE DEAD OR WISHED YOU COULD GO TO SLEEP AND NOT WAKE UP?: NO

## 2025-06-30 ASSESSMENT — COGNITIVE AND FUNCTIONAL STATUS - GENERAL
STANDING UP FROM CHAIR USING ARMS: A LITTLE
TURNING FROM BACK TO SIDE WHILE IN FLAT BAD: A LITTLE
DRESSING REGULAR LOWER BODY CLOTHING: A LITTLE
DAILY ACTIVITIY SCORE: 18
HELP NEEDED FOR BATHING: A LITTLE
MOVING TO AND FROM BED TO CHAIR: A LITTLE
DRESSING REGULAR LOWER BODY CLOTHING: A LITTLE
CLIMB 3 TO 5 STEPS WITH RAILING: A LOT
TURNING FROM BACK TO SIDE WHILE IN FLAT BAD: A LITTLE
MOVING FROM LYING ON BACK TO SITTING ON SIDE OF FLAT BED WITH BEDRAILS: A LITTLE
WALKING IN HOSPITAL ROOM: A LITTLE
MOVING TO AND FROM BED TO CHAIR: A LITTLE
MOVING FROM LYING ON BACK TO SITTING ON SIDE OF FLAT BED WITH BEDRAILS: A LITTLE
MOBILITY SCORE: 17
PERSONAL GROOMING: A LITTLE
DRESSING REGULAR UPPER BODY CLOTHING: A LITTLE
TOILETING: A LITTLE
STANDING UP FROM CHAIR USING ARMS: A LITTLE
CLIMB 3 TO 5 STEPS WITH RAILING: A LOT
WALKING IN HOSPITAL ROOM: A LITTLE
PERSONAL GROOMING: A LITTLE
PATIENT BASELINE BEDBOUND: NO
DAILY ACTIVITIY SCORE: 19
MOBILITY SCORE: 17
HELP NEEDED FOR BATHING: A LITTLE
DRESSING REGULAR UPPER BODY CLOTHING: A LITTLE
TOILETING: A LITTLE
EATING MEALS: A LITTLE

## 2025-06-30 ASSESSMENT — LIFESTYLE VARIABLES
HOW OFTEN DO YOU HAVE A DRINK CONTAINING ALCOHOL: MONTHLY OR LESS
SKIP TO QUESTIONS 9-10: 1
HOW OFTEN DO YOU HAVE 6 OR MORE DRINKS ON ONE OCCASION: NEVER
AUDIT-C TOTAL SCORE: 1
HOW MANY STANDARD DRINKS CONTAINING ALCOHOL DO YOU HAVE ON A TYPICAL DAY: 1 OR 2
AUDIT-C TOTAL SCORE: 1

## 2025-06-30 ASSESSMENT — PAIN SCALES - GENERAL
PAINLEVEL_OUTOF10: 5 - MODERATE PAIN
PAINLEVEL_OUTOF10: 0 - NO PAIN
PAINLEVEL_OUTOF10: 3
PAIN_LEVEL: 0
PAINLEVEL_OUTOF10: 0 - NO PAIN

## 2025-06-30 ASSESSMENT — PATIENT HEALTH QUESTIONNAIRE - PHQ9
SUM OF ALL RESPONSES TO PHQ9 QUESTIONS 1 & 2: 0
1. LITTLE INTEREST OR PLEASURE IN DOING THINGS: NOT AT ALL
2. FEELING DOWN, DEPRESSED OR HOPELESS: NOT AT ALL

## 2025-06-30 NOTE — H&P
Gynecologic Oncology Surgical History and Physical    Gisella Nicholas is a 74 y.o. female with pelvic mass and inability to rule out malignancy with liver nodule presenting for robotic assisted removal of pelvic mass, bilateral salpingo-oophorectomy, possible hysterectomy, staging and any indicated procedures      PAT (5/28): cleared    PMHx: L breast cancer (ER pos, on anastrazole), HTN, Parkinson's Disease, Osteoarthritis, Ulcerative Colitis     SurgHx: C/S x1, breast biopsy, knee surgery, hip surgery, shoulder surgery, strabismus surgery     Tumor History:  Imaging/pathology:  5/16 liver MRI  IMPRESSION:  1. Few hepatic simple cysts and hemangiomas. No suspicious liver  lesions.  2. Partially visualized known pelvic cystic lesion.    5/6 CT A/P:   REPRODUCTIVE ORGANS: The uterus is present. There is a large  predominantly cystic lesion of the right adnexa which measures 8.6 x  7.2 x 8.8 cm (series 2, image 110 and series 900, image 80)  demonstrating multiple internal septations some of which demonstrates  calcifications. There is a soft tissue lesion within this mass  measuring 2.6 x 1.2 cm (series 2, image 107).    IMPRESSION:  1. Complex predominantly cystic mass of the right adnexa with  internal septations as well as a solid component which is not  significantly changed in size compared to the prior ultrasound dated  03/15/2025 given the differences in image acquisition technique.  Further evaluation of this ovarian cystic neoplasm with  contrast-enhanced pelvic MRI is recommended.  2. A 0.9 cm hypodense lesion in the hepatic dome, which is too small  to characterize.  3. Additional chronic findings as described above.    3/15/25 pelvic US  IMPRESSION:  1. Large mainly anechoic cystic mass with a few thin internal  septations arises from the right ovary measuring 9.0 x 8.9 x 5.7 cm.  No without appearing peripheral nodularity. Findings favored to  reflect serous versus mucinous cystadenoma. No evidence of  torsion  within the right ovary. Nonemergent pelvic MRI with and without  contrast could be obtained for further evaluation if clinically  desired.  2. Left adnexa not well visualized due to overlying bowel gas.  3. Trace simple free fluid within the pelvic cul-de-sac.    Tumor Markers:  Lab Results   Component Value Date     13.7 03/15/2025     29.72 03/15/2025    CEA 1.9 03/15/2025       Obstetrical History   OB History   No obstetric history on file.   . C-sectionx1,  x2  Menopause in late 50s. No PMB. No HRT  No hx of abnormal paps     Past Medical History  Past Medical History:   Diagnosis Date    Arthritis     Breast cancer     left breast    Cataract     Colitis     History of recent steroid use     Hypertension     Irritable bowel syndrome     Liver disease     Parkinson's disease     Pelvic mass     Personal history of other diseases of the musculoskeletal system and connective tissue 2021    History of rotator cuff tear        Past Surgical History   Past Surgical History:   Procedure Laterality Date     SECTION, LOW TRANSVERSE      HIP SURGERY Right     KNEE SURGERY Left     SHOULDER SURGERY Bilateral     rotater cugg repair       Family History:  Family History   Problem Relation Name Age of Onset    Hypertension Mother      Stroke Mother         Social History  Social History     Tobacco Use    Smoking status: Never    Smokeless tobacco: Never   Substance Use Topics    Alcohol use: Not on file     Comment: occasional     Substance and Sexual Activity   Drug Use Not on file    Comment: CBD gummy at night       Allergies  Tramadol and Sulfa (sulfonamide antibiotics)     Medications  No medications prior to admission.       ROS: negative except per HPI    Objective    Last Vitals  There were no vitals taken for this visit.    Physical Examination  General: no acute distress  HEENT: normocephalic, atraumatic  Heart: warm and well perfused  Lungs: breathing comfortably on room  air  Abdomen: nondistended  Extremities: moving all extremities  Neuro: awake and conversant  Psych: appropriate mood and affect    Lab Review          Lab Results   Component Value Date    WBC 8.6 05/28/2025    HGB 14.5 05/28/2025    HCT 44.3 05/28/2025    MCV 97 05/28/2025     (H) 05/28/2025       Lab Results   Component Value Date    GLUCOSE 93 05/28/2025    CALCIUM 11.2 (H) 05/28/2025     (L) 05/28/2025    K 4.2 05/28/2025    CO2 32 05/28/2025    CL 95 (L) 05/28/2025    BUN 18 05/28/2025    CREATININE 1.03 05/28/2025       Assessment/Plan     Gisella Nicholas is a 74 y.o. presenting for scheduled surgery.    Plan to proceed with laparoscopic removal of pelvic mass, bilateral salpingo-oophorectomy, possible hysterectomy, staging and any indicated procedures  Surgical consent was reviewed. The risks of surgery were discussed including: bleeding (including need for blood transfusion in life-threatening situations; risks of transfusion), infection, damage to surrounding organs. The patient had the opportunity to answer questions and desired to proceed with surgery following our discussion. Both verbal and written consents were obtained.  Suspend DNR order      Anna Carson MD  PGY-2, Obstetrics and Gynecology   Gyn Onc Pager [phone]

## 2025-06-30 NOTE — ANESTHESIA POSTPROCEDURE EVALUATION
Patient: Gisella Nicholas    Procedure Summary       Date: 06/30/25 Room / Location: [facility] [place] 05 [place] [facility] [place]    Anesthesia Start: 0718 Anesthesia Stop: 1138    Procedure: HYSTERECTOMY, LAPAROSCOPIC Bilateral  salpingo- oopherectormy, bilateral tap blocks, pelvic washings, cystoscopy, oversewing rectum, cystomy repair (Bilateral) Diagnosis:       Pelvic mass      (Pelvic mass [R19.00])    Surgeons: Daylin Peterson MD MPH Responsible Provider: Leoncio Taylor MD    Anesthesia Type: general ASA Status: 3            Anesthesia Type: general    Vitals Value Taken Time   /69 06/30/25 11:35   Temp 36.6 06/30/25 11:38   Pulse 68 06/30/25 11:35   Resp 14 06/30/25 11:35   SpO2 98 % 06/30/25 11:35       Anesthesia Post Evaluation    Patient location during evaluation: PACU  Patient participation: complete - patient participated  Level of consciousness: sleepy but conscious  Pain score: 0  Pain management: adequate  Multimodal analgesia pain management approach  Airway patency: patent (Nasal airway in place, ventilating appropriately)  Cardiovascular status: acceptable, blood pressure returned to baseline and hemodynamically stable  Respiratory status: acceptable, face mask and spontaneous ventilation  Hydration status: acceptable  Postoperative Nausea and Vomiting: none        There were no known notable events for this encounter.

## 2025-06-30 NOTE — ANESTHESIA PREPROCEDURE EVALUATION
Patient: Gisella Nicholas    Procedure Information       Date/Time: 25 0700    Procedure: HYSTERECTOMY, LAPAROSCOPIC (Bilateral)    Location: [facility] OR 05 /  [facility] OR    Surgeons: Daylin Peterson MD MPH            Relevant Problems   Anesthesia (within normal limits)      Cardiac   (+) HTN (hypertension)      Pulmonary (within normal limits)      Neuro  Parkinson's disease      /Renal (within normal limits)      Liver (within normal limits)      Endocrine (within normal limits)      Hematology (within normal limits)      Musculoskeletal   (+) Osteoarthritis of hand      ID (within normal limits)      Skin (within normal limits)       Clinical information reviewed:   Tobacco  Allergies  Meds   Med Hx  Surg Hx   Fam Hx  Soc Hx        NPO Detail:  NPO/Void Status  Carbohydrate Drink Given Prior to Surgery? : Y  Date of Last Liquid: 25  Time of Last Liquid: 340 (clear ensure)  Date of Last Solid: 25  Time of Last Solid:   Last Intake Type: Carbohydrate drink         Physical Exam    Airway  Mallampati: IV  TM distance: >3 FB  Neck ROM: limited  Mouth openin finger widths     Cardiovascular Rate: normal     Dental        Pulmonary - normal exam   Abdominal            Anesthesia Plan    History of general anesthesia?: yes  History of complications of general anesthesia?: no    ASA 3     general     The patient is not a current smoker.  Patient did not smoke on day of procedure.    intravenous induction   Postoperative pain plan includes opioids.  Trial extubation is planned.  Anesthetic plan and risks discussed with patient.  Use of blood products discussed with patient who consented to blood products.    Plan discussed with resident.

## 2025-06-30 NOTE — SIGNIFICANT EVENT
Postoperative Check    74 y.o. now POD#0 s/p total laparoscopic hysterectomy, removal of pelvic mass, bilateral salpingo-oophorectomy, pelvic washings, over sewing rectum, cystoscopy, cystotomy repair and bilateral tap blocks.    Subjective  Gisella is doing well. She had just woken up from resting when I went to see her. She was feeling some pain, but had just had her doses of Tylenol and Toradol. She is not yet passing flatus and has not been out of bed. She denies nausea and vomiting.    Objective    Constitutional: No visible distress, alert and cooperative  Head/Neck: Normocephalic  Respiratory/Thorax: Normal respiratory effort on RA, lungs CTAB  Cardiovascular: RRR, no murmurs  Gastrointestinal: soft, nondistended, appropriately tender, without rebound or guarding. Incisions with steri strips and bandages in place clean, dry and intact.  Musculoskeletal: grossly normal ROM  Extremities: No LE edema  Neurological: A&Ox3  Psychological: Appropriate mood and behavior     A/P  74 y.o. now POD#0 s/p total laparoscopic hysterectomy, removal of pelvic mass, bilateral salpingo-oophorectomy, pelvic washings, over sewing rectum, cystoscopy, cystotomy repair and bilateral tap blocks    Postoperative state  Pain well controlled, continue management per ERAS protocol   IS q1 hour while awake, at bedside, continue to encourage   LR at 40mL/hr until taking adequate PO, antiemetics as needed  Maldonado in place, monitor output   Encourage ambulation   Follow up AM labs  Heparin/SCDs for DVT ppx    Comorbidities  Parkinson's on carbidopa-levodopa, amantadine  Ulcerative colitis on prednisone 2.5 mg, mesalamine  Breast cancer: ER pos on anastrozole    Xin Benitez MD PGY-1  Gynecologic Oncology (Pager [phone])

## 2025-06-30 NOTE — ANESTHESIA PROCEDURE NOTES
Airway  Date/Time: 6/30/2025 7:34 AM  Reason: elective    Airway not difficult    Staffing  Performed: resident   Authorized by: Leoncio Taylor MD    Performed by: Crow Rucker MD  Patient location during procedure: OR    Patient Condition  Indications for airway management: anesthesia and airway protection  Patient position: sniffing  MILS not maintained throughout  Planned trial extubation  Sedation level: deep     Final Airway Details   Preoxygenated: yes  Final airway type: endotracheal airway  Successful airway: ETT  Cuffed: yes   Successful intubation technique: video laryngoscopy  Adjuncts used in placement: intubating stylet  Endotracheal tube insertion site: oral  Blade size: #3  ETT size (mm): 7.0  Cormack-Lehane Classification: grade IIb - view of arytenoids or posterior of glottis only  Placement verified by: chest auscultation and capnometry   Inital cuff pressure (cm H2O): 10  Cuff volume (mL): 10  Measured from: lips  ETT to lips (cm): 21  Number of attempts at approach: 1    Additional Comments  Atraumatic  Airway in pre-anesthetic condition  Easy mask without oral airway  Grade IIb with GlideScope LoPro S3

## 2025-06-30 NOTE — DISCHARGE INSTRUCTIONS
Laparoscopic Hysterectomy Discharge Instructions  If you have any questions about your care, please contact us at [phone].    Medications and Pain Management  Common areas of pain after laparoscopic hysterectomy include the incision pain, pain in between your shoulder blades, the pelvis and lower back. The gas that was used to distend your abdomen for the surgery is absorbed slowly into your blood stream over the first 3-4 days after surgery. It is not passed intestinally, although, because your abdomen is distended, it may feel similar to intestinal gas. Staying active and walking is the best way to promote the absorption of this gas.  Immediately after surgery, nerve pain is the most intense, typically for the first 6 to 12 hours. As the body heals, it creates inflammation around the incisions sites adding pressure and creating soreness. After 5 days, the inflammation begins to recede and significant improvement in soreness is expected. Pulling on the incisions, especially if sudden, such as when you cough, will reactivate the nerve pain. Support your abdomen with a pillow during coughing or sneezing as this will be helpful to minimize pain. There are two types of pain pills typically used for post-operative pain management, narcotics such as tramadol and an anti-inflammatory such as Ibuprofen or Naprosyn.  Taking regular anti-inflammatory pills, such as 600mg of Ibuprofen every 6 hours for the first 5 days and then as needed is recommended. You can alternate ibuprofen with tylenol (975mg or 1000mg). The tylenol can be taken every 6 hours.  If you have problems using NSAIDs, be sure to discuss this with your doctor. The narcotic can be used on a schedule for the first 1 to 2 days but after that, only as you need it. Narcotics can cause constipation, nausea, sleepiness and headaches. You may begin your usual home medications as you were taking before unless directed by your doctor.    Incisional  care  Paper tape steri-strips are typically used for the abdominal incisions. The steri-strips will fall off on their own or can be removed at your first post-operative appointment. You may shower and use a mild soap around the incisions and pat dry. Do not use a washcloth or scrub the incisions. Using peroxide or antiseptics is not recommended for routine care. Avoid hot and steamy showers as this may cause you to feel faint. No tub baths for six weeks following surgery. There may be discoloration or bruising around the incisions. This is normal and may take several weeks to resolve. Firmness or a nodular area under the skin near the incision may represent a collection of blood, this too will resolve on its own after a little time. If any incision develops tenderness, redness in the skin layer or has drainage please call the office.    Vaginal Discharge  You may have a mildly malodorous discharge and occasional spotting for up to 6 weeks. Do not put anything in the vagina like tampons or have sexual intercourse for 6 weeks after surgery. If you are having bleeding like a period, that is abnormal and you should contact your doctor.    GI Function, Nausea and Constipation  Nausea can occasionally be an issue in the first few days after surgery. It is usually caused as a side effect from the anesthesia and pain medicine, particularly narcotics. Taking the pain pills with food is a food way to proactively minimize this. Throwing up, especially after the first day, is not expected and if this happens, you should call your doctor. Feeling gassy and constipation can be a problem for the first week after surgery. Limiting the use of narcotics may be helpful. Stool softeners twice a day and a high fiber diet are safe. If needed, Miralax once daily is a good choice. If no bowel movement after 3 days, you will need to increase the Miralax until soft, regular bowel movements are passing.    Urinating  Because the bladder is  disturbed by the surgery, the normal sensation may be temporarily altered. You may not be aware that your bladder is full. If the bladder is allowed to get over distended, it may make the problem worse. This is why we make sure that you are able to empty your bladder adequately before you go home. For the first few days at home, you should make a point to empty your bladder every 3 to 4 hours. Pain with voiding, especially after the first day, is not expected and may represent a bladder infection.    Activity  For the first two days post-operatively, your soreness and recovery from anesthesia will limit your activity  Stairs are safe, just take your time  At a minimum during this time, you should walk around for 10-15 minutes every 2-3 hours. After that, in the first week, any activity except for overt exercise is safe.   During the first week you should not commit to being on your feet for more than 30 minutes at a time.   During the second week, light exercise is encouraged.  After 2 weeks from surgery, you should try to get back into regular activity other than heavy lifting.   For healing, please limit the amount of weight lifted to 8-10 pounds (a gallon of milk) for the first 6 weeks after surgery.   Driving is usually okay after the first few days. You must be able to comfortably wear a seatbelt, press the gas/brake pedals, and drive defensively. You may not drive while taking narcotic pain medicines.    When to Call the Doctor  Call for any fever above 100.4 F (If you do not feel feverish you do not have to routinely check your temperature.)  Call for severe pain not improved by medications  Call for persistent nausea, vomiting  Call for vaginal bleeding that is heavy as a period or passing blood clots larger than a quarter  Call for unusual swelling in your legs  Call if the incisions develop painful redness and discharge    In an emergency, call 911 or go to an Emergency Department at a nearby hospital

## 2025-06-30 NOTE — CARE PLAN
The patient's goals for the shift include      The clinical goals for the shift include Pt will remain hds and pain controlled      Problem: Skin  Goal: Decreased wound size/increased tissue granulation at next dressing change  6/30/2025 1625 by Jane Moscoso RN  Outcome: Progressing  Flowsheets (Taken 6/30/2025 1625)  Decreased wound size/increased tissue granulation at next dressing change: Promote sleep for wound healing  6/30/2025 1625 by Jane Moscoso RN  Outcome: Progressing  Flowsheets (Taken 6/30/2025 1625)  Decreased wound size/increased tissue granulation at next dressing change: Promote sleep for wound healing  Goal: Participates in plan/prevention/treatment measures  6/30/2025 1625 by Jane Moscoso RN  Outcome: Progressing  Flowsheets (Taken 6/30/2025 1625)  Participates in plan/prevention/treatment measures:   Elevate heels   Increase activity/out of bed for meals  6/30/2025 1625 by Jane Moscoso RN  Outcome: Progressing  Flowsheets (Taken 6/30/2025 1625)  Participates in plan/prevention/treatment measures:   Elevate heels   Increase activity/out of bed for meals  Goal: Prevent/manage excess moisture  6/30/2025 1625 by Jane Moscoso RN  Outcome: Progressing  Flowsheets (Taken 6/30/2025 1625)  Prevent/manage excess moisture:   Cleanse incontinence/protect with barrier cream   Moisturize dry skin  6/30/2025 1625 by Jane Moscoso RN  Outcome: Progressing  Flowsheets (Taken 6/30/2025 1625)  Prevent/manage excess moisture:   Cleanse incontinence/protect with barrier cream   Moisturize dry skin  Goal: Prevent/minimize sheer/friction injuries  6/30/2025 1625 by Jane Moscoso RN  Outcome: Progressing  Flowsheets (Taken 6/30/2025 1625)  Prevent/minimize sheer/friction injuries:   Increase activity/out of bed for meals   HOB 30 degrees or less  6/30/2025 1625 by Jane Mosocso RN  Outcome:  Progressing  Flowsheets (Taken 6/30/2025 1625)  Prevent/minimize sheer/friction injuries:   Increase activity/out of bed for meals   HOB 30 degrees or less  Goal: Promote/optimize nutrition  6/30/2025 1625 by Jane Moscoso RN  Outcome: Progressing  Flowsheets (Taken 6/30/2025 1625)  Promote/optimize nutrition:   Consume > 50% meals/supplements   Offer water/supplements/favorite foods  6/30/2025 1625 by Jane Moscoso RN  Outcome: Progressing  Flowsheets (Taken 6/30/2025 1625)  Promote/optimize nutrition:   Consume > 50% meals/supplements   Offer water/supplements/favorite foods  Goal: Promote skin healing  6/30/2025 1625 by Jane Moscoso RN  Outcome: Progressing  Flowsheets (Taken 6/30/2025 1625)  Promote skin healing: Ensure correct size (line/device) and apply per  instructions  6/30/2025 1625 by Jane Moscoso RN  Outcome: Progressing  Flowsheets (Taken 6/30/2025 1625)  Promote skin healing: Ensure correct size (line/device) and apply per  instructions     Problem: Pain - Adult  Goal: Verbalizes/displays adequate comfort level or baseline comfort level  6/30/2025 1625 by Jane Moscoso RN  Outcome: Progressing  6/30/2025 1625 by Jane Moscoso RN  Outcome: Progressing     Problem: Safety - Adult  Goal: Free from fall injury  6/30/2025 1625 by Jane Moscoso RN  Outcome: Progressing  6/30/2025 1625 by Jane Moscoso RN  Outcome: Progressing     Problem: Discharge Planning  Goal: Discharge to home or other facility with appropriate resources  6/30/2025 1625 by Jane Moscoso RN  Outcome: Progressing  6/30/2025 1625 by Jane Moscoso RN  Outcome: Progressing     Problem: Chronic Conditions and Co-morbidities  Goal: Patient's chronic conditions and co-morbidity symptoms are monitored and maintained or improved  6/30/2025 1625 by Jane Moscoso RN  Outcome:  Progressing  6/30/2025 1625 by Jane Moscoso RN  Outcome: Progressing     Problem: Nutrition  Goal: Nutrient intake appropriate for maintaining nutritional needs  6/30/2025 1625 by Jane Moscoso RN  Outcome: Progressing  6/30/2025 1625 by Jane Moscoso RN  Outcome: Progressing

## 2025-06-30 NOTE — OP NOTE
HYSTERECTOMY, LAPAROSCOPIC Bilateral  salpingo- oopherectormy, bilateral tap blocks, pelvic washings, cystoscopy, oversewing rectum, cystomy repair (B) Operative Note     Date: 2025  OR Location: [facility] OR    Name: Gisella Nicholas : 1950, Age: 74 y.o., MRN: [id number], Sex: female    Diagnosis  Pre-op Diagnosis      * Pelvic mass [R19.00] Post-op Diagnosis     * Pelvic mass [R19.00]     Procedures  HYSTERECTOMY, LAPAROSCOPIC Bilateral  salpingo- oopherectormy, bilateral tap blocks, pelvic washings, cystoscopy, oversewing rectum, cystomy repair  68282 - MT LAPAROSCOPY W TOTAL HYSTERECTOMY UTERUS 250 GM/<      Surgeons      * Daylin Peterson - Primary    Resident/Fellow/Other Assistant:  Surgeons and Role:  * No surgeons found with a matching role *    Staff:   Circulator: Khurram Cardona Person: Lis Pantojaub Person: Adriana    Anesthesia Staff: Anesthesiologist: Leoncio Taylor MD  Anesthesia Resident: Kayleigh Cabrera DO; Crow Rucker MD    Procedure Summary  Anesthesia: General  ASA: III  Estimated Blood Loss: 25mL  Intra-op Medications:   Administrations occurring from 0700 to 1050 on 25:   Medication Name Total Dose   sodium chloride 0.9 % irrigation solution 1,000 mL   sterile water irrigation solution 1,000 mL   Unable to Find 41 mL   acetaminophen (Ofirmev) injection 1,000 mg   aprepitant (Emend) capsule 40 mg   ceFAZolin (Ancef) 1 gram/ 3 mL injection - reconstituted 330 mg/mL 2 g   dexAMETHasone (Decadron) 4 mg/mL IV Syringe 2 mL 8 mg   fentaNYL (Sublimaze) injection 50 mcg/mL 100 mcg   ketamine (Ketalar) 10 mg/mL injection 50 mg   LR bolus Cannot be calculated   lidocaine (Xylocaine) injection 2 % 50 mg   magnesium sulfate 50 % injection 2 g   metroNIDAZOLE (Flagyl)  mg/100 mL (premix) 500 mg   midazolam PF (Versed) injection 1 mg/mL 1 mg   phenylephrine 40 mcg/mL syringe 10 mL 120 mcg   propofol (Diprivan) injection 10 mg/mL 150 mg   rocuronium (ZeMuron) 50 mg/5 mL injection  90 mg              Anesthesia Record               Intraprocedure I/O Totals          Intake    Ketamine 0.00 mL    The total shown is the total volume documented since Anesthesia Start was filed.    LR bolus 250.00 mL    Total Intake 250 mL       Output    Urine 140 mL    Est. Blood Loss 50 mL    NG/OG Tube Output 25 mL    Total Output 215 mL       Net    Net Volume 35 mL          Specimen:   ID Type Source Tests Collected by Time   1 : Pelvic washings Non-Gynecologic Cytology PELVIC WASHING CYTOLOGY CONSULTATION (NON-GYNECOLOGIC) Daylin Peterson MD [facility] 6/30/2025 0810   2 : Pelvic Mass Tissue PELVIC MASS RESECTION SURGICAL PATHOLOGY EXAM Daylin Peterson MD [facility] 6/30/2025 0853   3 : Uterus, cervix, right tube and ovary Tissue UTERUS, CERVIX, FALLOPIAN TUBE AND OVARY RIGHT SURGICAL PATHOLOGY EXAM Daylin Peterson MD [facility] 6/30/2025 0945                 Drains and/or Catheters:   Urethral Catheter Straight-tip 16 Fr. (Active)       Tourniquet Times:         Implants:     Findings: Large pelvic mass likely replacing the left fallopian tube and ovary with evidence of torsion adherent to the sidewall, uterine fundus, bladder anterior, rectum and appendix posterior. Normal right tube and ovary. Normal appearing cervix and uterus with smooth serosa. Approximately 1 cm bladder defect posterior away from the trigone. Bladder deserosalization measuring approximately 4 cm. Cystoscopy with water tight repair, bilateral jets from UO. Normal appearing appendix though adherent to the pelvic mass. Unremarkable survey otherwise.     Indications: Gisella Nicholas is an 74 y.o. female who is having surgery for Pelvic mass [R19.00].     The patient was seen in the preoperative area. The risks, benefits, complications, treatment options, non-operative alternatives, expected recovery and outcomes were discussed with the patient. The possibilities of reaction to medication, pulmonary aspiration, injury to surrounding structures, bleeding, recurrent  infection, the need for additional procedures, failure to diagnose a condition, and creating a complication requiring transfusion or operation were discussed with the patient. The patient concurred with the proposed plan, giving informed consent.  The site of surgery was properly noted/marked if necessary per policy. The patient has been actively warmed in preoperative area. Preoperative antibiotics have been ordered and given within 1 hours of incision. Venous thrombosis prophylaxis have been ordered including bilateral sequential compression devices and chemical prophylaxis    Procedure Details:   After informed consent was confirmed, the patient was taken to the operating room with an IV in place. A preoperative huddle and timeout were performed, with all OR personnel confirming correct patient and procedure. The patient was moved to the operating room table and SCDs were placed.  Heparin was administered.  General anesthesia was induced without difficulty. She was then placed in the dorsal lithotomy position on the operating room table using Raheel stirrups. She was prepped and draped in a normal sterile fashion for a laparoscopic hysterectomy. A surgical pause was performed. The patient's identity and surgical procedure were again confirmed by all the surgical personnel. The patient received preoperative antibiotics.    A cadet catheter was placed.  A [employer] [facility] system was then placed as a uterine manipulator. .      We then turned our attention to the laparoscopic portion of the operation. The skin superior to the supraumbilical area was infiltrated with local anesthetic. A 12mm vertical skin incision was made. This was carried down to the level of the fascia with two S retractors. The fascia was elevated with 2 kocher clamps and incised with a scalpel.  Both sides of the fascia were then tagged with 0-vicryl suture.  The peritoneum was then elevated with 2 hemostats and was entered sharply with metzenbaum  scissors.  The michelle port was then placed. CO2 was then insufflated into the abdomen.     Once this was accomplished, we then carefully inspected the abdomen and there was no evidence of injury. The patient was placed in deep Trendelenburg. We then placed three 5-mm accessory ports.  All were placed under direct visualization.  The small bowel was manipulated out of the pelvis. Washings were collected. The appendix was adherent to the pelvic mass. Adhesions were taken down with blunt and sharp dissection. The bladder anterior was adherent to the pelvic mass and these were taken down bluntly.  The rectum was adherent to the left sidewall and pelvic mass. These were taken down with a combination of blunt and sharp dissection. Small < 1 cm serosal defect noted on the rectum which was later over sewn.     The uterus was placed on traction. The round ligaments were sealed and divided with the ligasure device. The broad ligament was dissected cephalad and caudad, and a bladder flap was created. This was carried to the opposite side, where the round ligament was also divided. The bladder was dissected away from the cervix. The infundibulopelvic ligaments were then isolated away from the ureters which were well visualized bilaterally.  A window was created between the two and the IP ligaments were cauterized and then divided with the ligasure device.  The left pelvic mass was freed and placed within a 15 mm bag and the cyst removed after contained rupture at the level of the skin. This was sent for frozen.     Adhesions were noted between the sigmoid colon and pelvic sidewall on the patient's left.  These were taken down with sharp dissection.  The anterior and posterior leaves of the broad ligament were bluntly .   At the vesicouterine fold the peritoneum was incised transversely and the bladder sharply dissected off the lower uterine segment and upper vagina.  The posterior peritoneum was gently brought down  from the uterus.  The uterine vessels were skeletonized then cauterized and divided with the ligasure device followed by the cardinal ligaments and the uterosacral ligaments in a similar fashion.      We then began the colpotomy. Monopolar bovie was used and the cervix was released circumferentially over the cervical cup.  The uterus, tubes and ovaries and cervix were delivered transvaginally without difficulty.  This was bivalved and only a small area of noninvasive tumor was noted.  This was sent for frozen section which revealed no evidence of malignancy.      The colpotomy was closed laparoscopically with a #0 V-loc.  Hemostasis was achieved.      The serosa involving the proximal rectum was over sewn with single figure of eight suture of 3-0 vicryl.     Bladder was back filled with 150 ml saline. Pin point defect suspect with deserosalization noted overlying the dome of the bladder approximately 4 cm. The bladder was emptied and bladder was reinforced with running suture of 3-0 vicryl. The abdomen was copiously irrigated. Hemostasis was noted.     Cystoscopy was performed and normal ureteral jets were noted bilaterally. Repair confirmed near the dome posterior bladder and not in close proximity to the trigone or UO. Repair water tight. Survey was otherwise unremarkable. Bilateral jets from UO.     We then looked from above and confirmed water tight cystotomy repair. The pelvis was irrigated and hemostatic. Surgicel powder was placed overlying the pelvis and the retroperitoneum.     Bilateral TAP blocks were performed intraoperatively with 20cc of 0.5% Bupivacaine mixed with 10mg of Kenalog with normal saline to a total volume of 40cc. A 22g spinal needle was introduced into preperitoneal space under direct visualization lateral to the rectus and withdrawn 5mm to the plane between the transversus abdominis and internal oblique. A test dose was administered to ensure proper location and 20cc of local anesthetic  mixture was administered. A similar process was repeated on the opposite side.    The 5mm ports were all removed under direct visualization.  The 12mm port was removed and the fascia was then closed with another figure-of-eight suture of 0-vicryl followed by tying the two previously-tagged end of fascia together.  The port sites were all irrigated.  Hemostasis was noted.  Ports were closed using 3-0 monocryl in a subcuticular fashion, followed by steristrips.      The patient was awoken from general anesthesia without difficulty.    Sponge, needle, instrument counts were correct x 2.    Evidence of Infection: No   Complications:  None; patient tolerated the procedure well.    Disposition: PACU - hemodynamically stable.  Condition: stable       Attending Attestation: I was present for the entire procedure.    Daylin Peterson  Phone Number: [phone]

## 2025-06-30 NOTE — PROGRESS NOTES
Pharmacy Medication History Review    Gisella Nicholas is a 74 y.o. female admitted for Pelvic mass. Pharmacy reviewed the patient's [id number] medications and allergies for accuracy.    The list below reflects the updated PTA list.   Prior to Admission Medications   Prescriptions Last Dose Informant   amantadine (Symmetrel) 100 mg capsule 6/30/2025 Morning Spouse/Significant Other   Sig: Take 1 capsule (100 mg) by mouth 2 times a day.   anastrozole (Arimidex) 1 mg tablet 6/29/2025 Spouse/Significant Other   Sig: Take 1 tablet (1 mg total) by mouth once daily.   carbidopa-levodopa (Crexont)  mg capsule,IR -extend rel,biphase Not Taking Spouse/Significant Other   Sig: TAKE 2 CAPSULES BY MOUTH 3 TIMES A DAY   Patient not taking: Reported on 6/30/2025   carbidopa-levodopa (Sinemet)  mg tablet 6/30/2025 Morning Spouse/Significant Other   Sig: Take 2 tablets by mouth 3 times a day.   Patient taking differently: Take 2 tablets by mouth 4 times a day.   chlorhexidine (Peridex) 0.12 % solution 6/30/2025 Morning Spouse/Significant Other   Sig: Swish and spit with 15ml of solution the night before and morning of surgery. Do not swallow.   cholecalciferol (Vitamin D-3) 25 mcg (1,000 units) tablet 6/29/2025 Spouse/Significant Other   Sig: Take 2 tablets (50 mcg) by mouth 2 times a day.   medical cannabis Past Week Spouse/Significant Other   Sig: Take 1 each by mouth once daily at bedtime.   mesalamine (Delzicol) 400 mg DR capsule 6/30/2025 Morning Spouse/Significant Other   Sig: Take 2 capsules (800 mg) by mouth 3 times a day. Patient gets from [facility]    nystatin (Mycostatin) 100,000 unit/mL suspension Past Week Spouse/Significant Other   Sig: Take 5 mL (500,000 Units) by mouth 2 times a day.   predniSONE (Deltasone) 5 mg tablet 6/29/2025 Spouse/Significant Other   Sig: Take 0.5 tablets (2.5 mg) by mouth once daily.   spironolacton-hydrochlorothiaz (Aldactazide) 25-25 mg tablet 6/30/2025 Morning  Spouse/Significant Other   Sig: Take 2 tablets (50 mg) by mouth once daily.      Facility-Administered Medications: None        The list below reflects the updated allergy list. Please review each documented allergy for additional clarification and justification.  Allergies  Reviewed by Jane Moscoso RN on 6/30/2025        Severity Reactions Comments    Tramadol Medium Nausea And Vomiting     Sulfa (sulfonamide Antibiotics) Not Specified Hives hives & chills            Patient declines M2B at discharge.     Sources used to complete the med history include:    [facility]  Pharmacy dispense history  Spouse   Good historian  Chart Review  Care Everywhere     Below are additional concerns with the patient's PTA list.  Spoke with the patient’s spouse, who is a reliable historian of her home medications. He was able to confirm the doses and directions for all medications.  He reports that the patient’s Sinemet dose was increased about a week ago to 2 tablets four times daily, instead of the previous regimen of 2 tablets three times daily.  The patient obtains her Mesalamine from a [place] pharmacy that ships from [facility].    Medications ADDED:  none  Medications CHANGED:  Prednisone to 2.5 mg daily.  Sinemet  to 2 tablets 4 times daily.  Vitamin D 2000 units two times daily.  Medications REMOVED:   Ammonium Lactate   Tylenol PM    Hi Spencer PharmD  Transitions of Care Pharmacist  [facility]  Please reach out via Secure Chat for questions, or if no response call [facility] or [facility]

## 2025-07-01 ENCOUNTER — HOME HEALTH ADMISSION (OUTPATIENT)
Dept: HOME HEALTH SERVICES | Facility: HOME HEALTH | Age: 75
End: 2025-07-01
Payer: MEDICARE

## 2025-07-01 ENCOUNTER — APPOINTMENT (OUTPATIENT)
Dept: OTOLARYNGOLOGY | Facility: CLINIC | Age: 75
End: 2025-07-01
Payer: MEDICARE

## 2025-07-01 VITALS
OXYGEN SATURATION: 97 % | DIASTOLIC BLOOD PRESSURE: 60 MMHG | BODY MASS INDEX: 25.35 KG/M2 | WEIGHT: 134.26 LBS | HEART RATE: 73 BPM | SYSTOLIC BLOOD PRESSURE: 103 MMHG | TEMPERATURE: 97.5 F | HEIGHT: 61 IN | RESPIRATION RATE: 16 BRPM

## 2025-07-01 PROBLEM — Z98.890 S/P ARTHROSCOPY OF RIGHT SHOULDER: Status: ACTIVE | Noted: 2022-01-13

## 2025-07-01 PROBLEM — M75.100 TEAR OF ROTATOR CUFF: Status: ACTIVE | Noted: 2021-12-16

## 2025-07-01 PROBLEM — M75.100 TEAR OF ROTATOR CUFF: Status: RESOLVED | Noted: 2021-12-16 | Resolved: 2025-07-01

## 2025-07-01 PROBLEM — C50.212 MALIGNANT NEOPLASM OF UPPER-INNER QUADRANT OF LEFT FEMALE BREAST: Status: ACTIVE | Noted: 2023-11-15

## 2025-07-01 PROBLEM — M16.11 OSTEOARTHRITIS OF RIGHT HIP: Status: ACTIVE | Noted: 2017-08-10

## 2025-07-01 PROBLEM — H50.00 ESOTROPIA: Status: RESOLVED | Noted: 2024-03-28 | Resolved: 2025-07-01

## 2025-07-01 LAB
LABORATORY COMMENT REPORT: NORMAL
LABORATORY COMMENT REPORT: NORMAL
PATH REPORT.FINAL DX SPEC: NORMAL
PATH REPORT.GROSS SPEC: NORMAL
PATH REPORT.RELEVANT HX SPEC: NORMAL
PATH REPORT.TOTAL CANCER: NORMAL

## 2025-07-01 PROCEDURE — 97161 PT EVAL LOW COMPLEX 20 MIN: CPT | Mod: GP

## 2025-07-01 PROCEDURE — 2500000004 HC RX 250 GENERAL PHARMACY W/ HCPCS (ALT 636 FOR OP/ED)

## 2025-07-01 PROCEDURE — 2500000001 HC RX 250 WO HCPCS SELF ADMINISTERED DRUGS (ALT 637 FOR MEDICARE OP)

## 2025-07-01 PROCEDURE — 99238 HOSP IP/OBS DSCHRG MGMT 30/<: CPT

## 2025-07-01 PROCEDURE — 2500000001 HC RX 250 WO HCPCS SELF ADMINISTERED DRUGS (ALT 637 FOR MEDICARE OP): Performed by: STUDENT IN AN ORGANIZED HEALTH CARE EDUCATION/TRAINING PROGRAM

## 2025-07-01 PROCEDURE — 96372 THER/PROPH/DIAG INJ SC/IM: CPT | Performed by: STUDENT IN AN ORGANIZED HEALTH CARE EDUCATION/TRAINING PROGRAM

## 2025-07-01 PROCEDURE — 7100000011 HC EXTENDED STAY RECOVERY HOURLY - NURSING UNIT

## 2025-07-01 PROCEDURE — 99024 POSTOP FOLLOW-UP VISIT: CPT

## 2025-07-01 PROCEDURE — 2500000004 HC RX 250 GENERAL PHARMACY W/ HCPCS (ALT 636 FOR OP/ED): Performed by: STUDENT IN AN ORGANIZED HEALTH CARE EDUCATION/TRAINING PROGRAM

## 2025-07-01 RX ORDER — IBUPROFEN 600 MG/1
600 TABLET, FILM COATED ORAL EVERY 6 HOURS
Qty: 28 TABLET | Refills: 0 | Status: SHIPPED | OUTPATIENT
Start: 2025-07-01 | End: 2025-07-08

## 2025-07-01 RX ORDER — ONDANSETRON HYDROCHLORIDE 2 MG/ML
4 INJECTION, SOLUTION INTRAVENOUS EVERY 8 HOURS PRN
Status: DISCONTINUED | OUTPATIENT
Start: 2025-07-01 | End: 2025-07-01 | Stop reason: HOSPADM

## 2025-07-01 RX ORDER — CARBIDOPA AND LEVODOPA 25; 100 MG/1; MG/1
2 TABLET ORAL 4 TIMES DAILY
Status: DISCONTINUED | OUTPATIENT
Start: 2025-07-01 | End: 2025-07-01 | Stop reason: HOSPADM

## 2025-07-01 RX ORDER — ACETAMINOPHEN 325 MG/1
975 TABLET ORAL EVERY 6 HOURS
Qty: 84 TABLET | Refills: 0 | Status: SHIPPED | OUTPATIENT
Start: 2025-07-01 | End: 2025-07-08

## 2025-07-01 RX ORDER — ONDANSETRON 4 MG/1
4 TABLET, FILM COATED ORAL EVERY 8 HOURS PRN
Status: DISCONTINUED | OUTPATIENT
Start: 2025-07-01 | End: 2025-07-01 | Stop reason: HOSPADM

## 2025-07-01 RX ORDER — CARBIDOPA AND LEVODOPA 25; 100 MG/1; MG/1
2 TABLET ORAL 4 TIMES DAILY
Qty: 240 TABLET | Refills: 0 | Status: SHIPPED | OUTPATIENT
Start: 2025-07-01 | End: 2025-07-31

## 2025-07-01 RX ORDER — SIMETHICONE 80 MG
80 TABLET,CHEWABLE ORAL 4 TIMES DAILY PRN
Qty: 30 TABLET | Refills: 0 | Status: SHIPPED | OUTPATIENT
Start: 2025-07-01

## 2025-07-01 RX ORDER — ONDANSETRON 4 MG/1
4 TABLET, FILM COATED ORAL EVERY 8 HOURS PRN
Qty: 20 TABLET | Refills: 0 | Status: SHIPPED | OUTPATIENT
Start: 2025-07-01

## 2025-07-01 RX ORDER — AMOXICILLIN 250 MG
2 CAPSULE ORAL 2 TIMES DAILY
Qty: 120 TABLET | Refills: 1 | Status: SHIPPED | OUTPATIENT
Start: 2025-07-01 | End: 2025-08-30

## 2025-07-01 RX ORDER — POLYETHYLENE GLYCOL 3350 17 G/17G
17 POWDER, FOR SOLUTION ORAL DAILY PRN
Qty: 30 PACKET | Refills: 1 | Status: SHIPPED | OUTPATIENT
Start: 2025-07-01 | End: 2025-08-30

## 2025-07-01 RX ORDER — ONDANSETRON 4 MG/1
4 TABLET, FILM COATED ORAL EVERY 8 HOURS PRN
Qty: 20 TABLET | Refills: 0 | Status: CANCELLED | OUTPATIENT
Start: 2025-07-01 | End: 2025-07-31

## 2025-07-01 RX ORDER — OXYCODONE HYDROCHLORIDE 5 MG/1
5 TABLET ORAL EVERY 6 HOURS PRN
Qty: 12 TABLET | Refills: 0 | Status: SHIPPED | OUTPATIENT
Start: 2025-07-01 | End: 2025-07-04

## 2025-07-01 RX ADMIN — CEFAZOLIN SODIUM 2 G: 2 INJECTION, SOLUTION INTRAVENOUS at 06:24

## 2025-07-01 RX ADMIN — HEPARIN SODIUM 5000 UNITS: 5000 INJECTION, SOLUTION INTRAVENOUS; SUBCUTANEOUS at 05:03

## 2025-07-01 RX ADMIN — PREDNISONE 2.5 MG: 2.5 TABLET ORAL at 08:15

## 2025-07-01 RX ADMIN — ACETAMINOPHEN 975 MG: 325 TABLET ORAL at 12:28

## 2025-07-01 RX ADMIN — CARBIDOPA AND LEVODOPA 2 TABLET: 25; 100 TABLET ORAL at 08:15

## 2025-07-01 RX ADMIN — AMANTADINE HYDROCHLORIDE 100 MG: 100 CAPSULE ORAL at 05:03

## 2025-07-01 RX ADMIN — DOCUSATE SODIUM 50 MG AND SENNOSIDES 8.6 MG 2 TABLET: 8.6; 5 TABLET, FILM COATED ORAL at 08:14

## 2025-07-01 RX ADMIN — METRONIDAZOLE 500 MG: 500 INJECTION, SOLUTION INTRAVENOUS at 05:03

## 2025-07-01 RX ADMIN — ANASTROZOLE 1 MG: 1 TABLET, COATED ORAL at 08:15

## 2025-07-01 RX ADMIN — CARBIDOPA AND LEVODOPA 2 TABLET: 25; 100 TABLET ORAL at 13:48

## 2025-07-01 RX ADMIN — CARBIDOPA AND LEVODOPA 2 TABLET: 25; 100 TABLET ORAL at 17:00

## 2025-07-01 RX ADMIN — AMANTADINE HYDROCHLORIDE 100 MG: 100 CAPSULE ORAL at 12:28

## 2025-07-01 RX ADMIN — ACETAMINOPHEN 975 MG: 325 TABLET ORAL at 06:26

## 2025-07-01 RX ADMIN — KETOROLAC TROMETHAMINE 15 MG: 30 INJECTION, SOLUTION INTRAMUSCULAR; INTRAVENOUS at 06:26

## 2025-07-01 RX ADMIN — HEPARIN SODIUM 5000 UNITS: 5000 INJECTION, SOLUTION INTRAVENOUS; SUBCUTANEOUS at 12:28

## 2025-07-01 RX ADMIN — IBUPROFEN 600 MG: 400 TABLET ORAL at 13:48

## 2025-07-01 RX ADMIN — MESALAMINE 2.4 G: 1.2 TABLET, DELAYED RELEASE ORAL at 08:15

## 2025-07-01 RX ADMIN — Medication 25 MCG: at 08:14

## 2025-07-01 ASSESSMENT — COGNITIVE AND FUNCTIONAL STATUS - GENERAL
HELP NEEDED FOR BATHING: A LITTLE
CLIMB 3 TO 5 STEPS WITH RAILING: TOTAL
PERSONAL GROOMING: A LITTLE
MOBILITY SCORE: 17
TURNING FROM BACK TO SIDE WHILE IN FLAT BAD: A LOT
MOVING TO AND FROM BED TO CHAIR: A LOT
STANDING UP FROM CHAIR USING ARMS: A LITTLE
MOVING FROM LYING ON BACK TO SITTING ON SIDE OF FLAT BED WITH BEDRAILS: A LITTLE
TURNING FROM BACK TO SIDE WHILE IN FLAT BAD: A LITTLE
DRESSING REGULAR LOWER BODY CLOTHING: A LITTLE
WALKING IN HOSPITAL ROOM: A LOT
CLIMB 3 TO 5 STEPS WITH RAILING: A LOT
TOILETING: A LITTLE
DRESSING REGULAR UPPER BODY CLOTHING: A LITTLE
MOBILITY SCORE: 11
MOVING TO AND FROM BED TO CHAIR: A LITTLE
EATING MEALS: A LITTLE
DAILY ACTIVITIY SCORE: 18
MOVING FROM LYING ON BACK TO SITTING ON SIDE OF FLAT BED WITH BEDRAILS: A LOT
WALKING IN HOSPITAL ROOM: A LITTLE
STANDING UP FROM CHAIR USING ARMS: A LOT

## 2025-07-01 ASSESSMENT — ACTIVITIES OF DAILY LIVING (ADL)
ADL_ASSISTANCE: INDEPENDENT
LACK_OF_TRANSPORTATION: NO

## 2025-07-01 ASSESSMENT — PAIN - FUNCTIONAL ASSESSMENT
PAIN_FUNCTIONAL_ASSESSMENT: 0-10

## 2025-07-01 ASSESSMENT — PAIN SCALES - GENERAL
PAINLEVEL_OUTOF10: 0 - NO PAIN
PAINLEVEL_OUTOF10: 1

## 2025-07-01 NOTE — CARE PLAN
Problem: Skin  Goal: Decreased wound size/increased tissue granulation at next dressing change  Outcome: Progressing  Flowsheets (Taken 7/1/2025 1254)  Decreased wound size/increased tissue granulation at next dressing change: Protective dressings over bony prominences  Goal: Participates in plan/prevention/treatment measures  Outcome: Progressing  Flowsheets (Taken 7/1/2025 1254)  Participates in plan/prevention/treatment measures:   Increase activity/out of bed for meals   Elevate heels  Goal: Prevent/manage excess moisture  Outcome: Progressing  Flowsheets (Taken 7/1/2025 1254)  Prevent/manage excess moisture: Monitor for/manage infection if present  Goal: Prevent/minimize sheer/friction injuries  Outcome: Progressing  Flowsheets (Taken 7/1/2025 1254)  Prevent/minimize sheer/friction injuries: Increase activity/out of bed for meals  Goal: Promote/optimize nutrition  Outcome: Progressing  Flowsheets (Taken 7/1/2025 1254)  Promote/optimize nutrition: Consume > 50% meals/supplements  Goal: Promote skin healing  Outcome: Progressing  Flowsheets (Taken 7/1/2025 1254)  Promote skin healing: Rotate device position/do not position patient on device     Problem: Pain - Adult  Goal: Verbalizes/displays adequate comfort level or baseline comfort level  Outcome: Progressing     Problem: Chronic Conditions and Co-morbidities  Goal: Patient's chronic conditions and co-morbidity symptoms are monitored and maintained or improved  Outcome: Progressing     Problem: Discharge Planning  Goal: Discharge to home or other facility with appropriate resources  Outcome: Progressing     Problem: Safety - Adult  Goal: Free from fall injury  Outcome: Progressing     Problem: Nutrition  Goal: Nutrient intake appropriate for maintaining nutritional needs  Outcome: Progressing

## 2025-07-01 NOTE — PROGRESS NOTES
07/01/25 1100   Discharge Planning   Living Arrangements Spouse/significant other   Support Systems Spouse/significant other   Type of Residence Private residence   Number of Stairs Within Residence 0   Home or Post Acute Services In home services   Type of Home Care Services Home PT   Expected Discharge Disposition Home H   Financial Resource Strain   How hard is it for you to pay for the very basics like food, housing, medical care, and heating? Not hard   Housing Stability   In the last 12 months, was there a time when you were not able to pay the mortgage or rent on time? N   In the past 12 months, how many times have you moved where you were living? 0   At any time in the past 12 months, were you homeless or living in a shelter (including now)? N   Transportation Needs   In the past 12 months, has lack of transportation kept you from medical appointments or from getting medications? no   In the past 12 months, has lack of transportation kept you from meetings, work, or from getting things needed for daily living? No   Patient Choice   Provider Choice list and CMS website (https://medicare.gov/care-compare#search) for post-acute Quality and Resource Measure Data were provided and reviewed with: Family;Patient   Patient / Family choosing to utilize agency / facility established prior to hospitalization No     SW met with pt and spouse to complete assessment.  Pt is alert and oriented x3.  Pt was living with spouse prior to admission in a one floor apartment.  She is independent in ADLs & IADLs, but states spouse does most of the cooking and cleaning.  Pt states she uses a walker to assist with ambulation.  She denies any recent falls and feels safe at home.  Pt identifies her spouse as primary support.  She has a dtr that lives near pt as well as friends to assist if needed.  Pt uses [facility] in [place] for pharmacy needs.  She denies any financial difficulty obtaining medications.  SW discussed SNF with pt.  Pt  and spouse decline SNF.  Both are receptive to HHC and [facility]  SW will relay to care team and TCC.  MARILYN Kirkpatrick

## 2025-07-01 NOTE — DISCHARGE SUMMARY
Discharge Summary    Admission Date: 6/30/2025  Discharge Date: 7/1/2025    Discharge Diagnosis  Pelvic mass    Hospital Course  Patient was admitted on 6/30 for a pelvic mass & liver nodule c/f possible malignancy, who is now s/p total laparoscopic hysterectomy, removal of pelvic mass, bilateral salpingo-oophorectomy, pelvic washings, over sewing rectum, cystoscopy, cystotomy repair and bilateral tap blocks. See operative report for full procedure details. By POD1, she was meeting postoperative milestones including tolerating PO diet and medications. Her pain was well controlled with PO pain medications. She is being discharged with a cadet in place, and received teaching for managing the cadet. She will follow up outpatient for cadet removal and a voiding cystourethrogram. She was recommended for discharge to SNF by PT, but pt declined SNF and will be discharged home with home PT ordered. She has follow up as an outpatient in 1 week with [facility] Patiño.    Pertinent Physical Exam At Time of Discharge  Constitutional: No visible distress, alert and cooperative  Head/Neck: Normocephalic  Respiratory/Thorax: Comfortable on RA, good air movement in anterior fields  Cardiovascular: RRR  Gastrointestinal: soft, nondistended, nontender, without rebound or guarding. Wearing abdominal binder  Musculoskeletal: grossly normal ROM  Extremities: No LE edema  Neurological: A&Ox3  Psychological: Appropriate mood and behavior    Last Vitals:  Temp Pulse Resp BP MAP Pulse Ox   36.4 °C (97.5 °F) 73 16 103/60 74 97 %     Discharge Meds     Your medication list        START taking these medications        Instructions Last Dose Given Next Dose Due   acetaminophen 325 mg tablet  Commonly known as: Tylenol      Take 3 tablets (975 mg) by mouth every 6 hours for 7 days.       ibuprofen 600 mg tablet      Take 1 tablet (600 mg) by mouth every 6 hours for 7 days.       ondansetron 4 mg tablet  Commonly known as: Zofran       Take 1 tablet (4 mg) by mouth every 8 hours if needed for nausea or vomiting.       oxyCODONE 5 mg immediate release tablet  Commonly known as: Roxicodone      Take 1 tablet (5 mg) by mouth every 6 hours if needed for moderate pain (4 - 6) or severe pain (7 - 10) for up to 3 days.       polyethylene glycol 17 gram packet  Commonly known as: Glycolax, Miralax      Take 17 g by mouth once daily as needed (constipation).       sennosides-docusate sodium 8.6-50 mg tablet  Commonly known as: Chani-Colace      Take 2 tablets by mouth 2 times a day. While taking Oxycodone then as needed for stool softener.       simethicone 80 mg chewable tablet  Commonly known as: Mylicon      Chew and swallow 1 tablet (80 mg) 4 times a day as needed (gas pain).              CONTINUE taking these medications        Instructions Last Dose Given Next Dose Due   amantadine 100 mg capsule  Commonly known as: Symmetrel      Take 1 capsule (100 mg) by mouth 2 times a day.       anastrozole 1 mg tablet  Commonly known as: Arimidex           carbidopa-levodopa  mg tablet  Commonly known as: Sinemet      Take 2 tablets by mouth 3 times a day.       carbidopa-levodopa  mg tablet  Commonly known as: Sinemet      Take 2 tablets by mouth 4 times a day.       cholecalciferol 25 mcg (1,000 units) tablet  Commonly known as: Vitamin D-3           medical cannabis           mesalamine 400 mg DR capsule  Commonly known as: Delzicol           nystatin 100,000 unit/mL suspension  Commonly known as: Mycostatin           predniSONE 5 mg tablet  Commonly known as: Deltasone           spironolacton-hydrochlorothiaz 25-25 mg tablet  Commonly known as: Aldactazide                  STOP taking these medications      chlorhexidine 0.12 % solution  Commonly known as: Peridex               ASK your doctor about these medications        Instructions Last Dose Given Next Dose Due   Crexont  mg capsule,IR -extend rel,biphase  Generic drug:  carbidopa-levodopa      TAKE 2 CAPSULES BY MOUTH 3 TIMES A DAY                 Where to Get Your Medications        These medications were sent to [facility] [address] [facility] - [place], [place] - [address]  [address], [facility] [address]      Phone: [phone]   acetaminophen 325 mg tablet  carbidopa-levodopa  mg tablet  ibuprofen 600 mg tablet  ondansetron 4 mg tablet  oxyCODONE 5 mg immediate release tablet  polyethylene glycol 17 gram packet  sennosides-docusate sodium 8.6-50 mg tablet  simethicone 80 mg chewable tablet        Complications Requiring Follow-Up  Follow up on 7/7 for voiding cystourethrogram and cadet removal.    Test Results Pending At Discharge  Pending Labs       Order Current Status    Surgical Pathology Exam In process            Outpatient Follow-Up  Future Appointments   Date Time Provider Department Center   7/7/2025 10:30 AM [facility]O 5 [facility]   7/7/2025 11:00 AM [facility] [place]   7/8/2025  4:00 PM Bernardo Chris MD [id number] [place]   7/25/2025  2:00 PM [facility]   9/15/2025  1:30 PM MAYLIN Mcneill-CNP [id number] [facility]     Sangeetha Frias MD, PGY-1

## 2025-07-01 NOTE — PROGRESS NOTES
Physical Therapy    Physical Therapy Evaluation    Patient Name: Gisella Nicholas  MRN: [id number]  Department: [facility] Room: 6026/6026-A  Today's Date: 7/1/2025   Time Calculation  Start Time: 0931  Stop Time: 0946  Time Calculation (min): 15 min    Assessment/Plan   PT Assessment  PT Assessment Results: Decreased strength, Decreased endurance, Impaired balance, Decreased mobility  Rehab Prognosis: Good  Barriers to Discharge Home: Caregiver assistance, Physical needs  Caregiver Assistance: Caregiver assistance needed per identified barriers - however, level of patient's required assistance exceeds assistance available at home  Physical Needs: Stair navigation into home limited by function/safety, Ambulating household distances limited by function/safety, Intermittent mobility assistance needed, High falls risk due to function or environment  End of Session Communication: Bedside nurse  Assessment Comment: Pt presents with decreased strength, balance, and mobility. Pt would benefit from PT to address the above deficits.  End of Session Patient Position: Up in chair, Alarm on  IP OR SWING BED PT PLAN  Inpatient or Swing Bed: Inpatient  PT Plan  Treatment/Interventions: Transfer training, Bed mobility, Gait training, Stair training, Balance training, Strengthening, Endurance training  PT Plan: Ongoing PT  PT Frequency: 3 times per week (during this acute inpatient hospitalization)  PT Discharge Recommendations: Moderate intensity level of continued care (Based on current functional status and rehab potential, patient is anticipated to tolerate and benefit from 5 or more days per week of skilled rehabilitative therapy after discharge from this acute inpatient hospitalization.)  Equipment Recommended upon Discharge:  (TBD)  PT Recommended Transfer Status: Assist x1, Assistive device  PT - OK to Discharge: Yes      Subjective   General Visit Information:  Reason for Referral: s/p TLH, BSO, pelvic washings, cystoscopy,  "oversewing of rectum, and cystotomy repair.  Past Medical History Relevant to Rehab: L ER+ breast cancer (on anastrozole), HTN, Parkinson's, OA, and ulcerative colitis, pelvic mass c/f possible neoplasm  Prior to Session Communication: Bedside nurse  Patient Position Received: Bed, 3 rail up, Alarm off, not on at start of session   Home Living:  Home Living  Type of Home: House  Lives With: Spouse  Home Adaptive Equipment: Walker rolling or standard, Wheelchair-manual (shower chair)  Home Layout: One level  Entrance Stairs-Number of Steps: 1  Prior Level of Function:  Prior Function Per Pt/Caregiver Report  ADL Assistance: Independent  Homemaking Assistance: Needs assistance  Ambulatory Assistance: Independent (with walker; uses a transport wheelchair in the community)  Prior Function Comments: reports a \"couple\" fall  Precautions:  Precautions  Medical Precautions: Fall precautions  Post-Surgical Precautions: Abdominal surgery precautions  Vital Signs:  Vital Signs  Heart Rate: 76  BP: 118/76  Patient Position: Sitting    Objective     Pain:  Pain Assessment  Pain Assessment: 0-10  0-10 (Numeric) Pain Score: 0 - No pain  Cognition:  Cognition  Overall Cognitive Status: Within Functional Limits      Extremity/Trunk Assessments:  Strength:           RLE   RLE : Exceptions to WFL  Strength RLE  RLE Overall Strength: Greater than or equal to 3/5 as evidenced by functional mobility  LLE   LLE : Exceptions to WFL  Strength LLE  LLE Overall Strength: Greater than or equal to 3/5 as evidenced by functional mobility    General Assessments:            Sensation  Light Touch: No apparent deficits     Static Sitting Balance  Static Sitting-Balance Support: Feet supported  Static Sitting-Level of Assistance: Contact guard  Dynamic Sitting Balance  Dynamic Sitting-Balance Support: Feet supported  Dynamic Sitting-Level of Assistance: Minimum assistance  Static Standing Balance  Static Standing-Balance Support: Bilateral upper " extremity supported  Static Standing-Level of Assistance: Minimum assistance  Dynamic Standing Balance  Dynamic Standing-Balance Support: Bilateral upper extremity supported  Dynamic Standing-Level of Assistance: Moderate assistance    Functional Assessments:  Bed Mobility  Bed Mobility: Yes  Bed Mobility 1  Bed Mobility 1: Supine to sitting  Level of Assistance 1: Moderate assistance  Bed Mobility Comments 1: HOB elevated, verbal cues for sequencing  Transfers  Transfer: Yes  Transfer 1  Technique 1: Sit to stand, Stand to sit  Transfer Device 1: Walker  Transfer Level of Assistance 1: Moderate assistance  Trials/Comments 1: verbal cues  Transfers 2  Transfer From 2: Bed to  Transfer to 2: Chair with arms  Technique 2: Sit to stand  Transfer Device 2: Walker  Transfer Level of Assistance 2: Moderate assistance  Trials/Comments 2: verbal cues for sequencing  Ambulation/Gait Training  Ambulation/Gait Training Performed: Yes  Ambulation/Gait Training 1  Device 1: Rolling walker  Assistance 1: Moderate assistance  Quality of Gait 1: Decreased step length, Shuffling gait, Diminished heel strike, Narrow base of support (decreased venessa, verbal cues for sequencing)  Comments/Distance (ft) 1: 2 feet to chair          Outcome Measures:  [facility] Basic Mobility  Turning from your back to your side while in a flat bed without using bedrails: A lot  Moving from lying on your back to sitting on the side of a flat bed without using bedrails: A lot  Moving to and from bed to chair (including a wheelchair): A lot  Standing up from a chair using your arms (e.g. wheelchair or bedside chair): A lot  To walk in hospital room: A lot  Climbing 3-5 steps with railing: Total  Basic Mobility - Total Score: 11                               Encounter Problems       Encounter Problems (Active)       Balance       Pt will score >25/28 on the Tinetti Mobility Outcome Assessment (combined gait and balance) in order to demonstrate low fall risk         Start:  07/01/25    Expected End:  07/22/25               Mobility       STG - Patient will ambulate 100 feet with walker modified independent        Start:  07/01/25    Expected End:  07/22/25            STG - Patient will ambulate up and down 1 step with LRD modified independent        Start:  07/01/25    Expected End:  07/22/25               PT Transfers       STG - Patient will perform bed mobility independent        Start:  07/01/25    Expected End:  07/22/25            STG - Patient will transfer sit to and from stand with walker modified independent        Start:  07/01/25    Expected End:  07/22/25                   Education Documentation  Precautions, taught by Johnna Simeon, PT at 7/1/2025 10:07 AM.  Learner: Patient  Readiness: Acceptance  Method: Explanation  Response: Verbalizes Understanding  Comment: safe mobility, fall/abdominal precautions    Mobility Training, taught by Johnna Simeon PT at 7/1/2025 10:07 AM.  Learner: Patient  Readiness: Acceptance  Method: Explanation  Response: Verbalizes Understanding  Comment: safe mobility, fall/abdominal precautions            Johnna Simeon, PT

## 2025-07-01 NOTE — PROGRESS NOTES
"Gynecologic Oncology Progress Note    Subjective   Slept well overnight, reports that pain is well controlled. Has not been up to the bathroom yet, passing flatus, no BM, had some food last night and tolerated well with no n/v. Endorsed having dry eyes. Denied any HA, vision changes, CP, SOB.    Objective   Last Recorded Vitals  Blood pressure 95/54, pulse 73, temperature 36.6 °C (97.9 °F), temperature source Oral, resp. rate 14, height (!) 1.549 m (5' 1\"), weight 60.9 kg (134 lb 4.2 oz), SpO2 95%.  Intake/Output last 3 Shifts:    Intake/Output Summary (Last 24 hours) at 7/1/2025 0744  Last data filed at 7/1/2025 0501  Gross per 24 hour   Intake 2403.33 ml   Output 650 ml   Net 1753.33 ml     Constitutional: No visible distress, alert and cooperative  Head/Neck: Normocephalic  Respiratory/Thorax: Comfortable on RA, good air movement in anterior fields  Cardiovascular: RRR  Gastrointestinal: soft, nondistended, nontender, without rebound or guarding. Wearing abdominal binder  Musculoskeletal: grossly normal ROM  Extremities: No LE edema  Neurological: A&Ox3  Psychological: Appropriate mood and behavior  Assessment & Plan  Pelvic mass    73yo F with PMHx of L ER+ breast cancer (on anastrozole), HTN, Parkinson's, OA, and ulcerative colitis found to have a pelvic mass c/f possible neoplasm who is now POD1 s/p TLH, BSO, pelvic washings, cystoscopy, oversewing of rectum, and cystotomy repair.    Postop  - Pain well controlled with pain medications per ERAS pathway  - Starting Hgb 14.5 -> EBL 50  - Abdominal exam and VS wnl  - Saturating well ORA, IS q1hr  - Regular diet, IVF discontinued as pt is tolerating PO intake  - Maldonado to remain in place, plan for outpatient removal with voiding cystogram  - DVT ppx: SCDs, SQH. Transition to lovenox today     Comorbidities  - Hx L ER+ breast cancer: continue home anastrozole  - Parkinsons Disease: continue home Sinemet, amantadine  - ulcerative colitis: continue home mesalamine, " prednisone    Dispo: pending PT/OT eval    Pt seen and d/w Dr Fair and Dr Kristen Frias MD, PGY-1

## 2025-07-02 ENCOUNTER — DOCUMENTATION (OUTPATIENT)
Dept: HOME HEALTH SERVICES | Facility: HOME HEALTH | Age: 75
End: 2025-07-02
Payer: MEDICARE

## 2025-07-02 NOTE — HH CARE COORDINATION
Home Care received a Referral for Nursing, Physical Therapy, and Occupational Therapy. We have processed the referral for a Start of Care on 07-03-25, 24-48 HOURS.     If you have any questions or concerns, please feel free to contact us at [phone]. Follow the prompts, enter your five digit zip code, and you will be directed to your care team on WEST 1.

## 2025-07-03 ENCOUNTER — HOME CARE VISIT (OUTPATIENT)
Dept: HOME HEALTH SERVICES | Facility: HOME HEALTH | Age: 75
End: 2025-07-03
Payer: MEDICARE

## 2025-07-03 VITALS
RESPIRATION RATE: 18 BRPM | SYSTOLIC BLOOD PRESSURE: 126 MMHG | TEMPERATURE: 98.3 F | HEART RATE: 82 BPM | DIASTOLIC BLOOD PRESSURE: 88 MMHG | OXYGEN SATURATION: 99 %

## 2025-07-03 PROCEDURE — G0299 HHS/HOSPICE OF RN EA 15 MIN: HCPCS | Mod: HHH

## 2025-07-03 PROCEDURE — 169592 NO-PAY CLAIM PROCEDURE

## 2025-07-03 ASSESSMENT — ENCOUNTER SYMPTOMS
CONSTIPATION: 1
LAST BOWEL MOVEMENT: 67388
OCCASIONAL FEELINGS OF UNSTEADINESS: 1
CHANGE IN APPETITE: UNCHANGED
APPETITE LEVEL: GOOD
MUSCLE WEAKNESS: 1
PERSON REPORTING PAIN: PATIENT
DENIES PAIN: 1

## 2025-07-03 ASSESSMENT — ACTIVITIES OF DAILY LIVING (ADL)
OASIS_M1830: 03
ENTERING_EXITING_HOME: NEEDS ASSISTANCE

## 2025-07-05 ENCOUNTER — APPOINTMENT (OUTPATIENT)
Dept: CARDIOLOGY | Facility: HOSPITAL | Age: 75
End: 2025-07-05
Payer: MEDICARE

## 2025-07-05 ENCOUNTER — APPOINTMENT (OUTPATIENT)
Dept: RADIOLOGY | Facility: HOSPITAL | Age: 75
End: 2025-07-05
Payer: MEDICARE

## 2025-07-05 ENCOUNTER — HOME CARE VISIT (OUTPATIENT)
Dept: HOME HEALTH SERVICES | Facility: HOME HEALTH | Age: 75
End: 2025-07-05
Payer: MEDICARE

## 2025-07-05 ENCOUNTER — HOSPITAL ENCOUNTER (EMERGENCY)
Facility: HOSPITAL | Age: 75
Discharge: HOME | End: 2025-07-05
Attending: STUDENT IN AN ORGANIZED HEALTH CARE EDUCATION/TRAINING PROGRAM
Payer: MEDICARE

## 2025-07-05 VITALS
SYSTOLIC BLOOD PRESSURE: 148 MMHG | BODY MASS INDEX: 23.98 KG/M2 | RESPIRATION RATE: 16 BRPM | OXYGEN SATURATION: 96 % | TEMPERATURE: 98.4 F | DIASTOLIC BLOOD PRESSURE: 71 MMHG | WEIGHT: 127 LBS | HEART RATE: 85 BPM | HEIGHT: 61 IN

## 2025-07-05 DIAGNOSIS — D50.0 BLOOD LOSS ANEMIA: ICD-10-CM

## 2025-07-05 DIAGNOSIS — T83.511A URINARY TRACT INFECTION ASSOCIATED WITH INDWELLING URETHRAL CATHETER, INITIAL ENCOUNTER: Primary | ICD-10-CM

## 2025-07-05 DIAGNOSIS — N39.0 URINARY TRACT INFECTION ASSOCIATED WITH INDWELLING URETHRAL CATHETER, INITIAL ENCOUNTER: Primary | ICD-10-CM

## 2025-07-05 DIAGNOSIS — K66.1 HEMOPERITONEUM: ICD-10-CM

## 2025-07-05 DIAGNOSIS — K59.00 CONSTIPATION, UNSPECIFIED CONSTIPATION TYPE: ICD-10-CM

## 2025-07-05 DIAGNOSIS — T14.8XXA HEMATOMA: ICD-10-CM

## 2025-07-05 LAB
ALBUMIN SERPL BCP-MCNC: 3.6 G/DL (ref 3.4–5)
ALP SERPL-CCNC: 56 U/L (ref 33–136)
ALT SERPL W P-5'-P-CCNC: 4 U/L (ref 7–45)
ANION GAP SERPL CALC-SCNC: 8 MMOL/L (ref 10–20)
APPEARANCE UR: ABNORMAL
AST SERPL W P-5'-P-CCNC: 17 U/L (ref 9–39)
BACTERIA #/AREA URNS AUTO: ABNORMAL /HPF
BASOPHILS # BLD AUTO: 0.04 X10*3/UL (ref 0–0.1)
BASOPHILS NFR BLD AUTO: 0.3 %
BILIRUB SERPL-MCNC: 0.6 MG/DL (ref 0–1.2)
BILIRUB UR STRIP.AUTO-MCNC: NEGATIVE MG/DL
BUN SERPL-MCNC: 30 MG/DL (ref 6–23)
CALCIUM SERPL-MCNC: 9.7 MG/DL (ref 8.6–10.3)
CHLORIDE SERPL-SCNC: 103 MMOL/L (ref 98–107)
CO2 SERPL-SCNC: 29 MMOL/L (ref 21–32)
COLOR UR: ABNORMAL
CREAT SERPL-MCNC: 0.7 MG/DL (ref 0.5–1.05)
EGFRCR SERPLBLD CKD-EPI 2021: 90 ML/MIN/1.73M*2
EOSINOPHIL # BLD AUTO: 0.5 X10*3/UL (ref 0–0.4)
EOSINOPHIL NFR BLD AUTO: 3.3 %
ERYTHROCYTE [DISTWIDTH] IN BLOOD BY AUTOMATED COUNT: 14.4 % (ref 11.5–14.5)
ERYTHROCYTE [DISTWIDTH] IN BLOOD BY AUTOMATED COUNT: 14.5 % (ref 11.5–14.5)
GLUCOSE SERPL-MCNC: 100 MG/DL (ref 74–99)
GLUCOSE UR STRIP.AUTO-MCNC: NORMAL MG/DL
HCT VFR BLD AUTO: 29.8 % (ref 36–46)
HCT VFR BLD AUTO: 29.9 % (ref 36–46)
HGB BLD-MCNC: 9.7 G/DL (ref 12–16)
HGB BLD-MCNC: 9.8 G/DL (ref 12–16)
IMM GRANULOCYTES # BLD AUTO: 0.11 X10*3/UL (ref 0–0.5)
IMM GRANULOCYTES NFR BLD AUTO: 0.7 % (ref 0–0.9)
INR PPP: 1.1 (ref 0.9–1.1)
KETONES UR STRIP.AUTO-MCNC: NEGATIVE MG/DL
LACTATE SERPL-SCNC: 0.6 MMOL/L (ref 0.4–2)
LEUKOCYTE ESTERASE UR QL STRIP.AUTO: ABNORMAL
LYMPHOCYTES # BLD AUTO: 2.19 X10*3/UL (ref 0.8–3)
LYMPHOCYTES NFR BLD AUTO: 14.2 %
MCH RBC QN AUTO: 32.7 PG (ref 26–34)
MCH RBC QN AUTO: 32.7 PG (ref 26–34)
MCHC RBC AUTO-ENTMCNC: 32.4 G/DL (ref 32–36)
MCHC RBC AUTO-ENTMCNC: 32.9 G/DL (ref 32–36)
MCV RBC AUTO: 101 FL (ref 80–100)
MCV RBC AUTO: 99 FL (ref 80–100)
MONOCYTES # BLD AUTO: 1.1 X10*3/UL (ref 0.05–0.8)
MONOCYTES NFR BLD AUTO: 7.2 %
MUCOUS THREADS #/AREA URNS AUTO: ABNORMAL /LPF
NEUTROPHILS # BLD AUTO: 11.43 X10*3/UL (ref 1.6–5.5)
NEUTROPHILS NFR BLD AUTO: 74.3 %
NITRITE UR QL STRIP.AUTO: NEGATIVE
NRBC BLD-RTO: 0 /100 WBCS (ref 0–0)
NRBC BLD-RTO: 0 /100 WBCS (ref 0–0)
PH UR STRIP.AUTO: 8.5 [PH]
PLATELET # BLD AUTO: 342 X10*3/UL (ref 150–450)
PLATELET # BLD AUTO: 382 X10*3/UL (ref 150–450)
POTASSIUM SERPL-SCNC: 4.2 MMOL/L (ref 3.5–5.3)
PROT SERPL-MCNC: 7.5 G/DL (ref 6.4–8.2)
PROT UR STRIP.AUTO-MCNC: ABNORMAL MG/DL
PROTHROMBIN TIME: 12.2 SECONDS (ref 9.8–12.4)
RBC # BLD AUTO: 2.97 X10*6/UL (ref 4–5.2)
RBC # BLD AUTO: 3 X10*6/UL (ref 4–5.2)
RBC # UR STRIP.AUTO: ABNORMAL MG/DL
RBC #/AREA URNS AUTO: >20 /HPF
SODIUM SERPL-SCNC: 136 MMOL/L (ref 136–145)
SP GR UR STRIP.AUTO: 1.02
UROBILINOGEN UR STRIP.AUTO-MCNC: NORMAL MG/DL
WBC # BLD AUTO: 13.1 X10*3/UL (ref 4.4–11.3)
WBC # BLD AUTO: 15.4 X10*3/UL (ref 4.4–11.3)
WBC #/AREA URNS AUTO: >50 /HPF

## 2025-07-05 PROCEDURE — 85025 COMPLETE CBC W/AUTO DIFF WBC: CPT | Performed by: EMERGENCY MEDICINE

## 2025-07-05 PROCEDURE — 96365 THER/PROPH/DIAG IV INF INIT: CPT

## 2025-07-05 PROCEDURE — 2550000001 HC RX 255 CONTRASTS: Performed by: STUDENT IN AN ORGANIZED HEALTH CARE EDUCATION/TRAINING PROGRAM

## 2025-07-05 PROCEDURE — 36415 COLL VENOUS BLD VENIPUNCTURE: CPT | Performed by: STUDENT IN AN ORGANIZED HEALTH CARE EDUCATION/TRAINING PROGRAM

## 2025-07-05 PROCEDURE — 2500000004 HC RX 250 GENERAL PHARMACY W/ HCPCS (ALT 636 FOR OP/ED): Performed by: EMERGENCY MEDICINE

## 2025-07-05 PROCEDURE — 2500000004 HC RX 250 GENERAL PHARMACY W/ HCPCS (ALT 636 FOR OP/ED): Performed by: STUDENT IN AN ORGANIZED HEALTH CARE EDUCATION/TRAINING PROGRAM

## 2025-07-05 PROCEDURE — 87086 URINE CULTURE/COLONY COUNT: CPT | Mod: STJLAB | Performed by: EMERGENCY MEDICINE

## 2025-07-05 PROCEDURE — 85610 PROTHROMBIN TIME: CPT | Performed by: EMERGENCY MEDICINE

## 2025-07-05 PROCEDURE — 81001 URINALYSIS AUTO W/SCOPE: CPT | Performed by: EMERGENCY MEDICINE

## 2025-07-05 PROCEDURE — 74177 CT ABD & PELVIS W/CONTRAST: CPT | Performed by: RADIOLOGY

## 2025-07-05 PROCEDURE — 80053 COMPREHEN METABOLIC PANEL: CPT | Performed by: EMERGENCY MEDICINE

## 2025-07-05 PROCEDURE — 96367 TX/PROPH/DG ADDL SEQ IV INF: CPT

## 2025-07-05 PROCEDURE — 93005 ELECTROCARDIOGRAM TRACING: CPT

## 2025-07-05 PROCEDURE — 85027 COMPLETE CBC AUTOMATED: CPT | Performed by: EMERGENCY MEDICINE

## 2025-07-05 PROCEDURE — 83605 ASSAY OF LACTIC ACID: CPT | Performed by: STUDENT IN AN ORGANIZED HEALTH CARE EDUCATION/TRAINING PROGRAM

## 2025-07-05 PROCEDURE — 99285 EMERGENCY DEPT VISIT HI MDM: CPT | Mod: 25 | Performed by: STUDENT IN AN ORGANIZED HEALTH CARE EDUCATION/TRAINING PROGRAM

## 2025-07-05 PROCEDURE — 36415 COLL VENOUS BLD VENIPUNCTURE: CPT | Performed by: EMERGENCY MEDICINE

## 2025-07-05 PROCEDURE — 74177 CT ABD & PELVIS W/CONTRAST: CPT

## 2025-07-05 RX ORDER — AMOXICILLIN AND CLAVULANATE POTASSIUM 875; 125 MG/1; MG/1
1 TABLET, FILM COATED ORAL EVERY 12 HOURS
Qty: 20 TABLET | Refills: 0 | Status: SHIPPED | OUTPATIENT
Start: 2025-07-05 | End: 2025-07-15

## 2025-07-05 RX ORDER — METRONIDAZOLE 500 MG/100ML
500 INJECTION, SOLUTION INTRAVENOUS ONCE
Status: COMPLETED | OUTPATIENT
Start: 2025-07-05 | End: 2025-07-05

## 2025-07-05 RX ORDER — CEFTRIAXONE 1 G/50ML
1 INJECTION, SOLUTION INTRAVENOUS ONCE
Status: COMPLETED | OUTPATIENT
Start: 2025-07-05 | End: 2025-07-05

## 2025-07-05 RX ORDER — AMOXICILLIN 250 MG
1 CAPSULE ORAL DAILY
Qty: 20 TABLET | Refills: 0 | Status: SHIPPED | OUTPATIENT
Start: 2025-07-05 | End: 2025-07-25

## 2025-07-05 RX ORDER — POLYETHYLENE GLYCOL 3350 17 G/17G
17 POWDER, FOR SOLUTION ORAL DAILY
Qty: 3 PACKET | Refills: 0 | Status: SHIPPED | OUTPATIENT
Start: 2025-07-05 | End: 2025-07-08

## 2025-07-05 RX ADMIN — IOHEXOL 75 ML: 350 INJECTION, SOLUTION INTRAVENOUS at 15:40

## 2025-07-05 RX ADMIN — CEFTRIAXONE 1 G: 1 INJECTION, SOLUTION INTRAVENOUS at 15:21

## 2025-07-05 RX ADMIN — METRONIDAZOLE 500 MG: 500 INJECTION, SOLUTION INTRAVENOUS at 16:00

## 2025-07-05 ASSESSMENT — PAIN SCALES - GENERAL: PAINLEVEL_OUTOF10: 0 - NO PAIN

## 2025-07-05 ASSESSMENT — PAIN - FUNCTIONAL ASSESSMENT: PAIN_FUNCTIONAL_ASSESSMENT: 0-10

## 2025-07-05 NOTE — PROGRESS NOTES
Emergency Medicine Transition of Care Note.    I received Gisella Nicholas in signout from Dr. Gonzales.  Please see the previous ED provider note for all HPI, PE and MDM up to the time of signout at 1714. This is in addition to the primary record.    In brief Gisella Nicholas is an 74 y.o. female presenting for   Chief Complaint   Patient presents with    Blood in Urine     Pt had surgery on Monday to remove a mass on an ovary. Bladder was cut in the surgery requiring stitching and cadet catheter placement. Yesterday pt began having discomfort with the catheter and now has blood in the urine     At the time of signout we were awaiting: repeat CBC    Medical decision making:  ED Course as of 07/05/25 1910   Sat Jul 05, 2025   1423 Protime-INR  No coagulopathy [CB]   1423 Comprehensive metabolic panel(!)  Normoglycemic, no acute electrolyte or hepatorenal abnormality [CB]   1442 CBC and Auto Differential(!)  Leukocytosis, anemia with slight macrocytosis, no acute thrombocytosis or thrombocytopenia.  Notably most recent hemoglobin for comparison is prior to extensive pelvic surgery.  Blood loss likely combination due to surgery and hematuria. [CB]   1502 EKG taken at 1430 on 7/5/2025 showing normal sinus rate and rhythm, normal axis, normal intervals, incomplete right bundle branch block pattern present, no acute ST elevation or depression [DS]   1506 Microscopic Only, Urine(!)  1+ bacteria, 500 esterase, and 50 whites consistent with UTI.  Will treat with Rocephin.  Given recent surgical complication, will treat with Rocephin as well for broad spectrum intra-abdominal infectious coverage. [CB]   1631 CT abdomen pelvis w IV contrast  1. Recent pelvic surgery. There is a moderate amount of  hemoperitoneum around the spleen and in the pelvis. There is a 5.4 x  4.9 cm hematoma in the left lower quadrant.  2. Cadet catheter in a non distended urinary bladder.  3. Constipation.  4. Benign hepatic cyst.  5. Reticulation of the  subcutaneous fat which could represent  anasarca/hypoproteinemia. More focal area of induration of the  subcutaneous fat of the left lower quadrant which could represent  postsurgical contusion.   [CB]   1644 Discussed with Dr. Peterson, gyn-onc surgery, recommends if feeling okay, one repeat cbc then follow up Monday for cystogram, office Tuesday.    Repeat CBC in 2-4 hours, if hb 9's then discharge, if 8's then transfer for obs.  [CB]   1729 Signed out to Dr. Stone pending repeat CBC and discussion with Dr. Peterson.  If patient is discharged home, would likely send her home on Augmentin for broad intra-abdominal coverage which would be appropriate for complicated UTI as well. [CB]   1730 Received signout pending repeat CBC. [ES]   1844 HEMOGLOBIN(!): 9.8  Reassuring.  Patient's surgeon, Dr. Peterson, reports patient has safe for follow-up outpatient. [ES]   1909 Patient updated on findings and reassured.  Encouraged to drink more water for her constipation and she is agreeable.  Prescription of MiraLAX and senna prescribed and Augmentin for UTI. [ES]      ED Course User Index  [CB] Yaniv Gonzales DO  [DS] Balaji Billingsley MD  [ES] Joao Stone MD         Diagnoses as of 07/05/25 1910   Urinary tract infection associated with indwelling urethral catheter, initial encounter   Hematoma   Hemoperitoneum   Blood loss anemia   Constipation, unspecified constipation type       Final diagnoses:   [T83.511A, N39.0] Urinary tract infection associated with indwelling urethral catheter, initial encounter   [T14.8XXA] Hematoma   [K66.1] Hemoperitoneum   [D50.0] Blood loss anemia   [K59.00] Constipation, unspecified constipation type           Procedure  Procedures    Joao Stone MD

## 2025-07-05 NOTE — ED PROVIDER NOTES
EMERGENCY DEPARTMENT ENCOUNTER      Pt Name: Gisella Nicholas  MRN: [id number]  Birthdate 1950  Date of evaluation: 7/5/2025  Provider: Yaniv Gonzales DO    CHIEF COMPLAINT       Chief Complaint   Patient presents with    Blood in Urine     Pt had surgery on Monday to remove a mass on an ovary. Bladder was cut in the surgery requiring stitching and cadet catheter placement. Yesterday pt began having discomfort with the catheter and now has blood in the urine     HISTORY OF PRESENT ILLNESS    Gisella Nicholas is a 74 y.o. year old female who presents to the ER for hematuria.  [place] [facility] [place]ectomy, bilateral oophorectomy for ovarian mass.  Patient reports intraoperatively there was unintentional bladder trauma, Cadet placed prior to discharge.  For the last 2 to 3 days has had dysuria, today noticed blood in her Cadet.  Denies fevers, chest pain, shortness of breath, nausea or vomiting, flank pain.    PMH Parkinson's, colitis  Allergies to sulfa, tramadol     PAST MEDICAL HISTORY   Medical History[1]  CURRENT MEDICATIONS       Previous Medications    ACETAMINOPHEN (TYLENOL) 325 MG TABLET    Take 3 tablets (975 mg) by mouth every 6 hours for 7 days.    AMANTADINE (SYMMETREL) 100 MG CAPSULE    Take 1 capsule (100 mg) by mouth 2 times a day.    ANASTROZOLE (ARIMIDEX) 1 MG TABLET    Take 1 tablet (1 mg total) by mouth once daily.    CARBIDOPA-LEVODOPA (CREXONT)  MG CAPSULE,IR -EXTEND REL,BIPHASE    TAKE 2 CAPSULES BY MOUTH 3 TIMES A DAY    CARBIDOPA-LEVODOPA (SINEMET)  MG TABLET    Take 2 tablets by mouth 3 times a day.    CARBIDOPA-LEVODOPA (SINEMET)  MG TABLET    Take 2 tablets by mouth 4 times a day.    CHOLECALCIFEROL (VITAMIN D-3) 25 MCG (1,000 UNITS) TABLET    Take 2 tablets (50 mcg) by mouth 2 times a day.    IBUPROFEN 600 MG TABLET    Take 1 tablet (600 mg) by mouth every 6 hours for 7 days.    MEDICAL CANNABIS    Take 1 each by mouth once daily at bedtime.     MESALAMINE (DELZICOL) 400 MG DR CAPSULE    Take 2 capsules (800 mg) by mouth 3 times a day.    ONDANSETRON (ZOFRAN) 4 MG TABLET    Take 1 tablet (4 mg) by mouth every 8 hours if needed for nausea or vomiting.    POLYETHYLENE GLYCOL (GLYCOLAX, MIRALAX) 17 GRAM PACKET    Take 17 g by mouth once daily as needed (constipation).    PREDNISONE (DELTASONE) 5 MG TABLET    Take 0.5 tablets (2.5 mg) by mouth once daily.    SENNOSIDES-DOCUSATE SODIUM (FLETCHER-COLACE) 8.6-50 MG TABLET    Take 2 tablets by mouth 2 times a day. While taking Oxycodone then as needed for stool softener.    SIMETHICONE (MYLICON) 80 MG CHEWABLE TABLET    Chew and swallow 1 tablet (80 mg) 4 times a day as needed (gas pain).    SPIRONOLACTON-HYDROCHLOROTHIAZ (ALDACTAZIDE) 25-25 MG TABLET    Take 2 tablets (50 mg) by mouth once daily.     SURGICAL HISTORY     Surgical History[2]  ALLERGIES     Tramadol and Sulfa (sulfonamide antibiotics)  FAMILY HISTORY     Family History[3]  SOCIAL HISTORY     Social History[4]  PHYSICAL EXAM  (up to 7 for level 4, 8 or more for level 5)     ED Triage Vitals [07/05/25 1145]   Temperature Heart Rate Respirations BP   36.9 °C (98.4 °F) 86 20 121/57      Pulse Ox Temp Source Heart Rate Source Patient Position   97 % Temporal Monitor --      BP Location FiO2 (%)     -- --       Physical Exam  Vitals and nursing note reviewed.   Constitutional:       General: She is not in acute distress.     Appearance: Normal appearance. She is not ill-appearing.   HENT:      Head: Normocephalic and atraumatic. No raccoon eyes, Balderas's sign, contusion or laceration.      Jaw: No trismus or malocclusion.      Right Ear: External ear normal.      Left Ear: External ear normal.      Mouth/Throat:      Mouth: Mucous membranes are moist.      Pharynx: Oropharynx is clear.   Eyes:      Extraocular Movements: Extraocular movements intact.      Pupils: Pupils are equal, round, and reactive to light.   Neck:      Trachea: No tracheal deviation.    Cardiovascular:      Rate and Rhythm: Normal rate and regular rhythm.      Pulses: Normal pulses.   Pulmonary:      Effort: No respiratory distress.      Breath sounds: No wheezing, rhonchi or rales.   Chest:      Chest wall: No tenderness.   Abdominal:      General: Abdomen is flat.      Palpations: Abdomen is soft. There is no mass.      Tenderness: There is abdominal tenderness (Suprapubic bilateral lower quadrant).   Musculoskeletal:         General: No tenderness or signs of injury.      Right lower leg: No edema.      Left lower leg: No edema.   Skin:     Coloration: Skin is not jaundiced or pale.      Findings: No petechiae, rash or wound.   Neurological:      Mental Status: She is alert.      Sensory: No sensory deficit.      Motor: No weakness.        DIAGNOSTIC RESULTS   LABS:  Labs Reviewed   CBC WITH AUTO DIFFERENTIAL - Abnormal       Result Value    WBC 15.4 (*)     nRBC 0.0      RBC 2.97 (*)     Hemoglobin 9.7 (*)     Hematocrit 29.9 (*)      (*)     MCH 32.7      MCHC 32.4      RDW 14.4      Platelets 382      Neutrophils % 74.3      Immature Granulocytes %, Automated 0.7      Lymphocytes % 14.2      Monocytes % 7.2      Eosinophils % 3.3      Basophils % 0.3      Neutrophils Absolute 11.43 (*)     Immature Granulocytes Absolute, Automated 0.11      Lymphocytes Absolute 2.19      Monocytes Absolute 1.10 (*)     Eosinophils Absolute 0.50 (*)     Basophils Absolute 0.04     COMPREHENSIVE METABOLIC PANEL - Abnormal    Glucose 100 (*)     Sodium 136      Potassium 4.2      Chloride 103      Bicarbonate 29      Anion Gap 8 (*)     Urea Nitrogen 30 (*)     Creatinine 0.70      eGFR 90      Calcium 9.7      Albumin 3.6      Alkaline Phosphatase 56      Total Protein 7.5      AST 17      Bilirubin, Total 0.6      ALT 4 (*)    URINALYSIS WITH REFLEX CULTURE AND MICROSCOPIC - Abnormal    Color, Urine Light-Orange (*)     Appearance, Urine Turbid (*)     Specific Gravity, Urine 1.016      pH, Urine 8.5  (*)     Protein, Urine 30 (1+) (*)     Glucose, Urine Normal      Blood, Urine OVER (3+) (*)     Ketones, Urine NEGATIVE      Bilirubin, Urine NEGATIVE      Urobilinogen, Urine Normal      Nitrite, Urine NEGATIVE      Leukocyte Esterase, Urine 500 Pancho/uL (*)     Narrative:     OVER is reported when the result is greater than the clinically reportable range.   MICROSCOPIC ONLY, URINE - Abnormal    WBC, Urine >50 (*)     RBC, Urine >20 (*)     Bacteria, Urine 1+ (*)     Mucus, Urine FEW     PROTIME-INR - Normal    Protime 12.2      INR 1.1     LACTATE - Normal    Lactate 0.6      Narrative:     Venipuncture immediately after or during the administration of Metamizole may lead to falsely low results. Testing should be performed immediately prior to Metamizole dosing.   URINE CULTURE   URINALYSIS WITH REFLEX CULTURE AND MICROSCOPIC    Narrative:     The following orders were created for panel order Urinalysis with Reflex Culture and Microscopic.  Procedure                               Abnormality         Status                     ---------                               -----------         ------                     Urinalysis with Reflex C...[[id number]]  Abnormal            Final result               Extra Urine Gray Tube[[id number]]                            In process                   Please view results for these tests on the individual orders.   EXTRA URINE GRAY TUBE   CBC     All other labs were within normal range or not returned as of this dictation.  Imaging  CT abdomen pelvis w IV contrast   Final Result   1. Recent pelvic surgery. There is a moderate amount of   hemoperitoneum around the spleen and in the pelvis. There is a 5.4 x   4.9 cm hematoma in the left lower quadrant.   2. Maldonado catheter in a non distended urinary bladder.   3. Constipation.   4. Benign hepatic cyst.   5. Reticulation of the subcutaneous fat which could represent   anasarca/hypoproteinemia. More focal area of induration of the  "  subcutaneous fat of the left lower quadrant which could represent   postsurgical contusion.        MACRO:   None        Signed by: Iqra Denton 7/5/2025 4:18 PM   Dictation workstation:   [id number]         Procedure  Procedures  EMERGENCY DEPARTMENT COURSE/MDM:   Medical Decision Making    Vitals:    Vitals:    07/05/25 1145 07/05/25 1522 07/05/25 1800   BP: 121/57 114/57    Pulse: 86 78 76   Resp: 20 18    Temp: 36.9 °C (98.4 °F)     TempSrc: Temporal     SpO2: 97% 96% 97%   Weight: 57.6 kg (127 lb)     Height: (!) 1.549 m (5' 1\")       Gisella Nicholas is a female 74 y.o. who presents to the ER for post op hematuria. On arrival the patients vital signs were: Afebrile, regular heart rate, normotensive, regular respiration rate, normoxic on room air. History obtained from: patient and Spouse.  Given postop hematuria in Maldonado, suprapubic tenderness considerations include postop infection, UTI, Maldonado obstruction, Denovo intra-abdominal pathology.  Plan for CBC, CMP, lactate, CT abdomen pelvis with IV contrast for further assessment.  ED Course as of 07/06/25 1416   Sat Jul 05, 2025   1423 Protime-INR  No coagulopathy [CB]   1423 Comprehensive metabolic panel(!)  Normoglycemic, no acute electrolyte or hepatorenal abnormality [CB]   1442 CBC and Auto Differential(!)  Leukocytosis, anemia with slight macrocytosis, no acute thrombocytosis or thrombocytopenia.  Notably most recent hemoglobin for comparison is prior to extensive pelvic surgery.  Blood loss likely combination due to surgery and hematuria. [CB]   1502 EKG taken at 1430 on 7/5/2025 showing normal sinus rate and rhythm, normal axis, normal intervals, incomplete right bundle branch block pattern present, no acute ST elevation or depression [DS]   1506 Microscopic Only, Urine(!)  1+ bacteria, 500 esterase, and 50 whites consistent with UTI.  Will treat with Rocephin.  Given recent surgical complication, will treat with Rocephin as well for broad spectrum " intra-abdominal infectious coverage. [CB]   1631 CT abdomen pelvis w IV contrast  1. Recent pelvic surgery. There is a moderate amount of  hemoperitoneum around the spleen and in the pelvis. There is a 5.4 x  4.9 cm hematoma in the left lower quadrant.  2. Maldonado catheter in a non distended urinary bladder.  3. Constipation.  4. Benign hepatic cyst.  5. Reticulation of the subcutaneous fat which could represent  anasarca/hypoproteinemia. More focal area of induration of the  subcutaneous fat of the left lower quadrant which could represent  postsurgical contusion.   [CB]   1644 Discussed with Dr. Peterson, gyn-onc surgery, recommends if feeling okay, one repeat cbc then follow up Monday for cystogram, office Tuesday.    Repeat CBC in 2-4 hours, if hb 9's then discharge, if 8's then transfer for obs.  [CB]   1729 Signed out to Dr. Stone pending repeat CBC and discussion with Dr. Peterson.  If patient is discharged home, would likely send her home on Augmentin for broad intra-abdominal coverage which would be appropriate for complicated UTI as well. [CB]   1730 Received signout pending repeat CBC. [ES]   1844 HEMOGLOBIN(!): 9.8  Reassuring.  Patient's surgeon, Dr. Peterson, reports patient has safe for follow-up outpatient. [ES]   1909 Patient updated on findings and reassured.  Encouraged to drink more water for her constipation and she is agreeable.  Prescription of MiraLAX and senna prescribed and Augmentin for UTI. [ES]      ED Course User Index  [CB] Yaniv Gonzales DO  [DS] Balaji Billingsley MD  [ES] Joao Stone MD         Diagnoses as of 07/06/25 1416   Urinary tract infection associated with indwelling urethral catheter, initial encounter   Hematoma   Hemoperitoneum   Blood loss anemia   Constipation, unspecified constipation type     External Records Reviewed: I reviewed recent and relevant outside records including inpatient notes, outpatient records    ED Medications administered this visit:    Medications   cefTRIAXone  (Rocephin) 1 g in dextrose (iso) IV 50 mL (0 g intravenous Stopped 25 1600)   metroNIDAZOLE (Flagyl) 500 mg in sodium chloride (iso)  mL (0 mg intravenous Stopped 25 1723)   iohexol (OMNIPaque) 350 mg iodine/mL solution 75 mL (75 mL intravenous Given 25 1540)            Final Impression:   1. Urinary tract infection associated with indwelling urethral catheter, initial encounter    2. Hematoma    3. Hemoperitoneum    4. Blood loss anemia          Please excuse any misspellings or unintended errors related to the Dragon speech recognition software used to dictate this note.    I reviewed the case with the attending ED physician. The attending ED physician agrees with the plan.          Yaniv Gonzales DO  Resident  [phone]         [1]   Past Medical History:  Diagnosis Date    Arthritis     Breast cancer     left breast    Cataract     Colitis     History of recent steroid use     Hypertension     Irritable bowel syndrome     Liver disease     Parkinson's disease     Pelvic mass     Personal history of other diseases of the musculoskeletal system and connective tissue 2021    History of rotator cuff tear   [2]   Past Surgical History:  Procedure Laterality Date     SECTION, LOW TRANSVERSE      HIP SURGERY Right     KNEE SURGERY Left     SHOULDER SURGERY Bilateral     rotater cugg repair   [3]   Family History  Problem Relation Name Age of Onset    Hypertension Mother      Stroke Mother     [4]   Social History  Tobacco Use    Smoking status: Never    Smokeless tobacco: Never   Vaping Use    Vaping status: Never Used   Substance Use Topics    Alcohol use: Not Currently     Comment: occasional    Drug use: Yes     Comment: CBD gummy at night        Yaniv Gonzales DO  Resident  [phone]

## 2025-07-05 NOTE — DISCHARGE INSTRUCTIONS
You were seen in the Emergency Department (ED) for blood in urine.     You were found to be anemic on labs and CT imaging showed a blood collection (hematoma) in the abdomen as well as constipation.  Your gynecologist, Dr. Peterson, reviewed your labs and imaging and determined you are safe for follow-up in her office.      Take 1 cap Miralax (polyethylene glycol) powder 1-2x a day in 16-20 ounces of water, juice, or milk for the next 3 days then 0.5 cap Miralax 1-2x a day for the next 7 days. Adjust to stool consistency (hard or soft) and frequency.    You may not like the graininess you feel when you drink the medicine right after it is mixed.  Mix powder in liquid, stir, set aside for 5 minutes, and stir again.  This will make the graininess go away.      Miralax can be used in larger doses to clear out a lot of stool that is backed up, or in smaller doses to keep the stool soft and easy to pass.  If the stool is too watery, cut the dose in half.  If still not stooling (pooping) after 3 days, you may increase the dose by half.    It is important to find the right dose that gives you a soft stool every day and then continue to take this dose every day for months.  It will take you as long for the bowels to get back to normal as they were constipated before treatment.  Call your doctor if you need help adjusting your dose.    Other recommendations for constipation:  There is a natural tendency for the bowel to want to pass a stool (poop) after you eat.  Take advantage of this by sitting on the toilet for 5 minutes after breakfast and dinner.  Increasing the flexion of your hips can improve the angle for stooling with the help of a footrest.    Seek immediate medical attention should you have:  fevers of 38C (100.4) or higher, shortness of breath, chest pain, weakness, confusion, vomiting, or any worsening or concerning symptoms.

## 2025-07-06 ENCOUNTER — HOME CARE VISIT (OUTPATIENT)
Dept: HOME HEALTH SERVICES | Facility: HOME HEALTH | Age: 75
End: 2025-07-06
Payer: MEDICARE

## 2025-07-06 VITALS
RESPIRATION RATE: 14 BRPM | TEMPERATURE: 98.3 F | SYSTOLIC BLOOD PRESSURE: 116 MMHG | HEART RATE: 85 BPM | DIASTOLIC BLOOD PRESSURE: 60 MMHG | OXYGEN SATURATION: 97 %

## 2025-07-06 LAB
ATRIAL RATE: 76 BPM
P AXIS: 49 DEGREES
P OFFSET: 197 MS
P ONSET: 138 MS
PR INTERVAL: 162 MS
Q ONSET: 219 MS
QRS COUNT: 12 BEATS
QRS DURATION: 102 MS
QT INTERVAL: 370 MS
QTC CALCULATION(BAZETT): 416 MS
QTC FREDERICIA: 400 MS
R AXIS: -10 DEGREES
T AXIS: 57 DEGREES
T OFFSET: 404 MS
VENTRICULAR RATE: 76 BPM

## 2025-07-06 PROCEDURE — G0151 HHCP-SERV OF PT,EA 15 MIN: HCPCS | Mod: HHH

## 2025-07-06 SDOH — HEALTH STABILITY: PHYSICAL HEALTH: EXERCISE ACTIVITY: LAQ

## 2025-07-06 SDOH — HEALTH STABILITY: PHYSICAL HEALTH: PHYSICAL EXERCISE: 15

## 2025-07-06 SDOH — HEALTH STABILITY: PHYSICAL HEALTH: EXERCISE TYPE: LE EXERCISES

## 2025-07-06 SDOH — HEALTH STABILITY: PHYSICAL HEALTH: PHYSICAL EXERCISE: SEATED

## 2025-07-06 SDOH — HEALTH STABILITY: PHYSICAL HEALTH: EXERCISE ACTIVITIES SETS: 1

## 2025-07-06 SDOH — HEALTH STABILITY: PHYSICAL HEALTH: EXERCISE ACTIVITY: ANKLE DF/PF

## 2025-07-06 SDOH — HEALTH STABILITY: PHYSICAL HEALTH: EXERCISE ACTIVITY: MARCHING

## 2025-07-06 ASSESSMENT — GAIT ASSESSMENTS
PATH: 0 - MARKED DEVIATION
STEP SYMMETRY: 1 - RIGHT AND LEFT STEP LENGTH APPEAR EQUAL
INITIATION OF GAIT IMMEDIATELY AFTER GO: 1 - NO HESITANCY
TRUNK: 0 - MARKED SWAY OR USES WALKING AID
TRUNK SCORE: 0
BALANCE AND GAIT SCORE: 15
WALKING STANCE: 1 - HEELS ALMOST TOUCHING WHILE WALKING
STEP CONTINUITY: 1 - STEPS APPEAR CONTINUOUS
GAIT SCORE: 8
PATH SCORE: 0

## 2025-07-06 ASSESSMENT — ENCOUNTER SYMPTOMS
DENIES PAIN: 1
MUSCLE WEAKNESS: 1
OCCASIONAL FEELINGS OF UNSTEADINESS: 1
PERSON REPORTING PAIN: PATIENT

## 2025-07-06 ASSESSMENT — BALANCE ASSESSMENTS
NUDGED: 0 - BEGINS TO FALL
STANDING BALANCE: 1 - STEADY BUT WIDE STANCE AND USES CANE OR OTHER SUPPORT
BALANCE SCORE: 7
ATTEMPTS TO ARISE: 2 - ABLE TO RISE, ONE ATTEMPT
ARISING SCORE: 1
SITTING BALANCE: 1 - STEADY, SAFE
ARISES: 1 - ABLE, USES ARMS TO HELP
SITTING DOWN: 1 - USES ARMS OR NOT SMOOTH MOTION
NUDGED SCORE: 0
TURNING 360 DEGREES STEPS: 0 - DISCONTINUOUS STEPS
EYES CLOSED AT MAXIMUM POSITION NUDGED: 0 - UNSTEADY
IMMEDIATE STANDING BALANCE FIRST 5 SECONDS: 1 - STEADY BUT USES WALKER OR OTHER SUPPORT

## 2025-07-06 ASSESSMENT — ACTIVITIES OF DAILY LIVING (ADL)
AMBULATION ASSISTANCE ON FLAT SURFACES: 1
AMBULATION_DISTANCE/DURATION_TOLERATED: 50 FT X 2
CURRENT_FUNCTION: STAND BY ASSIST
AMBULATION ASSISTANCE: STAND BY ASSIST

## 2025-07-07 ENCOUNTER — OFFICE VISIT (OUTPATIENT)
Dept: GYNECOLOGIC ONCOLOGY | Facility: CLINIC | Age: 75
End: 2025-07-07
Payer: MEDICARE

## 2025-07-07 ENCOUNTER — HOME CARE VISIT (OUTPATIENT)
Dept: HOME HEALTH SERVICES | Facility: HOME HEALTH | Age: 75
End: 2025-07-07
Payer: MEDICARE

## 2025-07-07 ENCOUNTER — LAB (OUTPATIENT)
Dept: LAB | Facility: CLINIC | Age: 75
End: 2025-07-07
Payer: MEDICARE

## 2025-07-07 ENCOUNTER — HOSPITAL ENCOUNTER (OUTPATIENT)
Dept: RADIOLOGY | Facility: HOSPITAL | Age: 75
Discharge: HOME | End: 2025-07-07
Payer: MEDICARE

## 2025-07-07 VITALS
RESPIRATION RATE: 16 BRPM | OXYGEN SATURATION: 94 % | SYSTOLIC BLOOD PRESSURE: 123 MMHG | TEMPERATURE: 98.2 F | DIASTOLIC BLOOD PRESSURE: 69 MMHG | HEART RATE: 77 BPM

## 2025-07-07 DIAGNOSIS — Z98.890 POST-OPERATIVE STATE: Primary | ICD-10-CM

## 2025-07-07 DIAGNOSIS — R19.00 PELVIC MASS IN FEMALE: ICD-10-CM

## 2025-07-07 DIAGNOSIS — Z43.5: ICD-10-CM

## 2025-07-07 LAB
ERYTHROCYTE [DISTWIDTH] IN BLOOD BY AUTOMATED COUNT: 15 % (ref 11.5–14.5)
HCT VFR BLD AUTO: 33.1 % (ref 36–46)
HGB BLD-MCNC: 10.6 G/DL (ref 12–16)
HOLD SPECIMEN: NORMAL
MCH RBC QN AUTO: 32.9 PG (ref 26–34)
MCHC RBC AUTO-ENTMCNC: 32 G/DL (ref 32–36)
MCV RBC AUTO: 103 FL (ref 80–100)
PLATELET # BLD AUTO: 415 X10*3/UL (ref 150–450)
RBC # BLD AUTO: 3.22 X10*6/UL (ref 4–5.2)
WBC # BLD AUTO: 13.1 X10*3/UL (ref 4.4–11.3)

## 2025-07-07 PROCEDURE — 36415 COLL VENOUS BLD VENIPUNCTURE: CPT

## 2025-07-07 PROCEDURE — 1159F MED LIST DOCD IN RCRD: CPT | Performed by: NURSE PRACTITIONER

## 2025-07-07 PROCEDURE — 85027 COMPLETE CBC AUTOMATED: CPT

## 2025-07-07 PROCEDURE — 74455 X-RAY URETHRA/BLADDER: CPT

## 2025-07-07 PROCEDURE — 99211 OFF/OP EST MAY X REQ PHY/QHP: CPT | Performed by: NURSE PRACTITIONER

## 2025-07-07 PROCEDURE — 1036F TOBACCO NON-USER: CPT | Performed by: NURSE PRACTITIONER

## 2025-07-07 PROCEDURE — 3078F DIAST BP <80 MM HG: CPT | Performed by: NURSE PRACTITIONER

## 2025-07-07 PROCEDURE — G0152 HHCP-SERV OF OT,EA 15 MIN: HCPCS | Mod: HHH

## 2025-07-07 PROCEDURE — 2550000001 HC RX 255 CONTRASTS: Performed by: STUDENT IN AN ORGANIZED HEALTH CARE EDUCATION/TRAINING PROGRAM

## 2025-07-07 PROCEDURE — 74455 X-RAY URETHRA/BLADDER: CPT | Performed by: INTERNAL MEDICINE

## 2025-07-07 PROCEDURE — 1126F AMNT PAIN NOTED NONE PRSNT: CPT | Performed by: NURSE PRACTITIONER

## 2025-07-07 PROCEDURE — 3074F SYST BP LT 130 MM HG: CPT | Performed by: NURSE PRACTITIONER

## 2025-07-07 PROCEDURE — 51600 INJECTION FOR BLADDER X-RAY: CPT | Performed by: INTERNAL MEDICINE

## 2025-07-07 RX ADMIN — IOHEXOL 100 ML: 350 INJECTION, SOLUTION INTRAVENOUS at 11:37

## 2025-07-07 ASSESSMENT — ACTIVITIES OF DAILY LIVING (ADL)
DRESSING_UB_CURRENT_FUNCTION: SUPERVISION
AMBULATION ASSISTANCE: 1
TOILETING: SUPERVISION
PHYSICAL TRANSFERS ASSESSED: 1
TOILETING: 1
FEEDING_WITHIN_DEFINED_LIMITS: 1
GROOMING_WITHIN_DEFINED_LIMITS: 1
DRESSING_LB_CURRENT_FUNCTION: SUPERVISION
AMBULATION ASSISTANCE: SUPERVISION
CURRENT_FUNCTION: SUPERVISION

## 2025-07-07 ASSESSMENT — ENCOUNTER SYMPTOMS
DENIES PAIN: 1
PERSON REPORTING PAIN: PATIENT

## 2025-07-07 ASSESSMENT — PAIN SCALES - GENERAL: PAINLEVEL_OUTOF10: 0-NO PAIN

## 2025-07-07 NOTE — PROGRESS NOTES
Patient ID: Gisella Nicholas is a 74 y.o. female.  Referring Physician: No referring provider defined for this encounter.  Primary Care Provider: Joel Maxwell MD      Subjective    HPI  74 y.o. female presenting with pelvic mass.  Was admitted with pain, constipation.  Tumor board suspected TOA.   WNL.  Tried 2 week course augmentin.   CT without interval change, new liver finding.    25: Hysterectomy, lap BSO, cytoscopy, over sewing of the rectum and cystotomy repair    Interval History: Patient recovering well following surgery. Presented to ED on  with bright red blood in urine, concern for UTI and down trending Hgb. Was discharged home on antibiotics and is now feeling better. Cystogram today confirms no evidence of leak, cadet catheter was removed in radiology.     Past Medical History  L breast cancer (ER pos, on anastrazole), HTN, Parkinson's Disease, Osteoarthritis, Ulcerative Colitis      Surgical History  C/S x1, breast biopsy, knee surgery, hip surgery, shoulder surgery, strabismus surgery      OB/GYN History  . C-sectionx1,  x2  Menopause in late 50s. No PMB. No HRT  No hx of abnormal paps      Social History  Occ EtOH use, marijuana gummies. Never smoker. Retired      Family History  No family history of ovarian, uterine, or colon cancer     Allergies  Tramadol and Sulfa (sulfonamide antibiotics)    Objective    BSA: There is no height or weight on file to calculate BSA.  /69 (BP Location: Right arm, Patient Position: Sitting, BP Cuff Size: Adult)   Pulse 77   Temp 36.8 °C (98.2 °F) (Temporal)   Resp 16   SpO2 94%      Physical Exam  Constitutional:       Appearance: Normal appearance. She is normal weight.   HENT:      Head: Normocephalic and atraumatic.   Cardiovascular:      Rate and Rhythm: Normal rate and regular rhythm.   Pulmonary:      Effort: Pulmonary effort is normal.      Breath sounds: Normal breath sounds.   Abdominal:      General: Abdomen is  flat. Bowel sounds are normal.      Palpations: Abdomen is soft.      Comments: Lap sites healing well, steri strips intact. Moderate bruising across abdomen. Non tender.   Musculoskeletal:         General: Normal range of motion.      Cervical back: Normal range of motion.   Skin:     General: Skin is warm and dry.   Neurological:      General: No focal deficit present.      Mental Status: She is alert and oriented to person, place, and time. Mental status is at baseline.   Psychiatric:         Mood and Affect: Mood normal.         Behavior: Behavior normal.         Thought Content: Thought content normal.         Judgment: Judgment normal.       Performance Status:  Asymptomatic    Assessment/Plan   73yo pt with pelvic mass, concern for TOA, h/o ulcerative colitis    # Pelvic mass  - We discussed the possible etiologies of a pelvic mass including benign, borderline, and malignant.   - Imaging was reviewed and discussed with the patient  - Tumor markers WNL    # Post op  - Recovering well  - Reviewed ongoing restrictions (no lifting more than 10-15lb, nothing per vagina, no soaking)  - No evidence of leak on cystogram, cadet catheter was removed in radiology and patient successfully voided.   - Repeat CBC shows stable hemoglobin and WBC  - Follow up to review path on 7/25    MAYLIN Nichols-CNP

## 2025-07-08 ENCOUNTER — APPOINTMENT (OUTPATIENT)
Dept: OTOLARYNGOLOGY | Facility: CLINIC | Age: 75
End: 2025-07-08
Payer: MEDICARE

## 2025-07-08 DIAGNOSIS — K12.1 STOMATITIS AND MUCOSITIS: Primary | ICD-10-CM

## 2025-07-08 DIAGNOSIS — K12.30 STOMATITIS AND MUCOSITIS: Primary | ICD-10-CM

## 2025-07-08 LAB — BACTERIA UR CULT: ABNORMAL

## 2025-07-08 PROCEDURE — 1160F RVW MEDS BY RX/DR IN RCRD: CPT | Performed by: OTOLARYNGOLOGY

## 2025-07-08 PROCEDURE — 99213 OFFICE O/P EST LOW 20 MIN: CPT | Performed by: OTOLARYNGOLOGY

## 2025-07-08 PROCEDURE — 1159F MED LIST DOCD IN RCRD: CPT | Performed by: OTOLARYNGOLOGY

## 2025-07-08 NOTE — PROGRESS NOTES
"Subjective   Patient ID: Gisella Nicholas is a 74 y.o. female who presents for No chief complaint on file..    HPI  The patient returns, being seen for follow-up on mucositis and in particular a left lateral tongue ulceration. She has been using Butterfield's solution as prescribed and reports feeling \"100% better\".  She did note that she was placed on systemic antibiotics for an evident UTI and she believes this may have also facilitated improvement.  All remaining head neck inquiry otherwise negative.     Physical Exam   Throat/Oral mucosa:  The tongue is normally mobile. There are no lesions on the gingiva, buccal, or oral mucosa. There are no oral cavity masses. No mucositis noted on examination.     Assessment/Plan   74yr old patient being seen for follow-up on diffuse mucositis. Has been using Butterfield's solution as prescribed and reports that mouth and tongue in particular feels much better. This is excellent news. I recommend that she follow-up with me as needed.  "

## 2025-07-09 ENCOUNTER — TELEPHONE (OUTPATIENT)
Dept: PHARMACY | Facility: HOSPITAL | Age: 75
End: 2025-07-09
Payer: MEDICARE

## 2025-07-09 ENCOUNTER — HOME CARE VISIT (OUTPATIENT)
Dept: HOME HEALTH SERVICES | Facility: HOME HEALTH | Age: 75
End: 2025-07-09
Payer: MEDICARE

## 2025-07-09 ENCOUNTER — TELEPHONE (OUTPATIENT)
Age: 75
End: 2025-07-09

## 2025-07-09 VITALS — OXYGEN SATURATION: 96 % | HEART RATE: 82 BPM | TEMPERATURE: 98.1 F

## 2025-07-09 VITALS
HEART RATE: 80 BPM | DIASTOLIC BLOOD PRESSURE: 62 MMHG | SYSTOLIC BLOOD PRESSURE: 114 MMHG | TEMPERATURE: 98 F | OXYGEN SATURATION: 97 % | RESPIRATION RATE: 16 BRPM

## 2025-07-09 DIAGNOSIS — N30.01 ACUTE CYSTITIS WITH HEMATURIA: Primary | ICD-10-CM

## 2025-07-09 PROCEDURE — G0299 HHS/HOSPICE OF RN EA 15 MIN: HCPCS | Mod: HHH

## 2025-07-09 PROCEDURE — G0157 HHC PT ASSISTANT EA 15: HCPCS | Mod: CQ,HHH

## 2025-07-09 RX ORDER — CIPROFLOXACIN 250 MG/1
250 TABLET, FILM COATED ORAL 2 TIMES DAILY
Qty: 6 TABLET | Refills: 0 | Status: SHIPPED | OUTPATIENT
Start: 2025-07-09 | End: 2025-07-12

## 2025-07-09 ASSESSMENT — ENCOUNTER SYMPTOMS
PAIN SEVERITY GOAL: 4/10
PERSON REPORTING PAIN: PATIENT
HIGHEST PAIN SEVERITY IN PAST 24 HOURS: 3/10
APPETITE LEVEL: GOOD
PAIN: 1
DENIES PAIN: 1
LOWEST PAIN SEVERITY IN PAST 24 HOURS: 0/10
PAIN LOCATION - PAIN SEVERITY: 3/10
MUSCLE WEAKNESS: 1
PAIN LOCATION: ABDOMEN
PAIN LOCATION: ABDOMEN
PERSON REPORTING PAIN: PATIENT

## 2025-07-09 NOTE — TELEPHONE ENCOUNTER
LM for patient to contact our office to r/s her 8/13/25 appointment with dr. Jenkins as the provider will be out of the office. I was going to offer her an appointment on 8/27 when we are back in [place].

## 2025-07-09 NOTE — PROGRESS NOTES
EDPD Note: Bug-Drug Mismatch    Contacted Mr./Mrs./Ms. Gisella Nicholas regarding a positive urine culture/result that was taken during their recent emergency room visit. I completed education with patient. The patient is not being treated appropriately with Augmentin 875-125mg BID x10.     Patient presented to the ED for hematuria following surgery on ovary. Bladder was cut during surgery. Also reported dysuria, suprapubic tenderness. At tie of call, patient reports feeling better, has been taking antibiotic as prescribed. Advised patient to finish full course of antibiotic and follow up with PCP or urgent care if symptoms do not completely resolve.      The following prescription was sent to the patient's preferred pharmacy. No further follow up needed from EDPD Team.   Drug: Ciprofloxacin 250mg  Sig: BID x3  Days Supply: 3    Susceptibility data from last 90 days.  Collected Specimen Info Organism Aztreonam Cefepime Ceftazidime Ciprofloxacin Levofloxacin Piperacillin/Tazobactam Tobramycin   07/05/25 Urine from Indwelling ([place]) Catheter Pseudomonas aeruginosa  S  S  S  S  S  S  S       Stephy Barreto, JuarezD

## 2025-07-10 SDOH — HEALTH STABILITY: PHYSICAL HEALTH
EXERCISE COMMENTS: STRENGTH TRAINING WITH SEATED AP, LAQ, HIP FLEXION, HIP AB/ADDUCTION, GLUTE PRESSES, AND FOCUS ON ALIGNMENT AND PACING, BREATHING OUT WITH EXERTION FOR 12-15 REPS + VERBAL REINFORCEMENT FOR EXERCISING AS TOLERATED. COMPLETED 10 REPS INCENTIVE SPIROME

## 2025-07-10 SDOH — HEALTH STABILITY: PHYSICAL HEALTH: EXERCISE COMMENTS: TER TO FACILITATE IMPROVED LE VENOUS RETURN AND IMPROVED ACTIVE VENTILATION WITH ACTIVITY.

## 2025-07-16 ENCOUNTER — HOME CARE VISIT (OUTPATIENT)
Dept: HOME HEALTH SERVICES | Facility: HOME HEALTH | Age: 75
End: 2025-07-16
Payer: MEDICARE

## 2025-07-16 VITALS — OXYGEN SATURATION: 98 % | HEART RATE: 89 BPM

## 2025-07-16 VITALS
SYSTOLIC BLOOD PRESSURE: 122 MMHG | DIASTOLIC BLOOD PRESSURE: 68 MMHG | RESPIRATION RATE: 16 BRPM | HEART RATE: 76 BPM | TEMPERATURE: 98 F

## 2025-07-16 LAB
LABORATORY COMMENT REPORT: NORMAL
Lab: NORMAL
PATH REPORT.FINAL DX SPEC: NORMAL
PATH REPORT.GROSS SPEC: NORMAL
PATH REPORT.RELEVANT HX SPEC: NORMAL
PATH REPORT.TOTAL CANCER: NORMAL

## 2025-07-16 PROCEDURE — G0299 HHS/HOSPICE OF RN EA 15 MIN: HCPCS | Mod: HHH

## 2025-07-16 PROCEDURE — G0157 HHC PT ASSISTANT EA 15: HCPCS | Mod: CQ,HHH

## 2025-07-16 ASSESSMENT — ENCOUNTER SYMPTOMS
PAIN LOCATION - PAIN SEVERITY: 0/10
PAIN SEVERITY GOAL: 4/10
LOWEST PAIN SEVERITY IN PAST 24 HOURS: 0/10
HIGHEST PAIN SEVERITY IN PAST 24 HOURS: 3/10
PAIN: 1
PERSON REPORTING PAIN: PATIENT
MUSCLE WEAKNESS: 1
LAST BOWEL MOVEMENT: 67402
PAIN LOCATION: ABDOMEN
APPETITE LEVEL: GOOD

## 2025-07-17 SDOH — HEALTH STABILITY: PHYSICAL HEALTH
EXERCISE COMMENTS: COMPLETED STRENGTH TRAINING SEATED AP, LAQ, HIP FLEXION, GLUTE PRESSES, HIP AB/ADDUCTION AND STANDING HEEL RAISE, MARCHES 15X WITH INSTRUCTIONS/REINFORCEMENT FOR ALIGNMENT AND PACING, BREATHING OUT WITH EXERTION.

## 2025-07-18 ENCOUNTER — HOME CARE VISIT (OUTPATIENT)
Dept: HOME HEALTH SERVICES | Facility: HOME HEALTH | Age: 75
End: 2025-07-18
Payer: MEDICARE

## 2025-07-18 ENCOUNTER — HOSPITAL ENCOUNTER (OUTPATIENT)
Dept: WOMENS IMAGING | Age: 75
Discharge: HOME | End: 2025-07-20
Attending: INTERNAL MEDICINE
Payer: MEDICARE

## 2025-07-18 ENCOUNTER — LAB (OUTPATIENT)
Dept: LAB | Facility: HOSPITAL | Age: 75
End: 2025-07-18
Payer: MEDICARE

## 2025-07-18 ENCOUNTER — TELEPHONE (OUTPATIENT)
Dept: GYNECOLOGIC ONCOLOGY | Facility: HOSPITAL | Age: 75
End: 2025-07-18
Payer: MEDICARE

## 2025-07-18 ENCOUNTER — HOSPITAL ENCOUNTER (OUTPATIENT)
Dept: WOMENS IMAGING | Age: 75
Discharge: HOME | End: 2025-07-20
Attending: SURGERY
Payer: MEDICARE

## 2025-07-18 DIAGNOSIS — R92.8 ABNORMAL MAMMOGRAM OF LEFT BREAST: ICD-10-CM

## 2025-07-18 DIAGNOSIS — N64.9 BREAST DISORDER: ICD-10-CM

## 2025-07-18 DIAGNOSIS — C50.212 CARCINOMA OF UPPER-INNER QUADRANT OF LEFT BREAST IN FEMALE, ESTROGEN RECEPTOR POSITIVE (HCC): ICD-10-CM

## 2025-07-18 DIAGNOSIS — Z17.0 CARCINOMA OF UPPER-INNER QUADRANT OF LEFT BREAST IN FEMALE, ESTROGEN RECEPTOR POSITIVE (HCC): ICD-10-CM

## 2025-07-18 DIAGNOSIS — Z78.0 POSTMENOPAUSAL: ICD-10-CM

## 2025-07-18 DIAGNOSIS — R30.0 DYSURIA: ICD-10-CM

## 2025-07-18 LAB
APPEARANCE UR: CLEAR
BACTERIA #/AREA URNS AUTO: ABNORMAL /HPF
BILIRUB UR STRIP.AUTO-MCNC: NEGATIVE MG/DL
CAOX CRY #/AREA UR COMP ASSIST: ABNORMAL /HPF
COLOR UR: YELLOW
GLUCOSE UR STRIP.AUTO-MCNC: NORMAL MG/DL
HYALINE CASTS #/AREA URNS AUTO: ABNORMAL /LPF
KETONES UR STRIP.AUTO-MCNC: NEGATIVE MG/DL
LEUKOCYTE ESTERASE UR QL STRIP.AUTO: ABNORMAL
MUCOUS THREADS #/AREA URNS AUTO: ABNORMAL /LPF
NITRITE UR QL STRIP.AUTO: NEGATIVE
PH UR STRIP.AUTO: 6.5 [PH]
PROT UR STRIP.AUTO-MCNC: ABNORMAL MG/DL
RBC # UR STRIP.AUTO: ABNORMAL MG/DL
RBC #/AREA URNS AUTO: >20 /HPF
SP GR UR STRIP.AUTO: 1.02
SQUAMOUS #/AREA URNS AUTO: ABNORMAL /HPF
UROBILINOGEN UR STRIP.AUTO-MCNC: NORMAL MG/DL
WBC #/AREA URNS AUTO: ABNORMAL /HPF

## 2025-07-18 PROCEDURE — G0279 TOMOSYNTHESIS, MAMMO: HCPCS

## 2025-07-18 PROCEDURE — 87086 URINE CULTURE/COLONY COUNT: CPT

## 2025-07-18 PROCEDURE — 81001 URINALYSIS AUTO W/SCOPE: CPT

## 2025-07-18 PROCEDURE — 77080 DXA BONE DENSITY AXIAL: CPT

## 2025-07-18 NOTE — TELEPHONE ENCOUNTER
Patients  called to report bright red vaginal bleeding that started yesterday late afternoon/early evening.   Spoke with patient who states she noticed small amount of blood in Depends and on tissue after void.  States she did not see any blood in the toilet.    + intermittent pelvic cramping that started yesterday.   Denies fever.  Denies bowel symptom, n/v, abdominal pain.  Message routed to Dr. Peterson, Dr. Fair and Hannah Yoon, CNP    [id number]   Phoned patient and her  to notify regarding Dr. Castellanos recommendation for urine test to rule out infection.   Instructed patients  to call office back or proceed to ER for evaluation if vaginal bleeding increases or pelvic pain worsens.     verbalized his understanding of information given.    Patient will submit urine specimen today.    Order pended to Dr. Peterson.

## 2025-07-20 LAB
BACTERIA UR CULT: NORMAL
HOLD SPECIMEN: NORMAL

## 2025-07-21 ENCOUNTER — HOME CARE VISIT (OUTPATIENT)
Dept: HOME HEALTH SERVICES | Facility: HOME HEALTH | Age: 75
End: 2025-07-21
Payer: MEDICARE

## 2025-07-21 ENCOUNTER — TELEPHONE (OUTPATIENT)
Dept: GYNECOLOGIC ONCOLOGY | Facility: HOSPITAL | Age: 75
End: 2025-07-21
Payer: MEDICARE

## 2025-07-21 VITALS
DIASTOLIC BLOOD PRESSURE: 52 MMHG | TEMPERATURE: 100.1 F | OXYGEN SATURATION: 98 % | HEART RATE: 81 BPM | SYSTOLIC BLOOD PRESSURE: 104 MMHG

## 2025-07-21 PROCEDURE — G0157 HHC PT ASSISTANT EA 15: HCPCS | Mod: CQ,HHH

## 2025-07-21 ASSESSMENT — ENCOUNTER SYMPTOMS
PERSON REPORTING PAIN: PATIENT
HIGHEST PAIN SEVERITY IN PAST 24 HOURS: 2/10
PAIN LOCATION: BACK
PAIN LOCATION - PAIN SEVERITY: 0/10
PAIN: 1

## 2025-07-21 NOTE — PROGRESS NOTES
Patient ID: Gisella Nicholas is a 74 y.o. female   Referring Physician: No referring provider defined for this encounter.   Primary Care Provider: Joel Maxwell MD     Subjective      HPI  74 y.o. with pelvic mass s/p TLH, BSO, oversewing of rectum, cystotomy repair.     Interval History:  Postoperative course was notable for ER visit  for cadet catheter discomfort and hematuria, found to have Pseudomonas UTI s/p treatment with ciprofloxacin. CT at that time with 5cm hematoma in left lower quadrant. Seen  with no evidence of leak on cystogram, healing well, able to void spontaneously. Had small amount of red blood in toilet after voiding on , repeat UCx with no significant growth. Developed an isolated fever of 101F on  while working with home PT, has not recurred.     Today, endorses some groin pain with voiding. Appetite not back to baseline yet, but tolerating PO. Having firm BM.     Denies fevers/chills, chest pain, shortness of breath, nausea/vomiting, abdominal pain, constipation, diarrhea, dysuria, hematuria, vaginal bleeding, vaginal discharge, any other concerning symptoms.     12-point review of systems obtained and otherwise normal.       PMHx: L breast cancer (ER pos, on anastrazole), HTN, Parkinson's Disease, Osteoarthritis, Ulcerative Colitis   PSHx: C/S x1, breast biopsy, knee surgery, hip surgery, shoulder surgery, strabismus surgery, TLH/BSO/cystotomy repair  FHx: No family history of ovarian, uterine, or colon cancer   SHx: Occasional alcohol use, marijuana gummies. Never smoker. Retired    OBGYNHx: , C/S x1,  x2. Menopause in late 50s. No PMB, no HRT. No h/o abnl pap      Objective      /74 (BP Location: Right arm, Patient Position: Sitting, BP Cuff Size: Adult)   Pulse 88   Temp 36.1 °C (97 °F)   Resp 17   Wt 55.3 kg (122 lb)   SpO2 99%   BMI 23.05 kg/m²    1.54 meters squared   Daily Weight  25 : 55.3 kg (122 lb)  25 : 57.6 kg (127  lb)  07/01/25 : 60.9 kg (134 lb 4.2 oz)         Physical Exam  Vitals and nursing note reviewed. Exam conducted with a chaperone present.   Constitutional:       Appearance: Normal appearance.   HENT:      Head: Normocephalic.      Nose: Nose normal.      Mouth/Throat:      Mouth: Mucous membranes are moist.     Eyes:      Extraocular Movements: Extraocular movements intact.      Conjunctiva/sclera: Conjunctivae normal.       Cardiovascular:      Rate and Rhythm: Normal rate and regular rhythm.   Pulmonary:      Effort: Pulmonary effort is normal.      Breath sounds: Normal breath sounds.   Abdominal:      General: There is no distension.      Palpations: Abdomen is soft.      Tenderness: There is no abdominal tenderness.      Comments: Laparoscopic incisions healing well   Genitourinary:     Comments: Speculum Exam: Normal external genitalia, vaginal cuff visually intact with small amount of dark blood draining at left apex, friable area on posterior cuff  Bimanual Exam: No masses, lesions, tenderness; cuff intact but palpably thin along left apex    Musculoskeletal:      Cervical back: Normal range of motion.     Neurological:      Mental Status: She is alert.              Performance Status:  Symptomatic; in bed <50% of the day    Assessment/Plan    74 y.o. with pelvic mass who presents for postoperative visit.     Oncology History    No history exists.     Treatment Plans       No treatment plans exist           # Pelvic Mass  - Pathology reviewed, benign    # Post Op  - Abdominal incisions well healed  - Persistent discomfort with voiding, hematoma on imaging with active drainage through left vaginal cuff; differential includes draining hematoma, nephrolithiasis, routine postoperative healing; plan for follow up visit in 2 weeks for repeat exam  - Reviewed ongoing restrictions (no lifting more than 10-15lb, nothing per vagina, no soaking)  - Encouraged to call office for any concerns including vaginal bleeding,  increased vaginal discharge or any other concerning symptoms    # Constipation  - Encouraged hydration  - Can increase senna to two tabs BID  - Encouraged use of Miralax and MoM as needed    Seen and discussed with Dr. Peterson.     Lola Fair MD  Gynecologic Oncology Fellow

## 2025-07-21 NOTE — TELEPHONE ENCOUNTER
Patients  called to notify that home PT visited patient this morning.   PT took patients temperature and after performing home exercises temperature per patients  = 101.      states patient denies pain, abdominal incision are well approximated without erythema/drainage.   Denies increase pain, SOB, cough.    Vaginal bleeding reported on 7/18 has decreased.    Urine culture collected on 7/18 is negative for infection.     Instructed patients  to recheck temperature when he gets home early this afternoon.   verbalized his understanding of information given and states he will call office back this afternoon with patients temperature.    Message routed to Dr. Fair, Dr. Peterson, and Yadi Monsalve, CNP    [phone]   Patients  called to update that patients temperature is 98.6.    Message routed to Dr. Peterson to update.

## 2025-07-22 SDOH — HEALTH STABILITY: PHYSICAL HEALTH
EXERCISE COMMENTS: QUATS AND HIP EXTENSION WITH INSTRUCTIONS/DEMONSTRATION FOR ALIGNMENT AND TECHNIQUE, BREATHING OUT WITH EXERTION 15X.

## 2025-07-22 SDOH — HEALTH STABILITY: PHYSICAL HEALTH
EXERCISE COMMENTS: QUBI EXERCISE 5 MINUTES WITH INSTRUCTIONS FOR MAINTAINING ROTATIONS WHILE DEEP BREATHING, SELF-MONITORING FOR TOLERANCE AND TECHNIQUE.    COMPLETED STRENGTH TRAINING IN STANDING HEEL RAISE, HIP FLEXION, HIP ABDUCTION, ALTERNATING KNEE FLEXION, MINI-S

## 2025-07-23 ENCOUNTER — HOME CARE VISIT (OUTPATIENT)
Dept: HOME HEALTH SERVICES | Facility: HOME HEALTH | Age: 75
End: 2025-07-23
Payer: MEDICARE

## 2025-07-23 PROCEDURE — G0157 HHC PT ASSISTANT EA 15: HCPCS | Mod: CQ,HHH

## 2025-07-23 ASSESSMENT — ENCOUNTER SYMPTOMS
PAIN LOCATION: GROIN
HIGHEST PAIN SEVERITY IN PAST 24 HOURS: 7/10
PAIN: 1
PAIN LOCATION - PAIN SEVERITY: 3/10
PERSON REPORTING PAIN: PATIENT

## 2025-07-24 SDOH — HEALTH STABILITY: PHYSICAL HEALTH
EXERCISE COMMENTS: COMPLETED STRENGTH TRAINING WITH REINFORCEMENT FOR ALIGNMENT, UPRIGHT POSTURE AND VOCALIZATION OF REPS 15X EACH , HEEL RAISE, HIP FLEXION, HIP ABDUCTION, EXTENSION, MINI-SQUATS AND ALTERNATING KNEE FLEXION.

## 2025-07-25 ENCOUNTER — OFFICE VISIT (OUTPATIENT)
Dept: GYNECOLOGIC ONCOLOGY | Facility: HOSPITAL | Age: 75
End: 2025-07-25
Payer: MEDICARE

## 2025-07-25 VITALS
SYSTOLIC BLOOD PRESSURE: 138 MMHG | RESPIRATION RATE: 17 BRPM | WEIGHT: 122 LBS | HEART RATE: 88 BPM | DIASTOLIC BLOOD PRESSURE: 74 MMHG | OXYGEN SATURATION: 99 % | BODY MASS INDEX: 23.05 KG/M2 | TEMPERATURE: 97 F

## 2025-07-25 DIAGNOSIS — K59.00 CONSTIPATION, UNSPECIFIED CONSTIPATION TYPE: ICD-10-CM

## 2025-07-25 DIAGNOSIS — R19.00 PELVIC MASS: Primary | ICD-10-CM

## 2025-07-25 DIAGNOSIS — Z09 POSTOP CHECK: ICD-10-CM

## 2025-07-25 PROCEDURE — 1159F MED LIST DOCD IN RCRD: CPT | Performed by: STUDENT IN AN ORGANIZED HEALTH CARE EDUCATION/TRAINING PROGRAM

## 2025-07-25 PROCEDURE — 99211 OFF/OP EST MAY X REQ PHY/QHP: CPT | Performed by: STUDENT IN AN ORGANIZED HEALTH CARE EDUCATION/TRAINING PROGRAM

## 2025-07-25 PROCEDURE — 3078F DIAST BP <80 MM HG: CPT | Performed by: STUDENT IN AN ORGANIZED HEALTH CARE EDUCATION/TRAINING PROGRAM

## 2025-07-25 PROCEDURE — 1160F RVW MEDS BY RX/DR IN RCRD: CPT | Performed by: STUDENT IN AN ORGANIZED HEALTH CARE EDUCATION/TRAINING PROGRAM

## 2025-07-25 PROCEDURE — 3075F SYST BP GE 130 - 139MM HG: CPT | Performed by: STUDENT IN AN ORGANIZED HEALTH CARE EDUCATION/TRAINING PROGRAM

## 2025-07-29 ENCOUNTER — HOME CARE VISIT (OUTPATIENT)
Dept: HOME HEALTH SERVICES | Facility: HOME HEALTH | Age: 75
End: 2025-07-29
Payer: MEDICARE

## 2025-07-29 ENCOUNTER — TELEPHONE (OUTPATIENT)
Dept: NEUROLOGY | Facility: CLINIC | Age: 75
End: 2025-07-29
Payer: MEDICARE

## 2025-07-29 VITALS
SYSTOLIC BLOOD PRESSURE: 113 MMHG | DIASTOLIC BLOOD PRESSURE: 53 MMHG | OXYGEN SATURATION: 97 % | HEART RATE: 80 BPM | TEMPERATURE: 98 F

## 2025-07-29 DIAGNOSIS — G20.A1 PARKINSON'S DISEASE, UNSPECIFIED WHETHER DYSKINESIA PRESENT, UNSPECIFIED WHETHER MANIFESTATIONS FLUCTUATE: ICD-10-CM

## 2025-07-29 PROCEDURE — G0157 HHC PT ASSISTANT EA 15: HCPCS | Mod: CQ,HHH

## 2025-07-29 RX ORDER — CARBIDOPA AND LEVODOPA 25; 100 MG/1; MG/1
2 TABLET ORAL 4 TIMES DAILY
Qty: 720 TABLET | Refills: 3 | Status: SHIPPED | OUTPATIENT
Start: 2025-07-29

## 2025-07-29 ASSESSMENT — ENCOUNTER SYMPTOMS
PAIN: 1
PERSON REPORTING PAIN: PATIENT

## 2025-07-29 NOTE — TELEPHONE ENCOUNTER
Noah ( spouse called) Gisella needs a new script for  Carbidopa/ Levodopa 25/100 2 QID. She has since discontinued Crexont. She stated she took it for 3 weeks and had agitation and canker sores in her mouth. She has since resumed her prior med regimine. She was seen in the ER ( they ordered Carbidopa/ Levodopa 25/100)   But he did not pick it up. He would like you to cancel that order as he would like all her meds to have the correct provider with correct instructions. Please send #[phone] with 3 refills to [facility] on file.

## 2025-07-30 SDOH — HEALTH STABILITY: PHYSICAL HEALTH
EXERCISE COMMENTS: STRENGTH TRAINING WITH STANDING HEEL RAISE, MARCHES, ALTERNATING KNEE FLEXION, MINI-SQUATS, HIP ABDUCTION AND HIP EXTENSION 15X WITH REINFORCEMENT FOR VOCALIZATION OF REPS, ALIGNMENT, PACING AND SELF-MONITORING FOR TOLERANCE AND TECHNIQUE.

## 2025-07-31 ENCOUNTER — HOME CARE VISIT (OUTPATIENT)
Dept: HOME HEALTH SERVICES | Facility: HOME HEALTH | Age: 75
End: 2025-07-31
Payer: MEDICARE

## 2025-07-31 PROCEDURE — G0151 HHCP-SERV OF PT,EA 15 MIN: HCPCS | Mod: HHH

## 2025-07-31 ASSESSMENT — ACTIVITIES OF DAILY LIVING (ADL)
OASIS_M1830: 02
HOME_HEALTH_OASIS: 00

## 2025-07-31 ASSESSMENT — ENCOUNTER SYMPTOMS
PERSON REPORTING PAIN: PATIENT
DENIES PAIN: 1

## 2025-08-01 ENCOUNTER — TELEPHONE (OUTPATIENT)
Dept: HEMATOLOGY/ONCOLOGY | Facility: CLINIC | Age: 75
End: 2025-08-01
Payer: MEDICARE

## 2025-08-01 NOTE — TELEPHONE ENCOUNTER
Patient called into nurse's private line and left the following VM: patient's  Tra called in to update providers and team.  He stated that since Friday patient is experincing more vaginal bleeding. Patient had follow up with team on 7- and was having spotting then. Tra stated that he know the patient has a visit on 8-8-2025 (Friday) for follow up. He is asking about next steps and if the team will like to bring her in sooner to eval    This nurse sent message over to the primary team for more information. Will return call to patient once information is received.    SANGEETA MUNOZ, RN

## 2025-08-04 ENCOUNTER — TELEPHONE (OUTPATIENT)
Dept: WOMENS IMAGING | Age: 75
End: 2025-08-04

## 2025-08-06 NOTE — PROGRESS NOTES
Patient ID: Gisella Nicholas is a 74 y.o. female   Referring Physician: No referring provider defined for this encounter.   Primary Care Provider: Joel Maxwell MD     Subjective      HPI  74 y.o. with benign ovarian mass s/p TLH, BSO, oversewing of rectum, cystotomy repair .    Interval History:  Still having bleeding, bright red like a period with occasional small clots. Denies lightheadedness/dizziness. Continues to have pain with urination occasionally.     Denies fevers/chills, chest pain, shortness of breath, nausea/vomiting, abdominal pain, constipation, diarrhea, dysuria, hematuria, vaginal bleeding, vaginal discharge, any other concerning symptoms.     12-point review of systems obtained and otherwise normal.       PMHx: L breast cancer (ER pos, on anastrazole), HTN, Parkinson's Disease, Osteoarthritis, Ulcerative Colitis   PSHx: C/S x1, breast biopsy, knee surgery, hip surgery, shoulder surgery, strabismus surgery, TLH/BSO/cystotomy repair  FHx: No family history of ovarian, uterine, or colon cancer   SHx: Occasional alcohol use, marijuana gummies. Never smoker. Retired    OBGYNHx: , C/S x1,  x2. Menopause in late 50s. No PMB, no HRT. No h/o abnl pap      Objective      /82 (BP Location: Right arm, Patient Position: Sitting, BP Cuff Size: Adult)   Pulse 89   Temp 36.5 °C (97.7 °F)   Resp 16   Wt 54.4 kg (120 lb)   SpO2 96%   BMI 22.67 kg/m²    1.53 meters squared   Daily Weight  25 : 54.4 kg (120 lb)  25 : 55.3 kg (122 lb)  25 : 57.6 kg (127 lb)         Physical Exam  Vitals and nursing note reviewed.   Constitutional:       Appearance: Normal appearance.   HENT:      Head: Normocephalic.      Nose: Nose normal.      Mouth/Throat:      Mouth: Mucous membranes are moist.     Eyes:      Extraocular Movements: Extraocular movements intact.      Conjunctiva/sclera: Conjunctivae normal.       Cardiovascular:      Rate and Rhythm: Normal rate and regular  rhythm.   Pulmonary:      Effort: Pulmonary effort is normal.      Breath sounds: Normal breath sounds.   Abdominal:      General: There is no distension.      Palpations: Abdomen is soft.      Tenderness: There is no abdominal tenderness.      Comments: Laparoscopic incisions healing well   Genitourinary:     Comments: Normal external genitalia  Speculum Exam: Gray-red discharge pooling in the posterior aspect of the vagina with active drainage of fluid from left aspect of vaginal cuff, visualized ~1 cm separation of vaginal mucosa with intact peritoneum superiorly  Bimanual Exam: Vaginal mucosal defect palpated along left cuff ~1 cm, peritonealization over this area    Musculoskeletal:      Cervical back: Normal range of motion.     Neurological:      Mental Status: She is alert.              Performance Status:  Symptomatic; in bed <50% of the day    Assessment/Plan    74 y.o. with benign pelvic mass who presents for follow up with vaginal mucosal separation.     Oncology History    No history exists.     Treatment Plans       No treatment plans exist           # Post Op  # Vaginal bleeding/discharge  - Abdominal incisions well healed  - Exam concerning for ~1cm separation of vaginal cuff along left aspect of vaginal apex with intact peritoneal layer; discussed plan for conservative management at this point  - Given continued drainage of gray-red fluid, will start antibiotics with levofloxacin/metronidazole  - Discussed indications to present to ER including heavy bleeding, new abdominal pain, increased vaginal drainage, any other concerning/new symptoms  - Plan to see in office in 1 week for repeat exam; discussed the possibility of surgical intervention if no resolution with conservative management    # Constipation  - Encourage hydration  - Continue senna, Miralax and MoM    Seen and discussed with Dr. Bolivar.     Lola Fair MD  Gynecologic Oncology Fellow

## 2025-08-08 ENCOUNTER — OFFICE VISIT (OUTPATIENT)
Dept: GYNECOLOGIC ONCOLOGY | Facility: HOSPITAL | Age: 75
End: 2025-08-08
Payer: MEDICARE

## 2025-08-08 ENCOUNTER — TELEPHONE (OUTPATIENT)
Dept: FAMILY MEDICINE CLINIC | Age: 75
End: 2025-08-08

## 2025-08-08 VITALS
HEART RATE: 89 BPM | WEIGHT: 120 LBS | SYSTOLIC BLOOD PRESSURE: 134 MMHG | BODY MASS INDEX: 22.67 KG/M2 | TEMPERATURE: 97.7 F | RESPIRATION RATE: 16 BRPM | OXYGEN SATURATION: 96 % | DIASTOLIC BLOOD PRESSURE: 82 MMHG

## 2025-08-08 DIAGNOSIS — K59.00 CONSTIPATION, UNSPECIFIED CONSTIPATION TYPE: ICD-10-CM

## 2025-08-08 DIAGNOSIS — Z98.890 POST-OPERATIVE STATE: ICD-10-CM

## 2025-08-08 DIAGNOSIS — N89.8 VAGINAL DISCHARGE: Primary | ICD-10-CM

## 2025-08-08 PROCEDURE — 1160F RVW MEDS BY RX/DR IN RCRD: CPT | Performed by: STUDENT IN AN ORGANIZED HEALTH CARE EDUCATION/TRAINING PROGRAM

## 2025-08-08 PROCEDURE — 3075F SYST BP GE 130 - 139MM HG: CPT | Performed by: STUDENT IN AN ORGANIZED HEALTH CARE EDUCATION/TRAINING PROGRAM

## 2025-08-08 PROCEDURE — 1159F MED LIST DOCD IN RCRD: CPT | Performed by: STUDENT IN AN ORGANIZED HEALTH CARE EDUCATION/TRAINING PROGRAM

## 2025-08-08 PROCEDURE — 3079F DIAST BP 80-89 MM HG: CPT | Performed by: STUDENT IN AN ORGANIZED HEALTH CARE EDUCATION/TRAINING PROGRAM

## 2025-08-08 PROCEDURE — 1126F AMNT PAIN NOTED NONE PRSNT: CPT | Performed by: STUDENT IN AN ORGANIZED HEALTH CARE EDUCATION/TRAINING PROGRAM

## 2025-08-08 PROCEDURE — 99211 OFF/OP EST MAY X REQ PHY/QHP: CPT | Performed by: STUDENT IN AN ORGANIZED HEALTH CARE EDUCATION/TRAINING PROGRAM

## 2025-08-08 RX ORDER — LEVOFLOXACIN 500 MG/1
500 TABLET, FILM COATED ORAL DAILY
Qty: 10 TABLET | Refills: 0 | Status: SHIPPED | OUTPATIENT
Start: 2025-08-08 | End: 2025-08-18

## 2025-08-08 RX ORDER — METRONIDAZOLE 500 MG/1
500 TABLET ORAL 2 TIMES DAILY
Qty: 20 TABLET | Refills: 0 | Status: SHIPPED | OUTPATIENT
Start: 2025-08-08 | End: 2025-08-18

## 2025-08-08 ASSESSMENT — PAIN SCALES - GENERAL: PAINLEVEL_OUTOF10: 0-NO PAIN

## 2025-08-11 ENCOUNTER — TELEPHONE (OUTPATIENT)
Dept: OTOLARYNGOLOGY | Facility: CLINIC | Age: 75
End: 2025-08-11
Payer: MEDICARE

## 2025-08-11 ENCOUNTER — TELEPHONE (OUTPATIENT)
Dept: NEUROLOGY | Facility: CLINIC | Age: 75
End: 2025-08-11
Payer: MEDICARE

## 2025-08-11 DIAGNOSIS — G20.A1 PARKINSON'S DISEASE, UNSPECIFIED WHETHER DYSKINESIA PRESENT, UNSPECIFIED WHETHER MANIFESTATIONS FLUCTUATE: ICD-10-CM

## 2025-08-11 RX ORDER — AMANTADINE HYDROCHLORIDE 100 MG/1
100 CAPSULE, GELATIN COATED ORAL 2 TIMES DAILY
Qty: 180 CAPSULE | Refills: 3 | Status: SHIPPED | OUTPATIENT
Start: 2025-08-11 | End: 2026-08-11

## 2025-08-15 ENCOUNTER — OFFICE VISIT (OUTPATIENT)
Dept: GYNECOLOGIC ONCOLOGY | Facility: HOSPITAL | Age: 75
End: 2025-08-15
Payer: MEDICARE

## 2025-08-15 VITALS
BODY MASS INDEX: 22.48 KG/M2 | TEMPERATURE: 98.1 F | RESPIRATION RATE: 16 BRPM | HEART RATE: 81 BPM | OXYGEN SATURATION: 94 % | SYSTOLIC BLOOD PRESSURE: 149 MMHG | DIASTOLIC BLOOD PRESSURE: 64 MMHG | WEIGHT: 119 LBS

## 2025-08-15 DIAGNOSIS — T81.328D DEHISCENCE OF VAGINAL CUFF, SUBSEQUENT ENCOUNTER: Primary | ICD-10-CM

## 2025-08-15 PROCEDURE — 3077F SYST BP >= 140 MM HG: CPT | Performed by: STUDENT IN AN ORGANIZED HEALTH CARE EDUCATION/TRAINING PROGRAM

## 2025-08-15 PROCEDURE — 3078F DIAST BP <80 MM HG: CPT | Performed by: STUDENT IN AN ORGANIZED HEALTH CARE EDUCATION/TRAINING PROGRAM

## 2025-08-15 PROCEDURE — 1126F AMNT PAIN NOTED NONE PRSNT: CPT | Performed by: STUDENT IN AN ORGANIZED HEALTH CARE EDUCATION/TRAINING PROGRAM

## 2025-08-15 PROCEDURE — 99211 OFF/OP EST MAY X REQ PHY/QHP: CPT | Performed by: STUDENT IN AN ORGANIZED HEALTH CARE EDUCATION/TRAINING PROGRAM

## 2025-08-15 PROCEDURE — 1160F RVW MEDS BY RX/DR IN RCRD: CPT | Performed by: STUDENT IN AN ORGANIZED HEALTH CARE EDUCATION/TRAINING PROGRAM

## 2025-08-15 PROCEDURE — 1159F MED LIST DOCD IN RCRD: CPT | Performed by: STUDENT IN AN ORGANIZED HEALTH CARE EDUCATION/TRAINING PROGRAM

## 2025-08-15 RX ORDER — ESTRADIOL 10 UG/1
10 TABLET, FILM COATED VAGINAL 2 TIMES WEEKLY
Qty: 18 TABLET | Refills: 0 | Status: SHIPPED | OUTPATIENT
Start: 2025-08-18 | End: 2025-10-20

## 2025-08-15 ASSESSMENT — PAIN SCALES - GENERAL: PAINLEVEL_OUTOF10: 0-NO PAIN

## 2025-08-18 ENCOUNTER — APPOINTMENT (OUTPATIENT)
Dept: OTOLARYNGOLOGY | Facility: CLINIC | Age: 75
End: 2025-08-18
Payer: MEDICARE

## 2025-08-18 DIAGNOSIS — K12.1 STOMATITIS AND MUCOSITIS: ICD-10-CM

## 2025-08-18 DIAGNOSIS — K12.30 STOMATITIS AND MUCOSITIS: ICD-10-CM

## 2025-08-18 DIAGNOSIS — Z98.890 POSTOPERATIVE STATE: ICD-10-CM

## 2025-08-18 DIAGNOSIS — K14.0 GLOSSITIS: Primary | ICD-10-CM

## 2025-08-18 PROCEDURE — 99213 OFFICE O/P EST LOW 20 MIN: CPT | Performed by: OTOLARYNGOLOGY

## 2025-08-18 RX ORDER — AMOXICILLIN 250 MG
2 CAPSULE ORAL 2 TIMES DAILY
Qty: 120 TABLET | Refills: 1 | Status: SHIPPED | OUTPATIENT
Start: 2025-08-18 | End: 2025-10-17

## 2025-08-18 RX ORDER — TRIAMCINOLONE ACETONIDE 1 MG/G
PASTE DENTAL 3 TIMES DAILY
Qty: 15 G | Refills: 1 | Status: SHIPPED | OUTPATIENT
Start: 2025-08-18 | End: 2025-09-17

## 2025-08-27 ENCOUNTER — OFFICE VISIT (OUTPATIENT)
Dept: SURGERY | Age: 75
End: 2025-08-27
Payer: MEDICARE

## 2025-08-27 VITALS
HEART RATE: 82 BPM | HEIGHT: 61 IN | TEMPERATURE: 98.4 F | SYSTOLIC BLOOD PRESSURE: 132 MMHG | WEIGHT: 121.2 LBS | OXYGEN SATURATION: 99 % | BODY MASS INDEX: 22.88 KG/M2 | DIASTOLIC BLOOD PRESSURE: 88 MMHG | RESPIRATION RATE: 16 BRPM

## 2025-08-27 DIAGNOSIS — C50.212 CARCINOMA OF UPPER-INNER QUADRANT OF LEFT BREAST IN FEMALE, ESTROGEN RECEPTOR POSITIVE (HCC): Primary | ICD-10-CM

## 2025-08-27 DIAGNOSIS — Z17.0 CARCINOMA OF UPPER-INNER QUADRANT OF LEFT BREAST IN FEMALE, ESTROGEN RECEPTOR POSITIVE (HCC): Primary | ICD-10-CM

## 2025-08-27 PROBLEM — K12.30 STOMATITIS AND MUCOSITIS: Status: ACTIVE | Noted: 2025-06-03

## 2025-08-27 PROBLEM — R19.00 PELVIC MASS: Status: ACTIVE | Noted: 2025-03-14

## 2025-08-27 PROBLEM — K12.1 STOMATITIS AND MUCOSITIS: Status: ACTIVE | Noted: 2025-06-03

## 2025-08-27 PROBLEM — K14.0 GLOSSITIS: Status: ACTIVE | Noted: 2025-06-03

## 2025-08-27 PROCEDURE — 99213 OFFICE O/P EST LOW 20 MIN: CPT | Performed by: SURGERY

## 2025-08-27 PROCEDURE — G8427 DOCREV CUR MEDS BY ELIG CLIN: HCPCS | Performed by: SURGERY

## 2025-08-27 PROCEDURE — 3079F DIAST BP 80-89 MM HG: CPT | Performed by: SURGERY

## 2025-08-27 PROCEDURE — G8399 PT W/DXA RESULTS DOCUMENT: HCPCS | Performed by: SURGERY

## 2025-08-27 PROCEDURE — 3075F SYST BP GE 130 - 139MM HG: CPT | Performed by: SURGERY

## 2025-08-27 PROCEDURE — 1036F TOBACCO NON-USER: CPT | Performed by: SURGERY

## 2025-08-27 PROCEDURE — 1126F AMNT PAIN NOTED NONE PRSNT: CPT | Performed by: SURGERY

## 2025-08-27 PROCEDURE — 1159F MED LIST DOCD IN RCRD: CPT | Performed by: SURGERY

## 2025-08-27 PROCEDURE — G8420 CALC BMI NORM PARAMETERS: HCPCS | Performed by: SURGERY

## 2025-08-27 PROCEDURE — 1090F PRES/ABSN URINE INCON ASSESS: CPT | Performed by: SURGERY

## 2025-08-27 PROCEDURE — 1123F ACP DISCUSS/DSCN MKR DOCD: CPT | Performed by: SURGERY

## 2025-08-27 RX ORDER — POLYETHYLENE GLYCOL 3350 17 G/17G
17 POWDER, FOR SOLUTION ORAL DAILY PRN
COMMUNITY
Start: 2025-07-01 | End: 2025-08-30

## 2025-08-27 RX ORDER — ESTRADIOL 10 UG/1
10 TABLET, FILM COATED VAGINAL
COMMUNITY
Start: 2025-08-18 | End: 2025-10-20

## 2025-08-27 RX ORDER — IBUPROFEN 600 MG/1
600 TABLET, FILM COATED ORAL EVERY 6 HOURS PRN
COMMUNITY
Start: 2025-07-01

## 2025-08-27 RX ORDER — TRIAMCINOLONE ACETONIDE 0.1 %
PASTE (GRAM) DENTAL 3 TIMES DAILY
COMMUNITY
Start: 2025-08-18 | End: 2025-09-17

## 2025-09-15 ENCOUNTER — APPOINTMENT (OUTPATIENT)
Dept: NEUROLOGY | Facility: CLINIC | Age: 75
End: 2025-09-15
Payer: MEDICARE

## 2025-09-16 ENCOUNTER — APPOINTMENT (OUTPATIENT)
Dept: OTOLARYNGOLOGY | Facility: CLINIC | Age: 75
End: 2025-09-16
Payer: MEDICARE

## 2025-09-19 ENCOUNTER — APPOINTMENT (OUTPATIENT)
Dept: OTOLARYNGOLOGY | Facility: CLINIC | Age: 75
End: 2025-09-19
Payer: MEDICARE

## (undated) DEVICE — OCCLUDER, COLPO-PNEUMO

## (undated) DEVICE — SUTURE VCRL SZ 2-0 L27IN ABSRB UD L26MM CT-2 1/2 CIR J269H

## (undated) DEVICE — ABSORBENT ROLL ECODRI-SAFE

## (undated) DEVICE — TUBING PMP L8FT LNG W/ CONN FOR AR-6400 REDEUCE

## (undated) DEVICE — DRAPE,U/ SHT,SPLIT,PLAS,STERIL: Brand: MEDLINE

## (undated) DEVICE — SINGLE PORT MANIFOLD: Brand: NEPTUNE 2

## (undated) DEVICE — ADHESIVE SKIN CLSR 0.7ML TOP DERMBND ADV

## (undated) DEVICE — SLING ARM M FOR 11-13IN UNIV PREM QUAL BRTH EXTRA PD FAB W/

## (undated) DEVICE — PROBE ARTHSCP SUCT ASD 3.5MM DIA 90DEG

## (undated) DEVICE — DRESSING GZ W1XL8IN COT XRFRM N ADH OVERWRAP CURAD

## (undated) DEVICE — SUTURE, VICRYL, 8-0, 12 IN, TG1406, DA, VIOLET

## (undated) DEVICE — CONMED SCOPE SAVER BITE BLOCK, 20X27 MM: Brand: SCOPE SAVER

## (undated) DEVICE — DRAPE EQUIP TRNSPRT CONTAINMENT FOR BK TAB

## (undated) DEVICE — TRAP POLYP BALEEN

## (undated) DEVICE — NEEDLE, STIMUPLEX, INSULATED, 21GA X 4IN.

## (undated) DEVICE — BRUSH ENDO CLN L90.5IN SHTH DIA1.7MM BRIST DIA5-7MM 2-6MM

## (undated) DEVICE — T4 HOOD

## (undated) DEVICE — SOLUTION, BSS IRRIGATING 15ML

## (undated) DEVICE — APPLICATOR MEDICATED 26 CC SOLUTION HI LT ORNG CHLORAPREP

## (undated) DEVICE — STOCKINETTE,IMPERVIOUS,12X48,STERILE: Brand: MEDLINE

## (undated) DEVICE — SUTURE ETHBND EXCEL SZ 2 L30IN NONABSORBABLE GRN L40MM V-37 MX69G

## (undated) DEVICE — TOWEL, SURGICAL, NEURO, O/R, 16 X 26, BLUE, STERILE

## (undated) DEVICE — CORD, BIPOLAR,  12 FT, DISPOSABLE, LF

## (undated) DEVICE — SYRINGE, 60 CC, LUER LOCK, MONOJECT, W/O CAP, LF

## (undated) DEVICE — CANNULA ARTHSCP L7CM DIA7MM TRNSLUC THRD FLX W/ NO SQUIRT

## (undated) DEVICE — DRESSING, TRANSPARENT, TEGADERM, 2-3/8 X 2-3/4 IN

## (undated) DEVICE — NEEDLE SUT PASS FOR ROT CUF LABRAL REP MULTFI SCORPION

## (undated) DEVICE — SPONGE,LAP,18"X18",DLX,XR,ST,5/PK,40/PK: Brand: MEDLINE

## (undated) DEVICE — REST, HEAD, BAGEL, 9 IN

## (undated) DEVICE — 1016 S-DRAPE IRRIG POUCH 10/BOX: Brand: STERI-DRAPE™

## (undated) DEVICE — SUTURE, MONOCRYL, 4-0, 18 IN, PS2, UNDYED

## (undated) DEVICE — FORCEPS BX L240CM JAW DIA2.8MM L CAP W/ NDL MIC MESH TOOTH

## (undated) DEVICE — CLEANER, WIPE, INSTRUMENT, 3.25 X 3.25 IN

## (undated) DEVICE — DRESSING, GAUZE, PAD, 4 X 4 IN, STERILE

## (undated) DEVICE — SYSTEM, FIOS FIRST ENTRY, 5 X 100MM, KII ADVANCED FIXATION

## (undated) DEVICE — ENDO CARRY-ON PROCEDURE KIT: Brand: ENDO CARRY-ON PROCEDURE KIT

## (undated) DEVICE — NEPTUNE E-SEP SMOKE EVACUATION PENCIL, COATED, 70MM BLADE, PUSH BUTTON SWITCH: Brand: NEPTUNE E-SEP

## (undated) DEVICE — GLOVE ORTHO 7 1/2   MSG9475

## (undated) DEVICE — 3M™ STERI-DRAPE™ U-DRAPE 1015: Brand: STERI-DRAPE™

## (undated) DEVICE — GLOVE ORANGE PI 8 1/2   MSG9085

## (undated) DEVICE — SUTURE MCRYL SZ 4-0 L27IN ABSRB UD L19MM PS-2 1/2 CIR PRIM Y426H

## (undated) DEVICE — TIBURON TOP SHEET: Brand: CONVERTORS

## (undated) DEVICE — TUBE SET, PNEUMOCLEAR, SMOKE EVACU, HIGH-FLOW

## (undated) DEVICE — MANIFOLD, 4 PORT NEPTUNE STANDARD

## (undated) DEVICE — HOLSTER, JET SAFETY

## (undated) DEVICE — Device: Brand: ENDO SMARTCAP

## (undated) DEVICE — DRAPE,SHOULDER,BEACH ULTRAGARD: Brand: MEDLINE

## (undated) DEVICE — TIBURON SPLIT SHEET: Brand: CONVERTORS

## (undated) DEVICE — GOWN, SURGICAL, SMARTGOWN, XLARGE, STERILE

## (undated) DEVICE — GOWN,AURORA,NONREINFORCED,LARGE: Brand: MEDLINE

## (undated) DEVICE — SHEET, DRAPE, SPLIT, STERILE: Brand: MEDLINE

## (undated) DEVICE — GLOVE, SURGICAL, PROTEXIS PI , 6.0, PF, LF

## (undated) DEVICE — TUBING, SUCTION, 1/4" X 10', STRAIGHT: Brand: MEDLINE

## (undated) DEVICE — SUTURE VCRL SZ 1 L36IN ABSRB UD L36MM CT-1 1/2 CIR J947H

## (undated) DEVICE — GLOVE ORTHO 8   MSG9480

## (undated) DEVICE — TUBE SET 96 MM 64 MM H2O PERISTALTIC STD AUX CHANNEL

## (undated) DEVICE — SYRINGE, W/CANNULA, VIAL ACCESS, INTERLINK, W/NEEDLE, 15 G

## (undated) DEVICE — IRRIGATION SET, CYSTOSCOPY, F/CONSTANT/INTERMITTENT, 8 GTT/CC, 77 IN

## (undated) DEVICE — ADAPTER FLSH PMP FLD MGMT GI IRRIG OFP 2 DISPOSABLE

## (undated) DEVICE — INTENDED FOR TISSUE SEPARATION, AND OTHER PROCEDURES THAT REQUIRE A SHARP SURGICAL BLADE TO PUNCTURE OR CUT.: Brand: BARD-PARKER ® CARBON RIB-BACK BLADES

## (undated) DEVICE — SUTURE MCRYL + SZ 3-0 L36IN ABSRB UD CT-1 L36MM 1/2 CIR MCP944H

## (undated) DEVICE — TAPE,ELASTIC,FOAM,CURAD,3"X5.5YD,LF: Brand: CURAD

## (undated) DEVICE — GOWN,SIRUS,POLYRNF,BRTHSLV,XLN/XL,20/CS: Brand: MEDLINE

## (undated) DEVICE — GLOVE ORANGE PI 8   MSG9080

## (undated) DEVICE — Device

## (undated) DEVICE — 2000CC GUARDIAN II: Brand: GUARDIAN

## (undated) DEVICE — 3M™ STERI-STRIP™ REINFORCED ADHESIVE SKIN CLOSURES, R1547, 1/2 IN X 4 IN (12 MM X 100 MM), 6 STRIPS/ENVELOPE: Brand: 3M™ STERI-STRIP™

## (undated) DEVICE — ALCOHOL RUBBING ISO 16OZ 70%

## (undated) DEVICE — LABEL MED MINI W/ MARKER

## (undated) DEVICE — SNARE ENDOSCP AD L240CM LOOP W10MM SHTH DIA2.4MM RND INSUL

## (undated) DEVICE — SUTURE RETRIEVER

## (undated) DEVICE — 4-PORT MANIFOLD: Brand: NEPTUNE 2

## (undated) DEVICE — ELECTRODE PT RET AD L9FT HI MOIST COND ADH HYDRGEL CORDED

## (undated) DEVICE — SUTURE, CTD, VICRYL, 6-0, TG1008

## (undated) DEVICE — SIPS DUAL 2 MINUTE TIP

## (undated) DEVICE — GLOVE ORANGE PI 7 1/2   MSG9075

## (undated) DEVICE — CONNECTOR,T,STERILE: Brand: MEDLINE

## (undated) DEVICE — SUTURE, VICRYL, 0, 27 IN, UR-6, VIOLET

## (undated) DEVICE — YANKAUER,SMOOTH HANDLE,HIGH CAPACITY: Brand: MEDLINE INDUSTRIES, INC.

## (undated) DEVICE — BLADE SAW RESECTOR CUT 4MM

## (undated) DEVICE — PACK ORTHOPEDIC 1 TBL CVR 50X90 REINF 1 MAYO STD CVR 24X53 REINF 4 UTIL

## (undated) DEVICE — NEEDLE REPROC SCORPION MULTIFIRE

## (undated) DEVICE — DRAPE, SHEET, UTILITY, NON ABSORBENT, 18 X 26 IN, LF

## (undated) DEVICE — PAD,ABDOMINAL,8"X10",ST,LF: Brand: MEDLINE

## (undated) DEVICE — COUNTER NDL 40 COUNT HLD 70 FOAM BLK ADH W/ MAG

## (undated) DEVICE — TRAY CATH CATH OD16FR BG F HYDROCOATED

## (undated) DEVICE — DRAPE, PAD, PREP, W/ 9 IN CUFF, 24 X 41, LF, NS

## (undated) DEVICE — SPONGE GZ W4XL4IN COT 12 PLY TYP VII WVN C FLD DSGN

## (undated) DEVICE — GOWN, ASTOUND, L

## (undated) DEVICE — LABEL MED MED CHPOR

## (undated) DEVICE — BLADE SAW W6.3XL60MM THK0.64MM CUT THK1.04MM REPL SHT RECIP

## (undated) DEVICE — COVER LT HNDL BLU PLAS

## (undated) DEVICE — SUTURE STRATAFIX SPRL MCRYL + SZ 3-0 L18IN ABSRB UD PS-2 SXMP1B107

## (undated) DEVICE — DRAPE, LEGGINGS, 48 X 31 IN, STERILE, LF

## (undated) DEVICE — NEEDLE SPNL 18GA L3.5IN W/ QNCKE SHARPER BVL DURA CLICK

## (undated) DEVICE — PUMP, STRYKERFLOW 2 & HANDPIECE W/10FT. IRRIGATION TUBING

## (undated) DEVICE — MARKER SURG SKIN GENTIAN VLT REG TIP W/ 6IN RUL

## (undated) DEVICE — CHLORAPREP 26ML ORANGE

## (undated) DEVICE — APPLICATOR, ENDOSCOPIC, F/SURGICEL POWDER

## (undated) DEVICE — LIGASURE, V SEALER/DIVIDER  5MM BLUNT TIP

## (undated) DEVICE — COVER, CART, 45 X 27 X 48 IN, CLEAR

## (undated) DEVICE — HIGH FLOW TIP

## (undated) DEVICE — TOWEL,OR,DSP,ST,BLUE,STD,4/PK,20PK/CS: Brand: MEDLINE

## (undated) DEVICE — SPONGE, GAUZE, XRAY DECT, 16 PLY, 4 X 4, W/MASTER DMT,STERILE

## (undated) DEVICE — TUBING IRRIGATION 140/160/180/190 SER GI ENDOSCP SMARTCAP

## (undated) DEVICE — KIT CHAIR TRIMANO FOAM W/ SUPP ARM DRP ERGONOMICALLY DESIGNED

## (undated) DEVICE — Z DISCONTINUED PER MEDLINE USE 2741943 DRESSING AQUACEL 10 IN ALG W9XL25CM SIL CVR WTRPRF VIR BACT BARR ANTIMIC

## (undated) DEVICE — SUTURE, VICRYL PLUS 3-0, SH, 27IN

## (undated) DEVICE — TUBE, SALEM SUMP, 16 FR X 48IN, ENFIT

## (undated) DEVICE — CAUTERY, PENCIL, PUSH BUTTON, SMOKE EVAC, 70MM

## (undated) DEVICE — SHEET,DRAPE,53X77,STERILE: Brand: MEDLINE

## (undated) DEVICE — TROCAR SYSTEM, BALLOON, KII GELPORT, 12 X 100MM

## (undated) DEVICE — SYRINGE, MONOJECT, LUER LOCK, 3 CC, LF

## (undated) DEVICE — PREP TRAY, SKIN, DRY, W/GLOVES

## (undated) DEVICE — POSITIONING, THE PINK PAD, PIGAZZI SYSTEM

## (undated) DEVICE — PACK PROCEDURE SURG TOT HIP DRP

## (undated) DEVICE — SYRINGE IRRIG 60ML SFT PLIABLE BLB EZ TO GRP 1 HND USE W/

## (undated) DEVICE — DRAPE, UNDERBUTTOCKS

## (undated) DEVICE — ADVANCED FEMORAL PRESSURIZER, MEDIUM

## (undated) DEVICE — Z DISCONTINUED W/NO SUB ELECTRODE PT RET L9FT HI MOIST COND ADH HYDRGEL CORDED

## (undated) DEVICE — RETRACTOR, CERVICAL CUP, VCARE, STANDARD

## (undated) DEVICE — APPLICATOR, COTTON TIP, 3 IN, WOOD, STERILE

## (undated) DEVICE — ELECTRODE, OPTI2 LAPAROSCOPIC SPATULA, CURVED

## (undated) DEVICE — HEMOSTAT, SURGICEL POWDER 3.0GRAMS

## (undated) DEVICE — RETRIEVAL SYSTEM, MONARCH INZII, 12MM

## (undated) DEVICE — Z DISCONTINUED USE 2272117 DRAPE SURG 3 QTR N INVASIVE 2 LAYR DISP

## (undated) DEVICE — HIP PILLOW, ABDUCTION: Brand: DEROYAL

## (undated) DEVICE — NEEDLE SUT FOR EXPRESSEW LL AND LLL SUT PASS EXPRESSEW LLL